# Patient Record
Sex: FEMALE | Race: OTHER | HISPANIC OR LATINO | URBAN - METROPOLITAN AREA
[De-identification: names, ages, dates, MRNs, and addresses within clinical notes are randomized per-mention and may not be internally consistent; named-entity substitution may affect disease eponyms.]

---

## 2018-10-01 ENCOUNTER — OUTPATIENT (OUTPATIENT)
Dept: OUTPATIENT SERVICES | Facility: HOSPITAL | Age: 83
LOS: 1 days | End: 2018-10-01
Payer: MEDICAID

## 2018-10-01 PROCEDURE — G9001: CPT

## 2018-10-18 ENCOUNTER — EMERGENCY (EMERGENCY)
Facility: HOSPITAL | Age: 83
LOS: 1 days | Discharge: OTHER INSTITUTION | End: 2018-10-18
Attending: STUDENT IN AN ORGANIZED HEALTH CARE EDUCATION/TRAINING PROGRAM
Payer: COMMERCIAL

## 2018-10-18 VITALS
HEART RATE: 83 BPM | OXYGEN SATURATION: 97 % | DIASTOLIC BLOOD PRESSURE: 121 MMHG | TEMPERATURE: 98 F | RESPIRATION RATE: 20 BRPM | SYSTOLIC BLOOD PRESSURE: 189 MMHG

## 2018-10-18 LAB
ALBUMIN SERPL ELPH-MCNC: 4 G/DL — SIGNIFICANT CHANGE UP (ref 3.3–5.2)
ALP SERPL-CCNC: 90 U/L — SIGNIFICANT CHANGE UP (ref 40–120)
ALT FLD-CCNC: 8 U/L — SIGNIFICANT CHANGE UP
ANION GAP SERPL CALC-SCNC: 13 MMOL/L — SIGNIFICANT CHANGE UP (ref 5–17)
APTT BLD: 23.9 SEC — LOW (ref 27.5–37.4)
AST SERPL-CCNC: 15 U/L — SIGNIFICANT CHANGE UP
BASOPHILS # BLD AUTO: 0 K/UL — SIGNIFICANT CHANGE UP (ref 0–0.2)
BASOPHILS NFR BLD AUTO: 0.4 % — SIGNIFICANT CHANGE UP (ref 0–2)
BILIRUB SERPL-MCNC: 0.3 MG/DL — LOW (ref 0.4–2)
BLD GP AB SCN SERPL QL: SIGNIFICANT CHANGE UP
BUN SERPL-MCNC: 24 MG/DL — HIGH (ref 8–20)
CALCIUM SERPL-MCNC: 9.3 MG/DL — SIGNIFICANT CHANGE UP (ref 8.6–10.2)
CHLORIDE SERPL-SCNC: 105 MMOL/L — SIGNIFICANT CHANGE UP (ref 98–107)
CK SERPL-CCNC: 49 U/L — SIGNIFICANT CHANGE UP (ref 25–170)
CO2 SERPL-SCNC: 23 MMOL/L — SIGNIFICANT CHANGE UP (ref 22–29)
CREAT SERPL-MCNC: 1.13 MG/DL — SIGNIFICANT CHANGE UP (ref 0.5–1.3)
EOSINOPHIL # BLD AUTO: 0.3 K/UL — SIGNIFICANT CHANGE UP (ref 0–0.5)
EOSINOPHIL NFR BLD AUTO: 2.4 % — SIGNIFICANT CHANGE UP (ref 0–6)
GLUCOSE SERPL-MCNC: 139 MG/DL — HIGH (ref 70–115)
HCT VFR BLD CALC: 37.2 % — SIGNIFICANT CHANGE UP (ref 37–47)
HGB BLD-MCNC: 11.9 G/DL — LOW (ref 12–16)
INR BLD: 1.02 RATIO — SIGNIFICANT CHANGE UP (ref 0.88–1.16)
LYMPHOCYTES # BLD AUTO: 37.3 % — SIGNIFICANT CHANGE UP (ref 20–55)
LYMPHOCYTES # BLD AUTO: 4.7 K/UL — SIGNIFICANT CHANGE UP (ref 1–4.8)
MCHC RBC-ENTMCNC: 29.8 PG — SIGNIFICANT CHANGE UP (ref 27–31)
MCHC RBC-ENTMCNC: 32 G/DL — SIGNIFICANT CHANGE UP (ref 32–36)
MCV RBC AUTO: 93.2 FL — SIGNIFICANT CHANGE UP (ref 81–99)
MONOCYTES # BLD AUTO: 0.7 K/UL — SIGNIFICANT CHANGE UP (ref 0–0.8)
MONOCYTES NFR BLD AUTO: 5.8 % — SIGNIFICANT CHANGE UP (ref 3–10)
NEUTROPHILS # BLD AUTO: 6.7 K/UL — SIGNIFICANT CHANGE UP (ref 1.8–8)
NEUTROPHILS NFR BLD AUTO: 53.9 % — SIGNIFICANT CHANGE UP (ref 37–73)
PLATELET # BLD AUTO: 315 K/UL — SIGNIFICANT CHANGE UP (ref 150–400)
POTASSIUM SERPL-MCNC: 4.5 MMOL/L — SIGNIFICANT CHANGE UP (ref 3.5–5.3)
POTASSIUM SERPL-SCNC: 4.5 MMOL/L — SIGNIFICANT CHANGE UP (ref 3.5–5.3)
PROT SERPL-MCNC: 7.4 G/DL — SIGNIFICANT CHANGE UP (ref 6.6–8.7)
PROTHROM AB SERPL-ACNC: 11.2 SEC — SIGNIFICANT CHANGE UP (ref 9.8–12.7)
RBC # BLD: 3.99 M/UL — LOW (ref 4.4–5.2)
RBC # FLD: 13.6 % — SIGNIFICANT CHANGE UP (ref 11–15.6)
SODIUM SERPL-SCNC: 141 MMOL/L — SIGNIFICANT CHANGE UP (ref 135–145)
TROPONIN T SERPL-MCNC: <0.01 NG/ML — SIGNIFICANT CHANGE UP (ref 0–0.06)
TYPE + AB SCN PNL BLD: SIGNIFICANT CHANGE UP
WBC # BLD: 12.5 K/UL — HIGH (ref 4.8–10.8)
WBC # FLD AUTO: 12.5 K/UL — HIGH (ref 4.8–10.8)

## 2018-10-18 PROCEDURE — 70496 CT ANGIOGRAPHY HEAD: CPT | Mod: 26

## 2018-10-18 PROCEDURE — 71045 X-RAY EXAM CHEST 1 VIEW: CPT | Mod: 26

## 2018-10-18 PROCEDURE — 70450 CT HEAD/BRAIN W/O DYE: CPT | Mod: 26,59

## 2018-10-18 PROCEDURE — 70498 CT ANGIOGRAPHY NECK: CPT | Mod: 26

## 2018-10-18 PROCEDURE — 99285 EMERGENCY DEPT VISIT HI MDM: CPT | Mod: 25

## 2018-10-18 PROCEDURE — 93010 ELECTROCARDIOGRAM REPORT: CPT

## 2018-10-18 RX ORDER — ALTEPLASE 100 MG
64 KIT INTRAVENOUS ONCE
Qty: 0 | Refills: 0 | Status: COMPLETED | OUTPATIENT
Start: 2018-10-18 | End: 2018-10-18

## 2018-10-18 RX ORDER — ONDANSETRON 8 MG/1
4 TABLET, FILM COATED ORAL ONCE
Qty: 0 | Refills: 0 | Status: COMPLETED | OUTPATIENT
Start: 2018-10-18 | End: 2018-10-18

## 2018-10-18 RX ORDER — ALTEPLASE 100 MG
7 KIT INTRAVENOUS ONCE
Qty: 0 | Refills: 0 | Status: COMPLETED | OUTPATIENT
Start: 2018-10-18 | End: 2018-10-18

## 2018-10-18 RX ADMIN — ONDANSETRON 4 MILLIGRAM(S): 8 TABLET, FILM COATED ORAL at 23:15

## 2018-10-18 RX ADMIN — ALTEPLASE 64 MILLIGRAM(S): KIT at 23:28

## 2018-10-18 RX ADMIN — ALTEPLASE 420 MILLIGRAM(S): KIT at 23:26

## 2018-10-18 NOTE — ED ADULT NURSE NOTE - NSIMPLEMENTINTERV_GEN_ALL_ED
Implemented All Fall with Harm Risk Interventions:  Foreman to call system. Call bell, personal items and telephone within reach. Instruct patient to call for assistance. Room bathroom lighting operational. Non-slip footwear when patient is off stretcher. Physically safe environment: no spills, clutter or unnecessary equipment. Stretcher in lowest position, wheels locked, appropriate side rails in place. Provide visual cue, wrist band, yellow gown, etc. Monitor gait and stability. Monitor for mental status changes and reorient to person, place, and time. Review medications for side effects contributing to fall risk. Reinforce activity limits and safety measures with patient and family. Provide visual clues: red socks.

## 2018-10-18 NOTE — ED PROVIDER NOTE - PROGRESS NOTE DETAILS
Case discussed via telestroke Dr. Yassine Evans pt getting CT scan Case discussed with Dr. De Santiago via telestroke Case discussed with Dr. De Santiago via telestroke. Pt is a candidate for tPA. Radiologist called regarding CT results. R HEATHER noted. Pt is a candidate for tPA as per Dr. De Santiago via telestroke. Call placed to pharmacy to prepare tPA. Pt is a candidate for tPA as per Dr. De Santiago via telestroke. Call placed to pharmacy to prepare tPA. Risks vs benefits of tPA discussed with family member. Family member understands risk and is agreeable to administration of the tPA. tPA administered. Dr. De Santiago requesting CTA for possible intervention. Medically urgent to do even though we do not have labs or blood work. Family understands all risks and benefits and they are agreeable to plan. 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 Dr. De Santiago aware. Transfer initiated Radiologist called regarding CT results. dense L MCA noted. extensive thrombosis from central internal carotid to L MCA. Dr. De Santiago aware. Transfer initiated

## 2018-10-18 NOTE — STROKE CODE NOTE - NIH STROKE SCALE: 4. FACIAL PALSY, QM
(1) Minor paralysis (flattened nasolabial fold, asymmetry on smiling) (3) Complete paralysis of one or both sides (absence of facial movement in the upper and lower face)

## 2018-10-18 NOTE — ED ADULT TRIAGE NOTE - ARRIVAL INFO ADDITIONAL COMMENTS
EMS pre-notified of Code Stroke. Right Sided droop. not following commands, non-verbal EMS pre-notified of possible Code Stroke @10:41. Right Sided droop. not following commands, non-verbal

## 2018-10-18 NOTE — ED PROVIDER NOTE - DIAGNOSIS COUNSELING, MDM
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 Patient was lost to follow-up with onc for management of his malignancies, they were subsequently consulted upon his admission and obtained a CTAP which revealed partial gastrectomy, previously noted soft tissue mass at the gastrectomy margin not well demonstrated on this study. No evidence of bowel obstruction. A new soft tissue mass in the right pelvis (2:100) measures 4.5 x 3.2 cm. This abuts the right aspect of the rectum and is concerning for peritoneal disease. GI consulted for further recommendations regarding pursuit of tissue biopsy.    Allergies:  No Known Allergies    Home Medications:    Hospital Medications:  amLODIPine   Tablet 5 milliGRAM(s) Oral daily  aspirin  chewable 81 milliGRAM(s) Oral daily  atorvastatin 80 milliGRAM(s) Oral at bedtime  clopidogrel Tablet 75 milliGRAM(s) Oral daily  fentaNYL     Lozenge 200 MICROGram(s) Transmucosal every 3 hours PRN  gabapentin 300 milliGRAM(s) Oral three times a day  heparin  Injectable 5000 Unit(s) SubCutaneous every 8 hours  levETIRAcetam  Solution 500 milliGRAM(s) Oral two times a day  pantoprazole   Suspension 40 milliGRAM(s) Oral daily  polyethylene glycol 3350 17 Gram(s) Oral daily  QUEtiapine 25 milliGRAM(s) Oral daily  senna 2 Tablet(s) Oral at bedtime  sodium chloride 0.45%. 1000 milliLiter(s) IV Continuous <Continuous>      PMHX/PSHX:  Prostate hypertrophy  HLD (hyperlipidemia)  Agitation  Depression  Stroke  Syncope  HTN (hypertension)  Gastric tumor  Gastric ulcer with hemorrhage, perforation, and obstruction, acute  No Past Surgical History    Family history:  No pertinent family history in first degree relatives    Social History:     ROS:     General:  No wt loss, fevers, chills, night sweats, fatigue,   Eyes:  Good vision, no reported pain  ENT:  No sore throat, pain, runny nose, dysphagia  CV:  No pain, palpitations, hypo/hypertension  Resp:  No dyspnea, cough, tachypnea, wheezing  GI:  See HPI  :  No pain, bleeding, incontinence, nocturia  Muscle:  No pain, weakness  Neuro:  No weakness, tingling, memory problems  Psych:  No fatigue, insomnia, mood problems, depression  Endocrine:  No polyuria, polydipsia, cold/heat intolerance  Heme:  No petechiae, ecchymosis, easy bruisability  Skin:  No rash, edema      PHYSICAL EXAM:   Vital Signs:  Vital Signs Last 24 Hrs  T(C): 36.6 (29 Nov 2017 11:23), Max: 36.7 (29 Nov 2017 04:50)  T(F): 97.8 (29 Nov 2017 11:23), Max: 98 (29 Nov 2017 04:50)  HR: 68 (29 Nov 2017 11:23) (60 - 88)  BP: 146/85 (29 Nov 2017 11:23) (138/80 - 162/95)  BP(mean): --  RR: 18 (29 Nov 2017 11:23) (18 - 18)  SpO2: 96% (29 Nov 2017 11:23) (95% - 97%)  Daily     Daily     GENERAL:  Appears stated age, well-groomed, well-nourished, no distress  HEENT:  NC/AT,  conjunctivae clear and pink,  no JVD  CHEST:  Full & symmetric excursion, no increased effort, breath sounds clear  HEART:  Regular rhythm, S1, S2, no murmur/rub/S3/S4, no abdominal bruit, no edema  ABDOMEN:  Soft, non-tender, non-distended, normoactive bowel sounds,  no masses ,  EXTREMITIES:  no cyanosis,clubbing or edema  SKIN:  No rash/erythema/ecchymoses/petechiae/wounds/abscess/warm/dry  NEURO:  Alert, oriented      LABS:                        12.5   3.58  )-----------( 168      ( 29 Nov 2017 07:14 )             36.0     11-29    139  |  98  |  18  ----------------------------<  112<H>  4.1   |  27  |  0.96    Ca    9.8      29 Nov 2017 07:12    Imaging:  < from: CT Abdomen and Pelvis w/ IV Cont (11.26.17 @ 21:15) >  IMPRESSION:     New large soft tissue mass in the right pelvis concerning for peritoneal   disease.    No acute pathology.    A 4.4 cm suprarenal abdominal aortic aneurysm without change.    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 Patient was lost to follow-up with onc for management of his malignancies, they were subsequently consulted upon his admission and obtained a CTAP which revealed partial gastrectomy, previously noted soft tissue mass at the gastrectomy margin not well demonstrated on this study. No evidence of bowel obstruction. A new soft tissue mass in the right pelvis (2:100) measures 4.5 x 3.2 cm. This abuts the right aspect of the rectum and is concerning for peritoneal disease. GI consulted for further recommendations regarding pursuit of tissue biopsy.    Allergies:  No Known Allergies    Home Medications:    Hospital Medications:  amLODIPine   Tablet 5 milliGRAM(s) Oral daily  aspirin  chewable 81 milliGRAM(s) Oral daily  atorvastatin 80 milliGRAM(s) Oral at bedtime  clopidogrel Tablet 75 milliGRAM(s) Oral daily  fentaNYL     Lozenge 200 MICROGram(s) Transmucosal every 3 hours PRN  gabapentin 300 milliGRAM(s) Oral three times a day  heparin  Injectable 5000 Unit(s) SubCutaneous every 8 hours  levETIRAcetam  Solution 500 milliGRAM(s) Oral two times a day  pantoprazole   Suspension 40 milliGRAM(s) Oral daily  polyethylene glycol 3350 17 Gram(s) Oral daily  QUEtiapine 25 milliGRAM(s) Oral daily  senna 2 Tablet(s) Oral at bedtime  sodium chloride 0.45%. 1000 milliLiter(s) IV Continuous <Continuous>      PMHX/PSHX:  Prostate hypertrophy  HLD (hyperlipidemia)  Agitation  Depression  Stroke  Syncope  HTN (hypertension)  Gastric tumor  Gastric ulcer with hemorrhage, perforation, and obstruction, acute  No Past Surgical History    Family history:  No pertinent family history in first degree relatives    Social History:     ROS:     General:  No wt loss, fevers, chills, night sweats, fatigue,   Eyes:  Good vision, no reported pain  ENT:  No sore throat, pain, runny nose, dysphagia  CV:  No pain, palpitations, hypo/hypertension  Resp:  No dyspnea, cough, tachypnea, wheezing  GI:  See HPI  :  No pain, bleeding, incontinence, nocturia  Muscle:  No pain, weakness  Neuro:  No weakness, tingling, memory problems  Psych:  No fatigue, insomnia, mood problems, depression  Endocrine:  No polyuria, polydipsia, cold/heat intolerance  Heme:  No petechiae, ecchymosis, easy bruisability  Skin:  No rash, edema      PHYSICAL EXAM:   Vital Signs:  Vital Signs Last 24 Hrs  T(C): 36.6 (29 Nov 2017 11:23), Max: 36.7 (29 Nov 2017 04:50)  T(F): 97.8 (29 Nov 2017 11:23), Max: 98 (29 Nov 2017 04:50)  HR: 68 (29 Nov 2017 11:23) (60 - 88)  BP: 146/85 (29 Nov 2017 11:23) (138/80 - 162/95)  BP(mean): --  RR: 18 (29 Nov 2017 11:23) (18 - 18)  SpO2: 96% (29 Nov 2017 11:23) (95% - 97%)  Daily     Daily     GENERAL:  Appears stated age, well-groomed, well-nourished, no distress  HEENT:  NC/AT,  conjunctivae clear and pink,  no JVD  CHEST:  Full & symmetric excursion, no increased effort, breath sounds clear  HEART:  Regular rhythm, S1, S2, no murmur/rub/S3/S4, no abdominal bruit, no edema  ABDOMEN:  Soft, non-tender, non-distended, normoactive bowel sounds,  no masses , +PEG  EXTREMITIES:  no cyanosis,clubbing or edema  SKIN:  No rash/erythema/ecchymoses/petechiae/wounds/abscess/warm/dry  NEURO:  Alert, AAOx0      LABS:                        12.5   3.58  )-----------( 168      ( 29 Nov 2017 07:14 )             36.0     11-29    139  |  98  |  18  ----------------------------<  112<H>  4.1   |  27  |  0.96    Ca    9.8      29 Nov 2017 07:12    Imaging:  < from: CT Abdomen and Pelvis w/ IV Cont (11.26.17 @ 21:15) >  IMPRESSION:     New large soft tissue mass in the right pelvis concerning for peritoneal   disease.    No acute pathology.    A 4.4 cm suprarenal abdominal aortic aneurysm without change.    < end of copied text > conducted a detailed discussion... I had a detailed discussion with the patient and/or guardian regarding the historical points, exam findings, and any diagnostic results supporting the discharge/admit diagnosis.

## 2018-10-18 NOTE — STROKE CODE NOTE - NIH STROKE SCALE: 1A. LEVEL OF CONSCIOUSNESS, QM
(0) Alert; keenly responsive (3) Responds only with reflex motor or autonomic effects or totally unresponsive, flaccid, and areflexic

## 2018-10-18 NOTE — ED PROVIDER NOTE - OBJECTIVE STATEMENT
88 y/o female with ah x of DM, osteoporosis, HTN, iron deficiency, neuropathy and HDL presents to the ED BIBA as a code stroke alert. CODE STROKE called upon arrival at 2254. As per family at bedside, pt was last seen normal at 2210 watching TV. Family found pt on the floor at 2220 and activated EMS. Upon arrival to the ED, grandson noted that pt was non-verbal and has a right sided facial droop, which are both new deficits. She is not on blood thinners anymore according to grandson, she was originally started on blood thinners for a blood clot. HPI and ROS limited secondary to pt's critical condition. 88 y/o female with ah x of DM, osteoporosis, HTN, iron deficiency, neuropathy and HDL presents to the ED BIBA as a code stroke alert. CODE STROKE called upon arrival at 2254. As per family at bedside, pt was last seen normal at 2210 watching TV. Family found pt on the floor at 2220 and activated EMS. Upon arrival to the ED, grandson noted that pt was non-verbal and has a right sided facial droop, which are both new deficits. She is not on blood thinners anymore according to grandson, she was originally started on blood thinners for a blood clot. Pt usually is AO*3 at baseline and has no hx of neurological deficits according to grandson.  HPI and ROS limited secondary to pt's critical condition.

## 2018-10-18 NOTE — STROKE CODE NOTE - NIH STROKE SCALE: 5A. MOTOR ARM, LEFT, QM
(3) No effort against gravity; limb falls (0) No drift; limb holds 90 (or 45) degrees for full 10 secs

## 2018-10-18 NOTE — STROKE CODE NOTE - NIH STROKE SCALE: 6B. MOTOR LEG, RIGHT, QM
(2) Some effort against gravity; leg falls to bed by 5 secs, but has some effort against gravity (3) No effort against gravity; leg falls to bed immediately

## 2018-10-18 NOTE — STROKE CODE NOTE - SUBJECTIVE
Patient seen and examined via telestroke. 87 functionally independent. Acute onset of L MCA syndrome. CT without acute L dense MCA. Risks tpa discussed with family including life threatening bleeding they agree to proceed. IVtpa per protocol. STAT CTA and potential transfer to NS for endovascular therapy.

## 2018-10-18 NOTE — ED ADULT TRIAGE NOTE - CHIEF COMPLAINT QUOTE
As per EMS found on floor, family states last known well 10:10 pm. Normally ambulatory, verbal, and A&Ox3. In ED pt. nonverbal, not following commands. droop noted. MD Akers called to bedside. Code Stroke initiated.

## 2018-10-18 NOTE — ED ADULT NURSE NOTE - OBJECTIVE STATEMENT
Pt brought In with new onset facial droop and unresponsiveness.  Patient usually A&Ox4 and independent at baseline as per granddebbie.  Code STROKE activated.  Code team and MD Akers at bedside. As per grandson last known well today at 2210.   Caridad at bedside during assessment and interventions.  TPA pushed by Dr. Akers.  Patient showed improvement with administration of TPA, now responding to voice, following basic commands (squeeze hands, smile, wiggle toes) but remains non verbal.  Patient's right pupil 3mm and sluggish, left 3mm and brisk.  Respirations even and unlabored.  2 episodes of vomiting, zofran administered as ordered.  Vitals remained stable throughout.  [facility] transportation service arrived and transported patient at 0035.

## 2018-10-19 ENCOUNTER — TRANSCRIPTION ENCOUNTER (OUTPATIENT)
Age: 83
End: 2018-10-19

## 2018-10-19 ENCOUNTER — INPATIENT (INPATIENT)
Facility: HOSPITAL | Age: 83
LOS: 12 days | Discharge: HOME | DRG: 23 | End: 2018-11-01
Attending: PSYCHIATRY & NEUROLOGY | Admitting: PSYCHIATRY & NEUROLOGY
Payer: MEDICAID

## 2018-10-19 ENCOUNTER — APPOINTMENT (OUTPATIENT)
Dept: NEUROLOGY | Facility: CLINIC | Age: 83
End: 2018-10-19

## 2018-10-19 VITALS
TEMPERATURE: 98 F | OXYGEN SATURATION: 97 % | SYSTOLIC BLOOD PRESSURE: 157 MMHG | RESPIRATION RATE: 18 BRPM | HEART RATE: 70 BPM | DIASTOLIC BLOOD PRESSURE: 125 MMHG

## 2018-10-19 VITALS
SYSTOLIC BLOOD PRESSURE: 129 MMHG | HEART RATE: 73 BPM | OXYGEN SATURATION: 97 % | RESPIRATION RATE: 17 BRPM | DIASTOLIC BLOOD PRESSURE: 69 MMHG

## 2018-10-19 DIAGNOSIS — I63.9 CEREBRAL INFARCTION, UNSPECIFIED: ICD-10-CM

## 2018-10-19 LAB
ANION GAP SERPL CALC-SCNC: 12 MMOL/L — SIGNIFICANT CHANGE UP (ref 5–17)
ANION GAP SERPL CALC-SCNC: 12 MMOL/L — SIGNIFICANT CHANGE UP (ref 5–17)
APTT BLD: 29.7 SEC — SIGNIFICANT CHANGE UP (ref 27.5–37.4)
BLD GP AB SCN SERPL QL: NEGATIVE — SIGNIFICANT CHANGE UP
BUN SERPL-MCNC: 19 MG/DL — SIGNIFICANT CHANGE UP (ref 7–23)
BUN SERPL-MCNC: 22 MG/DL — SIGNIFICANT CHANGE UP (ref 7–23)
CALCIUM SERPL-MCNC: 7.9 MG/DL — LOW (ref 8.4–10.5)
CALCIUM SERPL-MCNC: 8.2 MG/DL — LOW (ref 8.4–10.5)
CHLORIDE SERPL-SCNC: 107 MMOL/L — SIGNIFICANT CHANGE UP (ref 96–108)
CHLORIDE SERPL-SCNC: 112 MMOL/L — HIGH (ref 96–108)
CHOLEST SERPL-MCNC: 167 MG/DL — SIGNIFICANT CHANGE UP (ref 10–199)
CO2 SERPL-SCNC: 19 MMOL/L — LOW (ref 22–31)
CO2 SERPL-SCNC: 20 MMOL/L — LOW (ref 22–31)
CREAT SERPL-MCNC: 1.01 MG/DL — SIGNIFICANT CHANGE UP (ref 0.5–1.3)
CREAT SERPL-MCNC: 1.04 MG/DL — SIGNIFICANT CHANGE UP (ref 0.5–1.3)
FIBRINOGEN PPP-MCNC: 407 MG/DL — SIGNIFICANT CHANGE UP (ref 310–510)
FSP PPP-MCNC: >=20 — SIGNIFICANT CHANGE UP
GAS PNL BLDA: SIGNIFICANT CHANGE UP
GLUCOSE SERPL-MCNC: 121 MG/DL — HIGH (ref 70–99)
GLUCOSE SERPL-MCNC: 173 MG/DL — HIGH (ref 70–99)
HBA1C BLD-MCNC: 6 % — HIGH (ref 4–5.6)
HCT VFR BLD CALC: 32.9 % — LOW (ref 34.5–45)
HDLC SERPL-MCNC: 52 MG/DL — SIGNIFICANT CHANGE UP
HGB BLD-MCNC: 11.2 G/DL — LOW (ref 11.5–15.5)
INR BLD: 1.08 RATIO — SIGNIFICANT CHANGE UP (ref 0.88–1.16)
LIPID PNL WITH DIRECT LDL SERPL: 100 MG/DL — SIGNIFICANT CHANGE UP
MAGNESIUM SERPL-MCNC: 1.6 MG/DL — SIGNIFICANT CHANGE UP (ref 1.6–2.6)
MCHC RBC-ENTMCNC: 31.2 PG — SIGNIFICANT CHANGE UP (ref 27–34)
MCHC RBC-ENTMCNC: 33.9 GM/DL — SIGNIFICANT CHANGE UP (ref 32–36)
MCV RBC AUTO: 92.2 FL — SIGNIFICANT CHANGE UP (ref 80–100)
PHOSPHATE SERPL-MCNC: 3.1 MG/DL — SIGNIFICANT CHANGE UP (ref 2.5–4.5)
PLATELET # BLD AUTO: 273 K/UL — SIGNIFICANT CHANGE UP (ref 150–400)
POTASSIUM SERPL-MCNC: 4.3 MMOL/L — SIGNIFICANT CHANGE UP (ref 3.5–5.3)
POTASSIUM SERPL-MCNC: 4.5 MMOL/L — SIGNIFICANT CHANGE UP (ref 3.5–5.3)
POTASSIUM SERPL-SCNC: 4.3 MMOL/L — SIGNIFICANT CHANGE UP (ref 3.5–5.3)
POTASSIUM SERPL-SCNC: 4.5 MMOL/L — SIGNIFICANT CHANGE UP (ref 3.5–5.3)
PROTHROM AB SERPL-ACNC: 11.8 SEC — SIGNIFICANT CHANGE UP (ref 9.8–12.7)
RBC # BLD: 3.57 M/UL — LOW (ref 3.8–5.2)
RBC # FLD: 12.2 % — SIGNIFICANT CHANGE UP (ref 10.3–14.5)
RH IG SCN BLD-IMP: POSITIVE — SIGNIFICANT CHANGE UP
RH IG SCN BLD-IMP: POSITIVE — SIGNIFICANT CHANGE UP
SODIUM SERPL-SCNC: 139 MMOL/L — SIGNIFICANT CHANGE UP (ref 135–145)
SODIUM SERPL-SCNC: 143 MMOL/L — SIGNIFICANT CHANGE UP (ref 135–145)
T3 SERPL-MCNC: 96 NG/DL — SIGNIFICANT CHANGE UP (ref 80–200)
T4 AB SER-ACNC: 8.1 UG/DL — SIGNIFICANT CHANGE UP (ref 4.6–12)
TOTAL CHOLESTEROL/HDL RATIO MEASUREMENT: 3.2 RATIO — LOW (ref 3.3–7.1)
TRIGL SERPL-MCNC: 75 MG/DL — SIGNIFICANT CHANGE UP (ref 10–149)
TSH SERPL-MCNC: 3.25 UIU/ML — SIGNIFICANT CHANGE UP (ref 0.27–4.2)
WBC # BLD: 13 K/UL — HIGH (ref 3.8–10.5)
WBC # FLD AUTO: 13 K/UL — HIGH (ref 3.8–10.5)

## 2018-10-19 PROCEDURE — 71045 X-RAY EXAM CHEST 1 VIEW: CPT | Mod: 26

## 2018-10-19 PROCEDURE — 99285 EMERGENCY DEPT VISIT HI MDM: CPT | Mod: 25

## 2018-10-19 PROCEDURE — 70450 CT HEAD/BRAIN W/O DYE: CPT | Mod: 26

## 2018-10-19 PROCEDURE — 93005 ELECTROCARDIOGRAM TRACING: CPT

## 2018-10-19 PROCEDURE — 85730 THROMBOPLASTIN TIME PARTIAL: CPT

## 2018-10-19 PROCEDURE — 36415 COLL VENOUS BLD VENIPUNCTURE: CPT

## 2018-10-19 PROCEDURE — 86850 RBC ANTIBODY SCREEN: CPT

## 2018-10-19 PROCEDURE — 99292 CRITICAL CARE ADDL 30 MIN: CPT

## 2018-10-19 PROCEDURE — 70496 CT ANGIOGRAPHY HEAD: CPT

## 2018-10-19 PROCEDURE — T1013: CPT

## 2018-10-19 PROCEDURE — 84484 ASSAY OF TROPONIN QUANT: CPT

## 2018-10-19 PROCEDURE — 80053 COMPREHEN METABOLIC PANEL: CPT

## 2018-10-19 PROCEDURE — 99291 CRITICAL CARE FIRST HOUR: CPT

## 2018-10-19 PROCEDURE — 70450 CT HEAD/BRAIN W/O DYE: CPT | Mod: 26,76

## 2018-10-19 PROCEDURE — 61645 PERQ ART M-THROMBECT &/NFS: CPT | Mod: LT

## 2018-10-19 PROCEDURE — 86901 BLOOD TYPING SEROLOGIC RH(D): CPT

## 2018-10-19 PROCEDURE — 96375 TX/PRO/DX INJ NEW DRUG ADDON: CPT | Mod: XU

## 2018-10-19 PROCEDURE — 70450 CT HEAD/BRAIN W/O DYE: CPT

## 2018-10-19 PROCEDURE — 85027 COMPLETE CBC AUTOMATED: CPT

## 2018-10-19 PROCEDURE — 70498 CT ANGIOGRAPHY NECK: CPT

## 2018-10-19 PROCEDURE — 85610 PROTHROMBIN TIME: CPT

## 2018-10-19 PROCEDURE — 71045 X-RAY EXAM CHEST 1 VIEW: CPT

## 2018-10-19 PROCEDURE — 93010 ELECTROCARDIOGRAM REPORT: CPT | Mod: 59

## 2018-10-19 PROCEDURE — 96374 THER/PROPH/DIAG INJ IV PUSH: CPT | Mod: XU

## 2018-10-19 PROCEDURE — 82550 ASSAY OF CK (CPK): CPT

## 2018-10-19 PROCEDURE — 86900 BLOOD TYPING SEROLOGIC ABO: CPT

## 2018-10-19 PROCEDURE — 93306 TTE W/DOPPLER COMPLETE: CPT | Mod: 26

## 2018-10-19 PROCEDURE — 37195 THROMBOLYTIC THERAPY STROKE: CPT

## 2018-10-19 RX ORDER — ATORVASTATIN CALCIUM 80 MG/1
80 TABLET, FILM COATED ORAL AT BEDTIME
Qty: 0 | Refills: 0 | Status: DISCONTINUED | OUTPATIENT
Start: 2018-10-19 | End: 2018-11-01

## 2018-10-19 RX ORDER — PHENYLEPHRINE HYDROCHLORIDE 10 MG/ML
0.2 INJECTION INTRAVENOUS
Qty: 40 | Refills: 0 | Status: DISCONTINUED | OUTPATIENT
Start: 2018-10-19 | End: 2018-10-20

## 2018-10-19 RX ORDER — PANTOPRAZOLE SODIUM 20 MG/1
40 TABLET, DELAYED RELEASE ORAL DAILY
Qty: 0 | Refills: 0 | Status: DISCONTINUED | OUTPATIENT
Start: 2018-10-19 | End: 2018-10-20

## 2018-10-19 RX ORDER — CHLORHEXIDINE GLUCONATE 213 G/1000ML
15 SOLUTION TOPICAL
Qty: 0 | Refills: 0 | Status: DISCONTINUED | OUTPATIENT
Start: 2018-10-19 | End: 2018-10-20

## 2018-10-19 RX ORDER — CALCIUM GLUCONATE 100 MG/ML
2 VIAL (ML) INTRAVENOUS ONCE
Qty: 0 | Refills: 0 | Status: COMPLETED | OUTPATIENT
Start: 2018-10-19 | End: 2018-10-19

## 2018-10-19 RX ORDER — SODIUM CHLORIDE 9 MG/ML
1000 INJECTION INTRAMUSCULAR; INTRAVENOUS; SUBCUTANEOUS
Qty: 0 | Refills: 0 | Status: DISCONTINUED | OUTPATIENT
Start: 2018-10-19 | End: 2018-10-19

## 2018-10-19 RX ORDER — SODIUM CHLORIDE 5 G/100ML
1000 INJECTION, SOLUTION INTRAVENOUS
Qty: 0 | Refills: 0 | Status: DISCONTINUED | OUTPATIENT
Start: 2018-10-19 | End: 2018-10-21

## 2018-10-19 RX ORDER — INFLUENZA VIRUS VACCINE 15; 15; 15; 15 UG/.5ML; UG/.5ML; UG/.5ML; UG/.5ML
0.5 SUSPENSION INTRAMUSCULAR ONCE
Qty: 0 | Refills: 0 | Status: DISCONTINUED | OUTPATIENT
Start: 2018-10-19 | End: 2018-11-01

## 2018-10-19 RX ADMIN — PHENYLEPHRINE HYDROCHLORIDE 5.8 MICROGRAM(S)/KG/MIN: 10 INJECTION INTRAVENOUS at 17:46

## 2018-10-19 RX ADMIN — PHENYLEPHRINE HYDROCHLORIDE 5.8 MICROGRAM(S)/KG/MIN: 10 INJECTION INTRAVENOUS at 11:34

## 2018-10-19 RX ADMIN — SODIUM CHLORIDE 60 MILLILITER(S): 5 INJECTION, SOLUTION INTRAVENOUS at 20:00

## 2018-10-19 RX ADMIN — SODIUM CHLORIDE 60 MILLILITER(S): 9 INJECTION INTRAMUSCULAR; INTRAVENOUS; SUBCUTANEOUS at 06:16

## 2018-10-19 RX ADMIN — PANTOPRAZOLE SODIUM 40 MILLIGRAM(S): 20 TABLET, DELAYED RELEASE ORAL at 11:33

## 2018-10-19 RX ADMIN — PHENYLEPHRINE HYDROCHLORIDE 5.8 MICROGRAM(S)/KG/MIN: 10 INJECTION INTRAVENOUS at 06:16

## 2018-10-19 RX ADMIN — SODIUM CHLORIDE 60 MILLILITER(S): 5 INJECTION, SOLUTION INTRAVENOUS at 17:46

## 2018-10-19 RX ADMIN — Medication 200 GRAM(S): at 06:16

## 2018-10-19 RX ADMIN — SODIUM CHLORIDE 60 MILLILITER(S): 5 INJECTION, SOLUTION INTRAVENOUS at 11:34

## 2018-10-19 NOTE — ED PROVIDER NOTE - OBJECTIVE STATEMENT
87F presenting as code stroke - transferred from [facility]. Last normal was 2210, tPA was given at 2336 at [facility]. Aphasic in ED.

## 2018-10-19 NOTE — DISCHARGE NOTE ADULT - CARE PROVIDER_API CALL
Kerri Yun (NP; RN), NP in Family Health  [address] [facility]  [address]  [place] [address]  Phone: [phone]  Fax: [phone]    Thien Portillo (MD), Neurology; Vascular Neurology  [address] [place] [address] [address]  [place] [address]  Phone: [phone]  Fax: [phone]    Kenneth Chand (), Cardiology; Internal Medicine  [address] [place] [address]  [address]  [place] [address]  Phone: [phone]  Fax: [phone]

## 2018-10-19 NOTE — PROGRESS NOTE ADULT - SUBJECTIVE AND OBJECTIVE BOX
EXAMINATION:  General:  intubated, sedated.  Neuro: PERRL, gaze midline, o/b the vent, LUE loc, LLE wdrl, RLE TF, RUE 0/5.  Cards:  S1S2 present  Respiratory:  no respiratory distress, intubated  Abdomen:  soft  Extremities:  no edema  Skin:  warm/dry    Assessment and Plan:   · Assessment	  ASSESSMENT/PLAN:  86y/o female with vascular risk factors with left internal carotid artery T occlusion. S/p tPA, S/p Partial recanalization of the M1 segment of the left middle cerebral artery occlusion, TICI 2a.  Concern for hemorrhagic conversion.    NEURO:  sedation Fentanyl PRN,   stroke core measures  CTh now  Will also get fibrinogen , coags, CBC, BMP, LFt  Activity: [] mobilize as tolerated [x] Bedrest [] PT [] OT [] PMNR    PULM:  keep vented  Get ABG  CXR unremarkable - ETT acceptable position,     CV:  SBP goal 110-160  EKG, ECHO  Needs an a-line    RENAL:  Fluids: IVF maintenance    GI:  Diet: NPO  GI prophylaxis [] not indicated [x] PPI [] other:  Bowel regimen [x] colace [x] senna [] other:    ENDO:   Goal euglycemia (-180)    HEME/ONC:  VTE prophylaxis: [x] SCDs [] chemoprophylaxis [x] hold chemoprophylaxis due to: post tPA[] high risk of DVT/PE on admission due to:    ID: afebrile.    SOCIAL/FAMILY:  [x] awaiting [] updated at bedside [] family meeting    CODE STATUS:  [x] Full Code [] DNR [] DNI [] Palliative/Comfort Care    DISPOSITION:  [x] ICU [] Stroke Unit [] Floor [] EMU [] RCU [] PCU    [x] Patient is at high risk of neurologic deterioration/death due to: cerebral bleeding.    Time spent: 45 critical care minutes    Contact: [phone] Events: weaned neosyneprhine  got CT head, shows left MCA ischemic stroke with contrasted extravasation midline shift     T(C): 37.1 (10-19-18 @ 19:00), Max: 37.5 (10-19-18 @ 15:00)  HR: 71 (10-19-18 @ 23:37) (50 - 96)  BP: 110/49 (10-19-18 @ 22:00) (81/61 - 171/79)  RR: 12 (10-19-18 @ 22:00) (12 - 23)  SpO2: 98% (10-19-18 @ 23:37) (97% - 100%)  10-18-18 @ 07:01  -  10-19-18 @ 07:00  --------------------------------------------------------  IN: 255 mL / OUT: 300 mL / NET: -45 mL    10-19-18 @ 07:01  -  10-19-18 @ 23:47  --------------------------------------------------------  IN: 1194.5 mL / OUT: 695 mL / NET: 499.5 mL    atorvastatin 80 milliGRAM(s) Oral at bedtime  chlorhexidine 0.12% Liquid 15 milliLiter(s) Oral Mucosa two times a day  influenza   Vaccine 0.5 milliLiter(s) IntraMuscular once  pantoprazole  Injectable 40 milliGRAM(s) IV Push daily  phenylephrine    Infusion 0.2 MICROgram(s)/kG/Min IV Continuous <Continuous>  sodium chloride 2% . 1000 milliLiter(s) IV Continuous <Continuous>    Mode: AC/ CMV (Assist Control/ Continuous Mandatory Ventilation), RR (machine): 12, TV (machine): 500, FiO2: 40, PEEP: 5, ITime: 1, MAP: 10, PIP: 25  EXAMINATION:  General:  intubated, sedated.  Neuro: PERRL, gaze midline, o/b the vent, LUE loc, LLE wdrl, RLE TF, RUE 0/5.  Cards:  S1S2 present  Respiratory:  no respiratory distress, intubated  Abdomen:  soft  Extremities:  no edema  Skin:  warm/dry      LABS:  Na: 143 (10-19 @ 15:46), 139 (10-19 @ 05:21)  K: 4.5 (10-19 @ 15:46), 4.3 (10-19 @ 05:21)  Cl: 112 (10-19 @ 15:46), 107 (10-19 @ 05:21)  CO2: 19 (10-19 @ 15:46), 20 (10-19 @ 05:21)  BUN: 19 (10-19 @ 15:46), 22 (10-19 @ 05:21)  Cr: 1.04 (10-19 @ 15:46), 1.01 (10-19 @ 05:21)  Glu: 121(10-19 @ 15:46), 173(10-19 @ 05:21)    Hgb: 11.2 (10-19 @ 05:21)  Hct: 32.9 (10-19 @ 05:21)  WBC: 13.0 (10-19 @ 05:21)  Plt: 273 (10-19 @ 05:21)    INR: 1.08 10-19-18 @ 05:21  PTT: 29.7 10-19-18 @ 05:21        Assessment and Plan:   86y/o female with vascular risk factors with left internal carotid artery T occlusion. S/p tPA, S/p Partial recanalization of the M1 segment of the left middle cerebral artery occlusion, TICI 2a.  Concern for hemorrhagic conversion.    NEURO:  sedation Fentanyl PRN,   stroke core measures  neuro checks q 1 hr  CT head stable, midline shift, cytotoxic edema     PULM:  on PPRVC, will place her on PS   Get ABG   if tolerates it, can attempt extubation   CXR unremarkable - ETT acceptable position,     CV:  SBP goal 110-160  on phenylephrine, wean it off   Needs an a-line    RENAL:  Fluids: 2% 60 ml/hr, keep sodium 140-150     GI:  Diet: NPO, will attempt extubation   euglycemia (-180)    HEME/ONC:  VTE prophylaxis: [x] SCDs [] chemoprophylaxis [x] hold chemoprophylaxis due to: post tPA[] high risk of DVT/PE on admission due to:    SOCIAL/FAMILY:  [x] awaiting [] updated at bedside [] family meeting    CODE STATUS:  [x] Full Code [] DNR [] DNI [] Palliative/Comfort Care    DISPOSITION:  [x] ICU [] Stroke Unit [] Floor [] EMU [] RCU [] PCU    [x] Patient is at high risk of neurologic deterioration/death due to: cerebral bleeding, brain compression     Time spent: 35 critical care minutes    Contact: [phone] Events: weaned neosyneprhine  got CT head, shows left MCA ischemic stroke with contrasted extravasation midline shift     T(C): 37.1 (10-19-18 @ 19:00), Max: 37.5 (10-19-18 @ 15:00)  HR: 71 (10-19-18 @ 23:37) (50 - 96)  BP: 110/49 (10-19-18 @ 22:00) (81/61 - 171/79)  RR: 12 (10-19-18 @ 22:00) (12 - 23)  SpO2: 98% (10-19-18 @ 23:37) (97% - 100%)  10-18-18 @ 07:01  -  10-19-18 @ 07:00  --------------------------------------------------------  IN: 255 mL / OUT: 300 mL / NET: -45 mL    10-19-18 @ 07:01  -  10-19-18 @ 23:47  --------------------------------------------------------  IN: 1194.5 mL / OUT: 695 mL / NET: 499.5 mL    atorvastatin 80 milliGRAM(s) Oral at bedtime  chlorhexidine 0.12% Liquid 15 milliLiter(s) Oral Mucosa two times a day  influenza   Vaccine 0.5 milliLiter(s) IntraMuscular once  pantoprazole  Injectable 40 milliGRAM(s) IV Push daily  phenylephrine    Infusion 0.2 MICROgram(s)/kG/Min IV Continuous <Continuous>  sodium chloride 2% . 1000 milliLiter(s) IV Continuous <Continuous>    Mode: AC/ CMV (Assist Control/ Continuous Mandatory Ventilation), RR (machine): 12, TV (machine): 500, FiO2: 40, PEEP: 5, ITime: 1, MAP: 10, PIP: 25  EXAMINATION:  General:  intubated, sedated.   Neuro: PERRL, gaze Left , o/b the vent, LUE antigravity, LE withdraws bilaterally, right UE 0 /5, follows simple commands   Cards:  S1S2 present, irregular   Respiratory:  no respiratory distress, intubated  Abdomen:  soft  Extremities:  no edema  Skin:  warm/dry      LABS:  Na: 143 (10-19 @ 15:46), 139 (10-19 @ 05:21)  K: 4.5 (10-19 @ 15:46), 4.3 (10-19 @ 05:21)  Cl: 112 (10-19 @ 15:46), 107 (10-19 @ 05:21)  CO2: 19 (10-19 @ 15:46), 20 (10-19 @ 05:21)  BUN: 19 (10-19 @ 15:46), 22 (10-19 @ 05:21)  Cr: 1.04 (10-19 @ 15:46), 1.01 (10-19 @ 05:21)  Glu: 121(10-19 @ 15:46), 173(10-19 @ 05:21)    Hgb: 11.2 (10-19 @ 05:21)  Hct: 32.9 (10-19 @ 05:21)  WBC: 13.0 (10-19 @ 05:21)  Plt: 273 (10-19 @ 05:21)    INR: 1.08 10-19-18 @ 05:21  PTT: 29.7 10-19-18 @ 05:21        Assessment and Plan:   88y/o female with vascular risk factors with left internal carotid artery T occlusion. S/p tPA, S/p Partial recanalization of the M1 segment of the left middle cerebral artery occlusion, TICI 2a.  Concern for hemorrhagic conversion.    NEURO:  Patient is in A.fib, prob that is the cause of her stroke, she will eventually need to be on anticoagulation  consult stroke   start aspirin tomorrow   sedation Fentanyl PRN,   stroke core measures  neuro checks q 1 hr  CT head stable, midline shift, cytotoxic edema     PULM:  on PPRVC, will place her on PS   Get ABG   if tolerates it, can attempt extubation   CXR unremarkable - ETT acceptable position,     CV:  SBP goal 110-160  on phenylephrine, wean it off   has A.fib , rate controlled   normal left atrium on echo    RENAL:  Fluids: 2% 60 ml/hr, keep sodium 140-150     GI:  Diet: NPO, will attempt extubation   euglycemia (-180)    HEME/ONC:  VTE prophylaxis: [x] SCDs [] chemoprophylaxis lovenox 40 mg sc qhs  [] hold chemoprophylaxis due to: post tPA[] high risk of DVT/PE on admission due to:    SOCIAL/FAMILY:  [x] awaiting [] updated at bedside [] family meeting    CODE STATUS:  [x] Full Code [] DNR [] DNI [] Palliative/Comfort Care    DISPOSITION:  [x] ICU [] Stroke Unit [] Floor [] EMU [] RCU [] PCU    [x] Patient is at high risk of neurologic deterioration/death due to: cerebral bleeding, brain compression     Time spent: 35 critical care minutes    Contact: [phone] Events: weaned neosyneprhine  got CT head, shows left MCA ischemic stroke with contrasted extravasation midline shift     T(C): 37.1 (10-19-18 @ 19:00), Max: 37.5 (10-19-18 @ 15:00)  HR: 71 (10-19-18 @ 23:37) (50 - 96)  BP: 110/49 (10-19-18 @ 22:00) (81/61 - 171/79)  RR: 12 (10-19-18 @ 22:00) (12 - 23)  SpO2: 98% (10-19-18 @ 23:37) (97% - 100%)  10-18-18 @ 07:01  -  10-19-18 @ 07:00  --------------------------------------------------------  IN: 255 mL / OUT: 300 mL / NET: -45 mL    10-19-18 @ 07:01  -  10-19-18 @ 23:47  --------------------------------------------------------  IN: 1194.5 mL / OUT: 695 mL / NET: 499.5 mL    atorvastatin 80 milliGRAM(s) Oral at bedtime  chlorhexidine 0.12% Liquid 15 milliLiter(s) Oral Mucosa two times a day  influenza   Vaccine 0.5 milliLiter(s) IntraMuscular once  pantoprazole  Injectable 40 milliGRAM(s) IV Push daily  phenylephrine    Infusion 0.2 MICROgram(s)/kG/Min IV Continuous <Continuous>  sodium chloride 2% . 1000 milliLiter(s) IV Continuous <Continuous>    Mode: AC/ CMV (Assist Control/ Continuous Mandatory Ventilation), RR (machine): 12, TV (machine): 500, FiO2: 40, PEEP: 5, ITime: 1, MAP: 10, PIP: 25  EXAMINATION:  General:  intubated, sedated.   Neuro: PERRL, gaze Left , o/b the vent, LUE antigravity, LE withdraws bilaterally, right UE 0 /5, follows simple commands   Cards:  S1S2 present, irregular   Respiratory:  no respiratory distress, intubated  Abdomen:  soft  Extremities:  no edema  Skin:  warm/dry      LABS:  Na: 143 (10-19 @ 15:46), 139 (10-19 @ 05:21)  K: 4.5 (10-19 @ 15:46), 4.3 (10-19 @ 05:21)  Cl: 112 (10-19 @ 15:46), 107 (10-19 @ 05:21)  CO2: 19 (10-19 @ 15:46), 20 (10-19 @ 05:21)  BUN: 19 (10-19 @ 15:46), 22 (10-19 @ 05:21)  Cr: 1.04 (10-19 @ 15:46), 1.01 (10-19 @ 05:21)  Glu: 121(10-19 @ 15:46), 173(10-19 @ 05:21)    Hgb: 11.2 (10-19 @ 05:21)  Hct: 32.9 (10-19 @ 05:21)  WBC: 13.0 (10-19 @ 05:21)  Plt: 273 (10-19 @ 05:21)    INR: 1.08 10-19-18 @ 05:21  PTT: 29.7 10-19-18 @ 05:21        Assessment and Plan:   86y/o female with vascular risk factors with left internal carotid artery T occlusion. S/p tPA, S/p Partial recanalization of the M1 segment of the left middle cerebral artery occlusion, TICI 2a.  Concern for hemorrhagic conversion.    NEURO:  Patient is in A.fib, prob that is the cause of her stroke, she will eventually need to be on anticoagulation  consult stroke   start aspirin tomorrow   sedation Fentanyl PRN,   stroke core measures  neuro checks q 1 hr  CT head stable, midline shift, cytotoxic edema     PULM:  on PPRVC, will place her on PS   Get ABG in an hr, if cuff leak, extubate   if tolerates it, can attempt extubation   CXR unremarkable - ETT acceptable position,     CV:  SBP goal 110-160  on phenylephrine, wean it off   has A.fib , rate controlled   normal left atrium on echo    RENAL:  Fluids: 2% 60 ml/hr, keep sodium 140-150     GI:  Diet: NPO, will attempt extubation   euglycemia (-180)    HEME/ONC:  VTE prophylaxis: [x] SCDs [] chemoprophylaxis lovenox 40 mg sc qhs  [] hold chemoprophylaxis due to: post tPA[] high risk of DVT/PE on admission due to:    SOCIAL/FAMILY:  [x] awaiting [] updated at bedside [] family meeting    CODE STATUS:  [x] Full Code [] DNR [] DNI [] Palliative/Comfort Care    DISPOSITION:  [x] ICU [] Stroke Unit [] Floor [] EMU [] RCU [] PCU    [x] Patient is at high risk of neurologic deterioration/death due to: cerebral bleeding, brain compression     Time spent: 35 critical care minutes    Contact: [phone]

## 2018-10-19 NOTE — PROGRESS NOTE ADULT - ASSESSMENT
ASSESSMENT/PLAN:  88y/o female with vascular risk factors with left internal carotid artery T occlusion. S/p tPA, S/p Partial recanalization of the M1 segment of the left middle cerebral artery occlusion, TICI 2a.  Concern for hemorrhagic conversion.    NEURO:  sedation Fentanyl PRN,   stroke core measures  CTh now  Will also get fibrinogen , coags, CBC, BMP, LFt  Activity: [] mobilize as tolerated [x] Bedrest [] PT [] OT [] PMNR    PULM:  keep vented  Get ABG  CXR unremarkable - ETT acceptable position,     CV:  SBP goal 110-160  EKG, ECHO  Needs an a-line    RENAL:  Fluids: IVF maintenance    GI:  Diet: NPO  GI prophylaxis [] not indicated [x] PPI [] other:  Bowel regimen [x] colace [x] senna [] other:    ENDO:   Goal euglycemia (-180)    HEME/ONC:  VTE prophylaxis: [x] SCDs [] chemoprophylaxis [x] hold chemoprophylaxis due to: post tPA[] high risk of DVT/PE on admission due to:    ID: afebrile.    SOCIAL/FAMILY:  [x] awaiting [] updated at bedside [] family meeting    CODE STATUS:  [x] Full Code [] DNR [] DNI [] Palliative/Comfort Care    DISPOSITION:  [x] ICU [] Stroke Unit [] Floor [] EMU [] RCU [] PCU    [x] Patient is at high risk of neurologic deterioration/death due to: cerebral bleeding.    Time spent: 45 critical care minutes    Contact: [phone] ASSESSMENT/PLAN:  88y/o female with vascular risk factors with left internal carotid artery T occlusion. S/p tPA, S/p Partial recanalization of the M1 segment of the left middle cerebral artery occlusion, TICI 2a.  Concern for hemorrhagic conversion.    NEURO:  sedation Fentanyl PRN,   stroke core measures: A1c 6.0,   Start Atorvastatin 80 qHS  Activity: [] mobilize as tolerated [x] Bedrest [] PT [] OT [] PMNR    PULM:  keep vented  VAP ppx: Chlorhexidine 15 ml po BID    CV:  SBP goal 110-160  EKG, ECHO  Needs an a-line    RENAL:  Fluids: On 2%NS @ 60 ml/hr  Na goal: 145-155  BMP Q8H    GI:  Diet: NPO  GI prophylaxis [] not indicated [x] PPI [] other:  Bowel regimen [x] colace [x] senna [] other:    ENDO:   Goal euglycemia (-180)    HEME/ONC:  VTE prophylaxis: [x] SCDs [] chemoprophylaxis [x] hold chemoprophylaxis due to: post tPA[] high risk of DVT/PE on admission due to:    ID: afebrile.    SOCIAL/FAMILY:  [x] awaiting [] updated at bedside [] family meeting    CODE STATUS:  [x] Full Code [] DNR [] DNI [] Palliative/Comfort Care    DISPOSITION:  [x] ICU [] Stroke Unit [] Floor [] EMU [] RCU [] PCU    [x] Patient is at high risk of neurologic deterioration/death due to: cerebral bleeding.    Time spent: 45 critical care minutes    Contact: [phone] ASSESSMENT/PLAN:  88y/o female with vascular risk factors with left internal carotid artery T occlusion. S/p tPA, S/p Partial recanalization of the M1 segment of the left middle cerebral artery occlusion, TICI 2a.  Concern for hemorrhagic conversion.    NEURO:  sedation Fentanyl PRN,   stroke core measures: A1c 6.0,   Start Atorvastatin 80 qHS  Activity: [] mobilize as tolerated [x] Bedrest [] PT [] OT [] PMNR    PULM:  keep vented  VAP ppx: Chlorhexidine 15 ml po BID    CV:  SBP goal 110-160  Echo: Transpulmonic shunting  Needs an a-line    RENAL:  Fluids: On 2%NS @ 60 ml/hr  Na goal: 145-155  BMP Q8H    GI:  Diet: NPO  GI prophylaxis [] not indicated [x] PPI [] other:  Bowel regimen [x] colace [x] senna [] other:    ENDO:   Goal euglycemia (-180)    HEME/ONC:  VTE prophylaxis: [x] SCDs [] chemoprophylaxis [x] hold chemoprophylaxis due to: post tPA[] high risk of DVT/PE on admission due to:    ID: afebrile.    SOCIAL/FAMILY:  [x] awaiting [] updated at bedside [] family meeting    CODE STATUS:  [x] Full Code [] DNR [] DNI [] Palliative/Comfort Care    DISPOSITION:  [x] ICU [] Stroke Unit [] Floor [] EMU [] RCU [] PCU    [x] Patient is at high risk of neurologic deterioration/death due to: cerebral bleeding.    Time spent: 45 critical care minutes    Contact: [phone] ASSESSMENT/PLAN:  86y/o female with vascular risk factors with left internal carotid artery T occlusion. S/p tPA, S/p Partial recanalization of the M1 segment of the left middle cerebral artery occlusion, TICI 2a.  Concern for hemorrhagic conversion.    NEURO:  sedation Fentanyl PRN,   stroke core measures: A1c 6.0,   Start Atorvastatin 80 qHS  Activity: [] mobilize as tolerated [x] Bedrest [] PT [] OT [] PMNR    PULM:  keep vented  VAP ppx: Chlorhexidine 15 ml po BID    CV:  SBP goal 110-160  Requiring phenylephrine for maintaing SBP  TTE  Needs an a-line    RENAL:  Fluids: On 2%NS @ 60 ml/hr  Na goal: 145-155  BMP Q8H    GI:  Diet: NPO  GI prophylaxis [] not indicated [x] PPI [] other:  Bowel regimen [x] colace [x] senna [] other:    ENDO:   Goal euglycemia (-180)    HEME/ONC:  VTE prophylaxis: [x] SCDs [] chemoprophylaxis [x] hold chemoprophylaxis due to: post tPA[] high risk of DVT/PE on admission due to:    ID: afebrile.    SOCIAL/FAMILY:  [x] awaiting [] updated at bedside [] family meeting    CODE STATUS:  [x] Full Code [] DNR [] DNI [] Palliative/Comfort Care    DISPOSITION:  [x] ICU [] Stroke Unit [] Floor [] EMU [] RCU [] PCU    [x] Patient is at high risk of neurologic deterioration/death due to: cerebral bleeding.    Time spent: 45 critical care minutes    Contact: [phone]

## 2018-10-19 NOTE — DISCHARGE NOTE ADULT - HOSPITAL COURSE
88y/o female with vascular risk factors of HTN and hyperlipidemia, who was last seen normal at 22:10, on the day prior to admission. Then she was found by family members at the floor with inability to communicate or move her right side at 22:20. She was initially evaluated at [place] were NIHSS=29 deemed candidate for tPA. 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 Patient was transferred for further management to [facility]. She received IVtpa per protocol and transferred to [facility] after CTA revealed L ICA and L M1 occlusion. She underwent thrombectomy with resultant TICI 2B flow.     Impression:   Cerebral embolism with cerebral infarction. Left MCA distribution stroke - likely etiology being cryptogenic stroke, probably related to embolism from a proximal source, like cardiac/paradoxical source of embolism    CXR (10/23) shows bilateral pleural effusions - no overt sign of HF noted, lungs CTA B/L, protecting airway, saturating well    TTE shows EF: 55-60%, Agitated saline injection revealed few late bubbles in the left heart, consistent with transpulmonic shunting, LE doppler: no DVT. Initially thought to have afib- now deemed to be NSR with PAC's s/p ICM placement for prolonged cardiac monitoring to screen for occult cardiac arrhythmias like atrial fibrillation, but a cardiac source of embolism. Continue current cardiac meds, Dr Song (cardiology) consult appreciated. ILR placed and the patient to follow up with MELVA song for IRL evaluation.

## 2018-10-19 NOTE — H&P ADULT - HISTORY OF PRESENT ILLNESS
History obtained from chart.     Ms. Rucker is an 88y/o female with vascular risk factors of HTN and hyperlipidemia, who was last seen normal at 22:10. Then she was found by family members at the floor with inability to communicate or move her right side at 22:20. She was initially evaluated at [place] were NIHSS=29 deemed candidate for tPA. 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 Patient was transferred for further management to [facility].      Upon arrival, NIHSS=25, was taken directly for IR intervention.

## 2018-10-19 NOTE — ED ADULT NURSE NOTE - OBJECTIVE STATEMENT
Pt. is an 86 yo female c/o rescue stroke from [facility]. Pt is densely aphasic, L side spontaneous, flexing R upper, & triple flexing R lower. According to EMS, last seen normal was 2210. tPA was given over at [facility] starting at 2336 on 10/18/19.

## 2018-10-19 NOTE — CHART NOTE - NSCHARTNOTEFT_GEN_A_CORE
Interventional Neuro Radiology  Pre-Procedure Note     HPI:      Neuro Exam: Awake and alert, oriented x3, fluent, normal naming and repetition, follows 3 step commands. Extraocular movements intact, no nystagmus, visual fields full, face symmetric, tongue midline. No drift, 5/5 power x 4 extremities. Normal sensation to LT. Normal finger-to-nose and rapid alternating movements.    PAST MEDICAL & SURGICAL HISTORY:      Social History:     FAMILY HISTORY:      Allergies: No Known Allergies      Current Medications: sodium chloride 0.9%. 1000 milliLiter(s) IV Continuous <Continuous>      Labs:               HCG:     Blood Bank:     Assessment/Plan:   This is a 87y ____ hand dominant Female  presents with   Patient presents to neuro-IR for selective cerebral angiography.   Procedure/ risks/ benefits/ goals/ alternatives were explained. Risks include but are not limited to stroke/ vessel injury/ hemorrhage/ groin hematoma. All questions answered and concerns addressed. Informed content obtained from patient____ who verbaliezes /expresses full understanding. Consent placed in chart. Interventional Neuro Radiology  Pre-Procedure Note     HPI:  Ms. Rucker is an 86y/o female with vascular risk factors (as per ED provider note at [facility], no family members available at the moment of evaluation) of HTN and hyperlipidemia, who was last seen normal at 22:10. Then she was found by family members at the floor with inability to communicate or move her right side at 22:20. She was initially evaluated at [place] were NIHSS=29 deemed candidate for tPA. 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 Patient was referred for endovascular therapy for ischemic stroke and transfer to [facility]. Upon arrival, NIHSS=24 she presented to Neurointerventional Surgery suite for mechanical thrombectomy for ischemic stroke.      PAST MEDICAL & SURGICAL HISTORY:  HTN  Hyperlipidemia       Social History:   Unable to asses    FAMILY HISTORY:  Unable to asses    Allergies: No Known Allergies        Assessment/Plan:   This is an 86y/o female who was last seen normal at 22:10. Then she was found by family members at the floor with inability to communicate or move her right side at 22:20. She was initially evaluated at [place] were NIHSS=29 and deemed candidate for tPA. 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 Patient was referred for endovascular therapy for ischemic stroke and transfer to [facility]. Upon arrival, NIHSS=24 she presented to Neurointerventional Surgery suite for mechanical thrombectomy for ischemic stroke.  No family members or contact information available at moment of evaluation for which a two physician consent was obtained emergently.  Risks include but are not limited to stroke, vessel injury, hemorrhage and groin hematoma. Two physician consent documented in electronic medical record.

## 2018-10-19 NOTE — DISCHARGE NOTE ADULT - CARE PROVIDERS DIRECT ADDRESSES
,DirectAddress_Unknown,[email or web address],DirectAddress_Unknown

## 2018-10-19 NOTE — ED PROVIDER NOTE - ATTENDING CONTRIBUTION TO CARE
88 yo female stroke rescue xfer from [facility] s/p tPA @ ~2330 with last known normal @ 2210.  Dense aphasia here, RUE flexor posturing, RLE triple flex, spontaneous on L side.  Code stroke on arrival, neuro to bedside.  To IR urgently for clot retrieval.

## 2018-10-19 NOTE — CHART NOTE - NSCHARTNOTEFT_GEN_A_CORE
Neurointerventional Surgery   Procedure Note     Procedure:Selective Cerebral Angiography     Pre-Procedure Diagnosis: Left middle cerebral artery occlusion M1 segment occlusion  Post- Procedure Diagnosis: Partial recanalization of the M1 segment of the left middle cerebral artery occlusion     : Dr. Ame MD    First Assistant: Adrianne Brown MD    Nurse: Scarlett Stallings RN    Anesthesiologist: Dr. Avina                                       General Anesthesia    Sheath: 6 F NeuroMax      Preliminary Report:  Under a  6 NeuroMax sheath to the right groin under general anesthesia to the left common carotid artery a selective cerebral angiography was performed and reveals a left middle cerebral artery M1 segment occlusion. Using a triaxial system a 4X40mm Solitaire stent retriever was deployed through the clot and after the first pull we achieve recanalization of the M1 segment and the upper division. Then a 3D separator stent retriever was deployed through a distal M3 occlusion but with unsuccessful revascularization. The resultant recanalization score is TICI 2A (Official note to follow).  Patient tolerated procedure well. She remained hemodynamically stable, intubated.   Results were discussed with Neurosurgery.  Right groin sheath was discontinued.  Hemostasis was obtained with 21 minutes of manual compression.  No active bleeding, no hematoma, no ecchymosis. Quick clot and safeguard balloon dressing applied at 3:15hr.  STAT labs, CBC,BMP, PT/PTT/INR, Fibrinogen and Fibrinogen split products at 4:00hr. Patient transferred to recovery room in stable condition. Neurointerventional Surgery   Procedure Note     Procedure:Selective Cerebral Angiography     Pre-Procedure Diagnosis: Left ICA-T occlusion occlusion  Post- Procedure Diagnosis: Proximal left MCA M1 segment occlusion. TICI2A recanalization post EST, with persistent occlusion of large inferior division M3 trunk.    : Dr. Ame MD    First Assistant: Adrianne Brown MD    Nurse: Scarlett Stallings RN    Anesthesiologist: Dr. Avina                                       General Anesthesia    Sheath: 6 F NeuroMax      Preliminary Report:  Under a  6 NeuroMax sheath to the right groin under general anesthesia to the left internal carotid artery a selective cerebral angiography was performed and reveals a left middle cerebral artery M1 segment occlusion. Using a triaxial system a 4X40mm Solitaire stent retriever was deployed through the clot and after the first pull we achieve recanalization of the M1 segment and the upper division. Then the Solitaire, followed by a 3D separator stent retriever were sequentially deployed through a large inferior division M3 occlusion, but with failed revascularization. The resultant recanalization score is TICI 2A (Official note to follow).  Patient tolerated procedure well. She remained hemodynamically stable, intubated.   Results were discussed with Neurosurgery.  Right groin sheath was discontinued.  Hemostasis was obtained with 21 minutes of manual compression.  No active bleeding, no hematoma, no ecchymosis. Quick clot and safeguard balloon dressing applied at 3:15hr.  STAT labs, CBC,BMP, PT/PTT/INR, Fibrinogen and Fibrinogen split products at 4:00hr. Patient transferred to recovery room in stable condition.

## 2018-10-19 NOTE — ED PROVIDER NOTE - MEDICAL DECISION MAKING DETAILS
see attending attestation authored by me, Sharif Matson MD see attending attestation authored by me, MD David Rivero: Neuro/ IR.

## 2018-10-19 NOTE — DISCHARGE NOTE ADULT - NS AS DC STROKE ED MATERIALS
Stroke Education Booklet/Prescribed Medications/Need for Followup After Discharge/Stroke Warning Signs and Symptoms/Risk Factors for Stroke/Call 911 for Stroke

## 2018-10-19 NOTE — PROGRESS NOTE ADULT - SUBJECTIVE AND OBJECTIVE BOX
88y/o female with vascular risk factors (as per ED provider note at [facility]), no family members available at the moment of evaluation) of HTN and hyperlipidemia, who was last seen normal at 22:10. Then she was found by family members at the floor with inability to communicate or move her right side at 22:20. She was initially evaluated at [facility] were NIHSS=29 deemed candidate for tPA. 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 Patient was referred for endovascular therapy for ischemic stroke and transfer to [facility]. Upon arrival, NIHSS=24 she presented to Neurointerventional Surgery suite for mechanical thrombectomy for ischemic stroke.  S/p Partial recanalization of the M1 segment of the left middle cerebral artery occlusion, TICI 2a.    On admission, the patient was:  NIHSS: 24.    VITALS:  T(C): , Max: 36.5 (10-19-18 @ 01:30)  HR:  (70 - 96)  BP:  (149/88 - 157/125)  RR:  (13 - 18)  SpO2:  (97% - 100%)  Device: Avea, Mode: AC/ CMV (Assist Control/ Continuous Mandatory Ventilation), RR (machine): 12, TV (machine): 500, FiO2: 100, PEEP: 5, ITime: 1, MAP: 8, PIP: 21    Awaiting labs.    MEDICATIONS: reviewed.    EXAMINATION:  General:  intubated, sedated.  Neuro:    Cards:  S1S2 present  Respiratory:  no respiratory distress, intubated  Abdomen:  soft  Extremities:  no edema  Skin:  warm/dry 88y/o female with vascular risk factors (as per ED provider note at [facility]), no family members available at the moment of evaluation) of HTN and hyperlipidemia, who was last seen normal at 22:10. Then she was found by family members at the floor with inability to communicate or move her right side at 22:20. She was initially evaluated at [facility] were NIHSS=29 deemed candidate for tPA. 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 Patient was referred for endovascular therapy for ischemic stroke and transfer to [facility]. Upon arrival, NIHSS=24 she presented to Neurointerventional Surgery suite for mechanical thrombectomy for ischemic stroke.  S/p Partial recanalization of the M1 segment of the left middle cerebral artery occlusion, TICI 2a.    On admission, the patient was:  NIHSS: 24.    VITALS:  T(C): , Max: 36.5 (10-19-18 @ 01:30)  HR:  (70 - 96)  BP:  (149/88 - 157/125)  RR:  (13 - 18)  SpO2:  (97% - 100%)  Device: Avea, Mode: AC/ CMV (Assist Control/ Continuous Mandatory Ventilation), RR (machine): 12, TV (machine): 500, FiO2: 100, PEEP: 5, ITime: 1, MAP: 8, PIP: 21    Awaiting labs.    Imaging: DynaCT - contrast vs hemorrhagic conversion.    MEDICATIONS: reviewed.    EXAMINATION:  General:  intubated, sedated.  Neuro: PERRL, gaze midline, o/b the vent, LUE loc, LLE wdrl, RLE TF, RUE 0/5.  Cards:  S1S2 present  Respiratory:  no respiratory distress, intubated  Abdomen:  soft  Extremities:  no edema  Skin:  warm/dry

## 2018-10-19 NOTE — PROGRESS NOTE ADULT - SUBJECTIVE AND OBJECTIVE BOX
88y/o female with vascular risk factors (as per ED provider note at [facility]), no family members available at the moment of evaluation) of HTN and hyperlipidemia, who was last seen normal at 22:10. Then she was found by family members at the floor with inability to communicate or move her right side at 22:20. She was initially evaluated at [place] were NIHSS=29 deemed candidate for tPA. 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 Patient was referred for endovascular therapy for ischemic stroke and transfer to [facility]. Upon arrival, NIHSS=24 she presented to Neurointerventional Surgery suite for mechanical thrombectomy for ischemic stroke.  S/p Partial recanalization of the M1 segment of the left middle cerebral artery occlusion, TICI 2a.    On admission, the patient was:  NIHSS: 24.    Overnight Events:     ROS: negative [] unable to obtain as patient is comatose/intubated/aphasic []     VITALS:   T(C): 33.8 (10-19-18 @ 04:00), Max: 36.5 (10-19-18 @ 01:30)  HR: 63 (10-19-18 @ 06:30) (54 - 96)  BP: 104/63 (10-19-18 @ 06:30) (81/61 - 157/125)  RR: 13 (10-19-18 @ 06:30) (12 - 18)  SpO2: 100% (10-19-18 @ 06:30) (97% - 100%)    10-18-18 @ 07:01  -  10-19-18 @ 07:00  --------------------------------------------------------  IN: 208.8 mL / OUT: 250 mL / NET: -41.2 mL      LABS:  ABG - ( 19 Oct 2018 05:19 )  pH, Arterial: 7.41  pH, Blood: x     /  pCO2: 35    /  pO2: 394   / HCO3: 22    / Base Excess: -1.6  /  SaO2: 100                11.2   13.0  )-----------( 273      ( 19 Oct 2018 05:21 )             32.9     10-19    139  |  107  |  22  ----------------------------<  173<H>  4.3   |  20<L>  |  1.01    Ca    7.9<L>      19 Oct 2018 05:21  Phos  3.1     10-19  Mg     1.6     10-19      PT/INR - ( 19 Oct 2018 05:21 )   PT: 11.8 sec;   INR: 1.08 ratio         PTT - ( 19 Oct 2018 05:21 )  PTT:29.7 sec  MEDS:  MEDICATIONS  (STANDING):  pantoprazole  Injectable 40 milliGRAM(s) IV Push daily  phenylephrine    Infusion 0.2 MICROgram(s)/kG/Min (5.798 mL/Hr) IV Continuous <Continuous>  sodium chloride 2% . 1000 milliLiter(s) (60 mL/Hr) IV Continuous <Continuous>          EXAMINATION:  General:  intubated, sedated.  Neuro: PERRL, gaze midline, o/b the vent, LUE loc, LLE wdrl, RLE TF, RUE 0/5.  Cards:  S1S2 present  Respiratory:  no respiratory distress, intubated  Abdomen:  soft  Extremities:  no edema  Skin:  warm/dry 88y/o female with vascular risk factors (as per ED provider note at [facility]), no family members available at the moment of evaluation) of HTN and hyperlipidemia, who was last seen normal at 22:10. Then she was found by family members at the floor with inability to communicate or move her right side at 22:20. She was initially evaluated at [place] were NIHSS=29 deemed candidate for tPA. 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 Patient was referred for endovascular therapy for ischemic stroke and transfer to [facility]. Upon arrival, NIHSS=24 she presented to Neurointerventional Surgery suite for mechanical thrombectomy for ischemic stroke.  S/p Partial recanalization of the M1 segment of the left middle cerebral artery occlusion, TICI 2a.    On admission, the patient was:  NIHSS: 24.    Overnight Events: Admitted to NSICU    ROS: Unable to obtain since patient is intubated.    VITALS:   T(C): 33.8 (10-19-18 @ 04:00), Max: 36.5 (10-19-18 @ 01:30)  HR: 63 (10-19-18 @ 06:30) (54 - 96)  BP: 104/63 (10-19-18 @ 06:30) (81/61 - 157/125)  RR: 13 (10-19-18 @ 06:30) (12 - 18)  SpO2: 100% (10-19-18 @ 06:30) (97% - 100%)    10-18-18 @ 07:01  -  10-19-18 @ 07:00  --------------------------------------------------------  IN: 208.8 mL / OUT: 250 mL / NET: -41.2 mL      LABS:  ABG - ( 19 Oct 2018 05:19 )  pH, Arterial: 7.41  pH, Blood: x     /  pCO2: 35    /  pO2: 394   / HCO3: 22    / Base Excess: -1.6  /  SaO2: 100                11.2   13.0  )-----------( 273      ( 19 Oct 2018 05:21 )             32.9     10-19    139  |  107  |  22  ----------------------------<  173<H>  4.3   |  20<L>  |  1.01    Ca    7.9<L>      19 Oct 2018 05:21  Phos  3.1     10-19  Mg     1.6     10-19      PT/INR - ( 19 Oct 2018 05:21 )   PT: 11.8 sec;   INR: 1.08 ratio         PTT - ( 19 Oct 2018 05:21 )  PTT:29.7 sec  MEDS:  MEDICATIONS  (STANDING):  pantoprazole  Injectable 40 milliGRAM(s) IV Push daily  phenylephrine    Infusion 0.2 MICROgram(s)/kG/Min (5.798 mL/Hr) IV Continuous <Continuous>  sodium chloride 2% . 1000 milliLiter(s) (60 mL/Hr) IV Continuous <Continuous>          EXAMINATION:  General:  intubated, sedated.  Neuro: PERRL, gaze midline, o/b the vent, LUE FC (3/5), LLE purposeful, RLE TF, RUE 0/5.  Cards:  S1S2 present  Respiratory:  no respiratory distress, intubated  Abdomen:  soft  Inguinal ecchymoses noted  Extremities:  no edema  Skin:  warm/dry

## 2018-10-19 NOTE — H&P ADULT - ATTENDING COMMENTS
VASCULAR NEUROLOGY ATTENDING  The patient is seen and examined the history and imaging are reviewed. I agree with the resident note unless otherwise noted. Patient seen by me on arrival to [facility] via telestroke. In brief this is an 87 year old functionally independent woman presenting within the window for acute stroke therapy with L MCA syndrome. She received IVtpa per protocol and transferred to NS after CTA revealed L ICA and L M1 occlusion. Risks and benefits explained at length to patients grandson at te bedside. On exam this AM she is intubated arousable and attentive not following commands in [place]. Spontaneous movements on the L. R side with 1-2/5 in the uppers and lowers to noxious. Impression: L MCA infarct presumed cardio embolism. Continue post TPA protocol. Repeat CT with L hemispheric hemorrhage and contrast stating pending repeat to clarify degree of hemorrhagic conversion. Appreciate neurosurgical eval. Avoid hyponatremia. Serial CT heads and neuro checks. TTE potential ILR. PT/OT/SS evals.

## 2018-10-19 NOTE — CHART NOTE - NSCHARTNOTEFT_GEN_A_CORE
Patient seen earlier via telestroke. Endovascular therapy explained to patients grandson at bedside who agrees with procedure. Endovascular therapy potentially lifesaving and due to current absence of family physician consent given

## 2018-10-19 NOTE — DISCHARGE NOTE ADULT - PATIENT PORTAL LINK FT
You can access the [facility] Portal, offered by [facility], by registering with the following website: [email or web address]

## 2018-10-19 NOTE — DISCHARGE NOTE ADULT - CARE PLAN
Principal Discharge DX:	Acute ischemic stroke  Goal:	stroke prevention and rehabilitation  Assessment and plan of treatment:	- rehab  - c/w asa  - f/u with neurology

## 2018-10-19 NOTE — H&P ADULT - ASSESSMENT
Ms. Rucker is an 88y/o female with vascular risk factors of HTN and hyperlipidemia, who was last seen normal at 22:10. Then she was found by family members at the floor with inability to communicate or move her right side at 22:20. She was initially evaluated at [facility] were NIHSS=29 deemed candidate for tPA. 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 Patient was transferred for further management to [facility]. Neurological examination showed right hemiparesis, right hemianopia, right dense facial droop, right sensory loss, left gaze preference with gaze palsy.     Impression     Left hemispheric dysfunction from M1 occlusion     Plan     s/p IR and tpa   Permissive HTN x 24hrs goal 180/105  MRI brain w/o cont when stable   HgA1c  Lipid profile  no aspirin and DVT prophylaxis   Atorvastatin 80 gm when able to take   TTE when stable   Telemetry monitoring  PT/OT/SS

## 2018-10-19 NOTE — DISCHARGE NOTE ADULT - MEDICATION SUMMARY - MEDICATIONS TO TAKE
I will START or STAY ON the medications listed below when I get home from the hospital:    acetaminophen 160 mg/5 mL oral suspension  -- 20.31 milliliter(s) by mouth every 6 hours, As needed, Mild Pain (1 - 3), Moderate Pain (4 - 6)  -- Indication: For pain    aspirin 81 mg oral tablet, chewable  -- 1 tab(s) by mouth once a day  -- Indication: For prevention    Prinivil 5 mg oral tablet  -- 1 tab(s) by mouth   -- Indication: For HTN    Neurontin 250 mg/5 mL oral solution  -- 2 milliliter(s) by mouth 3 times a day  -- Indication: For pain    metFORMIN 500 mg oral tablet  -- 1 tab(s) by mouth 2 times a day  -- Indication: For DM    nystatin 100,000 units/mL oral suspension  -- 5 milliliter(s) by mouth 2 times a day  -- Indication: For rash    atorvastatin 80 mg oral tablet  -- 1 tab(s) by mouth once a day (at bedtime)  -- Indication: For HLD    Metoprolol Tartrate 1 mg/mL injectable solution  --  injectable   -- Indication: For HTN    alendronate 70 mg oral tablet  -- 1 tab(s) by mouth once a week  -- Indication: For Osteoprosis    Norvasc 5 mg oral tablet  -- 1 tab(s) by mouth once a day  -- Indication: For HTN    ferrous sulfate 325 mg (65 mg elemental iron) oral tablet  -- 1 tab(s) by mouth once a day  -- Indication: For Anemia    bisacodyl 10 mg rectal suppository  -- 1 suppository(ies) rectally once a day, As needed, Constipation  -- Indication: For Constipation    ocular lubricant ophthalmic solution  -- 1 drop(s) to each affected eye once a day  -- Indication: For Dry eye    brimonidine 0.2% ophthalmic solution  -- 1 drop(s) to each affected eye 2 times a day  -- Indication: For Dry eye    timolol-dorzolamide 0.5%-2% preservative-free ophthalmic solution  -- 1 drop(s) to each affected eye 2 times a day  -- Indication: For Dry eye

## 2018-10-19 NOTE — H&P ADULT - NSHPLABSRESULTS_GEN_ALL_CORE
11.2   13.0  )-----------( 273      ( 19 Oct 2018 05:21 )             32.9   10-19    139  |  107  |  22  ----------------------------<  173<H>  4.3   |  20<L>  |  1.01    Ca    7.9<L>      19 Oct 2018 05:21  Phos  3.1     10-19  Mg     1.6     10-19      CT head at [facility]     - unremarkable, unable to view images     CTA head and neck - unable to view images

## 2018-10-20 DIAGNOSIS — I63.9 CEREBRAL INFARCTION, UNSPECIFIED: ICD-10-CM

## 2018-10-20 LAB
ANION GAP SERPL CALC-SCNC: 7 MMOL/L — SIGNIFICANT CHANGE UP (ref 5–17)
ANION GAP SERPL CALC-SCNC: 8 MMOL/L — SIGNIFICANT CHANGE UP (ref 5–17)
APPEARANCE UR: CLEAR — SIGNIFICANT CHANGE UP
BILIRUB UR-MCNC: NEGATIVE — SIGNIFICANT CHANGE UP
BUN SERPL-MCNC: 17 MG/DL — SIGNIFICANT CHANGE UP (ref 7–23)
BUN SERPL-MCNC: 18 MG/DL — SIGNIFICANT CHANGE UP (ref 7–23)
CALCIUM SERPL-MCNC: 8.4 MG/DL — SIGNIFICANT CHANGE UP (ref 8.4–10.5)
CALCIUM SERPL-MCNC: 8.5 MG/DL — SIGNIFICANT CHANGE UP (ref 8.4–10.5)
CHLORIDE SERPL-SCNC: 116 MMOL/L — HIGH (ref 96–108)
CHLORIDE SERPL-SCNC: 119 MMOL/L — HIGH (ref 96–108)
CO2 SERPL-SCNC: 20 MMOL/L — LOW (ref 22–31)
CO2 SERPL-SCNC: 22 MMOL/L — SIGNIFICANT CHANGE UP (ref 22–31)
COLOR SPEC: YELLOW — SIGNIFICANT CHANGE UP
CREAT SERPL-MCNC: 1.01 MG/DL — SIGNIFICANT CHANGE UP (ref 0.5–1.3)
CREAT SERPL-MCNC: 1.03 MG/DL — SIGNIFICANT CHANGE UP (ref 0.5–1.3)
DIFF PNL FLD: NEGATIVE — SIGNIFICANT CHANGE UP
GAS PNL BLDA: SIGNIFICANT CHANGE UP
GLUCOSE SERPL-MCNC: 121 MG/DL — HIGH (ref 70–99)
GLUCOSE SERPL-MCNC: 138 MG/DL — HIGH (ref 70–99)
GLUCOSE UR QL: NEGATIVE MG/DL — SIGNIFICANT CHANGE UP
HCT VFR BLD CALC: 29.2 % — LOW (ref 34.5–45)
HGB BLD-MCNC: 9.4 G/DL — LOW (ref 11.5–15.5)
KETONES UR-MCNC: NEGATIVE — SIGNIFICANT CHANGE UP
LEUKOCYTE ESTERASE UR-ACNC: NEGATIVE — SIGNIFICANT CHANGE UP
MAGNESIUM SERPL-MCNC: 1.8 MG/DL — SIGNIFICANT CHANGE UP (ref 1.6–2.6)
MCHC RBC-ENTMCNC: 29.9 PG — SIGNIFICANT CHANGE UP (ref 27–34)
MCHC RBC-ENTMCNC: 32 GM/DL — SIGNIFICANT CHANGE UP (ref 32–36)
MCV RBC AUTO: 93.2 FL — SIGNIFICANT CHANGE UP (ref 80–100)
NITRITE UR-MCNC: NEGATIVE — SIGNIFICANT CHANGE UP
PH UR: 5.5 — SIGNIFICANT CHANGE UP (ref 5–8)
PHOSPHATE SERPL-MCNC: 3.8 MG/DL — SIGNIFICANT CHANGE UP (ref 2.5–4.5)
PLATELET # BLD AUTO: 231 K/UL — SIGNIFICANT CHANGE UP (ref 150–400)
POTASSIUM SERPL-MCNC: 4.2 MMOL/L — SIGNIFICANT CHANGE UP (ref 3.5–5.3)
POTASSIUM SERPL-MCNC: 4.2 MMOL/L — SIGNIFICANT CHANGE UP (ref 3.5–5.3)
POTASSIUM SERPL-SCNC: 4.2 MMOL/L — SIGNIFICANT CHANGE UP (ref 3.5–5.3)
POTASSIUM SERPL-SCNC: 4.2 MMOL/L — SIGNIFICANT CHANGE UP (ref 3.5–5.3)
PROT UR-MCNC: ABNORMAL MG/DL
RBC # BLD: 3.13 M/UL — LOW (ref 3.8–5.2)
RBC # FLD: 12.9 % — SIGNIFICANT CHANGE UP (ref 10.3–14.5)
SODIUM SERPL-SCNC: 143 MMOL/L — SIGNIFICANT CHANGE UP (ref 135–145)
SODIUM SERPL-SCNC: 149 MMOL/L — HIGH (ref 135–145)
SP GR SPEC: 1.02 — SIGNIFICANT CHANGE UP (ref 1.01–1.02)
TROPONIN T, HIGH SENSITIVITY RESULT: 15 NG/L — SIGNIFICANT CHANGE UP (ref 0–51)
UROBILINOGEN FLD QL: NEGATIVE MG/DL — SIGNIFICANT CHANGE UP
WBC # BLD: 11.9 K/UL — HIGH (ref 3.8–10.5)
WBC # FLD AUTO: 11.9 K/UL — HIGH (ref 3.8–10.5)

## 2018-10-20 PROCEDURE — 99233 SBSQ HOSP IP/OBS HIGH 50: CPT

## 2018-10-20 PROCEDURE — 71045 X-RAY EXAM CHEST 1 VIEW: CPT | Mod: 26

## 2018-10-20 PROCEDURE — 70551 MRI BRAIN STEM W/O DYE: CPT | Mod: 26

## 2018-10-20 PROCEDURE — 93010 ELECTROCARDIOGRAM REPORT: CPT

## 2018-10-20 RX ORDER — ASPIRIN/CALCIUM CARB/MAGNESIUM 324 MG
300 TABLET ORAL DAILY
Qty: 0 | Refills: 0 | Status: DISCONTINUED | OUTPATIENT
Start: 2018-10-20 | End: 2018-10-23

## 2018-10-20 RX ORDER — MAGNESIUM SULFATE 500 MG/ML
1 VIAL (ML) INJECTION ONCE
Qty: 0 | Refills: 0 | Status: COMPLETED | OUTPATIENT
Start: 2018-10-20 | End: 2018-10-20

## 2018-10-20 RX ORDER — ENOXAPARIN SODIUM 100 MG/ML
40 INJECTION SUBCUTANEOUS DAILY
Qty: 0 | Refills: 0 | Status: DISCONTINUED | OUTPATIENT
Start: 2018-10-20 | End: 2018-11-01

## 2018-10-20 RX ADMIN — Medication 100 GRAM(S): at 03:44

## 2018-10-20 RX ADMIN — Medication 300 MILLIGRAM(S): at 12:43

## 2018-10-20 RX ADMIN — ENOXAPARIN SODIUM 40 MILLIGRAM(S): 100 INJECTION SUBCUTANEOUS at 12:43

## 2018-10-20 NOTE — PHYSICAL THERAPY INITIAL EVALUATION ADULT - ADDITIONAL COMMENTS
pt lives in a house with her daughter without steps to enter. prior to admission she was amb Independently without AD. she was Independent with all ADLs and IADLs

## 2018-10-20 NOTE — PHYSICAL THERAPY INITIAL EVALUATION ADULT - PRECAUTIONS/LIMITATIONS, REHAB EVAL
CTH 10/19: Large acute L MCA territory infarct. Increased density seen throughout the left basal ganglia and left lateral frontal, parietal, and temporal cortices likely represents a combination of hemorrhagic transformation and cortical staining from recent digital subtraction angiography/fall precautions

## 2018-10-20 NOTE — CONSULT NOTE ADULT - ASSESSMENT
88 yo female admitted with Large left MCA territory infarct and found to have frequent ectopy on Tele

## 2018-10-20 NOTE — CONSULT NOTE ADULT - PROBLEM SELECTOR RECOMMENDATION 9
No evidence of Afib on tele. There is frequent PACS and Bigeminy but no Afib   Monitor on tele   Check thyroid panel   ASA and statin

## 2018-10-20 NOTE — PHYSICAL THERAPY INITIAL EVALUATION ADULT - PERTINENT HX OF CURRENT PROBLEM, REHAB EVAL
PMHx: HTN, hyperlipidemia, last seen normal 22:10, found by family members at the floor with inability to communicate or move R side at 22:20. NIHSS 29 at [facility], s/p IV tPA. Head/neck CTA: L M1 occlusion & L ICA T occlusion. transferred to [facility] (NIHSS 24) directly to Neurointerventional suite 10/19 for mechanical thrombectomy for ischemic stroke, +partial recanalization of M1 segment of L MCA occlusion. +hemorrhagic conversion. +new diagnosis of a-fib & PFO

## 2018-10-20 NOTE — PROGRESS NOTE ADULT - ASSESSMENT
ASSESSMENT/PLAN:  86y/o female with vascular risk factors with left internal carotid artery T occlusion. S/p tPA, S/p Partial recanalization of the M1 segment of the left middle cerebral artery occlusion, TICI 2a.  Concern for hemorrhagic conversion.    NEURO:  sedation Fentanyl PRN,   stroke core measures: A1c 6.0,   Start Atorvastatin 80 qHS  Activity: [] mobilize as tolerated [x] Bedrest [] PT [] OT [] PMNR    PULM:  keep vented  VAP ppx: Chlorhexidine 15 ml po BID    CV:  SBP goal 110-160  Requiring phenylephrine for maintaing SBP  TTE  Needs an a-line    RENAL:  Fluids: On 2%NS @ 60 ml/hr  Na goal: 145-155  BMP Q8H    GI:  Diet: NPO  GI prophylaxis [] not indicated [x] PPI [] other:  Bowel regimen [x] colace [x] senna [] other:    ENDO:   Goal euglycemia (-180)    HEME/ONC:  VTE prophylaxis: [x] SCDs [] chemoprophylaxis [x] hold chemoprophylaxis due to: post tPA[] high risk of DVT/PE on admission due to:    ID: afebrile.    SOCIAL/FAMILY:  [x] awaiting [] updated at bedside [] family meeting    CODE STATUS:  [x] Full Code [] DNR [] DNI [] Palliative/Comfort Care    DISPOSITION:  [x] ICU [] Stroke Unit [] Floor [] EMU [] RCU [] PCU    [x] Patient is at high risk of neurologic deterioration/death due to: cerebral bleeding.    Time spent: 45 critical care minutes    Contact: [phone] ASSESSMENT/PLAN:  L MCA stroke status post IV tPA and thrombectomy with hemorrhagic conversion in setting of newly diagnosed atrial fibrillation    NEURO:  CT head to evaluate for hemorrhage stability  MRI to assess stroke volume  Atorvastatin for secondary stroke prevention; ASA when hemorrhagic conversion stable; long-term anticoagulation after discussion of risks/benefits with family  Activity: [x] mobilize as tolerated [] Bedrest [] PT [] OT [] PMNR    PULM:  Extubated  Aspiration precautions    CV:  SBP goal 110-160mmHg  Weaned off pressor  Atrial fibrillation: rate control  Cardiology f/u    RENAL:  Na goal: 140-150 for cerebral edema    GI:  Diet: Speech and Swallow; currently on tube feeds  Bowel regimen [x] colace [x] senna [] other:    ENDO:   Goal euglycemia (-180)    HEME/ONC:  VTE prophylaxis: [x] SCDs [] chemoprophylaxis: when heme stable [] hold chemoprophylaxis due to: [] high risk of DVT/PE on admission due to:    ID:   Monitor for fever    SOCIAL/FAMILY:  [x] awaiting [] updated at bedside [] family meeting    CODE STATUS:  [x] Full Code [] DNR [] DNI [] Palliative/Comfort Care    DISPOSITION:  [] ICU [x] Stroke Unit [] Floor [] EMU [] RCU [] PCU

## 2018-10-20 NOTE — PHYSICAL THERAPY INITIAL EVALUATION ADULT - GENERAL OBSERVATIONS, REHAB EVAL
pt encountered supine in bed, + ICU monitoring, +venodyne boots, daughter at bedside, L sided gaze preference

## 2018-10-20 NOTE — PROGRESS NOTE ADULT - SUBJECTIVE AND OBJECTIVE BOX
THE PATIENT WAS SEEN AND EXAMINED BY ME WITH THE HOUSESTAFF AND STROKE TEAM DURING MORNING ROUNDS.   HPI:  Ms. Rucker is an 86y/o female with vascular risk factors of HTN and hyperlipidemia, who was last seen normal at 22:10, on the day prior to admission. Then she was found by family members at the floor with inability to communicate or move her right side at 22:20. She was initially evaluated at [place] were NIHSS=29 deemed candidate for tPA. 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 Patient was transferred for further management to [facility]. Neurological examination showed right hemiparesis, right hemianopia, right dense facial droop, right sensory loss, left gaze preference with gaze palsy. She received IVtpa per protocol and transferred to NS after CTA revealed L ICA and L M1 occlusion.     SUBJECTIVE: Extubated overnight.  No new neurologic complaints.      atorvastatin 80 milliGRAM(s) Oral at bedtime  influenza   Vaccine 0.5 milliLiter(s) IntraMuscular once  pantoprazole  Injectable 40 milliGRAM(s) IV Push daily  phenylephrine    Infusion 0.2 MICROgram(s)/kG/Min IV Continuous <Continuous>  sodium chloride 2% . 1000 milliLiter(s) IV Continuous <Continuous>    PHYSICAL EXAM:   Vital Signs Last 24 Hrs  T(C): 37.8 (20 Oct 2018 07:00), Max: 37.8 (20 Oct 2018 03:00)  T(F): 100 (20 Oct 2018 07:00), Max: 100 (20 Oct 2018 03:00)  HR: 68 (20 Oct 2018 07:00) (50 - 92)  BP: 121/48 (20 Oct 2018 07:00) (107/44 - 156/66)  BP(mean): 71 (20 Oct 2018 07:00) (0 - 100)  RR: 16 (20 Oct 2018 07:00) (12 - 23)  SpO2: 97% (20 Oct 2018 07:00) (97% - 100%)    General: No acute distress  HEENT: EOM intact, visual fields full  Abdomen: Soft, nontender, nondistended   Extremities: No edema    NEUROLOGICAL EXAM:  Mental status: Awake, alert, oriented x3, no aphasia, no neglect, normal memory   Cranial Nerves: No facial asymmetry, no nystagmus, no dysarthria,  tongue midline  Motor exam: Normal tone, no drift, 5/5 RUE, 5/5 RLE, 5/5 LUE, 5/5 LLE, normal fine finger movements.  Sensation: Intact to light touch   Coordination/ Gait: No dysmetria, ANDREW intact and symmetric bilaterally    LABS:                        9.4    11.9  )-----------( 231      ( 20 Oct 2018 01:00 )             29.2    10-20    143  |  116<H>  |  18  ----------------------------<  138<H>  4.2   |  20<L>  |  1.03    Ca    8.4      20 Oct 2018 01:00  Phos  3.8     10-20  Mg     1.8     10-20    PT/INR - ( 19 Oct 2018 05:21 )   PT: 11.8 sec;   INR: 1.08 ratio         PTT - ( 19 Oct 2018 05:21 )  PTT:29.7 sec  Hemoglobin A1C, Whole Blood: 6.0 % (10-19 @ 07:53)      IMAGING: Reviewed by me.     CT Head No Cont (10/19/18):  Large acute left MCA territory infarct. Increased density seen throughout the left basal ganglia and left lateral frontal, parietal, and temporal cortices likely represents a combination of hemorrhagic transformation and cortical staining from recent digital subtraction angiography. Additional density seen within the left cerebral sulci likely representing subarachnoid hemorrhage. Mass effect upon the left lateral ventricle and rightward midline shift of 4 mm. No large hydrocephalus. THE PATIENT WAS SEEN AND EXAMINED BY ME WITH THE HOUSESTAFF AND STROKE TEAM DURING MORNING ROUNDS.   HPI:  Ms. Rucker is an 86y/o female with vascular risk factors of HTN and hyperlipidemia, who was last seen normal at 22:10, on the day prior to admission. Then she was found by family members at the floor with inability to communicate or move her right side at 22:20. She was initially evaluated at [place] were NIHSS=29 deemed candidate for tPA. 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 Patient was transferred for further management to [facility]. Neurological examination showed right hemiparesis, right hemianopia, right dense facial droop, right sensory loss, left gaze preference with gaze palsy. She received IVtpa per protocol and transferred to NS after CTA revealed L ICA and L M1 occlusion.     SUBJECTIVE: Extubated overnight.  No new neurologic complaints.      atorvastatin 80 milliGRAM(s) Oral at bedtime  influenza   Vaccine 0.5 milliLiter(s) IntraMuscular once  pantoprazole  Injectable 40 milliGRAM(s) IV Push daily  phenylephrine    Infusion 0.2 MICROgram(s)/kG/Min IV Continuous <Continuous>  sodium chloride 2% . 1000 milliLiter(s) IV Continuous <Continuous>    PHYSICAL EXAM:   Vital Signs Last 24 Hrs  T(C): 37.8 (20 Oct 2018 07:00), Max: 37.8 (20 Oct 2018 03:00)  T(F): 100 (20 Oct 2018 07:00), Max: 100 (20 Oct 2018 03:00)  HR: 68 (20 Oct 2018 07:00) (50 - 92)  BP: 121/48 (20 Oct 2018 07:00) (107/44 - 156/66)  BP(mean): 71 (20 Oct 2018 07:00) (0 - 100)  RR: 16 (20 Oct 2018 07:00) (12 - 23)  SpO2: 97% (20 Oct 2018 07:00) (97% - 100%)    General: No acute distress  HEENT: Right HHH  Abdomen: Soft, nontender, nondistended   Extremities: No edema    NEUROLOGICAL EXAM:  Mental status: Awake, lethargic, not following commands, no verbal output  Cranial Nerves: Right facial droop  Motor exam:  RUE- spastic, increased tone, RLE - withdraws to noxious stimuli, LUE - minimal movement,  LLE - moving freely  Sensation: Not assessed.   Coordination/ Gait: Not assessed.     LABS:                        9.4    11.9  )-----------( 231      ( 20 Oct 2018 01:00 )             29.2    10-20    143  |  116<H>  |  18  ----------------------------<  138<H>  4.2   |  20<L>  |  1.03    Ca    8.4      20 Oct 2018 01:00  Phos  3.8     10-20  Mg     1.8     10-20    PT/INR - ( 19 Oct 2018 05:21 )   PT: 11.8 sec;   INR: 1.08 ratio         PTT - ( 19 Oct 2018 05:21 )  PTT:29.7 sec  Hemoglobin A1C, Whole Blood: 6.0 % (10-19 @ 07:53)      IMAGING: Reviewed by me.     CT Head No Cont (10/19/18) @ 23:21:   Redemonstration of large acute left MCA territory infarct with similar mass effect upon the left lateral ventricle and similar rightward midline shift of 4 mm. Ventricles stable in size. No hydrocephalus. Previously seen increased density throughout the region of infarction is less apparent on the current examination, consistent with resolving contrast staining after digital subtraction angiography. 2.1 x 1.6 cm region of persistent increased density in the left lateral frontotemporal region may represent hemorrhagic transformation of the infarct.    CT Head No Cont (10/19/18) @ 05:52:   Large acute left MCA territory infarct. Increased density seen throughout the left basal ganglia and left lateral frontal, parietal, and temporal cortices likely represents a combination of hemorrhagic transformation and cortical staining from recent digital subtraction angiography. Additional density seen within the left cerebral sulci likely representing subarachnoid hemorrhage. Mass effect upon the left lateral ventricle and rightward midline shift of 4 mm. No large hydrocephalus.

## 2018-10-20 NOTE — PROGRESS NOTE ADULT - SUBJECTIVE AND OBJECTIVE BOX
88y/o female with vascular risk factors (as per ED provider note at [facility]), no family members available at the moment of evaluation) of HTN and hyperlipidemia, who was last seen normal at 22:10. Then she was found by family members at the floor with inability to communicate or move her right side at 22:20. She was initially evaluated at [place] were NIHSS=29 deemed candidate for tPA. 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 Patient was referred for endovascular therapy for ischemic stroke and transfer to [facility]. Upon arrival, NIHSS=24 she presented to Neurointerventional Surgery suite for mechanical thrombectomy for ischemic stroke.  S/p Partial recanalization of the M1 segment of the left middle cerebral artery occlusion, TICI 2a.    On admission, the patient was:  NIHSS: 24.    Overnight Events:     ROS: negative [] unable to obtain as patient is comatose/intubated/aphasic []     VITALS:   T(C): 37 (10-20-18 @ 06:00), Max: 37.8 (10-20-18 @ 03:00)  HR: 67 (10-20-18 @ 06:00) (50 - 92)  BP: 122/51 (10-20-18 @ 06:00) (106/59 - 171/79)  RR: 17 (10-20-18 @ 06:00) (12 - 23)  SpO2: 99% (10-20-18 @ 06:00) (97% - 100%)    10-19-18 @ 07:01  -  10-20-18 @ 07:00  --------------------------------------------------------  IN: 1714.5 mL / OUT: 960 mL / NET: 754.5 mL      LABS:  ABG - ( 20 Oct 2018 00:57 )  pH, Arterial: 7.41  pH, Blood: x     /  pCO2: 36    /  pO2: 167   / HCO3: 22    / Base Excess: -1.2  /  SaO2: 100                9.4    11.9  )-----------( 231      ( 20 Oct 2018 01:00 )             29.2     10-20    143  |  116<H>  |  18  ----------------------------<  138<H>  4.2   |  20<L>  |  1.03    Ca    8.4      20 Oct 2018 01:00  Phos  3.8     10-20  Mg     1.8     10-20      PT/INR - ( 19 Oct 2018 05:21 )   PT: 11.8 sec;   INR: 1.08 ratio         PTT - ( 19 Oct 2018 05:21 )  PTT:29.7 sec  MEDS:  MEDICATIONS  (STANDING):  atorvastatin 80 milliGRAM(s) Oral at bedtime  influenza   Vaccine 0.5 milliLiter(s) IntraMuscular once  pantoprazole  Injectable 40 milliGRAM(s) IV Push daily  phenylephrine    Infusion 0.2 MICROgram(s)/kG/Min (5.798 mL/Hr) IV Continuous <Continuous>  sodium chloride 2% . 1000 milliLiter(s) (60 mL/Hr) IV Continuous <Continuous>    EXAMINATION:  General:  intubated, sedated.  Neuro: PERRL, gaze midline, o/b the vent, LUE FC (3/5), LLE purposeful, RLE TF, RUE 0/5.  Cards:  S1S2 present  Respiratory:  no respiratory distress, intubated  Abdomen:  soft  Inguinal ecchymoses noted  Extremities:  no edema  Skin:  warm/dry SUMMARY:   87 year-old woman with vascular risk factors of hypertension and hyperlipidemia presented to [facility] with right sided hemiparesis and global aphasia (admission NIHSS 24). She was given IV rtPA and transferred to [facility] for stroke rescue on 10/19/18. She underwent thrombectomy with resultant TICI 2B flow. Her hospital course was notable for new diagnosis of atrial fibrillation and patent foramen ovale. She also had some hemorrhagic conversion without change in examination.    24 HOUR EVENTS:  Extubated. Weaned off pressors.     ROS: [x] unable to obtain as patient is aphasic     VITALS/DATA/ORDERS: [x] Reviewed     EXAMINATION:  General: NAD, smiles  Neuro: Awake, alert, mumbles but no clear words, opens/closes eyes and sticks out tongue to command but follows no other commands, aphasic, PERRL, right facial, RUE flicker of movement to noxious stimulus, RLE TF, left side spontaneous and at least antigravity  Cards: S1S2 irregular  Respiratory: Decrease breath sounds at bases  Abdomen: Soft, +BS  Extremities: + pulses  Skin: Warm/dry

## 2018-10-20 NOTE — CONSULT NOTE ADULT - SUBJECTIVE AND OBJECTIVE BOX
CHIEF COMPLAINT: CVA    HISTORY OF PRESENT ILLNESS:  86y/o female with vascular risk factors of HTN and hyperlipidemia, who was last seen normal at 22:10, on the day prior to admission. Then she was found by family members at the floor with inability to communicate or move her right side at 22:20. She was initially evaluated at [place] were NIHSS=29 deemed candidate for tPA. 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 Patient was transferred for further management to [facility]. Neurological examination showed right hemiparesis, right hemianopia, right dense facial droop, right sensory loss, left gaze preference with gaze palsy. She received IVtpa per protocol and transferred to NS after CTA revealed L ICA and L M1 occlusion.   Now aphasic in Stroke unit. Family at bedside. No known cardiac history.     PAST MEDICAL & SURGICAL HISTORY:      reviewed     MEDICATIONS:  enoxaparin Injectable 40 milliGRAM(s) SubCutaneous daily        aspirin Suppository 300 milliGRAM(s) Rectal daily      atorvastatin 80 milliGRAM(s) Oral at bedtime    influenza   Vaccine 0.5 milliLiter(s) IntraMuscular once  sodium chloride 2% . 1000 milliLiter(s) IV Continuous <Continuous>      FAMILY HISTORY:      SOCIAL HISTORY:    [ ] Non-smoker  [ ] Smoker  [ ] Alcohol    Allergies    No Known Allergies    Intolerances    	    REVIEW OF SYSTEMS:  	    [ ] All others negative	  [X ] Unable to obtain    PHYSICAL EXAM:  T(C): 37.6 (10-20-18 @ 14:00), Max: 37.8 (10-20-18 @ 03:00)  HR: 56 (10-20-18 @ 20:00) (56 - 79)  BP: 132/56 (10-20-18 @ 20:00) (107/52 - 134/50)  RR: 17 (10-20-18 @ 20:00) (12 - 23)  SpO2: 100% (10-20-18 @ 20:00) (97% - 100%)  Wt(kg): --  I&O's Summary    19 Oct 2018 07:01  -  20 Oct 2018 07:00  --------------------------------------------------------  IN: 1774.5 mL / OUT: 960 mL / NET: 814.5 mL    20 Oct 2018 07:01  -  20 Oct 2018 20:57  --------------------------------------------------------  IN: 720 mL / OUT: 300 mL / NET: 420 mL        Appearance: NAD aphasic 	  HEENT:   Normal oral mucosa, PERRL, EOMI	  Lymphatic: No lymphadenopathy  Cardiovascular: Normal S1 S2, No JVD, No murmurs, No edema  Respiratory: Lungs clear to auscultation	  Psychiatry: A & O x 3,  Gastrointestinal:  Soft, Non-tender, + BS	  Skin: No rashes, No ecchymoses, No cyanosis	  Extremities: Decreased range of motion, No clubbing, cyanosis or edema  Vascular: Peripheral pulses palpable 2+ bilaterally  NEUROLOGICAL EXAM:  Mental status: Awake, lethargic, not following commands, no verbal output  Cranial Nerves: Right facial droop  Motor exam:  RUE- spastic, increased tone, RLE - withdraws to noxious stimuli, LUE - minimal movement,  LLE - moving freely  Sensation: Not assessed.   Coordination/ Gait: Not assessed.   TELEMETRY: 	SR, Bigeminy, PACs     ECG:  NSR, PACs, no acute ischemic stt changes 	  RADIOLOGY:  < from: CT Head No Cont (10.19.18 @ 05:52) >    EXAM:  CT BRAIN                            PROCEDURE DATE:  10/19/2018            INTERPRETATION:  Noncontrast CT of the brain.    CLINICAL INDICATION:  stroke s/p tpa, status post digital subtraction   angiography    TECHNIQUE : Axial CT scanning of the brain was obtained from the skull   base to the vertex without the administration of intravenous contrast.      COMPARISON: None available    FINDINGS:    There is low-density and sulcal effacement seen within the left frontal   and parietal lobes, consistent with an acute left MCA territory infarct.     Increased density seen throughout the left basal ganglia and left lateral   frontal, parietal, and temporal cortices likely represents a combination   of hemorrhagic transformation and cortical staining from recent digital   subtraction angiography. Additional density seen within the left cerebral   sulci likely representing subarachnoid hemorrhage.    Mass effect upon the left lateral ventricle and rightward midline shift   of 4 mm. Nolarge hydrocephalus.    IMPRESSION:    Large acute left MCA territory infarct.    Increased density seen throughout the left basal ganglia and left lateral   frontal, parietal, and temporal cortices likely represents a combination   of hemorrhagic transformation and cortical staining from recent digital   subtraction angiography    Additional density seen within the left cerebral sulci likely   representing subarachnoid hemorrhage.    Mass effect upon the left lateral ventricle and rightward midline shift   of 4 mm. No large hydrocephalus.    Dr. Kong discussed these findings with neurosurgical physician's   assistant Richard on 10/19/2018 9:02 AM with read back.                      MARIUSZ KONG M.D., ATTENDING RADIOLOGIST  This document hasbeen electronically signed. Oct 19 2018  9:02AM          < from: TTE with Doppler (w/Cont) (10.19.18 @ 08:26) >    Patient name: STEPAN RIOJAS  YOB: 1931   Age: 87 (F)   MR#: [id number]  Study Date: 10/19/2018  [facility] Delac[facility]LL[facility]  Study quality: Technically fair  Referring Physician: Adeline Jarqiun MD  Blood Pressure: 171/79 mmHg  Height: 160 cm  Weight: 68 kg  BSA: 1.7 m2  ------------------------------------------------------------------------  PROCEDURE: Transthoracic echocardiogram with 2-D, M-Mode  and complete spectral and color flow Doppler. Patientwas  injected with 10 cc's of aerosolized saline. Patient was  injected with 10 cc's of aerosolized saline.  INDICATION: Cerebral infarction, unspecified (I63.9)  ------------------------------------------------------------------------  Dimensions: Normal Values:  LA:     4.3    2.0 - 4.0 cm  Ao:     2.5    2.0 - 3.8 cm  SEPTUM: 0.9    0.6 - 1.2 cm  PWT:    0.8    0.6 - 1.1 cm  LVIDd:  3.8    3.0 - 5.6 cm  LVIDs:         1.8 - 4.0 cm  Derived variables:  LVMI: 55 g/m2  RWT: 0.42  EF (Visual Estimate): 55-60 %  ------------------------------------------------------------------------  Observations:  Mitral Valve: Normal mitral valve.  Aortic Valve/Aorta: Normal trileaflet aortic valve.  Aortic Root: 2.5 cm.  Left Atrium: Normal left atrium.LA volume index = 25  cc/m2.  Left Ventricle: Normal left ventricular systolic function.  No segmental wall motion abnormalities. Normal left  ventricular internal dimensions and wall thicknesses.  Right Heart: Normal right atrium. Normal right ventricular  size and function. Normal tricuspid valve. Minimal  tricuspid regurgitation. Normal pulmonic valve. Minimal  pulmonic regurgitation.  Pericardium/Pleura: Normal pericardium with no pericardial  effusion.  Hemodynamic: Estimated right atrial pressure is 8 mm Hg.  Estimated right ventricular systolic pressure equals 29 mm  Hg, assuming right atrial pressure equals 8 mm Hg,  consistent with normal pulmonary pressures. Agitated saline  injection revealed few late bubbles in the left heart,  consistent with transpulmonic shunting.  ------------------------------------------------------------------------  Conclusions:  1. Normal left ventricular internal dimensions and wall  thicknesses.  2. Normal left ventricular systolic function. No segmental  wall motion abnormalities.  3. Agitated saline injection revealed few late bubbles in  the left heart, consistent with transpulmonic shunting.  *** No previous Echo exam.  ------------------------------------------------------------------------  Confirmed on  10/19/2018 - 12:02:23 by Too Rose M.D.  ------------------------------------------------------------------------    < end of copied text >      OTHER: 	  	  LABS:	 	    CARDIAC MARKERS:                                  9.4    11.9  )-----------( 231      ( 20 Oct 2018 01:00 )             29.2     10-20    149<H>  |  119<H>  |  17  ----------------------------<  121<H>  4.2   |  22  |  1.01    Ca    8.5      20 Oct 2018 12:38  Phos  3.8     10-20  Mg     1.8     10-20      proBNP:   Lipid Profile:   HgA1c:   TSH:

## 2018-10-20 NOTE — PROVIDER CONTACT NOTE (OTHER) - ASSESSMENT
pt. aphasic  but face smiling , no pain s/s. daughter at bedside. HR-63 A-FIB  and SINUS ARRHYTHMIA going in cardiac monitor

## 2018-10-20 NOTE — AIRWAY REMOVAL NOTE  ADULT & PEDS - COUGH
Patient is calling to request a WORK excuse note for date(s): 2/20/18    Patient is requesting the letter be available for  today; patient stated her  will be in the [place] area. Please give a call when letter is ready for .    Preferred contact number#      mobile [phone]             no productive sputum

## 2018-10-21 LAB
ANION GAP SERPL CALC-SCNC: 11 MMOL/L — SIGNIFICANT CHANGE UP (ref 5–17)
ANION GAP SERPL CALC-SCNC: 12 MMOL/L — SIGNIFICANT CHANGE UP (ref 5–17)
BASOPHILS # BLD AUTO: 0 K/UL — SIGNIFICANT CHANGE UP (ref 0–0.2)
BASOPHILS NFR BLD AUTO: 0.1 % — SIGNIFICANT CHANGE UP (ref 0–2)
BUN SERPL-MCNC: 20 MG/DL — SIGNIFICANT CHANGE UP (ref 7–23)
BUN SERPL-MCNC: 25 MG/DL — HIGH (ref 7–23)
CALCIUM SERPL-MCNC: 8.6 MG/DL — SIGNIFICANT CHANGE UP (ref 8.4–10.5)
CALCIUM SERPL-MCNC: 8.9 MG/DL — SIGNIFICANT CHANGE UP (ref 8.4–10.5)
CHLORIDE SERPL-SCNC: 123 MMOL/L — HIGH (ref 96–108)
CHLORIDE SERPL-SCNC: 123 MMOL/L — HIGH (ref 96–108)
CO2 SERPL-SCNC: 19 MMOL/L — LOW (ref 22–31)
CO2 SERPL-SCNC: 20 MMOL/L — LOW (ref 22–31)
CREAT SERPL-MCNC: 0.89 MG/DL — SIGNIFICANT CHANGE UP (ref 0.5–1.3)
CREAT SERPL-MCNC: 0.95 MG/DL — SIGNIFICANT CHANGE UP (ref 0.5–1.3)
EOSINOPHIL # BLD AUTO: 0 K/UL — SIGNIFICANT CHANGE UP (ref 0–0.5)
EOSINOPHIL NFR BLD AUTO: 0 % — SIGNIFICANT CHANGE UP (ref 0–6)
GLUCOSE SERPL-MCNC: 110 MG/DL — HIGH (ref 70–99)
GLUCOSE SERPL-MCNC: 118 MG/DL — HIGH (ref 70–99)
HCT VFR BLD CALC: 27.5 % — LOW (ref 34.5–45)
HGB BLD-MCNC: 8.8 G/DL — LOW (ref 11.5–15.5)
LYMPHOCYTES # BLD AUTO: 1.3 K/UL — SIGNIFICANT CHANGE UP (ref 1–3.3)
LYMPHOCYTES # BLD AUTO: 11.8 % — LOW (ref 13–44)
MCHC RBC-ENTMCNC: 30.3 PG — SIGNIFICANT CHANGE UP (ref 27–34)
MCHC RBC-ENTMCNC: 32.1 GM/DL — SIGNIFICANT CHANGE UP (ref 32–36)
MCV RBC AUTO: 94.3 FL — SIGNIFICANT CHANGE UP (ref 80–100)
MONOCYTES # BLD AUTO: 0.7 K/UL — SIGNIFICANT CHANGE UP (ref 0–0.9)
MONOCYTES NFR BLD AUTO: 6.2 % — SIGNIFICANT CHANGE UP (ref 2–14)
NEUTROPHILS # BLD AUTO: 9.1 K/UL — HIGH (ref 1.8–7.4)
NEUTROPHILS NFR BLD AUTO: 82 % — HIGH (ref 43–77)
PLATELET # BLD AUTO: 198 K/UL — SIGNIFICANT CHANGE UP (ref 150–400)
POTASSIUM SERPL-MCNC: 3.8 MMOL/L — SIGNIFICANT CHANGE UP (ref 3.5–5.3)
POTASSIUM SERPL-MCNC: 4.2 MMOL/L — SIGNIFICANT CHANGE UP (ref 3.5–5.3)
POTASSIUM SERPL-SCNC: 3.8 MMOL/L — SIGNIFICANT CHANGE UP (ref 3.5–5.3)
POTASSIUM SERPL-SCNC: 4.2 MMOL/L — SIGNIFICANT CHANGE UP (ref 3.5–5.3)
RBC # BLD: 2.91 M/UL — LOW (ref 3.8–5.2)
RBC # FLD: 12.9 % — SIGNIFICANT CHANGE UP (ref 10.3–14.5)
SODIUM SERPL-SCNC: 154 MMOL/L — HIGH (ref 135–145)
SODIUM SERPL-SCNC: 154 MMOL/L — HIGH (ref 135–145)
T3 SERPL-MCNC: 67 NG/DL — LOW (ref 80–200)
T4 AB SER-ACNC: 8.4 UG/DL — SIGNIFICANT CHANGE UP (ref 4.6–12)
TSH SERPL-MCNC: 0.55 UIU/ML — SIGNIFICANT CHANGE UP (ref 0.27–4.2)
WBC # BLD: 11.1 K/UL — HIGH (ref 3.8–10.5)
WBC # FLD AUTO: 11.1 K/UL — HIGH (ref 3.8–10.5)

## 2018-10-21 PROCEDURE — 99233 SBSQ HOSP IP/OBS HIGH 50: CPT

## 2018-10-21 RX ORDER — SODIUM CHLORIDE 9 MG/ML
1000 INJECTION INTRAMUSCULAR; INTRAVENOUS; SUBCUTANEOUS
Qty: 0 | Refills: 0 | Status: DISCONTINUED | OUTPATIENT
Start: 2018-10-21 | End: 2018-10-22

## 2018-10-21 RX ORDER — ACETAMINOPHEN 500 MG
1000 TABLET ORAL ONCE
Qty: 0 | Refills: 0 | Status: COMPLETED | OUTPATIENT
Start: 2018-10-21 | End: 2018-10-21

## 2018-10-21 RX ORDER — METOPROLOL TARTRATE 50 MG
5 TABLET ORAL EVERY 6 HOURS
Qty: 0 | Refills: 0 | Status: DISCONTINUED | OUTPATIENT
Start: 2018-10-21 | End: 2018-10-23

## 2018-10-21 RX ORDER — SODIUM CHLORIDE 5 G/100ML
1000 INJECTION, SOLUTION INTRAVENOUS
Qty: 0 | Refills: 0 | Status: DISCONTINUED | OUTPATIENT
Start: 2018-10-21 | End: 2018-10-21

## 2018-10-21 RX ADMIN — Medication 300 MILLIGRAM(S): at 12:00

## 2018-10-21 RX ADMIN — Medication 5 MILLIGRAM(S): at 17:12

## 2018-10-21 RX ADMIN — ENOXAPARIN SODIUM 40 MILLIGRAM(S): 100 INJECTION SUBCUTANEOUS at 12:00

## 2018-10-21 RX ADMIN — SODIUM CHLORIDE 50 MILLILITER(S): 9 INJECTION INTRAMUSCULAR; INTRAVENOUS; SUBCUTANEOUS at 17:12

## 2018-10-21 RX ADMIN — SODIUM CHLORIDE 30 MILLILITER(S): 5 INJECTION, SOLUTION INTRAVENOUS at 08:40

## 2018-10-21 RX ADMIN — Medication 400 MILLIGRAM(S): at 04:16

## 2018-10-21 RX ADMIN — SODIUM CHLORIDE 60 MILLILITER(S): 5 INJECTION, SOLUTION INTRAVENOUS at 04:17

## 2018-10-21 RX ADMIN — Medication 1000 MILLIGRAM(S): at 04:50

## 2018-10-21 NOTE — CONSULT NOTE ADULT - ASSESSMENT
86 yo F, here with L MCA distribution hemorrhagic infarct- TPA and thrombectomy.  She remains aphasic, R hemiparesis.  Febrile past 24 hrs, Tmx 101.  Otherwise, no clues to emerging infection.  WBC minimally elevated.  No pyuria.  CXR 10/19 clear.  No other sepsis criteria at present.  By exclusion, fever likely reflects hemorrhagic component, CNS blood.    Plan:  Observe off antibiotics   Check PCT level  Follow temps and CBC/diff   Await Cx results  d/w Neuro team  d/w daughter in law

## 2018-10-21 NOTE — PROGRESS NOTE ADULT - ASSESSMENT
Ms. Rucker is an 86y/o female with vascular risk factors of HTN and hyperlipidemia, who was last seen normal at 22:10, on the day prior to admission. Then she was found by family members at the floor with inability to communicate or move her right side at 22:20. She was initially evaluated at [place] were NIHSS=29 deemed candidate for tPA. 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 Patient was transferred for further management to [facility]. Neurological examination showed right hemiparesis, right hemianopia, right dense facial droop, right sensory loss, left gaze preference with gaze palsy. She received IVtpa per protocol and transferred to NS after CTA revealed L ICA and L M1 occlusion.     Impression: L MCA infarct secondary to new onset of a-fib.     ASSESSMENT:     NEURO: Continue close monitoring for neurologic deterioration, SBP goal of 110-160, titrate statin to LDL goal less than 70, MRI Brain w/o pending. Physical therapy/OT/Speech eval/treatment- evals pending.     ANTITHROMBOTIC THERAPY: Aspirin only due to hemorraghic transformation     PULMONARY: CXR clear on 10/19, protecting airway, saturating well     CARDIOVASCULAR: TTE shows EF: 55-60%, Normal left ventricular internal dimensions and wall  thicknesses. Normal left ventricular systolic function. No segmental wall motion abnormalities. Agitated saline injection revealed few late bubbles in the left heart, consistent with transpulmonic shunting, cardiac monitoring found to be in a-fib.                              SBP goal: 110-160     GASTROINTESTINAL:  dysphagia screen failed, speech and swallow eval pending now that patient is extubated.     Diet: NPO    RENAL: BUN/Cr without acute change, good urine output      Na Goal: Greater than 135     Mendez: No    HEMATOLOGY: H/H without acute change, Platelets 198, no signs or symptoms of bleeding      DVT ppx: Heparin s.c [] LMWH [x]     ID: febrile overnight, slight leukocytosis , will continue to monitor, will recheck CBC w/ diff in the AM. Ordered 2 sets of blood cultures- results pending, UA - negative, and chest X-ray, results pending, to rule out infection.     OTHER: Plan discussed with pts daughter at bedside, all questions and concerns addressed.     DISPOSITION: Rehab or home depending on PT eval once stable and workup is complete.    CORE MEASURES:        Admission NIHSS: 29     TPA: [x] YES [] NO      LDL/HDL: 100/52      Depression Screen: p     Statin Therapy: yes     Dysphagia Screen: [] PASS [x] FAIL     Smoking [] YES [x] NO      Afib [x] YES [] NO     Stroke Education [] YES [] NO - p Ms. Rucker is an 86y/o female with vascular risk factors of HTN and hyperlipidemia, who was last seen normal at 22:10, on the day prior to admission. Then she was found by family members at the floor with inability to communicate or move her right side at 22:20. She was initially evaluated at [place] were NIHSS=29 deemed candidate for tPA. 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 Patient was transferred for further management to [facility]. Neurological examination showed right hemiparesis, right hemianopia, right dense facial droop, right sensory loss, left gaze preference with gaze palsy. She received IVtpa per protocol and transferred to NS after CTA revealed L ICA and L M1 occlusion.     Impression: L MCA infarct secondary to new onset of a-fib.     ASSESSMENT:     NEURO: Continue close monitoring for neurologic deterioration, SBP goal of 110-160, titrate statin to LDL goal less than 70, MRI Brain w/o - results as noted above.  Physical therapy/OT/Speech eval/treatment- evals pending.     ANTITHROMBOTIC THERAPY: Aspirin only due to hemorraghic transformation     PULMONARY: CXR clear on 10/19, protecting airway, saturating well     CARDIOVASCULAR: TTE shows EF: 55-60%, Normal left ventricular internal dimensions and wall  thicknesses. Normal left ventricular systolic function. No segmental wall motion abnormalities. Agitated saline injection revealed few late bubbles in the left heart, consistent with transpulmonic shunting, cardiac monitoring found to be in a-fib.                              SBP goal: 110-160     GASTROINTESTINAL:  dysphagia screen failed, speech and swallow eval pending now that patient is extubated.     Diet: NPO    RENAL: BUN/Cr without acute change, good urine output - being straight cathed     Na Goal: Greater than 135     Mendez: No    HEMATOLOGY: H/H without acute change, Platelets 198, no signs or symptoms of bleeding      DVT ppx: Heparin s.c [] LMWH [x]     ID: febrile overnight, slight leukocytosis , will continue to monitor, will recheck CBC w/ diff in the AM. Ordered 2 sets of blood cultures- results pending, UA - negative, and chest X-ray, results pending, to rule out infection. ID consulted.    OTHER: Plan discussed with pts daughter at bedside, all questions and concerns addressed.     DISPOSITION: Rehab or home depending on PT eval once stable and workup is complete.    CORE MEASURES:        Admission NIHSS: 29     TPA: [x] YES [] NO      LDL/HDL: 100/52      Depression Screen: p     Statin Therapy: yes     Dysphagia Screen: [] PASS [x] FAIL     Smoking [] YES [x] NO      Afib [x] YES [] NO     Stroke Education [] YES [] NO - p

## 2018-10-21 NOTE — PROGRESS NOTE ADULT - SUBJECTIVE AND OBJECTIVE BOX
Subjective: Patient seen and examined. No new events except as noted.   remains in Stroke unit     REVIEW OF SYSTEMS:    CONSTITUTIONAL: + weakness, fevers or chills  EYES/ENT: No visual changes;  No vertigo or throat pain   NECK: No pain or stiffness  RESPIRATORY: No cough, wheezing, hemoptysis; No shortness of breath  CARDIOVASCULAR: No chest pain or palpitations  GASTROINTESTINAL: No abdominal or epigastric pain. No nausea, vomiting, or hematemesis; No diarrhea or constipation. No melena or hematochezia.  GENITOURINARY: No dysuria, frequency or hematuria  NEUROLOGICAL: No numbness or weakness  SKIN: No itching, burning, rashes, or lesions   All other review of systems is negative unless indicated above.    MEDICATIONS:  MEDICATIONS  (STANDING):  aspirin Suppository 300 milliGRAM(s) Rectal daily  atorvastatin 80 milliGRAM(s) Oral at bedtime  enoxaparin Injectable 40 milliGRAM(s) SubCutaneous daily  influenza   Vaccine 0.5 milliLiter(s) IntraMuscular once  sodium chloride 2% . 1000 milliLiter(s) (30 mL/Hr) IV Continuous <Continuous>      PHYSICAL EXAM:  T(C): 37.5 (10-21-18 @ 07:30), Max: 38.3 (10-21-18 @ 04:00)  HR: 67 (10-21-18 @ 10:00) (56 - 83)  BP: 157/61 (10-21-18 @ 10:00) (108/60 - 157/61)  RR: 17 (10-21-18 @ 10:00) (15 - 22)  SpO2: 98% (10-21-18 @ 10:00) (97% - 100%)  Wt(kg): --  I&O's Summary    20 Oct 2018 07:01  -  21 Oct 2018 07:00  --------------------------------------------------------  IN: 1360 mL / OUT: 1070 mL / NET: 290 mL    21 Oct 2018 07:01  -  21 Oct 2018 11:45  --------------------------------------------------------  IN: 120 mL / OUT: 0 mL / NET: 120 mL          Appearance: NAD aphasic   HEENT:   Normal oral mucosa, PERRL, EOMI	  Lymphatic: No lymphadenopathy , no edema  Cardiovascular: Normal S1 S2, No JVD, No murmurs , Peripheral pulses palpable 2+ bilaterally  Respiratory: Lungs clear to auscultation, normal effort 	  Gastrointestinal:  Soft, Non-tender, + BS	  Skin: No rashes, No ecchymoses, No cyanosis, warm to touch  Musculoskeletal: Decreased range of motion and  strength  Psychiatry:  Mood & affect appropriate  Ext: No edema  NEUROLOGICAL EXAM:  Mental status: Awake, lethargic, not following commands, no verbal output  Cranial Nerves: Right facial droop  Motor exam:  RUE- plegic, increased tone, RLE - withdraws to noxious stimuli, LUE - minimal movement,  LLE - moving freely  Sensation: Not assessed.   Coordination/ Gait: Not assessed.     LABS:    CARDIAC MARKERS:                                8.8    11.1  )-----------( 198      ( 21 Oct 2018 01:22 )             27.5     10-21    154<H>  |  123<H>  |  20  ----------------------------<  118<H>  4.2   |  20<L>  |  0.89    Ca    8.6      21 Oct 2018 01:22  Phos  3.8     10-20  Mg     1.8     10-20      proBNP:   Lipid Profile:   HgA1c:   TSH:     2          TELEMETRY: SR/PACs 	    ECG:  	  RADIOLOGY:   DIAGNOSTIC TESTING:  [ ] Echocardiogram:  [ ]  Catheterization:  [ ] Stress Test:    OTHER:

## 2018-10-21 NOTE — PROGRESS NOTE ADULT - ASSESSMENT
86 yo female admitted with Large left MCA territory infarct and found to have frequent ectopy on Tele

## 2018-10-21 NOTE — PROGRESS NOTE ADULT - SUBJECTIVE AND OBJECTIVE BOX
THE PATIENT WAS SEEN AND EXAMINED BY ME WITH THE HOUSESTAFF AND STROKE TEAM DURING MORNING ROUNDS.   HPI:  Ms. Rucker is an 88y/o female with vascular risk factors of HTN and hyperlipidemia, who was last seen normal at 22:10, on the day prior to admission. Then she was found by family members at the floor with inability to communicate or move her right side at 22:20. She was initially evaluated at [place] were NIHSS=29 deemed candidate for tPA. 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 Patient was transferred for further management to [facility]. Neurological examination showed right hemiparesis, right hemianopia, right dense facial droop, right sensory loss, left gaze preference with gaze palsy. She received IVtpa per protocol and transferred to NS after CTA revealed L ICA and L M1 occlusion.     SUBJECTIVE: 4 seconds atrial tachycardia, temp of 101 F, and a right arm tremor overnight. 2 Sets of blood cultures drawn and tylenol given.  No new neurologic complaints.      aspirin Suppository 300 milliGRAM(s) Rectal daily  atorvastatin 80 milliGRAM(s) Oral at bedtime  enoxaparin Injectable 40 milliGRAM(s) SubCutaneous daily  influenza   Vaccine 0.5 milliLiter(s) IntraMuscular once  sodium chloride 2% . 1000 milliLiter(s) IV Continuous <Continuous>    PHYSICAL EXAM:   Vital Signs Last 24 Hrs  T(C): 38.3 (21 Oct 2018 04:00), Max: 38.3 (21 Oct 2018 04:00)  T(F): 101 (21 Oct 2018 04:00), Max: 101 (21 Oct 2018 04:00)  HR: 61 (21 Oct 2018 06:00) (56 - 83)  BP: 133/52 (21 Oct 2018 06:00) (108/60 - 152/82)  BP(mean): 104 (21 Oct 2018 04:00) (71 - 104)  RR: 16 (21 Oct 2018 06:00) (14 - 22)  SpO2: 99% (21 Oct 2018 06:00) (97% - 100%)    General: No acute distress  HEENT: Right H  Abdomen: Soft, nontender, nondistended   Extremities: No edema    NEUROLOGICAL EXAM:  Mental status: Awake, lethargic, not following commands, no verbal output  Cranial Nerves: Right facial droop  Motor exam:  RUE- spastic, increased tone, RLE - withdraws to noxious stimuli, LUE - minimal movement,  LLE - moving freely  Sensation: Not assessed.   Coordination/ Gait: Not assessed.     LABS:                        8.8    11.1  )-----------( 198      ( 21 Oct 2018 01:22 )             27.5    10-21    154<H>  |  123<H>  |  20  ----------------------------<  118<H>  4.2   |  20<L>  |  0.89    Ca    8.6      21 Oct 2018 01:22  Phos  3.8     10-20  Mg     1.8     10-20      Hemoglobin A1C, Whole Blood: 6.0 % (10-19 @ 07:53)      IMAGING: Reviewed by me.     CT Head No Cont (10/19/18) @ 23:21:   Redemonstration of large acute left MCA territory infarct with similar mass effect upon the left lateral ventricle and similar rightward midline shift of 4 mm. Ventricles stable in size. No hydrocephalus. Previously seen increased density throughout the region of infarction is less apparent on the current examination, consistent with resolving contrast staining after digital subtraction angiography. 2.1 x 1.6 cm region of persistent increased density in the left lateral frontotemporal region may represent hemorrhagic transformation of the infarct.    CT Head No Cont (10/19/18) @ 05:52:   Large acute left MCA territory infarct. Increased density seen throughout the left basal ganglia and left lateral frontal, parietal, and temporal cortices likely represents a combination of hemorrhagic transformation and cortical staining from recent digital subtraction angiography. Additional density seen within the left cerebral sulci likely representing subarachnoid hemorrhage. Mass effect upon the left lateral ventricle and rightward midline shift of 4 mm. No large hydrocephalus. THE PATIENT WAS SEEN AND EXAMINED BY ME WITH THE HOUSESTAFF AND STROKE TEAM DURING MORNING ROUNDS.   HPI:  Ms. Rucker is an 88y/o female with vascular risk factors of HTN and hyperlipidemia, who was last seen normal at 22:10, on the day prior to admission. Then she was found by family members at the floor with inability to communicate or move her right side at 22:20. She was initially evaluated at [place] were NIHSS=29 deemed candidate for tPA. 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 Patient was transferred for further management to [facility]. Neurological examination showed right hemiparesis, right hemianopia, right dense facial droop, right sensory loss, left gaze preference with gaze palsy. She received IVtpa per protocol and transferred to NS after CTA revealed L ICA and L M1 occlusion.     SUBJECTIVE: 4 seconds atrial tachycardia, temp of 101 F, and a right arm tremor overnight. 2 Sets of blood cultures drawn and tylenol given.  No new neurologic complaints.      aspirin Suppository 300 milliGRAM(s) Rectal daily  atorvastatin 80 milliGRAM(s) Oral at bedtime  enoxaparin Injectable 40 milliGRAM(s) SubCutaneous daily  influenza   Vaccine 0.5 milliLiter(s) IntraMuscular once  sodium chloride 2% . 1000 milliLiter(s) IV Continuous <Continuous>    PHYSICAL EXAM:   Vital Signs Last 24 Hrs  T(C): 38.3 (21 Oct 2018 04:00), Max: 38.3 (21 Oct 2018 04:00)  T(F): 101 (21 Oct 2018 04:00), Max: 101 (21 Oct 2018 04:00)  HR: 61 (21 Oct 2018 06:00) (56 - 83)  BP: 133/52 (21 Oct 2018 06:00) (108/60 - 152/82)  BP(mean): 104 (21 Oct 2018 04:00) (71 - 104)  RR: 16 (21 Oct 2018 06:00) (14 - 22)  SpO2: 99% (21 Oct 2018 06:00) (97% - 100%)    General: No acute distress  HEENT: Right H  Abdomen: Soft, nontender, nondistended   Extremities: No edema    NEUROLOGICAL EXAM:  Mental status: Awake, lethargic, not following commands, no verbal output  Cranial Nerves: Right facial droop  Motor exam:  RUE- plegic, increased tone, RLE - withdraws to noxious stimuli, LUE - minimal movement,  LLE - moving freely  Sensation: Not assessed.   Coordination/ Gait: Not assessed.     LABS:                        8.8    11.1  )-----------( 198      ( 21 Oct 2018 01:22 )             27.5    10-21    154<H>  |  123<H>  |  20  ----------------------------<  118<H>  4.2   |  20<L>  |  0.89    Ca    8.6      21 Oct 2018 01:22  Phos  3.8     10-20  Mg     1.8     10-20      Hemoglobin A1C, Whole Blood: 6.0 % (10-19 @ 07:53)      IMAGING: Reviewed by me.     MRI Head No Cont (10/20/18):  Acute hemorrhagic infarct involving the left MCA territory with mass effect on the left lateral ventricle and left to right shift.    CT Head No Cont (10/19/18) @ 23:21:   Redemonstration of large acute left MCA territory infarct with similar mass effect upon the left lateral ventricle and similar rightward midline shift of 4 mm. Ventricles stable in size. No hydrocephalus. Previously seen increased density throughout the region of infarction is less apparent on the current examination, consistent with resolving contrast staining after digital subtraction angiography. 2.1 x 1.6 cm region of persistent increased density in the left lateral frontotemporal region may represent hemorrhagic transformation of the infarct.    CT Head No Cont (10/19/18) @ 05:52:   Large acute left MCA territory infarct. Increased density seen throughout the left basal ganglia and left lateral frontal, parietal, and temporal cortices likely represents a combination of hemorrhagic transformation and cortical staining from recent digital subtraction angiography. Additional density seen within the left cerebral sulci likely representing subarachnoid hemorrhage. Mass effect upon the left lateral ventricle and rightward midline shift of 4 mm. No large hydrocephalus.

## 2018-10-21 NOTE — CONSULT NOTE ADULT - SUBJECTIVE AND OBJECTIVE BOX
HPI:   Patient is a 87y female with HTN, HLD, admitted 10/19 with acute stroke, L MCA syndrome- received TPA, underwent thrombectomy with hemorrhagic conversion.  She's now on Neuro service, aphasic, but alert, follows some commands.  NGT feeds in progress.  As of early yest AM, T 100, T 101 this AM.  No tachycardia, BP stable.  D/w daughter in law at bedside.    REVIEW OF SYSTEMS:   Not provided, aphasia    PAST MEDICAL & SURGICAL HISTORY:  As above    Allergies  No Known Allergies    Antimicrobials off    Other Medications:  aspirin Suppository 300 milliGRAM(s) Rectal daily  atorvastatin 80 milliGRAM(s) Oral at bedtime  enoxaparin Injectable 40 milliGRAM(s) SubCutaneous daily  influenza   Vaccine 0.5 milliLiter(s) IntraMuscular once  sodium chloride 2% . 1000 milliLiter(s) IV Continuous <Continuous>      FAMILY HISTORY:  NC    SOCIAL HISTORY:  Smoking: no    ETOH: no            T(F): 99.5 (10-21-18 @ 07:30), Max: 101 (10-21-18 @ 04:00)  HR: 67 (10-21-18 @ 10:00)  BP: 157/61 (10-21-18 @ 10:00)  RR: 17 (10-21-18 @ 10:00)  SpO2: 98% (10-21-18 @ 10:00)  Wt(kg): --    PHYSICAL EXAM:  General: alert, no acute distress  Eyes:  anicteric, no conjunctival injection, no discharge  Oropharynx: no lesions  	  Neck: without adenopathy  Lungs: clear to auscultation  Heart: irregular rhythm; no murmur, rubs or gallops  Abdomen: soft, nondistended, nontender, without mass or organomegaly  Skin: no lesions  Extremities: no edema  Neurologic: alert, aphasic, R hemiparesis    LAB RESULTS:                        8.8    11.1  )-----------( 198      ( 21 Oct 2018 01:22 )             27.5     10-21    154<H>  |  123<H>  |  20  ----------------------------<  118<H>  4.2   |  20<L>  |  0.89    Ca    8.6      21 Oct 2018 01:22  Phos  3.8     10-20  Mg     1.8     10-20      Urinalysis Basic - ( 20 Oct 2018 22:12 )    Color: Yellow / Appearance: Clear / S.023 / pH: x  Gluc: x / Ketone: Negative  / Bili: Negative / Urobili: Negative mg/dL   Blood: x / Protein: Trace mg/dL / Nitrite: Negative   Leuk Esterase: Negative / RBC: 2 /HPF / WBC 2 /HPF   Sq Epi: x / Non Sq Epi: 1 /HPF / Bacteria: Negative    MICROBIOLOGY:  RECENT CULTURES:  pending      RADIOLOGY REVIEWED:  MR Head No Cont (10.20.18 @ 18:38) >  Acute hemorrhagic infarct involving the left MCA territory   with mass effect on the left lateral ventricle and left to right shift.    Xray Chest 1 View- PORTABLE-Urgent (10.19.18 @ 05:48) >  The lungs are clear.

## 2018-10-21 NOTE — CHART NOTE - NSCHARTNOTEFT_GEN_A_CORE
Notified by RN of PAT 8 second with , per tele.      ICU Vital Signs Last 24 Hrs  T(C): 37.3 (21 Oct 2018 12:00), Max: 38.3 (21 Oct 2018 04:00)  T(F): 99.1 (21 Oct 2018 12:00), Max: 101 (21 Oct 2018 04:00)  HR: 71 (21 Oct 2018 16:00) (56 - 93)  BP: 159/53 (21 Oct 2018 16:00) (130/74 - 159/53)  BP(mean): 83 (21 Oct 2018 16:00) (78 - 119)  RR: 17 (21 Oct 2018 16:00) (15 - 22)  SpO2: 97% (21 Oct 2018 16:00) (95% - 100%)    General: No acute distress   HEENT: Right H  Abdomen: Soft, nontender, nondistended   Extremities: No edema    NEUROLOGICAL EXAM:  Mental status: Awake, lethargic, not following commands, no verbal output  Cranial Nerves: Right facial droop  Motor exam:  RUE- plegic, increased tone, RLE - withdraws to noxious stimuli, LUE - minimal movement,  LLE - moving freely  Sensation: Not assessed.   Coordination/ Gait: Not assessed.     Plan:   -Spoke with Dr. Chand, cardiologist who is following patient, started on lopressor 5mg IV q 6 hours, and will continue to monitor on tele.

## 2018-10-22 DIAGNOSIS — Z71.89 OTHER SPECIFIED COUNSELING: ICD-10-CM

## 2018-10-22 DIAGNOSIS — R00.0 TACHYCARDIA, UNSPECIFIED: ICD-10-CM

## 2018-10-22 LAB
ANION GAP SERPL CALC-SCNC: 12 MMOL/L — SIGNIFICANT CHANGE UP (ref 5–17)
ANION GAP SERPL CALC-SCNC: 13 MMOL/L — SIGNIFICANT CHANGE UP (ref 5–17)
ANION GAP SERPL CALC-SCNC: 14 MMOL/L — SIGNIFICANT CHANGE UP (ref 5–17)
BASOPHILS # BLD AUTO: 0.01 K/UL — SIGNIFICANT CHANGE UP (ref 0–0.2)
BASOPHILS NFR BLD AUTO: 0.1 % — SIGNIFICANT CHANGE UP (ref 0–2)
BUN SERPL-MCNC: 33 MG/DL — HIGH (ref 7–23)
BUN SERPL-MCNC: 36 MG/DL — HIGH (ref 7–23)
BUN SERPL-MCNC: 37 MG/DL — HIGH (ref 7–23)
CALCIUM SERPL-MCNC: 9.1 MG/DL — SIGNIFICANT CHANGE UP (ref 8.4–10.5)
CALCIUM SERPL-MCNC: 9.2 MG/DL — SIGNIFICANT CHANGE UP (ref 8.4–10.5)
CALCIUM SERPL-MCNC: 9.5 MG/DL — SIGNIFICANT CHANGE UP (ref 8.4–10.5)
CHLORIDE SERPL-SCNC: 127 MMOL/L — HIGH (ref 96–108)
CHLORIDE SERPL-SCNC: 127 MMOL/L — HIGH (ref 96–108)
CHLORIDE SERPL-SCNC: 128 MMOL/L — HIGH (ref 96–108)
CO2 SERPL-SCNC: 16 MMOL/L — LOW (ref 22–31)
CO2 SERPL-SCNC: 17 MMOL/L — LOW (ref 22–31)
CO2 SERPL-SCNC: 18 MMOL/L — LOW (ref 22–31)
CREAT SERPL-MCNC: 0.99 MG/DL — SIGNIFICANT CHANGE UP (ref 0.5–1.3)
CREAT SERPL-MCNC: 1.02 MG/DL — SIGNIFICANT CHANGE UP (ref 0.5–1.3)
CREAT SERPL-MCNC: 1.07 MG/DL — SIGNIFICANT CHANGE UP (ref 0.5–1.3)
EOSINOPHIL # BLD AUTO: 0 K/UL — SIGNIFICANT CHANGE UP (ref 0–0.5)
EOSINOPHIL NFR BLD AUTO: 0 % — SIGNIFICANT CHANGE UP (ref 0–6)
GLUCOSE SERPL-MCNC: 100 MG/DL — HIGH (ref 70–99)
GLUCOSE SERPL-MCNC: 115 MG/DL — HIGH (ref 70–99)
GLUCOSE SERPL-MCNC: 116 MG/DL — HIGH (ref 70–99)
HCT VFR BLD CALC: 29.5 % — LOW (ref 34.5–45)
HGB BLD-MCNC: 9.4 G/DL — LOW (ref 11.5–15.5)
IMM GRANULOCYTES NFR BLD AUTO: 0.4 % — SIGNIFICANT CHANGE UP (ref 0–1.5)
LYMPHOCYTES # BLD AUTO: 1.24 K/UL — SIGNIFICANT CHANGE UP (ref 1–3.3)
LYMPHOCYTES # BLD AUTO: 9.6 % — LOW (ref 13–44)
MCHC RBC-ENTMCNC: 30.5 PG — SIGNIFICANT CHANGE UP (ref 27–34)
MCHC RBC-ENTMCNC: 31.9 GM/DL — LOW (ref 32–36)
MCV RBC AUTO: 95.8 FL — SIGNIFICANT CHANGE UP (ref 80–100)
MONOCYTES # BLD AUTO: 0.78 K/UL — SIGNIFICANT CHANGE UP (ref 0–0.9)
MONOCYTES NFR BLD AUTO: 6 % — SIGNIFICANT CHANGE UP (ref 2–14)
NEUTROPHILS # BLD AUTO: 10.86 K/UL — HIGH (ref 1.8–7.4)
NEUTROPHILS NFR BLD AUTO: 83.9 % — HIGH (ref 43–77)
PLATELET # BLD AUTO: 228 K/UL — SIGNIFICANT CHANGE UP (ref 150–400)
POTASSIUM SERPL-MCNC: 3.5 MMOL/L — SIGNIFICANT CHANGE UP (ref 3.5–5.3)
POTASSIUM SERPL-MCNC: 3.7 MMOL/L — SIGNIFICANT CHANGE UP (ref 3.5–5.3)
POTASSIUM SERPL-MCNC: 3.7 MMOL/L — SIGNIFICANT CHANGE UP (ref 3.5–5.3)
POTASSIUM SERPL-SCNC: 3.5 MMOL/L — SIGNIFICANT CHANGE UP (ref 3.5–5.3)
POTASSIUM SERPL-SCNC: 3.7 MMOL/L — SIGNIFICANT CHANGE UP (ref 3.5–5.3)
POTASSIUM SERPL-SCNC: 3.7 MMOL/L — SIGNIFICANT CHANGE UP (ref 3.5–5.3)
PROCALCITONIN SERPL-MCNC: 0.05 NG/ML — SIGNIFICANT CHANGE UP (ref 0.02–0.1)
RBC # BLD: 3.08 M/UL — LOW (ref 3.8–5.2)
RBC # FLD: 15.3 % — HIGH (ref 10.3–14.5)
SODIUM SERPL-SCNC: 157 MMOL/L — HIGH (ref 135–145)
SODIUM SERPL-SCNC: 157 MMOL/L — HIGH (ref 135–145)
SODIUM SERPL-SCNC: 158 MMOL/L — HIGH (ref 135–145)
WBC # BLD: 12.94 K/UL — HIGH (ref 3.8–10.5)
WBC # FLD AUTO: 12.94 K/UL — HIGH (ref 3.8–10.5)

## 2018-10-22 PROCEDURE — 95819 EEG AWAKE AND ASLEEP: CPT | Mod: 26

## 2018-10-22 PROCEDURE — 99222 1ST HOSP IP/OBS MODERATE 55: CPT | Mod: GC

## 2018-10-22 PROCEDURE — 99233 SBSQ HOSP IP/OBS HIGH 50: CPT

## 2018-10-22 PROCEDURE — 93010 ELECTROCARDIOGRAM REPORT: CPT

## 2018-10-22 RX ORDER — HYDRALAZINE HCL 50 MG
5 TABLET ORAL ONCE
Qty: 0 | Refills: 0 | Status: COMPLETED | OUTPATIENT
Start: 2018-10-22 | End: 2018-10-22

## 2018-10-22 RX ORDER — SODIUM CHLORIDE 9 MG/ML
1000 INJECTION, SOLUTION INTRAVENOUS
Qty: 0 | Refills: 0 | Status: DISCONTINUED | OUTPATIENT
Start: 2018-10-22 | End: 2018-10-23

## 2018-10-22 RX ADMIN — Medication 5 MILLIGRAM(S): at 05:52

## 2018-10-22 RX ADMIN — ENOXAPARIN SODIUM 40 MILLIGRAM(S): 100 INJECTION SUBCUTANEOUS at 12:42

## 2018-10-22 RX ADMIN — Medication 5 MILLIGRAM(S): at 20:25

## 2018-10-22 RX ADMIN — Medication 5 MILLIGRAM(S): at 00:12

## 2018-10-22 RX ADMIN — Medication 300 MILLIGRAM(S): at 12:41

## 2018-10-22 RX ADMIN — Medication 5 MILLIGRAM(S): at 11:07

## 2018-10-22 RX ADMIN — SODIUM CHLORIDE 75 MILLILITER(S): 9 INJECTION, SOLUTION INTRAVENOUS at 08:05

## 2018-10-22 RX ADMIN — Medication 5 MILLIGRAM(S): at 17:27

## 2018-10-22 NOTE — DIETITIAN INITIAL EVALUATION ADULT. - ENERGY NEEDS
Ht: 65"   Wt: 170  BMI:28.4 kg/m2   IBW: 125 (+/-10%)     136% IBW  Edema: none        Skin: no pressure injuries documented

## 2018-10-22 NOTE — CONSULT NOTE ADULT - SUBJECTIVE AND OBJECTIVE BOX
Patient is a 87y old  Female who presents with a chief complaint of stroke (22 Oct 2018 10:48)      HPI:   STEPAN RIOJAS is a 88yo Female with hx of HTN and HLD who presented to [facility] 10/18 for aphasia and R hemiparesis, NIHSS=29, s/p tPA, CTA found L M1 occlusion, transferred to [facility] 10/19, NIHSS=24, underwent mechanical thrombectomy with Dr. Mccloud. Tele monitoring showing frequent ectopy, seen by cards, no evidence of A-fib, put on IV lopressor for tachycardia. TTE showed PFO. Also noted some hemorrhagic conversion without neurologic deterioration. Fever Tmax 101, seen by ID 10/21, not signs of infection noted. ID rec observe off abx and f/u Cx's. Pt on NGT feeds. Bedside swallow eval attempted 10/22, however no PO trials administered due to inadequate mental status. On aspirin, statin.      REVIEW OF SYSTEMS  Constitutional - Denies fevers, chills  HEENT - Denies changes in vision or hearing  Respiratory - Denies cough, dyspnea  Cardiovascular - Denies chest pain, palpitations  Gastrointestinal - Denies n/v, constipation, bowel incontinence  Genitourinary - Denies dysuria, urinary incontinence  Neurological - Denies weakness, numbness, headaches  Skin - Denies rashes  Musculoskeletal - Denies arthralgia, myalgias, back pain  Psychiatric - Denies depressed mood, anxiety    PAST MEDICAL & SURGICAL HISTORY  HTN  HLD  Prediabetes (A1c 6.0)    SOCIAL HISTORY  Smoking - Denied   EtOH - Denied   Drugs - Denied     FUNCTIONAL HISTORY  Lives with daughter in [place] 0[facility]  PTA Independent in ambulation and ADLs     CURRENT FUNCTIONAL STATUS  Bed mobility - mod A  Transfers - mod A  On 10/22 tolerated 30min PT well, followed <25% of commands.    FAMILY HISTORY:  noncontributory    ALLERGIES  No Known Allergies    MEDICATIONS   aspirin Suppository 300 milliGRAM(s) Rectal daily  atorvastatin 80 milliGRAM(s) Oral at bedtime  enoxaparin Injectable 40 milliGRAM(s) SubCutaneous daily  influenza   Vaccine 0.5 milliLiter(s) IntraMuscular once  metoprolol tartrate Injectable 5 milliGRAM(s) IV Push every 6 hours  sodium chloride 0.45%. 1000 milliLiter(s) IV Continuous <Continuous>    ----------------------------------------------------------------------------------------  PHYSICAL EXAM  VITALS  T(C): 37.4 (10-22-18 @ 08:33), Max: 37.4 (10-22-18 @ 08:33)  HR: 69 (10-22-18 @ 10:30) (65 - 97)  BP: 170/87 (10-22-18 @ 10:30) (143/63 - 170/87)  RR: 18 (10-22-18 @ 10:30) (17 - 31)  SpO2: 95% (10-22-18 @ 10:30) (95% - 100%)    Constitutional - NAD, Comfortable  HEENT - NCAT, EOMI  Neck - Supple  Chest - CTAB  Cardiovascular - RRR  Abdomen - BS+, Soft, NTND  Extremities - No C/C/E, No calf tenderness   Neurologic Exam -                    Cognitive - Awake, Alert, AAO to self, place, date, year, situation     Communication - Fluent, No dysarthria     Cranial Nerves - CN 2-12 intact     Motor -                     LEFT    UE - ShAB 5/5, EF 5/5, EE 5/5, WE 5/5,  5/5                    RIGHT UE - ShAB 5/5, EF 5/5, EE 5/5, WE 5/5,  5/5                    LEFT    LE - HF 5/5, KE 5/5, DF 5/5, PF 5/5                    RIGHT LE - HF 5/5, KE 5/5, DF 5/5, PF 5/5        Sensory - Intact to LT     Reflexes - DTR Intact, No primitive reflexive     Coordination - FTN intact  Psychiatric - Mood stable, Affect WNL    RECENT LABS/IMAGING                        9.4    12.94 )-----------( 228      ( 22 Oct 2018 07:26 )             29.5     10-    158<H>  |  128<H>  |  33<H>  ----------------------------<  116<H>  3.7   |  16<L>  |  0.99    Ca    9.1      22 Oct 2018 04:59      Urinalysis Basic - ( 20 Oct 2018 22:12 )  Color: Yellow / Appearance: Clear / S.023 / pH: x  Gluc: x / Ketone: Negative  / Bili: Negative / Urobili: Negative mg/dL   Blood: x / Protein: Trace mg/dL / Nitrite: Negative   Leuk Esterase: Negative / RBC: 2 /HPF / WBC 2 /HPF   Sq Epi: x / Non Sq Epi: 1 /HPF / Bacteria: Negative    MRI Head No Cont (10/20/18):  Acute hemorrhagic infarct involving the left MCA territory with mass effect on the left lateral ventricle and left to right shift.    CT Head No Cont (10/19/18) @ 23:21:   Redemonstration of large acute left MCA territory infarct with similar mass effect upon the left lateral ventricle and similar rightward midline shift of 4 mm. Ventricles stable in size. No hydrocephalus. Previously seen increased density throughout the region of infarction is less apparent on the current examination, consistent with resolving contrast staining after digital subtraction angiography. 2.1 x 1.6 cm region of persistent increased density in the left lateral frontotemporal region may represent hemorrhagic transformation of the infarct.    CT Head No Cont (10/19/18) @ 05:52:   Large acute left MCA territory infarct. Increased density seen throughout the left basal ganglia and left lateral frontal, parietal, and temporal cortices likely represents a combination of hemorrhagic transformation and cortical staining from recent digital subtraction angiography. Additional density seen within the left cerebral sulci likely representing subarachnoid hemorrhage. Mass effect upon the left lateral ventricle and rightward midline shift of 4 mm. No large hydrocephalus.    ----------------------------------------------------------------------------------------  ASSESSMENT/PLAN    STEPAN RIOJAS is a 88yo Female with hx of HTN, HLD, prediabetes, who is admitted with an acute L MCA infarct with hemorragic transformation, s/p tPA and thrombectomy, with functional, gait, ADL, swallow, and speech impairments.    ID - WBC uptrending today, afebrile.  Tachycardia - HR controlled on IV metoprolol. S/p 1L NS today.  Secondary CVA PPx - Aspirin, statin  Diet - NPO, meds given via NGT  DVT PPx - lovenox    *** Incomplete *** Patient is a 87y old  Female who presents with a chief complaint of stroke (22 Oct 2018 10:48)    HPI:   STEPAN RIOJAS is a 86yo Female with hx of HTN and HLD who presented to [facility] 10/18 for aphasia and R hemiparesis, NIHSS=29, s/p tPA, CTA found L M1 occlusion, transferred to [facility] 10/19, NIHSS=24, underwent mechanical thrombectomy with Dr. Mccloud. MRI brain 10/20 showed hemorrhagic transformation of L MCA infarct. Tele monitoring showing frequent ectopy, seen by cards, no evidence of A-fib, put on IV lopressor for tachycardia. TTE showed PFO. Also noted some hemorrhagic conversion without neurologic deterioration. NGT and barcenas removed 10/19, put NPO. Required straight cath. Fever Tmax 101, seen by ID 10/21, not signs of infection noted. ID rec observe off abx and f/u Cx's. Bedside swallow eval attempted 10/22, however no PO trials administered due to inadequate mental status. On aspirin, statin. Per daughter Emma at bedside, pt was agitated and disoriented overnight.      REVIEW OF SYSTEMS - impaired by aphasia, obtained from family  Constitutional - Denies fevers, chills  HEENT - L gaze preference  Respiratory - Denies cough, dyspnea  Cardiovascular - Denies chest pain, palpitations  Gastrointestinal - Denies n/v  Genitourinary - +Urinary retention  Neurological - +R hemiparesis +aphasia  Skin - +Bruising throughout  Musculoskeletal - Denies arthralgia, myalgias, back pain  Psychiatric - Denies depressed mood, anxiety    PAST MEDICAL & SURGICAL HISTORY  HTN  HLD  Prediabetes (A1c 6.0)    SOCIAL HISTORY  Smoking - Denied   EtOH - Denied   Drugs - Denied     FUNCTIONAL HISTORY  Lives with daughter in  3STE none inside  PTA Independent in ambulation and ADLs     CURRENT FUNCTIONAL STATUS  Bed mobility - mod A  Transfers - mod A  On 10/22 tolerated 30min PT well, followed <25% of commands.    FAMILY HISTORY:  noncontributory    ALLERGIES  No Known Allergies    MEDICATIONS   aspirin Suppository 300 milliGRAM(s) Rectal daily  atorvastatin 80 milliGRAM(s) Oral at bedtime  enoxaparin Injectable 40 milliGRAM(s) SubCutaneous daily  influenza   Vaccine 0.5 milliLiter(s) IntraMuscular once  metoprolol tartrate Injectable 5 milliGRAM(s) IV Push every 6 hours  sodium chloride 0.45%. 1000 milliLiter(s) IV Continuous <Continuous>    ----------------------------------------------------------------------------------------  PHYSICAL EXAM  VITALS  T(C): 37.4 (10-22-18 @ 08:33), Max: 37.4 (10-22-18 @ 08:33)  HR: 69 (10-22-18 @ 10:30) (65 - 97)  BP: 170/87 (10-22-18 @ 10:30) (143/63 - 170/87)  RR: 18 (10-22-18 @ 10:30) (17 - 31)  SpO2: 95% (10-22-18 @ 10:30) (95% - 100%)    Constitutional - NAD, Comfortable  HEENT - NCAT  Neck - Supple  Chest - poor inspiratory effort, diminished breath sounds  Cardiovascular - irregular  Abdomen - BS+, Soft, NTND  Extremities - No C/C/E, No calf tenderness  Neurologic Exam -                    Cognitive - Awake, Alert     Communication - Aphasic, occasionally mutters incomprehensible speech, poor command following     Cranial Nerves - Eyes do not track past midline to right VF     Motor -                     LEFT    UE - moves spontaneously, at least 2/5                    RIGHT UE - 0/5, rare trace movements                    LEFT    LE - moves spontaneously, at least 2/5                    RIGHT LE - 1/5     Sensory - Painful stimuli to RUE does not elicit withdrawl or wince. RLE withdraws to painful stimuli.     Reflexes - DTR Intact, babinski upgoing on R, downgoing on L  Psychiatric - Mood stable, Affect WNL    RECENT LABS/IMAGING                        9.4    12.94 )-----------( 228      ( 22 Oct 2018 07:26 )             29.5     10-    158<H>  |  128<H>  |  33<H>  ----------------------------<  116<H>  3.7   |  16<L>  |  0.99    Ca    9.1      22 Oct 2018 04:59      Urinalysis Basic - ( 20 Oct 2018 22:12 )  Color: Yellow / Appearance: Clear / S.023 / pH: x  Gluc: x / Ketone: Negative  / Bili: Negative / Urobili: Negative mg/dL   Blood: x / Protein: Trace mg/dL / Nitrite: Negative   Leuk Esterase: Negative / RBC: 2 /HPF / WBC 2 /HPF   Sq Epi: x / Non Sq Epi: 1 /HPF / Bacteria: Negative    MRI Head No Cont (10/20/18):  Acute hemorrhagic infarct involving the left MCA territory with mass effect on the left lateral ventricle and left to right shift.    CT Head No Cont (10/19/18) @ 23:21:   Redemonstration of large acute left MCA territory infarct with similar mass effect upon the left lateral ventricle and similar rightward midline shift of 4 mm. Ventricles stable in size. No hydrocephalus. Previously seen increased density throughout the region of infarction is less apparent on the current examination, consistent with resolving contrast staining after digital subtraction angiography. 2.1 x 1.6 cm region of persistent increased density in the left lateral frontotemporal region may represent hemorrhagic transformation of the infarct.    CT Head No Cont (10/19/18) @ 05:52:   Large acute left MCA territory infarct. Increased density seen throughout the left basal ganglia and left lateral frontal, parietal, and temporal cortices likely represents a combination of hemorrhagic transformation and cortical staining from recent digital subtraction angiography. Additional density seen within the left cerebral sulci likely representing subarachnoid hemorrhage. Mass effect upon the left lateral ventricle and rightward midline shift of 4 mm. No large hydrocephalus.    ----------------------------------------------------------------------------------------  ASSESSMENT/PLAN    STEPAN RIOJAS is a 86yo Female with hx of HTN, HLD, prediabetes, who is admitted with an acute L MCA infarct with hemorragic transformation, s/p tPA and thrombectomy, with functional, gait, ADL, swallow, and speech impairments.    ID - WBC uptrending today, afebrile.  Tachycardia - HR controlled on IV metoprolol. S/p 1L NS today.  Secondary CVA PPx - Aspirin, statin  Diet - NPO, pending repeat bedside swallow eval tomorrow  DVT PPx - lovenox    *** Incomplete *** Patient is a 87y old  Female who presents with a chief complaint of stroke (22 Oct 2018 10:48)    HPI:   STEPAN RIOJAS is a 88yo Female with hx of HTN and HLD who presented to [facility] 10/18 for aphasia and R hemiparesis, NIHSS=29, s/p tPA, CTA found L M1 occlusion, transferred to [facility] 10/19, NIHSS=24, underwent mechanical thrombectomy with Dr. Mccloud. MRI brain 10/20 showed hemorrhagic transformation of L MCA infarct. Tele monitoring showing frequent ectopy, seen by cards, no evidence of A-fib, put on IV lopressor for tachycardia. TTE showed PFO. Also noted some hemorrhagic conversion without neurologic deterioration. NGT and barcenas removed 10/19, put NPO. Required straight cath. Fever Tmax 101, seen by ID 10/21, not signs of infection noted. ID rec observe off abx and f/u Cx's. Bedside swallow eval attempted 10/22, however no PO trials administered due to inadequate mental status. On aspirin, statin. Per daughter Emma at bedside, pt was agitated and disoriented overnight.      REVIEW OF SYSTEMS - impaired by aphasia, obtained from family  Constitutional - Denies fevers, chills  HEENT - L gaze preference  Respiratory - Denies cough, dyspnea  Cardiovascular - Denies chest pain, palpitations  Gastrointestinal - Denies n/v  Genitourinary - +Urinary retention  Neurological - +R hemiparesis +aphasia  Skin - +Bruising throughout  Musculoskeletal - Denies arthralgia, myalgias, back pain  Psychiatric - Denies depressed mood, anxiety    PAST MEDICAL & SURGICAL HISTORY  HTN  HLD  Prediabetes (A1c 6.0)    SOCIAL HISTORY  Smoking - Denied   EtOH - Denied   Drugs - Denied     FUNCTIONAL HISTORY  Lives with daughter in  3STE none inside  PTA Independent in ambulation and ADLs     CURRENT FUNCTIONAL STATUS  Bed mobility - mod A  Transfers - mod A  ADLs - dependent for LE dressing  On 10/22 tolerated 30min PT well, followed <25% of commands.    FAMILY HISTORY:  noncontributory    ALLERGIES  No Known Allergies    MEDICATIONS   aspirin Suppository 300 milliGRAM(s) Rectal daily  atorvastatin 80 milliGRAM(s) Oral at bedtime  enoxaparin Injectable 40 milliGRAM(s) SubCutaneous daily  influenza   Vaccine 0.5 milliLiter(s) IntraMuscular once  metoprolol tartrate Injectable 5 milliGRAM(s) IV Push every 6 hours  sodium chloride 0.45%. 1000 milliLiter(s) IV Continuous <Continuous>    ----------------------------------------------------------------------------------------  PHYSICAL EXAM  VITALS  T(C): 37.4 (10-22-18 @ 08:33), Max: 37.4 (10-22-18 @ 08:33)  HR: 69 (10-22-18 @ 10:30) (65 - 97)  BP: 170/87 (10-22-18 @ 10:30) (143/63 - 170/87)  RR: 18 (10-22-18 @ 10:30) (17 - 31)  SpO2: 95% (10-22-18 @ 10:30) (95% - 100%)    Constitutional - NAD, Comfortable  HEENT - NCAT  Neck - Supple  Chest - poor inspiratory effort, diminished breath sounds  Cardiovascular - irregular  Abdomen - BS+, Soft, NTND  Extremities - No C/C/E, No calf tenderness  Neurologic Exam -                    Cognitive - Awake, Alert     Communication - Aphasic, occasionally mutters incomprehensible speech, poor command following     Cranial Nerves - Eyes do not track past midline to right VF     Motor -                     LEFT    UE - moves spontaneously, at least 2/5                    RIGHT UE - 0/5, rare trace movements                    LEFT    LE - moves spontaneously, at least 2/5                    RIGHT LE - 1/5     Sensory - Painful stimuli to RUE does not elicit withdrawl or wince. RLE withdraws to painful stimuli.     Reflexes - DTR Intact, babinski upgoing on R, downgoing on L  Psychiatric - Mood stable, Affect WNL    RECENT LABS/IMAGING                        9.4    12.94 )-----------( 228      ( 22 Oct 2018 07:26 )             29.5     10-    158<H>  |  128<H>  |  33<H>  ----------------------------<  116<H>  3.7   |  16<L>  |  0.99    Ca    9.1      22 Oct 2018 04:59      Urinalysis Basic - ( 20 Oct 2018 22:12 )  Color: Yellow / Appearance: Clear / S.023 / pH: x  Gluc: x / Ketone: Negative  / Bili: Negative / Urobili: Negative mg/dL   Blood: x / Protein: Trace mg/dL / Nitrite: Negative   Leuk Esterase: Negative / RBC: 2 /HPF / WBC 2 /HPF   Sq Epi: x / Non Sq Epi: 1 /HPF / Bacteria: Negative    MRI Head No Cont (10/20/18):  Acute hemorrhagic infarct involving the left MCA territory with mass effect on the left lateral ventricle and left to right shift.    CT Head No Cont (10/19/18) @ 23:21:   Redemonstration of large acute left MCA territory infarct with similar mass effect upon the left lateral ventricle and similar rightward midline shift of 4 mm. Ventricles stable in size. No hydrocephalus. Previously seen increased density throughout the region of infarction is less apparent on the current examination, consistent with resolving contrast staining after digital subtraction angiography. 2.1 x 1.6 cm region of persistent increased density in the left lateral frontotemporal region may represent hemorrhagic transformation of the infarct.    CT Head No Cont (10/19/18) @ 05:52:   Large acute left MCA territory infarct. Increased density seen throughout the left basal ganglia and left lateral frontal, parietal, and temporal cortices likely represents a combination of hemorrhagic transformation and cortical staining from recent digital subtraction angiography. Additional density seen within the left cerebral sulci likely representing subarachnoid hemorrhage. Mass effect upon the left lateral ventricle and rightward midline shift of 4 mm. No large hydrocephalus.    ----------------------------------------------------------------------------------------  ASSESSMENT/PLAN    STEPAN RIOJAS is a 88yo Female with hx of HTN, HLD, prediabetes, who is admitted with an acute L MCA infarct with hemorragic transformation, s/p tPA and thrombectomy, with functional, gait, ADL, swallow, and speech impairments.    ID - WBC uptrending today, afebrile.  Tachycardia - HR controlled on IV metoprolol. S/p 1L NS today.  Secondary CVA PPx - Aspirin, statin  Diet - NPO, pending repeat bedside swallow eval tomorrow  DVT PPx - lovenox    *** Incomplete *** Patient is a 87y old  Female who presents with a chief complaint of stroke (22 Oct 2018 10:48)    HPI:   STEPAN RIOJAS is a 88yo Female with hx of HTN and HLD who presented to [facility] 10/18 for aphasia and R hemiparesis, NIHSS=29, s/p tPA, CTA found L M1 occlusion, transferred to [facility] 10/19, NIHSS=24, underwent mechanical thrombectomy with Dr. Mccloud. MRI brain 10/20 showed hemorrhagic transformation of L MCA infarct. Tele monitoring showing frequent ectopy, seen by cards, no evidence of A-fib, put on IV lopressor for tachycardia. TTE showed PFO. Also noted some hemorrhagic conversion without neurologic deterioration. NGT and barcenas removed 10/19, put NPO. Required straight cath. Fever Tmax 101, seen by ID 10/21, not signs of infection noted. ID rec observe off abx and f/u Cx's. Bedside swallow eval attempted 10/22, however no PO trials administered due to inadequate mental status. On aspirin, statin. Per daughter Emma at bedside, pt was agitated and disoriented overnight.      REVIEW OF SYSTEMS - impaired by aphasia, obtained from family  Constitutional - Denies fevers, chills  HEENT - L gaze preference  Respiratory - Denies cough, dyspnea  Cardiovascular - Denies chest pain, palpitations  Gastrointestinal - Denies n/v  Genitourinary - +Urinary retention  Neurological - +R hemiparesis +aphasia  Skin - +Bruising throughout  Musculoskeletal - Denies arthralgia, myalgias, back pain  Psychiatric - Denies depressed mood, anxiety    PAST MEDICAL & SURGICAL HISTORY  HTN  HLD  Prediabetes (A1c 6.0)    SOCIAL HISTORY  Smoking - Denied   EtOH - Denied   Drugs - Denied     FUNCTIONAL HISTORY  Lives with daughter in  3STE none inside  PTA Independent in ambulation and ADLs     CURRENT FUNCTIONAL STATUS  Bed mobility - mod A  Transfers - mod A  ADLs - dependent for LE dressing  On 10/22 tolerated 30min PT well, followed <25% of commands.    FAMILY HISTORY:  noncontributory    ALLERGIES  No Known Allergies    MEDICATIONS   aspirin Suppository 300 milliGRAM(s) Rectal daily  atorvastatin 80 milliGRAM(s) Oral at bedtime  enoxaparin Injectable 40 milliGRAM(s) SubCutaneous daily  influenza   Vaccine 0.5 milliLiter(s) IntraMuscular once  metoprolol tartrate Injectable 5 milliGRAM(s) IV Push every 6 hours  sodium chloride 0.45%. 1000 milliLiter(s) IV Continuous <Continuous>    ----------------------------------------------------------------------------------------  PHYSICAL EXAM  VITALS  T(C): 37.4 (10-22-18 @ 08:33), Max: 37.4 (10-22-18 @ 08:33)  HR: 69 (10-22-18 @ 10:30) (65 - 97)  BP: 170/87 (10-22-18 @ 10:30) (143/63 - 170/87)  RR: 18 (10-22-18 @ 10:30) (17 - 31)  SpO2: 95% (10-22-18 @ 10:30) (95% - 100%)    Constitutional - NAD, Comfortable  HEENT - NCAT  Neck - Supple  Chest - poor inspiratory effort, diminished breath sounds  Cardiovascular - irregular  Abdomen - BS+, Soft, NTND  Extremities - No C/C/E, No calf tenderness  Neurologic Exam -                    Cognitive - Awake, Alert     Communication - Aphasic, occasionally mutters incomprehensible speech, poor command following     Cranial Nerves - Eyes do not track past midline to right VF     Motor -                     LEFT    UE - moves spontaneously, at least 2/5                    RIGHT UE - 0/5, rare trace movements                    LEFT    LE - moves spontaneously, at least 2/5                    RIGHT LE - 1/5     Sensory - Painful stimuli to RUE does not elicit withdrawl or wince. RLE withdraws to painful stimuli.     Reflexes - DTR Intact, babinski upgoing on R, downgoing on L  Psychiatric - Mood stable, Affect WNL    RECENT LABS/IMAGING                        9.4    12.94 )-----------( 228      ( 22 Oct 2018 07:26 )             29.5     10-    158<H>  |  128<H>  |  33<H>  ----------------------------<  116<H>  3.7   |  16<L>  |  0.99    Ca    9.1      22 Oct 2018 04:59      Urinalysis Basic - ( 20 Oct 2018 22:12 )  Color: Yellow / Appearance: Clear / S.023 / pH: x  Gluc: x / Ketone: Negative  / Bili: Negative / Urobili: Negative mg/dL   Blood: x / Protein: Trace mg/dL / Nitrite: Negative   Leuk Esterase: Negative / RBC: 2 /HPF / WBC 2 /HPF   Sq Epi: x / Non Sq Epi: 1 /HPF / Bacteria: Negative    MRI Head No Cont (10/20/18):  Acute hemorrhagic infarct involving the left MCA territory with mass effect on the left lateral ventricle and left to right shift.    CT Head No Cont (10/19/18) @ 23:21:   Redemonstration of large acute left MCA territory infarct with similar mass effect upon the left lateral ventricle and similar rightward midline shift of 4 mm. Ventricles stable in size. No hydrocephalus. Previously seen increased density throughout the region of infarction is less apparent on the current examination, consistent with resolving contrast staining after digital subtraction angiography. 2.1 x 1.6 cm region of persistent increased density in the left lateral frontotemporal region may represent hemorrhagic transformation of the infarct.    CT Head No Cont (10/19/18) @ 05:52:   Large acute left MCA territory infarct. Increased density seen throughout the left basal ganglia and left lateral frontal, parietal, and temporal cortices likely represents a combination of hemorrhagic transformation and cortical staining from recent digital subtraction angiography. Additional density seen within the left cerebral sulci likely representing subarachnoid hemorrhage. Mass effect upon the left lateral ventricle and rightward midline shift of 4 mm. No large hydrocephalus.    ----------------------------------------------------------------------------------------  ASSESSMENT/PLAN    STEPAN RIOJAS is a 88yo Female with hx of HTN, HLD, prediabetes, who is admitted with an acute L MCA infarct with hemorragic transformation, s/p tPA and thrombectomy, with functional, gait, ADL, swallow, and speech impairments.    ID - WBC uptrending today, afebrile.  Tachycardia - HR controlled on IV metoprolol.  Hypernatremia - S/p 1L NS today. 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 Mccloud. MRI brain 10/20 showed hemorrhagic transformation of L MCA infarct. Tele monitoring showing frequent ectopy, seen by cards, no evidence of A-fib, put on IV lopressor for tachycardia. TTE showed PFO. Also noted some hemorrhagic conversion without neurologic deterioration. NGT and barcenas removed 10/19, put NPO. Required straight cath. Fever Tmax 101, seen by ID 10/21, not signs of infection noted. ID rec observe off abx and f/u Cx's. Bedside swallow eval attempted 10/22, however no PO trials administered due to inadequate mental status. On aspirin, statin. Per daughter Emma at bedside, pt was agitated and disoriented overnight.      REVIEW OF SYSTEMS - impaired by aphasia, obtained from family  Constitutional - Denies fevers, chills  HEENT - L gaze preference  Respiratory - Denies cough, dyspnea  Cardiovascular - Denies chest pain, palpitations  Gastrointestinal - Denies n/v  Genitourinary - +Urinary retention  Neurological - +R hemiparesis +aphasia  Skin - +Bruising throughout  Musculoskeletal - Denies arthralgia, myalgias, back pain  Psychiatric - Denies depressed mood, anxiety    PAST MEDICAL & SURGICAL HISTORY  HTN  HLD  Prediabetes (A1c 6.0)    SOCIAL HISTORY  Smoking - Denied   EtOH - Denied   Drugs - Denied     FUNCTIONAL HISTORY  Lives with daughter in  3STE none inside  PTA Independent in ambulation and ADLs     CURRENT FUNCTIONAL STATUS  Bed mobility - mod A  Transfers - mod A  ADLs - dependent for LE dressing  On 10/22 tolerated 30min PT well, followed <25% of commands.    FAMILY HISTORY:  noncontributory    ALLERGIES  No Known Allergies    MEDICATIONS   aspirin Suppository 300 milliGRAM(s) Rectal daily  atorvastatin 80 milliGRAM(s) Oral at bedtime  enoxaparin Injectable 40 milliGRAM(s) SubCutaneous daily  influenza   Vaccine 0.5 milliLiter(s) IntraMuscular once  metoprolol tartrate Injectable 5 milliGRAM(s) IV Push every 6 hours  sodium chloride 0.45%. 1000 milliLiter(s) IV Continuous <Continuous>    ----------------------------------------------------------------------------------------  PHYSICAL EXAM  VITALS  T(C): 37.4 (10-22-18 @ 08:33), Max: 37.4 (10-22-18 @ 08:33)  HR: 69 (10-22-18 @ 10:30) (65 - 97)  BP: 170/87 (10-22-18 @ 10:30) (143/63 - 170/87)  RR: 18 (10-22-18 @ 10:30) (17 - 31)  SpO2: 95% (10-22-18 @ 10:30) (95% - 100%)    Constitutional - NAD, Comfortable  HEENT - NCAT  Neck - Supple  Chest - poor inspiratory effort, diminished breath sounds  Cardiovascular - irregular  Abdomen - BS+, Soft, NTND  Extremities - No C/C/E, No calf tenderness  Neurologic Exam -                    Cognitive - Awake, Alert     Communication - Aphasic, occasionally mutters incomprehensible speech, poor command following     Cranial Nerves - Eyes do not track past midline to right VF     Motor -                     LEFT    UE - moves spontaneously, at least 2/5                    RIGHT UE - 0/5, rare trace movements                    LEFT    LE - moves spontaneously, at least 2/5                    RIGHT LE - 1/5     Sensory - Painful stimuli to RUE does not elicit withdrawl or wince. RLE withdraws to painful stimuli.     Reflexes - DTR Intact, babinski upgoing on R, downgoing on L  Psychiatric - Mood stable, Affect WNL    RECENT LABS/IMAGING                        9.4    12.94 )-----------( 228      ( 22 Oct 2018 07:26 )             29.5     10-    158<H>  |  128<H>  |  33<H>  ----------------------------<  116<H>  3.7   |  16<L>  |  0.99    Ca    9.1      22 Oct 2018 04:59  Hemoglobin A1C, Whole Blood: 6.0% (10.19.18 @ 07:53)      Urinalysis Basic - ( 20 Oct 2018 22:12 )  Color: Yellow / Appearance: Clear / S.023 / pH: x  Gluc: x / Ketone: Negative  / Bili: Negative / Urobili: Negative mg/dL   Blood: x / Protein: Trace mg/dL / Nitrite: Negative   Leuk Esterase: Negative / RBC: 2 /HPF / WBC 2 /HPF   Sq Epi: x / Non Sq Epi: 1 /HPF / Bacteria: Negative    MRI Head No Cont (10/20/18):  Acute hemorrhagic infarct involving the left MCA territory with mass effect on the left lateral ventricle and left to right shift.    CT Head No Cont (10/19/18) @ 23:21:   Redemonstration of large acute left MCA territory infarct with similar mass effect upon the left lateral ventricle and similar rightward midline shift of 4 mm. Ventricles stable in size. No hydrocephalus. Previously seen increased density throughout the region of infarction is less apparent on the current examination, consistent with resolving contrast staining after digital subtraction angiography. 2.1 x 1.6 cm region of persistent increased density in the left lateral frontotemporal region may represent hemorrhagic transformation of the infarct.    CT Head No Cont (10/19/18) @ 05:52:   Large acute left MCA territory infarct. Increased density seen throughout the left basal ganglia and left lateral frontal, parietal, and temporal cortices likely represents a combination of hemorrhagic transformation and cortical staining from recent digital subtraction angiography. Additional density seen within the left cerebral sulci likely representing subarachnoid hemorrhage. Mass effect upon the left lateral ventricle and rightward midline shift of 4 mm. No large hydrocephalus.    < from: TTE with Doppler (w/Cont) (10.19.18 @ 08:26) >  Conclusions:  1. Normal left ventricular internal dimensions and wall thicknesses.  2. Normal left ventricular systolic function. No segmental wall motion abnormalities.  3. Agitated saline injection revealed few late bubbles in the left heart, consistent with transpulmonic shunting.    EEG IMPRESSION/CLINICAL CORRELATE  Abnormal EEG study.  1. Structural abnormality and cortical dysfunction in the left hemisphere.   2. Mild to moderate nonspecific diffuse or multifocal cerebral dysfunction.   3. No seizure seen.      ----------------------------------------------------------------------------------------  ASSESSMENT/PLAN    STEPAN RIOJAS is a 86yo Female with hx of HTN, HLD, prediabetes, who is admitted with an acute L MCA infarct with hemorragic transformation, s/p tPA and thrombectomy, with functional, gait, ADL, swallow, and speech impairments.    ID - WBC uptrending today, afebrile.  Tachycardia - HR controlled on IV metoprolol.  Hypernatremia - S/p 1L NS today. Remains NPO  Secondary CVA PPx - Aspirin, statin  Diet - NPO, pending repeat bedside swallow eval tomorrow  DVT PPx - lovenox    Rehab -   Once medically stable, off IV rate control agents, and feeding modality established, pt would benefit from ACUTE inpatient rehabilitation for the functional deficits consisting of 3 hours of therapy/day & 24 hour RN/daily PMR physician for comorbid medical management. Patient will be able to tolerate 3 hours a day.   Continue bedside therapy . Will continue to follow for ongoing rehab needs and recommendations.  Consider fluoxetine 20mg po daily for motor recovery after stroke once PO feeding modality established. Patient is a 87y old  Female who presents with a chief complaint of stroke (22 Oct 2018 10:48)    HPI:   STEPAN RIOJAS is a 86yo Female with hx of HTN and HLD who presented to [facility] 10/18 for aphasia and R hemiparesis, NIHSS=29, s/p tPA, CTA found L M1 occlusion, transferred to [facility] 10/19, NIHSS=24, underwent mechanical thrombectomy with Dr. Mccloud. MRI brain 10/20 showed hemorrhagic transformation of L MCA infarct. Tele monitoring showing frequent ectopy, seen by cards, no evidence of A-fib, put on IV lopressor for tachycardia. TTE showed PFO. Also noted some hemorrhagic conversion without neurologic deterioration. NGT and barcenas removed 10/19, put NPO. Required straight cath. Fever Tmax 101, seen by ID 10/21, not signs of infection noted. ID rec observe off abx and f/u Cx's. Bedside swallow eval attempted 10/22, however no PO trials administered due to inadequate mental status. On aspirin, statin. Per daughter Emma at bedside, pt was agitated and disoriented overnight.      REVIEW OF SYSTEMS - impaired by aphasia, obtained from family  Constitutional - Denies fevers, chills  HEENT - L gaze preference  Respiratory - Denies cough, dyspnea  Cardiovascular - Denies chest pain, palpitations  Gastrointestinal - Denies n/v  Genitourinary - +Urinary retention  Neurological - +R hemiparesis +aphasia  Skin - +Bruising throughout  Musculoskeletal - Denies arthralgia, myalgias, back pain  Psychiatric - Denies depressed mood, anxiety    PAST MEDICAL & SURGICAL HISTORY  HTN  HLD  Prediabetes (A1c 6.0)    SOCIAL HISTORY  Smoking - Denied   EtOH - Denied   Drugs - Denied     FUNCTIONAL HISTORY  Lives with daughter in  3STE none inside  PTA Independent in ambulation and ADLs     CURRENT FUNCTIONAL STATUS  Bed mobility - mod A  Transfers - mod A  ADLs - dependent for LE dressing  On 10/22 tolerated 30min PT well, followed <25% of commands.    FAMILY HISTORY:  noncontributory    ALLERGIES  No Known Allergies    MEDICATIONS   aspirin Suppository 300 milliGRAM(s) Rectal daily  atorvastatin 80 milliGRAM(s) Oral at bedtime  enoxaparin Injectable 40 milliGRAM(s) SubCutaneous daily  influenza   Vaccine 0.5 milliLiter(s) IntraMuscular once  metoprolol tartrate Injectable 5 milliGRAM(s) IV Push every 6 hours  sodium chloride 0.45%. 1000 milliLiter(s) IV Continuous <Continuous>    ----------------------------------------------------------------------------------------  PHYSICAL EXAM  VITALS  T(C): 37.4 (10-22-18 @ 08:33), Max: 37.4 (10-22-18 @ 08:33)  HR: 69 (10-22-18 @ 10:30) (65 - 97)  BP: 170/87 (10-22-18 @ 10:30) (143/63 - 170/87)  RR: 18 (10-22-18 @ 10:30) (17 - 31)  SpO2: 95% (10-22-18 @ 10:30) (95% - 100%)    Constitutional - NAD, Comfortable  HEENT - NCAT  Neck - Supple  Chest - poor inspiratory effort, diminished breath sounds  Cardiovascular - irregular  Abdomen - BS+, Soft, NTND  Extremities - No C/C/E, No calf tenderness  Neurologic Exam -                    Cognitive - Awake, Alert     Communication - Aphasic, occasionally mutters incomprehensible speech, poor command following     Cranial Nerves - Eyes do not track past midline to right VF     Motor -                     LEFT    UE - moves spontaneously                    RIGHT UE - 0/5, rare trace movements                    LEFT    LE - moves spontaneously                    RIGHT LE - 1/5     Sensory - Painful stimuli to RUE does not elicit withdrawl or wince. RLE withdraws to painful stimuli.     Reflexes - DTR Intact, babinski upgoing on R, downgoing on L  Psychiatric - Mood stable, Affect WNL    RECENT LABS/IMAGING                        9.4    12.94 )-----------( 228      ( 22 Oct 2018 07:26 )             29.5     10-22    158<H>  |  128<H>  |  33<H>  ----------------------------<  116<H>  3.7   |  16<L>  |  0.99    Ca    9.1      22 Oct 2018 04:59  Hemoglobin A1C, Whole Blood: 6.0% (10.19.18 @ 07:53)      Urinalysis Basic - ( 20 Oct 2018 22:12 )  Color: Yellow / Appearance: Clear / S.023 / pH: x  Gluc: x / Ketone: Negative  / Bili: Negative / Urobili: Negative mg/dL   Blood: x / Protein: Trace mg/dL / Nitrite: Negative   Leuk Esterase: Negative / RBC: 2 /HPF / WBC 2 /HPF   Sq Epi: x / Non Sq Epi: 1 /HPF / Bacteria: Negative    MRI Head No Cont (10/20/18):  Acute hemorrhagic infarct involving the left MCA territory with mass effect on the left lateral ventricle and left to right shift.    CT Head No Cont (10/19/18) @ 23:21:   Redemonstration of large acute left MCA territory infarct with similar mass effect upon the left lateral ventricle and similar rightward midline shift of 4 mm. Ventricles stable in size. No hydrocephalus. Previously seen increased density throughout the region of infarction is less apparent on the current examination, consistent with resolving contrast staining after digital subtraction angiography. 2.1 x 1.6 cm region of persistent increased density in the left lateral frontotemporal region may represent hemorrhagic transformation of the infarct.    CT Head No Cont (10/19/18) @ 05:52:   Large acute left MCA territory infarct. Increased density seen throughout the left basal ganglia and left lateral frontal, parietal, and temporal cortices likely represents a combination of hemorrhagic transformation and cortical staining from recent digital subtraction angiography. Additional density seen within the left cerebral sulci likely representing subarachnoid hemorrhage. Mass effect upon the left lateral ventricle and rightward midline shift of 4 mm. No large hydrocephalus.    < from: TTE with Doppler (w/Cont) (10.19.18 @ 08:26) >  Conclusions:  1. Normal left ventricular internal dimensions and wall thicknesses.  2. Normal left ventricular systolic function. No segmental wall motion abnormalities.  3. Agitated saline injection revealed few late bubbles in the left heart, consistent with transpulmonic shunting.    EEG IMPRESSION/CLINICAL CORRELATE  Abnormal EEG study.  1. Structural abnormality and cortical dysfunction in the left hemisphere.   2. Mild to moderate nonspecific diffuse or multifocal cerebral dysfunction.   3. No seizure seen.      ----------------------------------------------------------------------------------------  ASSESSMENT/PLAN    STEPAN RIOJAS is a 86yo Female with hx of HTN, HLD, prediabetes, who is admitted with an acute L MCA infarct with hemorragic transformation, s/p tPA and thrombectomy, with functional, gait, ADL, swallow, and speech impairments.    ID - WBC uptrending today, afebrile.  Tachycardia - HR controlled on IV metoprolol.  Hypernatremia - S/p 1L NS today. Remains NPO  Secondary CVA PPx - Aspirin, statin  Diet - NPO, pending repeat bedside swallow eval tomorrow  DVT PPx - lovenox    Rehab -   Once medically stable, off IV rate control agents, and feeding modality established, pt would benefit from ACUTE inpatient rehabilitation for the functional deficits consisting of 3 hours of therapy/day & 24 hour RN/daily PMR physician for comorbid medical management. Patient will be able to tolerate 3 hours a day.   Continue bedside therapy . Will continue to follow for ongoing rehab needs and recommendations.  Consider fluoxetine 20mg po daily for motor recovery after stroke once PO feeding modality established.

## 2018-10-22 NOTE — OCCUPATIONAL THERAPY INITIAL EVALUATION ADULT - RANGE OF MOTION EXAMINATION, LOWER EXTREMITY
Left LE Active ROM was WFL (within functional limits)/Right LE Passive ROM was WFL  (within functional limits)

## 2018-10-22 NOTE — OCCUPATIONAL THERAPY INITIAL EVALUATION ADULT - RANGE OF MOTION EXAMINATION, UPPER EXTREMITY
Right UE Passive ROM was WFL  (within functional limits)/Left UE Active ROM was WFL (within functional limits)

## 2018-10-22 NOTE — OCCUPATIONAL THERAPY INITIAL EVALUATION ADULT - LUE MMT, REHAB EVAL
grossly assessed due to/unable to accurately assess 2/2 decreased command follow and aphasia, however L UE at least 3/5 as movements noted t/o IE

## 2018-10-22 NOTE — DIETITIAN INITIAL EVALUATION ADULT. - OTHER INFO
Pt's daughter reports some wt gain, unsure of exact amount. Pt seen for: Length Of Stay   Adm dx: MCA stroke, S/P thhrombectomy   GI issues: no N/V/D   Last BM: none since adm    Food allergies: NKFA    Vit/supplement PTA: none noted

## 2018-10-22 NOTE — PROGRESS NOTE ADULT - SUBJECTIVE AND OBJECTIVE BOX
CC: f/u for low grade fever    Patient reports : she is sleepy, not verbal at this time.    REVIEW OF SYSTEMS:  All other review of systems negative (Comprehensive ROS)    Antimicrobials Day #  :off    Other Medications Reviewed    T(F): 99.4 (10-22-18 @ 08:33), Max: 99.4 (10-22-18 @ 08:33)  HR: 56 (10-22-18 @ 16:00)  BP: 168/73 (10-22-18 @ 16:00)  RR: 18 (10-22-18 @ 16:00)  SpO2: 97% (10-22-18 @ 16:00)  Wt(kg): --    PHYSICAL EXAM:  General: lethargic, no acute distress  Eyes:  anicteric, no conjunctival injection, no discharge  Oropharynx: no lesions or injection 	  Neck: supple, without adenopathy  Lungs: few ronchi  Heart: regular rate and rhythm; no murmur, rubs or gallops  Abdomen: soft, nondistended, nontender, without mass or organomegaly  Skin: no lesions  Extremities: no clubbing, cyanosis, or edema  Neurologic: sleepy, not very interactive at this point.    LAB RESULTS:                        9.4    12.94 )-----------( 228      ( 22 Oct 2018 07:26 )             29.5     10-    157<H>  |  127<H>  |  36<H>  ----------------------------<  115<H>  3.5   |  17<L>  |  1.07    Ca    9.5      22 Oct 2018 11:59        Urinalysis Basic - ( 20 Oct 2018 22:12 )    Color: Yellow / Appearance: Clear / S.023 / pH: x  Gluc: x / Ketone: Negative  / Bili: Negative / Urobili: Negative mg/dL   Blood: x / Protein: Trace mg/dL / Nitrite: Negative   Leuk Esterase: Negative / RBC: 2 /HPF / WBC 2 /HPF   Sq Epi: x / Non Sq Epi: 1 /HPF / Bacteria: Negative      MICROBIOLOGY:  RECENT CULTURES:  10-20 @ 14:48 .Blood Blood     No growth to date.          RADIOLOGY REVIEWED:  < from: MR Head No Cont (10.20.18 @ 18:38) >  Impression: Acute hemorrhagic infarct involving the left MCA territory   with mass effect on the left lateral ventricle and left to right shift.

## 2018-10-22 NOTE — OCCUPATIONAL THERAPY INITIAL EVALUATION ADULT - GENERAL OBSERVATIONS, REHAB EVAL
Pt received semi supine +tele, +BP cuff, +continuous pulse ox, +B/L venodynes, +restless,+echymosis on B/L UEs, +L gaze preference, +pt's daughter present t/o IE.

## 2018-10-22 NOTE — PROGRESS NOTE ADULT - ASSESSMENT
88y/o female with vascular risk factors of HTN and hyperlipidemia, who was last seen normal at 22:10, on the day prior to admission. Then she was found by family members at the floor with inability to communicate or move her right side at 22:20. She was initially evaluated at [place] were NIHSS=29 deemed candidate for tPA. 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 Patient was transferred for further management to [facility]. She received IVtpa per protocol and transferred to NS after CTA revealed L ICA and L M1 occlusion. She underwent thrombectomy with resultant TICI 2B flow.     Impression: Cerebral embolism with cerebral infarction -L MCA infarct- etiology cardioembolic secondary to new onset of a-fib.     NEURO: Neurological? Continue close monitoring for neurologic deterioration, BP goal of 140/90, Started on Atorvastatin 80mg as admission . MRI Brain w/o - results as noted above.  Physical therapy/OT/Speech eval/treatment- evals pending.     ANTITHROMBOTIC THERAPY: Aspirin for secondary stroke prevention; AC to be considered?    PULMONARY: CXR clear on 10/19, protecting airway, saturating well     CARDIOVASCULAR: TTE shows EF: 55-60%, Normal left ventricular internal dimensions and wall  thicknesses. Normal left ventricular systolic function. No segmental wall motion abnormalities. Agitated saline injection revealed few late bubbles in the left heart, consistent with transpulmonic shunting, cardiac monitoring found to be in a-fib now rate controlled.                              SBP goal: 140     GASTROINTESTINAL:  dysphagia screen failed, speech and swallow eval pending now that patient is extubated.     Diet: NPO    RENAL: BUN/Cr without acute change, good urine output - continue straight cath protocol; Hypernatremia- d/c IVF- repeat BMP q12hrs.      Na Goal: Greater than 135     Mendez: No    HEMATOLOGY: H/H ?, Platelets ?, no signs or symptoms of bleeding      DVT ppx: Heparin s.c [] LMWH [x]     ID: Remains afebrile-since 10/21 noted with fever of 101, slight leukocytosis, Ordered 2 sets of blood cultures- results pending, UA - negative, and chest X-ray, results? Procalcitonin  ID consulted and recommended to observe off Abx.    OTHER: Plan discussed with pts daughter at bedside, all questions and concerns addressed.     DISPOSITION: Rehab or home depending on PT eval once stable and workup is complete.    CORE MEASURES:        Admission NIHSS: 29     TPA: [x] YES [] NO      LDL/HDL: 100/52      Depression Screen: p     Statin Therapy: yes     Dysphagia Screen: [] PASS [x] FAIL     Smoking [] YES [x] NO      Afib [x] YES [] NO     Stroke Education [] YES [] NO - p 88y/o female with vascular risk factors of HTN and hyperlipidemia, who was last seen normal at 22:10, on the day prior to admission. Then she was found by family members at the floor with inability to communicate or move her right side at 22:20. She was initially evaluated at [place] were NIHSS=29 deemed candidate for tPA. 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 Patient was transferred for further management to [facility]. She received IVtpa per protocol and transferred to NS after CTA revealed L ICA and L M1 occlusion. She underwent thrombectomy with resultant TICI 2B flow.     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 MRI Brain w/o - results as noted above.  Physical therapy/OT/Speech eval/treatment- evals pending.     ANTITHROMBOTIC THERAPY: Aspirin for secondary stroke prevention    PULMONARY: CXR shows bilateral pleural effusions- may trial 1 dose of lasix if tachypneic, protecting airway, saturating well     CARDIOVASCULAR: TTE shows EF: 55-60%, Normal left ventricular internal dimensions and wall  thicknesses. Normal left ventricular systolic function. No segmental wall motion abnormalities. Agitated saline injection revealed few late bubbles in the left heart, consistent with transpulmonic shunting, Previously noted with afib- now deemed to be NSR with PAC's will likely need ILR. Cardiac monitoring now shows NSR with PACS- rate controlled. Continue with IV metoprolol as needed.                              SBP goal: 140     GASTROINTESTINAL:  dysphagia screen failed, speech and swallow eval recommended NPO for now, plan to reevaluate on 10/23. No further testing as of now. Discussed possibility of PEG tube with daughter at bedside, will consider options and discuss with family.      Diet: NPO    RENAL: BUN/Cr without acute change, good urine output - continue straight cath protocol; Hypernatremia- on 1/2 NS IVF- repeat BMP q12hrs.      Na Goal: Greater than 135     Mendez: No    HEMATOLOGY: H/H without acute change, Platelets 258, no signs or symptoms of bleeding      DVT ppx: LMWH [x]     ID: Remains afebrile-since 10/21 noted with fever of 101, slight leukocytosis, BCx x 2 NGTD, UA - negative, and chest X-ray shows no evidence of PNA, Procalcitonin within normal limits, ID consulted and recommended to observe off Abx. Will monitor closely.     OTHER: Plan discussed with pts daughter at bedside, all questions and concerns addressed.     DISPOSITION: Rehab or home depending on PT eval once stable and workup is complete.    CORE MEASURES:        Admission NIHSS: 29     TPA: [x] YES [] NO      LDL/HDL: 100/52      Depression Screen: p     Statin Therapy: yes     Dysphagia Screen: [] PASS [x] FAIL     Smoking [] YES [x] NO      Afib [x] YES [] NO     Stroke Education [] YES [] NO - p 88y/o female with vascular risk factors of HTN and hyperlipidemia, who was last seen normal at 22:10, on the day prior to admission. Then she was found by family members at the floor with inability to communicate or move her right side at 22:20. She was initially evaluated at [place] were NIHSS=29 deemed candidate for tPA. 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 Patient was transferred for further management to [facility]. She received IVtpa per protocol and transferred to NS after CTA revealed L ICA and L M1 occlusion. She underwent thrombectomy with resultant TICI 2B flow.     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 MRI Brain w/o - results as noted above.  Physical therapy/OT/Speech eval/treatment- evals pending.     ANTITHROMBOTIC THERAPY: Aspirin for secondary stroke prevention    PULMONARY: CXR shows bilateral pleural effusions- may trial 1 dose of lasix if tachypneic, protecting airway, saturating well     CARDIOVASCULAR: TTE shows EF: 55-60%, Normal left ventricular internal dimensions and wall  thicknesses. Normal left ventricular systolic function. No segmental wall motion abnormalities. Agitated saline injection revealed few late bubbles in the left heart, consistent with transpulmonic shunting, Previously noted with afib- now deemed to be NSR with PAC's will likely need ILR. Cardiac monitoring now shows NSR with PACS- rate controlled. Continue with IV metoprolol as needed.                              SBP goal: 140     GASTROINTESTINAL:  dysphagia screen failed, speech and swallow eval recommended NPO for now, plan to reevaluate on 10/23. No further testing as of now. Discussed possibility of PEG tube with daughter at bedside, will consider options and discuss with family.      Diet: NPO    RENAL: BUN/Cr without acute change, good urine output - continue straight cath protocol; Hypernatremia- on 1/2 NS IVF- repeat BMP q12hrs.      Na Goal: Greater than 135     Mendez: No    HEMATOLOGY: H/H without acute change, Platelets 258, no signs or symptoms of bleeding      DVT ppx: LMWH [x]     ID: Remains afebrile-since 10/21 noted with fever of 101, slight leukocytosis, BCx x 2 NGTD, UA - negative, and chest X-ray shows no evidence of PNA, Procalcitonin within normal limits, ID consulted and recommended to observe off Abx. Will monitor closely.     OTHER: Plan discussed with pts daughter at bedside, all questions and concerns addressed.     DISPOSITION: Rehab or home depending on PT eval once stable and workup is complete.    CORE MEASURES:        Admission NIHSS: 29     TPA: [x] YES [] NO      LDL/HDL: 100/52      Depression Screen: p     Statin Therapy: yes     Dysphagia Screen: [] PASS [x] FAIL     Smoking [] YES [x] NO      Afib [x] YES [] NO     Stroke Education [] YES [] NO - p

## 2018-10-22 NOTE — OCCUPATIONAL THERAPY INITIAL EVALUATION ADULT - IMPAIRED TRANSFERS: SIT/STAND, REHAB EVAL
decreased strength/impaired postural control/cognition/impaired balance/impaired motor control/abnormal muscle tone

## 2018-10-22 NOTE — PROGRESS NOTE ADULT - SUBJECTIVE AND OBJECTIVE BOX
THE PATIENT WAS SEEN AND EXAMINED BY ME WITH THE HOUSESTAFF AND STROKE TEAM DURING MORNING ROUNDS.   HPI:  86y/o female with vascular risk factors of HTN and hyperlipidemia, who was last seen normal at 22:10, on the day prior to admission. Then she was found by family members at the floor with inability to communicate or move her right side at 22:20. She was initially evaluated at [place] were NIHSS=29 deemed candidate for tPA. 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 Patient was transferred for further management to [facility]. She received IVtpa per protocol and transferred to NS after CTA revealed L ICA and L M1 occlusion. She underwent thrombectomy with resultant TICI 2B flow.     THE PATIENT WAS SEEN AND EXAMINED BY ME WITH THE HOUSESTAFF AND STROKE TEAM DURING MORNING ROUNDS.   HPI:  History obtained from chart.     Ms. Rucker is an 86y/o female with vascular risk factors of HTN and hyperlipidemia, who was last seen normal at 22:10. Then she was found by family members at the floor with inability to communicate or move her right side at 22:20. She was initially evaluated at [place] were NIHSS=29 deemed candidate for tPA. 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 Patient was transferred for further management to [facility].      Upon arrival, NIHSS=25, was taken directly for IR intervention. (19 Oct 2018 06:34)      SUBJECTIVE: No events overnight.  No new neurologic complaints.      aspirin Suppository 300 milliGRAM(s) Rectal daily  atorvastatin 80 milliGRAM(s) Oral at bedtime  enoxaparin Injectable 40 milliGRAM(s) SubCutaneous daily  influenza   Vaccine 0.5 milliLiter(s) IntraMuscular once  metoprolol tartrate Injectable 5 milliGRAM(s) IV Push every 6 hours  sodium chloride 0.9%. 1000 milliLiter(s) IV Continuous <Continuous>      PHYSICAL EXAM:   Vital Signs Last 24 Hrs  T(C): 37.2 (21 Oct 2018 20:00), Max: 37.3 (21 Oct 2018 12:00)  T(F): 99 (21 Oct 2018 20:00), Max: 99.1 (21 Oct 2018 12:00)  HR: 79 (22 Oct 2018 06:33) (65 - 93)  BP: 163/90 (22 Oct 2018 06:33) (143/63 - 169/69)  BP(mean): 112 (22 Oct 2018 06:33) (83 - 143)  RR: 31 (22 Oct 2018 06:33) (17 - 31)  SpO2: 96% (22 Oct 2018 06:33) (95% - 100%)    General: No acute distress  HEENT: Right HHA  Abdomen: Soft, nontender, nondistended   Extremities: No edema    NEUROLOGICAL EXAM:  Mental status: Awake, lethargic, not following commands, no verbal output  Cranial Nerves: Right facial droop, no dysarthia  Motor exam:  RUE- plegic, increased tone, RLE - withdraws to noxious stimuli, LUE - minimal movement,  LLE - moving freely  Sensation: Not assessed.   Coordination/ Gait: Not assessed.     LABS:                        8.8    11.1  )-----------( 198      ( 21 Oct 2018 01:22 )             27.5    10-22    158<H>  |  128<H>  |  33<H>  ----------------------------<  116<H>  3.7   |  16<L>  |  0.99    Ca    9.1      22 Oct 2018 04:59    Hemoglobin A1C, Whole Blood: 6.0 % (10-19 @ 07:53)    IMAGING: Reviewed by me.     MRI Head No Cont (10/20/18):  Acute hemorrhagic infarct involving the left MCA territory with mass effect on the left lateral ventricle and left to right shift.    CT Head No Cont (10/19/18) @ 23:21:   Redemonstration of large acute left MCA territory infarct with similar mass effect upon the left lateral ventricle and similar rightward midline shift of 4 mm. Ventricles stable in size. No hydrocephalus. Previously seen increased density throughout the region of infarction is less apparent on the current examination, consistent with resolving contrast staining after digital subtraction angiography. 2.1 x 1.6 cm region of persistent increased density in the left lateral frontotemporal region may represent hemorrhagic transformation of the infarct.    CT Head No Cont (10/19/18) @ 05:52:   Large acute left MCA territory infarct. Increased density seen throughout the left basal ganglia and left lateral frontal, parietal, and temporal cortices likely represents a combination of hemorrhagic transformation and cortical staining from recent digital subtraction angiography. Additional density seen within the left cerebral sulci likely representing subarachnoid hemorrhage. Mass effect upon the left lateral ventricle and rightward midline shift of 4 mm. No large hydrocephalus. THE PATIENT WAS SEEN AND EXAMINED BY ME WITH THE HOUSESTAFF AND STROKE TEAM DURING MORNING ROUNDS.   HPI:  88y/o female with vascular risk factors of HTN and hyperlipidemia, who was last seen normal at 22:10, on the day prior to admission. Then she was found by family members at the floor with inability to communicate or move her right side at 22:20. She was initially evaluated at [place] were NIHSS=29 deemed candidate for tPA. 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 Patient was transferred for further management to [facility]. She received IVtpa per protocol and transferred to NS after CTA revealed L ICA and L M1 occlusion. She underwent thrombectomy with resultant TICI 2B flow.     SUBJECTIVE: Noted with agitation overnight.  No new neurologic complaints.      aspirin Suppository 300 milliGRAM(s) Rectal daily  atorvastatin 80 milliGRAM(s) Oral at bedtime  enoxaparin Injectable 40 milliGRAM(s) SubCutaneous daily  influenza   Vaccine 0.5 milliLiter(s) IntraMuscular once  metoprolol tartrate Injectable 5 milliGRAM(s) IV Push every 6 hours  sodium chloride 0.9%. 1000 milliLiter(s) IV Continuous <Continuous>      PHYSICAL EXAM:   Vital Signs Last 24 Hrs  T(C): 37.2 (21 Oct 2018 20:00), Max: 37.3 (21 Oct 2018 12:00)  T(F): 99 (21 Oct 2018 20:00), Max: 99.1 (21 Oct 2018 12:00)  HR: 79 (22 Oct 2018 06:33) (65 - 93)  BP: 163/90 (22 Oct 2018 06:33) (143/63 - 169/69)  BP(mean): 112 (22 Oct 2018 06:33) (83 - 143)  RR: 31 (22 Oct 2018 06:33) (17 - 31)  SpO2: 96% (22 Oct 2018 06:33) (95% - 100%)    General: No acute distress  HEENT: unable to cooperate, unable to determine if BTT  Abdomen: Soft, nontender, nondistended   Extremities: No edema    NEUROLOGICAL EXAM:  Mental status: eyes closed- opens eyes to gentle verbal stimuli, generates some unintelligible sounds, not following commands  Cranial Nerves: no facial palsy at rest, no dysarthria  Motor exam: LUE: good effort against gravity, LLE : spontaneous movements minimal effort against gravity, RUE: no movement, RLE: minimal effort against gravity, minimally increased tone in RUE/RLE.     Sensation: unable to assess    Coordination/ Gait: deferred    LABS:                        8.8    11.1  )-----------( 198      ( 21 Oct 2018 01:22 )             27.5    10-22    158<H>  |  128<H>  |  33<H>  ----------------------------<  116<H>  3.7   |  16<L>  |  0.99    Ca    9.1      22 Oct 2018 04:59    Hemoglobin A1C, Whole Blood: 6.0 % (10-19 @ 07:53)    IMAGING: Reviewed by me.     MRI Head No Cont (10/20/18):  Acute hemorrhagic infarct involving the left MCA territory with mass effect on the left lateral ventricle and left to right shift.    CT Head No Cont (10/19/18) @ 23:21:   Redemonstration of large acute left MCA territory infarct with similar mass effect upon the left lateral ventricle and similar rightward midline shift of 4 mm. Ventricles stable in size. No hydrocephalus. Previously seen increased density throughout the region of infarction is less apparent on the current examination, consistent with resolving contrast staining after digital subtraction angiography. 2.1 x 1.6 cm region of persistent increased density in the left lateral frontotemporal region may represent hemorrhagic transformation of the infarct.    CT Head No Cont (10/19/18) @ 05:52:   Large acute left MCA territory infarct. Increased density seen throughout the left basal ganglia and left lateral frontal, parietal, and temporal cortices likely represents a combination of hemorrhagic transformation and cortical staining from recent digital subtraction angiography. Additional density seen within the left cerebral sulci likely representing subarachnoid hemorrhage. Mass effect upon the left lateral ventricle and rightward midline shift of 4 mm. No large hydrocephalus. THE PATIENT WAS SEEN AND EXAMINED BY ME WITH THE HOUSESTAFF AND STROKE TEAM DURING MORNING ROUNDS.   HPI:  88y/o female with vascular risk factors of HTN and hyperlipidemia, who was last seen normal at 22:10, on the day prior to admission. Then she was found by family members at the floor with inability to communicate or move her right side at 22:20. She was initially evaluated at [place] were NIHSS=29 deemed candidate for tPA. 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 Patient was transferred for further management to [facility]. She received IVtpa per protocol and transferred to NS after CTA revealed L ICA and L M1 occlusion. She underwent thrombectomy with resultant TICI 2B flow.     SUBJECTIVE: Noted with agitation overnight.  No new neurologic complaints.      aspirin Suppository 300 milliGRAM(s) Rectal daily  atorvastatin 80 milliGRAM(s) Oral at bedtime  enoxaparin Injectable 40 milliGRAM(s) SubCutaneous daily  influenza   Vaccine 0.5 milliLiter(s) IntraMuscular once  metoprolol tartrate Injectable 5 milliGRAM(s) IV Push every 6 hours  sodium chloride 0.9%. 1000 milliLiter(s) IV Continuous <Continuous>      PHYSICAL EXAM:   Vital Signs Last 24 Hrs  T(C): 37.2 (21 Oct 2018 20:00), Max: 37.3 (21 Oct 2018 12:00)  T(F): 99 (21 Oct 2018 20:00), Max: 99.1 (21 Oct 2018 12:00)  HR: 79 (22 Oct 2018 06:33) (65 - 93)  BP: 163/90 (22 Oct 2018 06:33) (143/63 - 169/69)  BP(mean): 112 (22 Oct 2018 06:33) (83 - 143)  RR: 31 (22 Oct 2018 06:33) (17 - 31)  SpO2: 96% (22 Oct 2018 06:33) (95% - 100%)    General: No acute distress  HEENT: unable to cooperate, unable to determine if BTT  Abdomen: Soft, nontender, nondistended   Extremities: No edema    NEUROLOGICAL EXAM:  Mental status: eyes closed- opens eyes to gentle verbal stimuli, generates some unintelligible sounds, not following commands  Cranial Nerves: no facial palsy at rest, no definite dysarthria but almost no verbal output  Motor exam: LUE: good effort against gravity, LLE : spontaneous movements minimal effort against gravity, RUE: no movement, RLE: minimal effort against gravity, minimally increased tone in RUE/RLE.     Sensation: unable to assess    Coordination/ Gait: deferred    LABS:                        8.8    11.1  )-----------( 198      ( 21 Oct 2018 01:22 )             27.5    10-22    158<H>  |  128<H>  |  33<H>  ----------------------------<  116<H>  3.7   |  16<L>  |  0.99    Ca    9.1      22 Oct 2018 04:59    Hemoglobin A1C, Whole Blood: 6.0 % (10-19 @ 07:53)    IMAGING: Reviewed by me.     MRI Head No Cont (10/20/18):  Acute hemorrhagic infarct involving the left MCA territory with mass effect on the left lateral ventricle and left to right shift.    CT Head No Cont (10/19/18) @ 23:21:   Redemonstration of large acute left MCA territory infarct with similar mass effect upon the left lateral ventricle and similar rightward midline shift of 4 mm. Ventricles stable in size. No hydrocephalus. Previously seen increased density throughout the region of infarction is less apparent on the current examination, consistent with resolving contrast staining after digital subtraction angiography. 2.1 x 1.6 cm region of persistent increased density in the left lateral frontotemporal region may represent hemorrhagic transformation of the infarct.    CT Head No Cont (10/19/18) @ 05:52:   Large acute left MCA territory infarct. Increased density seen throughout the left basal ganglia and left lateral frontal, parietal, and temporal cortices likely represents a combination of hemorrhagic transformation and cortical staining from recent digital subtraction angiography. Additional density seen within the left cerebral sulci likely representing subarachnoid hemorrhage. Mass effect upon the left lateral ventricle and rightward midline shift of 4 mm. No large hydrocephalus.

## 2018-10-22 NOTE — DIETITIAN INITIAL EVALUATION ADULT. - NS AS NUTRI INTERV ENTERAL NUTRITION
If pt requires EN recommend  Jevity 1.2 60cc/hr x 24 hrs to provide 1728 kcals, 80 gm protein, 1162cc free water  meets 22 Kcal/Kg, 1.0Gm/kg  dosing wt 77.3 kg

## 2018-10-22 NOTE — DIETITIAN INITIAL EVALUATION ADULT. - PERTINENT MEDS FT
MEDICATIONS  (STANDING):  aspirin Suppository 300 milliGRAM(s) Rectal daily  atorvastatin 80 milliGRAM(s) Oral at bedtime  enoxaparin Injectable 40 milliGRAM(s) SubCutaneous daily  influenza   Vaccine 0.5 milliLiter(s) IntraMuscular once  metoprolol tartrate Injectable 5 milliGRAM(s) IV Push every 6 hours  sodium chloride 0.45%. 1000 milliLiter(s) (75 mL/Hr) IV Continuous <Continuous>

## 2018-10-22 NOTE — OCCUPATIONAL THERAPY INITIAL EVALUATION ADULT - ADDITIONAL COMMENTS
MRI of head 10/20: Acute hemorrhagic infarct involving the left MCA territory with mass effect on the left lateral ventricle and left to right shift. Chest x-ray on 10/20: Small bilateral pleural effusions. CT of head on 10/19: IMPRESSION: Redemonstration of large acute left MCA territory infarct with similar mass effect upon the left lateral ventricle and similar rightward midline shift of 4 mm. Ventricles stable in size. No hydrocephalus. Previously seen increased density throughout the region of infarction is less apparent on the current examination, consistent with resolving contrast staining after digital subtraction angiography.  2.1 x 1.6 cm region of persistent increased density in the left lateral frontotemporal region may represent hemorrhagic transformation of the infarct.

## 2018-10-22 NOTE — OCCUPATIONAL THERAPY INITIAL EVALUATION ADULT - PLANNED THERAPY INTERVENTIONS, OT EVAL
ADL retraining/bed mobility training/cognitive, visual perceptual/transfer training/balance training

## 2018-10-22 NOTE — OCCUPATIONAL THERAPY INITIAL EVALUATION ADULT - PERTINENT HX OF CURRENT PROBLEM, REHAB EVAL
Ms. Rucker is an 88y/o female with vascular risk factors of HTN and hyperlipidemia, who was last seen normal at 22:10. Then she was found by family members at the floor with inability to communicate or move her right side at 22:20. She was initially evaluated at [place] were NIHSS=29 deemed candidate for tPA. 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 Patient was transferred for further management to [facility].

## 2018-10-22 NOTE — PROGRESS NOTE ADULT - SUBJECTIVE AND OBJECTIVE BOX
Subjective: Patient seen and examined. No new events except as noted.   remains in stroke unit   didnt sleep well   family at bedside   PAT yesterday       REVIEW OF SYSTEMS:    Unable to obtain           MEDICATIONS:  MEDICATIONS  (STANDING):  aspirin Suppository 300 milliGRAM(s) Rectal daily  atorvastatin 80 milliGRAM(s) Oral at bedtime  enoxaparin Injectable 40 milliGRAM(s) SubCutaneous daily  influenza   Vaccine 0.5 milliLiter(s) IntraMuscular once  metoprolol tartrate Injectable 5 milliGRAM(s) IV Push every 6 hours  sodium chloride 0.45%. 1000 milliLiter(s) (75 mL/Hr) IV Continuous <Continuous>      PHYSICAL EXAM:  T(C): 37.4 (10-22-18 @ 08:33), Max: 37.4 (10-22-18 @ 08:33)  HR: 97 (10-22-18 @ 10:00) (65 - 97)  BP: 170/66 (10-22-18 @ 10:00) (143/63 - 170/66)  RR: 22 (10-22-18 @ 10:00) (17 - 31)  SpO2: 96% (10-22-18 @ 10:00) (95% - 100%)  Wt(kg): --  I&O's Summary    21 Oct 2018 07:01  -  22 Oct 2018 07:00  --------------------------------------------------------  IN: 980 mL / OUT: 1005 mL / NET: -25 mL          Appearance: NAD aphasic   HEENT:   Normal oral mucosa, PERRL, EOMI	  Lymphatic: No lymphadenopathy , no edema  Cardiovascular: Normal S1 S2, No JVD, No murmurs , Peripheral pulses palpable 2+ bilaterally  Respiratory: Lungs clear to auscultation, normal effort 	  Gastrointestinal:  Soft, Non-tender, + BS	  Skin: No rashes, No ecchymoses, No cyanosis, warm to touch  Musculoskeletal: Decreased range of motion and  strength  Psychiatry:  Mood & affect appropriate  Ext: No edema  NEUROLOGICAL EXAM:  Mental status: Awake, lethargic, not following commands, no verbal output  Cranial Nerves: Right facial droop  Motor exam:  RUE- plegic, increased tone, RLE - withdraws to noxious stimuli, LUE - minimal movement,  LLE - moving freely  Sensation: Not assessed.   Coordination/ Gait: Not assessed.         LABS:    CARDIAC MARKERS:                                9.4    12.94 )-----------( 228      ( 22 Oct 2018 07:26 )             29.5     10-22    158<H>  |  128<H>  |  33<H>  ----------------------------<  116<H>  3.7   |  16<L>  |  0.99    Ca    9.1      22 Oct 2018 04:59      proBNP:   Lipid Profile:   HgA1c:   TSH: Thyroid Stimulating Hormone, Serum: 0.55 uIU/mL (10-21 @ 15:04)      2          TELEMETRY: 	/PAT/PACS    ECG:  	  RADIOLOGY:   DIAGNOSTIC TESTING:  [ ] Echocardiogram:  [ ]  Catheterization:  [ ] Stress Test:    OTHER:

## 2018-10-22 NOTE — CONSULT NOTE ADULT - ATTENDING COMMENTS
Seen and examined with resident. Agree with note.   Patient with right hemiparesis, gait dysfunction.  Patient will need acute rehabilitation when stable.

## 2018-10-22 NOTE — SWALLOW BEDSIDE ASSESSMENT ADULT - SWALLOW EVAL: DIAGNOSIS
Pt presents with an insufficient mental status to support PO feeding at this time. No PO trials administered.

## 2018-10-22 NOTE — OCCUPATIONAL THERAPY INITIAL EVALUATION ADULT - DIAGNOSIS, OT EVAL
Pt p/w Pt p/w decreased strength, balance, cognition, vision, and endurance impacting her ability to perform ADLs and functional mobility tasks.

## 2018-10-22 NOTE — SWALLOW BEDSIDE ASSESSMENT ADULT - COMMENTS
H&P continued/hospital course: Neurological examination showed right hemiparesis, right hemianopia, right dense facial droop, right sensory loss, left gaze preference with gaze palsy.   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 No evidence of Afib on tele. There is frequent PACS and Bigeminy but no Afib   10/21 Provider contact note: 4 seconds atrial tachycardia and temp 101  +Leukocytosis. ID consulted to r/o infection   10/21: ID Consult: no clues to emerging infection.  WBC minimally elevated.  No pyuria.  CXR 10/19 clear.  No other sepsis criteria at present.  By exclusion, fever likely reflects hemorrhagic component, CNS blood.    Patient on Lopressor and to possibly start PRN blood pressure medication H&P continued/hospital course: Neurological examination showed right hemiparesis, right hemianopia, right dense facial droop, right sensory loss, left gaze preference with gaze palsy.   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 No evidence of Afib on tele. There is frequent PACS and Bigeminy but no Afib   10/21 Provider contact note: 4 seconds atrial tachycardia and temp 101  +Leukocytosis. ID consulted to r/o infection   10/21: ID Consult: no clues to emerging infection.  WBC minimally elevated.  No pyuria.  CXR 10/19 clear.  No other sepsis criteria at present.  By exclusion, fever likely reflects hemorrhagic component, CNS blood.    Patient on Lopressor and to possibly start PRN blood pressure medication    See below addendum re: Imaging results

## 2018-10-22 NOTE — PROGRESS NOTE ADULT - ASSESSMENT
86 yo F, here with L MCA distribution hemorrhagic infarct- TPA and thrombectomy.  She remains aphasic, R hemiparesis.  Febrile 2days ago, Tmx 101.  Otherwise, no clues to emerging infection.  WBC minimally elevated.  No pyuria.  CXR 10/19 clear.  No other sepsis criteria at present.  By exclusion, fever likely reflects hemorrhagic component, CNS blood.Her temps appear to be moderating.Micro so far is negative.PC level is .05, also supportng a non infectious etiology to fever.    Plan:  Observe off antibiotics   Follow temps and CBC/diff   Follow Cx results  d/w daughter in law  No indication for antibiotics.

## 2018-10-22 NOTE — EEG REPORT - NS EEG TEXT BOX
[facility]   COMPREHENSIVE EPILEPSY CENTER   REPORT OF ROUTINE VIDEO EEG     [facility]: [address] [place] [address] , [address], [place] [address], Ph#: [phone]  [facility]: [address], [place] [address], Ph#: [phone]  Office: [address] [place] [address], [address], [place] [address] Ph#: [phone]    Patient Name: STEPAN RIOJAS  Age and : 87y ([birth year])  MRN #: [id number]  Location: [address]   Referring Physician: Adeline Jarquin    Study Date: 10-22-18    _____________________________________________________________  TECHNICAL INFORMATION    Placement and Labeling of Electrodes:  The EEG was performed utilizing 20 channels referential EEG connections (coronal over temporal over parasagittal montage) using all standard 10-20 electrode placements with EKG.  Recording was at a sampling rate of 256 samples per second per channel.  Time synchronized digital video recording was done simultaneously with EEG recording.  A low light infrared camera was used for low light recording.  Mario and seizure detection algorithms were utilized.    _____________________________________________________________  HISTORY    Patient is a 87y old  Female who presents with a chief complaint of stroke (22 Oct 2018 12:02)      PERTINENT MEDICATION:  No AEDs  _____________________________________________________________  STUDY INTERPRETATION    Findings: The background was continuous, spontaneously variable and reactive. During wakefulness, the posterior dominant rhythm consisted of symmetric, 7 Hz activity, with amplitude to 30 uV.     Background Slowing:  Background predominantly consisted of theta, delta and faster activities.    Focal Slowing:   None was present.    Sleep Background:  Drowsiness was characterized by fragmentation, attenuation, and slowing of the background activity.    Stage II sleep transients were not recorded.    Other Non-Epileptiform Findings:  Attenuation of fast and voltage over the left hemisphere max temporal region.     Interictal Epileptiform Activity:   None were present.    Events:  Clinical events: None recorded.  Seizures: None recorded.    Activation Procedures:   Hyperventilation was not performed.    Photic stimulation was not performed.     Artifacts:  Intermittent myogenic and movement artifacts were noted.    ECG:  The heart rate on single channel ECG was predominantly between 70-80 BPM ectopy.  _____________________________________________________________  EEG SUMMARY/CLASSIFICATION  Abnormal EEG in the awake, drowsy states.  - Attenuation of fast and voltage over the left hemisphere max temporal region.   - Mild to moderate generalized slowing.  _____________________________________________________________  EEG IMPRESSION/CLINICAL CORRELATE    Abnormal EEG study.  1. Cortical dysfunction in the left hemisphere.   2. Mild to moderate nonspecific diffuse or multifocal cerebral dysfunction.   3. No seizure seen.    Heart rhythm ectopy.     Preliminary Fellow Read:    Cathi Simeon MD  Adult EEG Fellow, [facility] Epilepsy [facility]   COMPREHENSIVE EPILEPSY CENTER   REPORT OF ROUTINE VIDEO EEG     [facility]: [address] [place] [address] , [address], [place] [address], Ph#: [phone]  [facility]: [address], [place] [address], Ph#: [phone]  Office: [address] [place] [address], [address], [place] [address] Ph#: [phone]    Patient Name: STEPAN RIOJAS  Age and : 87y ([birth year])  MRN #: [id number]  Location: [address]   Referring Physician: Adeline Jarquin    Study Date: 10-22-18    _____________________________________________________________  TECHNICAL INFORMATION    Placement and Labeling of Electrodes:  The EEG was performed utilizing 20 channels referential EEG connections (coronal over temporal over parasagittal montage) using all standard 10-20 electrode placements with EKG.  Recording was at a sampling rate of 256 samples per second per channel.  Time synchronized digital video recording was done simultaneously with EEG recording.  A low light infrared camera was used for low light recording.  Mario and seizure detection algorithms were utilized.    _____________________________________________________________  HISTORY    Patient is a 87y old  Female who presents with a chief complaint of stroke (22 Oct 2018 12:02)    PERTINENT MEDICATION:  No AEDs  _____________________________________________________________  STUDY INTERPRETATION    Findings: The background was continuous, spontaneously variable and reactive. During wakefulness, the posterior dominant rhythm consisted of symmetric, 7 Hz activity, with amplitude to 30 uV.     Background Slowing:  Background predominantly consisted of theta, delta and faster activities.    Focal Slowing:   Continuous polymorphic theta and delta activities over the left hemisphere.     Sleep Background:  Drowsiness was characterized by fragmentation, attenuation, and slowing of the background activity.    Stage II sleep transients were not recorded.    Other Non-Epileptiform Findings:  Attenuation of fast and voltage over the left hemisphere max temporal region.   Single sharp transients in the right Central region.    Interictal Epileptiform Activity:   None were present.    Events:  Clinical events: None recorded.  Seizures: None recorded.    Activation Procedures:   Hyperventilation was not performed.    Photic stimulation was not performed.     Artifacts:  Intermittent myogenic and movement artifacts were noted.    ECG:  The heart rate on single channel ECG was predominantly between 70-80 BPM ectopy.  _____________________________________________________________  EEG SUMMARY/CLASSIFICATION  Abnormal EEG in the awake, drowsy states.  - Continuous polymorphic theta and delta activities over the left hemisphere.   - Attenuation of fast and voltage over the left hemisphere max temporal region.   - Mild to moderate generalized slowing.  _____________________________________________________________  EEG IMPRESSION/CLINICAL CORRELATE    Abnormal EEG study.  1. Structural abnormality and cortical dysfunction in the left hemisphere.   2. Mild to moderate nonspecific diffuse or multifocal cerebral dysfunction.   3. No seizure seen.    Heart rhythm ectopy.     Preliminary Fellow Read:    Cathi Simeon MD  Adult EEG Fellow, [facility]psy [facility]   COMPREHENSIVE EPILEPSY CENTER   REPORT OF ROUTINE VIDEO EEG     [facility]: [address] [place] [address] , [address], [place] [address], Ph#: [phone]  [facility]: [address], [place] [address], Ph#: [phone]  Office: [address] [place] [address], [address], [place] [address] Ph#: [phone]    Patient Name: STEPAN RIOJAS  Age and : 87y ([birth year])  MRN #: [id number]  Location: [address]   Referring Physician: Adeline Jarquin    Study Date: 10-22-18    _____________________________________________________________  TECHNICAL INFORMATION    Placement and Labeling of Electrodes:  The EEG was performed utilizing 20 channels referential EEG connections (coronal over temporal over parasagittal montage) using all standard 10-20 electrode placements with EKG.  Recording was at a sampling rate of 256 samples per second per channel.  Time synchronized digital video recording was done simultaneously with EEG recording.  A low light infrared camera was used for low light recording.  Mario and seizure detection algorithms were utilized.    _____________________________________________________________  HISTORY    Patient is a 87y old  Female who presents with a chief complaint of stroke (22 Oct 2018 12:02)    PERTINENT MEDICATION:  No AEDs  _____________________________________________________________  STUDY INTERPRETATION    Findings: The background was continuous, spontaneously variable and reactive. During wakefulness, the posterior dominant rhythm consisted of symmetric, 7 Hz activity, with amplitude to 30 uV.     Background Slowing:  Background predominantly consisted of theta, delta and faster activities.    Focal Slowing:   Continuous polymorphic theta and delta activities over the left hemisphere.     Sleep Background:  Drowsiness was characterized by fragmentation, attenuation, and slowing of the background activity.    Stage II sleep transients were not recorded.    Other Non-Epileptiform Findings:  Attenuation of fast and voltage over the left hemisphere max temporal region.   Single sharp transients in the right Central region.    Interictal Epileptiform Activity:   None were present.    Events:  Clinical events: None recorded.  Seizures: None recorded.    Activation Procedures:   Hyperventilation was not performed.    Photic stimulation was not performed.     Artifacts:  Intermittent myogenic and movement artifacts were noted.    ECG:  The heart rate on single channel ECG was predominantly between 70-80 BPM ectopy.  _____________________________________________________________  EEG SUMMARY/CLASSIFICATION  Abnormal EEG in the awake, drowsy states.  - Continuous polymorphic theta and delta activities over the left hemisphere.   - Attenuation of fast and voltage over the left hemisphere max temporal region.   - Mild to moderate generalized slowing.  _____________________________________________________________  EEG IMPRESSION/CLINICAL CORRELATE    Abnormal EEG study.  1. Structural abnormality and cortical dysfunction in the left hemisphere.   2. Mild to moderate nonspecific diffuse or multifocal cerebral dysfunction.   3. No seizure seen.    Heart rhythm ectopy.    Cathi Simeon MD  Adult EEG Fellow, [facility]    Artem Trimble MD  EEG / Epilepsy Attending Physician

## 2018-10-22 NOTE — OCCUPATIONAL THERAPY INITIAL EVALUATION ADULT - BALANCE TRAINING, PT EVAL
GOAL: Pt will increase s/d s/s balance by half a grade to facilitate performance in ADLs and functional mobility tasks within 2 weeks.

## 2018-10-22 NOTE — OCCUPATIONAL THERAPY INITIAL EVALUATION ADULT - LLE MMT, REHAB EVAL
unable to accurately assess 2/2 decreased command follow and aphasia, however at least a 3/5 noted t/o IE./grossly assessed due to

## 2018-10-23 DIAGNOSIS — R13.10 DYSPHAGIA, UNSPECIFIED: ICD-10-CM

## 2018-10-23 LAB
ANION GAP SERPL CALC-SCNC: 13 MMOL/L — SIGNIFICANT CHANGE UP (ref 5–17)
BASOPHILS # BLD AUTO: 0.02 K/UL — SIGNIFICANT CHANGE UP (ref 0–0.2)
BASOPHILS NFR BLD AUTO: 0.2 % — SIGNIFICANT CHANGE UP (ref 0–2)
BUN SERPL-MCNC: 37 MG/DL — HIGH (ref 7–23)
CALCIUM SERPL-MCNC: 8.7 MG/DL — SIGNIFICANT CHANGE UP (ref 8.4–10.5)
CHLORIDE SERPL-SCNC: 126 MMOL/L — HIGH (ref 96–108)
CO2 SERPL-SCNC: 16 MMOL/L — LOW (ref 22–31)
CREAT SERPL-MCNC: 0.94 MG/DL — SIGNIFICANT CHANGE UP (ref 0.5–1.3)
EOSINOPHIL # BLD AUTO: 0 K/UL — SIGNIFICANT CHANGE UP (ref 0–0.5)
EOSINOPHIL NFR BLD AUTO: 0 % — SIGNIFICANT CHANGE UP (ref 0–6)
GLUCOSE SERPL-MCNC: 96 MG/DL — SIGNIFICANT CHANGE UP (ref 70–99)
HCT VFR BLD CALC: 28.2 % — LOW (ref 34.5–45)
HGB BLD-MCNC: 8.9 G/DL — LOW (ref 11.5–15.5)
IMM GRANULOCYTES NFR BLD AUTO: 0.5 % — SIGNIFICANT CHANGE UP (ref 0–1.5)
LYMPHOCYTES # BLD AUTO: 1.57 K/UL — SIGNIFICANT CHANGE UP (ref 1–3.3)
LYMPHOCYTES # BLD AUTO: 12.7 % — LOW (ref 13–44)
MCHC RBC-ENTMCNC: 30.3 PG — SIGNIFICANT CHANGE UP (ref 27–34)
MCHC RBC-ENTMCNC: 31.6 GM/DL — LOW (ref 32–36)
MCV RBC AUTO: 95.9 FL — SIGNIFICANT CHANGE UP (ref 80–100)
MONOCYTES # BLD AUTO: 0.9 K/UL — SIGNIFICANT CHANGE UP (ref 0–0.9)
MONOCYTES NFR BLD AUTO: 7.3 % — SIGNIFICANT CHANGE UP (ref 2–14)
NEUTROPHILS # BLD AUTO: 9.77 K/UL — HIGH (ref 1.8–7.4)
NEUTROPHILS NFR BLD AUTO: 79.3 % — HIGH (ref 43–77)
PLATELET # BLD AUTO: 229 K/UL — SIGNIFICANT CHANGE UP (ref 150–400)
POTASSIUM SERPL-MCNC: 3.6 MMOL/L — SIGNIFICANT CHANGE UP (ref 3.5–5.3)
POTASSIUM SERPL-SCNC: 3.6 MMOL/L — SIGNIFICANT CHANGE UP (ref 3.5–5.3)
RBC # BLD: 2.94 M/UL — LOW (ref 3.8–5.2)
RBC # FLD: 15.3 % — HIGH (ref 10.3–14.5)
SODIUM SERPL-SCNC: 155 MMOL/L — HIGH (ref 135–145)
WBC # BLD: 12.32 K/UL — HIGH (ref 3.8–10.5)
WBC # FLD AUTO: 12.32 K/UL — HIGH (ref 3.8–10.5)

## 2018-10-23 PROCEDURE — 71045 X-RAY EXAM CHEST 1 VIEW: CPT | Mod: 26

## 2018-10-23 PROCEDURE — 99233 SBSQ HOSP IP/OBS HIGH 50: CPT

## 2018-10-23 RX ORDER — ASPIRIN/CALCIUM CARB/MAGNESIUM 324 MG
81 TABLET ORAL DAILY
Qty: 0 | Refills: 0 | Status: DISCONTINUED | OUTPATIENT
Start: 2018-10-24 | End: 2018-11-01

## 2018-10-23 RX ORDER — METOPROLOL TARTRATE 50 MG
25 TABLET ORAL
Qty: 0 | Refills: 0 | Status: DISCONTINUED | OUTPATIENT
Start: 2018-10-23 | End: 2018-11-01

## 2018-10-23 RX ORDER — SODIUM CHLORIDE 9 MG/ML
1000 INJECTION, SOLUTION INTRAVENOUS
Qty: 0 | Refills: 0 | Status: DISCONTINUED | OUTPATIENT
Start: 2018-10-23 | End: 2018-10-25

## 2018-10-23 RX ORDER — AMLODIPINE BESYLATE 2.5 MG/1
5 TABLET ORAL DAILY
Qty: 0 | Refills: 0 | Status: DISCONTINUED | OUTPATIENT
Start: 2018-10-23 | End: 2018-11-01

## 2018-10-23 RX ORDER — GABAPENTIN 400 MG/1
100 CAPSULE ORAL THREE TIMES A DAY
Qty: 0 | Refills: 0 | Status: DISCONTINUED | OUTPATIENT
Start: 2018-10-23 | End: 2018-10-28

## 2018-10-23 RX ORDER — GABAPENTIN 400 MG/1
100 CAPSULE ORAL THREE TIMES A DAY
Qty: 0 | Refills: 0 | Status: DISCONTINUED | OUTPATIENT
Start: 2018-10-23 | End: 2018-10-23

## 2018-10-23 RX ORDER — FERROUS SULFATE 325(65) MG
300 TABLET ORAL DAILY
Qty: 0 | Refills: 0 | Status: DISCONTINUED | OUTPATIENT
Start: 2018-10-23 | End: 2018-11-01

## 2018-10-23 RX ORDER — LISINOPRIL 2.5 MG/1
5 TABLET ORAL
Qty: 0 | Refills: 0 | Status: DISCONTINUED | OUTPATIENT
Start: 2018-10-23 | End: 2018-11-01

## 2018-10-23 RX ADMIN — LISINOPRIL 5 MILLIGRAM(S): 2.5 TABLET ORAL at 19:31

## 2018-10-23 RX ADMIN — GABAPENTIN 100 MILLIGRAM(S): 400 CAPSULE ORAL at 22:33

## 2018-10-23 RX ADMIN — Medication 5 MILLIGRAM(S): at 06:40

## 2018-10-23 RX ADMIN — Medication 25 MILLIGRAM(S): at 19:20

## 2018-10-23 RX ADMIN — SODIUM CHLORIDE 100 MILLILITER(S): 9 INJECTION, SOLUTION INTRAVENOUS at 12:29

## 2018-10-23 RX ADMIN — ENOXAPARIN SODIUM 40 MILLIGRAM(S): 100 INJECTION SUBCUTANEOUS at 12:24

## 2018-10-23 RX ADMIN — ATORVASTATIN CALCIUM 80 MILLIGRAM(S): 80 TABLET, FILM COATED ORAL at 22:06

## 2018-10-23 RX ADMIN — Medication 300 MILLIGRAM(S): at 12:24

## 2018-10-23 RX ADMIN — SODIUM CHLORIDE 75 MILLILITER(S): 9 INJECTION, SOLUTION INTRAVENOUS at 00:30

## 2018-10-23 RX ADMIN — AMLODIPINE BESYLATE 5 MILLIGRAM(S): 2.5 TABLET ORAL at 19:20

## 2018-10-23 RX ADMIN — Medication 5 MILLIGRAM(S): at 00:31

## 2018-10-23 RX ADMIN — Medication 5 MILLIGRAM(S): at 12:24

## 2018-10-23 NOTE — SWALLOW BEDSIDE ASSESSMENT ADULT - COMMENTS
H&P continued/hospital course: Neurological examination showed right hemiparesis, right hemianopia, right dense facial droop, right sensory loss, left gaze preference with gaze palsy.   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 No evidence of Afib on tele. There is frequent PACS and Bigeminy but no Afib   10/21 Provider contact note: 4 seconds atrial tachycardia and temp 101  +Leukocytosis. ID consulted to r/o infection   10/21: ID Consult: no clues to emerging infection.  WBC minimally elevated.  No pyuria.  CXR 10/19 clear.  No other sepsis criteria at present.  By exclusion, fever likely reflects hemorrhagic component, CNS blood.    Patient on Lopressor and to possibly start PRN blood pressure medication    See below addendum re: Imaging results

## 2018-10-23 NOTE — PROGRESS NOTE ADULT - SUBJECTIVE AND OBJECTIVE BOX
CC: f/u for fever    Patient reports nothing    REVIEW OF SYSTEMS:  All other review of systems negative (Comprehensive ROS)    Antimicrobials Day #  :    Other Medications Reviewed    T(F): 98 (10-23-18 @ 16:19), Max: 98.8 (10-23-18 @ 08:28)  HR: 72 (10-23-18 @ 18:00)  BP: 129/74 (10-23-18 @ 18:00)  RR: 17 (10-23-18 @ 18:00)  SpO2: 98% (10-23-18 @ 18:00)  Wt(kg): --    PHYSICAL EXAM:  General: awake no acute distress  Eyes:  anicteric, no conjunctival injection, no discharge  Oropharynx: no lesions or injection 	  Neck: supple, without adenopathy  Lungs: clear to auscultation  Heart: regular rate and rhythm; no murmur, rubs or gallops  Abdomen: soft, nondistended, nontender, without mass or organomegaly  Skin: no lesions  Extremities: no clubbing, cyanosis, or edema  Neurologic: awake does not move right side    LAB RESULTS:                        8.9    12.32 )-----------( 229      ( 23 Oct 2018 08:11 )             28.2     10-23    155<H>  |  126<H>  |  37<H>  ----------------------------<  96  3.6   |  16<L>  |  0.94    Ca    8.7      23 Oct 2018 04:48          MICROBIOLOGY:  RECENT CULTURES:  10-21 @ 18:04 .Blood Blood     No growth to date.      10-20 @ 14:48 .Blood Blood     No growth to date.          RADIOLOGY REVIEWED:    < from: MR Head No Cont (10.20.18 @ 18:38) >  EXAM:  MR BRAIN                            PROCEDURE DATE:  10/20/2018            INTERPRETATION:  History: Left MCA infarcts seen on prior head CT.    MRI of the brain was performed using sagittal T1, axial T 1 fast and echo   T2-weighted sequencewith FLAIR diffusion and susceptibility weighted   sequence.    This exam is compared with prior noncontrast head CT performed on October 19, 2018.    Large area of abnormal T2 prolongation with restricted diffusion is seen   involving the left temporal frontal and parietal region. Involvement of   the left basal ganglia and left corona radiata region is identified as   well. This is compatible with an acute left MCA infarct. There is   evidence of associated areas of susceptibility identified which are   compatible with areas of hemorrhagic transformation. Mass effect on the   left lateral ventricle is identified. Left-to-right shift (5.6 mm) is   seen.    The large vessels demonstrate normal flow voids.    The visualized paranasal sinusesmastoid and middle ear regions appear   clear.    Inflammatory change is seen involving both mastoids.    The patient is status post bilateral cataract surgery    Impression: Acute hemorrhagic infarct involving the left MCA territory   with mass effect on the left lateral ventricle and left to right shift.            < end of copied text >    Assessment:  S/p CVA, TPA, Thrombectomy with hemorrhagic transformation and low grade fever and mild wbc elevation likely reactive to blood in the brain  Plan:  monitor off antibiotics

## 2018-10-23 NOTE — SWALLOW BEDSIDE ASSESSMENT ADULT - NS SPL SWALLOW CLINIC TRIAL FT
significant oral residue noted post swallow, most notable on R side; pt's oral cavity suctioned via [place] to ensure complete clearance.

## 2018-10-23 NOTE — PROGRESS NOTE ADULT - SUBJECTIVE AND OBJECTIVE BOX
Subjective: Patient seen and examined. No new events except as noted.   remains in stroke unit     REVIEW OF SYSTEMS:    CONSTITUTIONAL: + weakness, fevers or chills  EYES/ENT: No visual changes;  No vertigo or throat pain   NECK: No pain or stiffness  RESPIRATORY: No cough, wheezing, hemoptysis; No shortness of breath  CARDIOVASCULAR: No chest pain or palpitations  GASTROINTESTINAL: No abdominal or epigastric pain. No nausea, vomiting, or hematemesis; No diarrhea or constipation. No melena or hematochezia.  GENITOURINARY: No dysuria, frequency or hematuria  NEUROLOGICAL: = numbness or weakness  SKIN: No itching, burning, rashes, or lesions   All other review of systems is negative unless indicated above.    MEDICATIONS:  MEDICATIONS  (STANDING):  amLODIPine   Tablet 5 milliGRAM(s) Oral daily  aspirin  chewable 81 milliGRAM(s) Oral daily  atorvastatin 80 milliGRAM(s) Oral at bedtime  enoxaparin Injectable 40 milliGRAM(s) SubCutaneous daily  ferrous    sulfate Liquid 300 milliGRAM(s) Oral daily  gabapentin   Solution 100 milliGRAM(s) Oral three times a day  influenza   Vaccine 0.5 milliLiter(s) IntraMuscular once  lisinopril 5 milliGRAM(s) Oral <User Schedule>  metoprolol tartrate 25 milliGRAM(s) Oral two times a day  sodium chloride 0.45%. 1000 milliLiter(s) (100 mL/Hr) IV Continuous <Continuous>      PHYSICAL EXAM:   Vital Signs Last 24 Hrs  T(C): 36.9 (22 Oct 2018 20:00), Max: 37.4 (22 Oct 2018 08:33)  T(F): 98.5 (22 Oct 2018 20:00), Max: 99.4 (22 Oct 2018 08:33)  HR: 74 (23 Oct 2018 06:00) (56 - 97)  BP: 151/91 (23 Oct 2018 06:00) (140/80 - 170/87)  BP(mean): 107 (23 Oct 2018 06:00) (88 - 122)  RR: 15 (23 Oct 2018 06:00) (15 - 22)  SpO2: 97% (23 Oct 2018 06:00) (94% - 99%)          Appearance: NAD aphasic   HEENT:   Dry oral mucosa, +NGT 	  Lymphatic: No lymphadenopathy , no edema  Cardiovascular: Normal S1 S2, No JVD, No murmurs , Peripheral pulses palpable 2+ bilaterally  Respiratory: Lungs clear to auscultation, normal effort 	  Gastrointestinal:  Soft, Non-tender, + BS	  Skin: No rashes, No ecchymoses, No cyanosis, warm to touch  Musculoskeletal: Decreased range of motion and  strength  Psychiatry:  lethargic   Ext: No edema  NEUROLOGICAL EXAM:  Mental status: Awake, lethargic, not following commands, no verbal output  Cranial Nerves: Right facial droop  Motor exam:  RUE- plegic, increased tone, RLE - withdraws to noxious stimuli, LUE - minimal movement,  LLE - moving freely  Sensation: Not assessed.   Coordination/ Gait: Not assessed.           LABS:    CARDIAC MARKERS:    LABS:                        9.4    12.94 )-----------( 228      ( 22 Oct 2018 07:26 )             29.5    10-23    155<H>  |  126<H>  |  37<H>  ----------------------------<  96  3.6   |  16<L>  |  0.94    Ca    8.7      23 Oct 2018 04:48    Hemoglobin A1C, Whole Blood: 6.0 % (10-19 @ 07:53)    IMAGING: Reviewed by me.     MRI Head No Cont (10/20/18):  Acute hemorrhagic infarct involving the left MCA territory with mass effect on the left lateral ventricle and left to right shift.    CT Head No Cont (10/19/18) @ 23:21:   Redemonstration of large acute left MCA territory infarct with similar mass effect upon the left lateral ventricle and similar rightward midline shift of 4 mm. Ventricles stable in size. No hydrocephalus. Previously seen increased density throughout the region of infarction is less apparent on the current examination, consistent with resolving contrast staining after digital subtraction angiography. 2.1 x 1.6 cm region of persistent increased density in the left lateral frontotemporal region may represent hemorrhagic transformation of the infarct.    CT Head No Cont (10/19/18) @ 05:52:   Large acute left MCA territory infarct. Increased density seen throughout the left basal ganglia and left lateral frontal, parietal, and temporal cortices likely represents a combination of hemorrhagic transformation and cortical staining from recent digital subtraction angiography. Additional density seen within the left cerebral sulci likely representing subarachnoid hemorrhage. Mass effect upon the left lateral ventricle and rightward midline shift of 4 mm. No large hydrocephalus.          TELEMETRY: SR/PACs	    ECG:  	  RADIOLOGY:   DIAGNOSTIC TESTING:  [ ] Echocardiogram:  [ ]  Catheterization:  [ ] Stress Test:    OTHER:

## 2018-10-23 NOTE — SWALLOW BEDSIDE ASSESSMENT ADULT - SWALLOW EVAL: DIAGNOSIS
Patient presents with oral and suspected pharyngeal dysphagia characterized by impaired oral management resulting in significant oral residue most notable on R side requiring suctioning via [place], delayed trigger of the swallow, and reduced hyolaryngeal elevation/excursion upon palpation.

## 2018-10-23 NOTE — PROGRESS NOTE ADULT - ASSESSMENT
88y/o female with vascular risk factors of HTN and hyperlipidemia, who was last seen normal at 22:10, on the day prior to admission. Then she was found by family members at the floor with inability to communicate or move her right side at 22:20. She was initially evaluated at [place] were NIHSS=29 deemed candidate for tPA. 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 Patient was transferred for further management to [facility]. She received IVtpa per protocol and transferred to NS after CTA revealed L ICA and L M1 occlusion. She underwent thrombectomy with resultant TICI 2B flow.     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 MRI Brain w/o - results as noted above.  Physical therapy/OT recommends acute rehab.     ANTITHROMBOTIC THERAPY: Aspirin for secondary stroke prevention    PULMONARY: CXR shows bilateral pleural effusions- no overt sign of HF noted, lungs CTA B/L, protecting airway, saturating well     CARDIOVASCULAR: TTE shows EF: 55-60%, Normal left ventricular internal dimensions and wall  thicknesses. Normal left ventricular systolic function. No segmental wall motion abnormalities. Agitated saline injection revealed few late bubbles in the left heart, consistent with transpulmonic shunting, Previously noted with afib- now deemed to be NSR with PAC's will likely need ILR. Cardiac monitoring now shows NSR with PACS- rate controlled. Continue with IV metoprolol as needed.                              SBP goal: 140     GASTROINTESTINAL:  dysphagia screen failed, speech and swallow eval recommended NPO for now, plan to reevaluate on 10/23. No further testing as of now. Discussed possibility of PEG tube with daughter at bedside, will consider options and discuss with family.      Diet: NPO    RENAL: BUN/Cr without acute change, good urine output - continue straight cath protocol; Hypernatremia- trending down gradually on 1/2 NS IVF- repeat BMP q12hrs.      Na Goal: Greater than 135     Mendez: No    HEMATOLOGY: H/H ?, Platelets ?, no signs or symptoms of bleeding      DVT ppx: LMWH [x]     ID: Remains afebrile-since 10/21 noted with fever of 101, slight leukocytosis???, BCx x 2 NGTD, UA - negative, and chest X-ray shows no evidence of PNA, Procalcitonin within normal limits, ID consulted and recommended to observe off Abx. Will monitor closely.     OTHER: Plan discussed with pts daughter at bedside, all questions and concerns addressed.     DISPOSITION: Rehab once stable and workup is complete.    CORE MEASURES:        Admission NIHSS: 29     TPA: [x] YES [] NO      LDL/HDL: 100/52      Depression Screen: unable to obtain due to aphasia     Statin Therapy: yes     Dysphagia Screen: [] PASS [x] FAIL     Smoking [] YES [x] NO      Afib [x] YES [] NO     Stroke Education [] YES [] NO - p 88y/o female with vascular risk factors of HTN and hyperlipidemia, who was last seen normal at 22:10, on the day prior to admission. Then she was found by family members at the floor with inability to communicate or move her right side at 22:20. She was initially evaluated at [place] were NIHSS=29 deemed candidate for tPA. 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 Patient was transferred for further management to [facility]. She received IVtpa per protocol and transferred to NS after CTA revealed L ICA and L M1 occlusion. She underwent thrombectomy with resultant TICI 2B flow.     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 MRI Brain w/o - results as noted above.  Physical therapy/OT recommends acute rehab.     ANTITHROMBOTIC THERAPY: Aspirin for secondary stroke prevention    PULMONARY: CXR shows bilateral pleural effusions- no overt sign of HF noted, lungs CTA B/L, protecting airway, saturating well     CARDIOVASCULAR: TTE shows EF: 55-60%, Normal left ventricular internal dimensions and wall  thicknesses. Normal left ventricular systolic function. No segmental wall motion abnormalities. Agitated saline injection revealed few late bubbles in the left heart, consistent with transpulmonic shunting, Previously noted with afib- now deemed to be NSR with PAC's will likely need ILR. Cardiac monitoring now shows NSR with PACS- rate controlled. Continue with IV metoprolol as needed.                              SBP goal: 140     GASTROINTESTINAL:  dysphagia screen failed, speech and swallow eval recommended NPO for now, plan to reevaluate on 10/235. No further testing as of now. Discussed possibility of PEG tube with patients nephew who is the HCP and he would like to proceed with PEG if unable to pass dysphagia on 10/25. GI consulted. Will place NGT interim.      Diet: NPO    RENAL: BUN/Cr without acute change, good urine output - continue straight cath protocol; Hypernatremia- trending down gradually- will increase 1/2 NS IVF- repeat BMP q12hrs.      Na Goal: Greater than 135     Menedz: No    HEMATOLOGY: H/H without acute change noted with anemia, Platelets 229, no signs or symptoms of bleeding      DVT ppx: LMWH [x]     ID: Remains afebrile-since 10/21 noted with fever of 101, slight leukocytosis, BCx x 2 NGTD, UA - negative, and chest X-ray shows no evidence of PNA, Procalcitonin within normal limits, ID consulted and recommended to observe off Abx. Will monitor closely.     OTHER: Plan discussed with pts nephew (hcp) at bedside, all questions and concerns addressed.     DISPOSITION: Rehab once stable and workup is complete.    CORE MEASURES:        Admission NIHSS: 29     TPA: [x] YES [] NO      LDL/HDL: 100/52      Depression Screen: unable to obtain due to aphasia     Statin Therapy: yes     Dysphagia Screen: [] PASS [x] FAIL     Smoking [] YES [x] NO      Afib [x] YES [] NO     Stroke Education [] YES [] NO - p 86y/o female with vascular risk factors of HTN and hyperlipidemia, who was last seen normal at 22:10, on the day prior to admission. Then she was found by family members at the floor with inability to communicate or move her right side at 22:20. She was initially evaluated at [place] were NIHSS=29 deemed candidate for tPA. 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 Patient was transferred for further management to [facility]. She received IVtpa per protocol and transferred to NS after CTA revealed L ICA and L M1 occlusion. She underwent thrombectomy with resultant TICI 2B flow.     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 MRI Brain w/o - results as noted above.  Physical therapy/OT recommends acute rehab.     ANTITHROMBOTIC THERAPY: Aspirin for secondary stroke prevention    PULMONARY: CXR shows bilateral pleural effusions- no overt sign of HF noted, lungs CTA B/L, protecting airway, saturating well     CARDIOVASCULAR: TTE shows EF: 55-60%, Normal left ventricular internal dimensions and wall  thicknesses. Normal left ventricular systolic function. No segmental wall motion abnormalities. Agitated saline injection revealed few late bubbles in the left heart, consistent with transpulmonic shunting, Previously noted with afib- now deemed to be NSR with PAC's will likely need ILR. Cardiac monitoring now shows NSR with PACS- rate controlled. Continue with IV metoprolol as needed.                              SBP goal: 140     GASTROINTESTINAL:  dysphagia screen failed, speech and swallow eval recommended NPO for now, reevaluatd on 10/23-still recommended NPO. No further testing as of now. Discussed possibility of PEG tube with patients nephew who is the HCP and he would like to proceed with PEG if unable to pass dysphagia on 10/25. GI consulted. Will place NGT interim.      Diet: NPO    RENAL: BUN/Cr without acute change, good urine output - continue straight cath protocol; Hypernatremia- trending down gradually- will increase 1/2 NS IVF- repeat BMP q12hrs.      Na Goal: Greater than 135     Mendez: No    HEMATOLOGY: H/H without acute change noted with anemia, Platelets 229, no signs or symptoms of bleeding      DVT ppx: LMWH [x]     ID: Remains afebrile-since 10/21 noted with fever of 101, slight leukocytosis, BCx x 2 NGTD, UA - negative, and chest X-ray shows no evidence of PNA, Procalcitonin within normal limits, ID consulted and recommended to observe off Abx. Will monitor closely.     OTHER: Plan discussed with pts nephew (hcp) at bedside, all questions and concerns addressed.     DISPOSITION: Rehab once stable and workup is complete.    CORE MEASURES:        Admission NIHSS: 29     TPA: [x] YES [] NO      LDL/HDL: 100/52      Depression Screen: unable to obtain due to aphasia     Statin Therapy: yes     Dysphagia Screen: [] PASS [x] FAIL     Smoking [] YES [x] NO      Afib [x] YES [] NO     Stroke Education [] YES [] NO - p

## 2018-10-23 NOTE — PROGRESS NOTE ADULT - SUBJECTIVE AND OBJECTIVE BOX
THE PATIENT WAS SEEN AND EXAMINED BY ME WITH THE HOUSESTAFF AND STROKE TEAM DURING MORNING ROUNDS.   HPI:  88y/o female with vascular risk factors of HTN and hyperlipidemia, who was last seen normal at 22:10, on the day prior to admission. Then she was found by family members at the floor with inability to communicate or move her right side at 22:20. She was initially evaluated at [place] were NIHSS=29 deemed candidate for tPA. 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 Patient was transferred for further management to [facility]. She received IVtpa per protocol and transferred to NS after CTA revealed L ICA and L M1 occlusion. She underwent thrombectomy with resultant TICI 2B flow.     SUBJECTIVE: No events overnight.  No new neurologic complaints.      aspirin Suppository 300 milliGRAM(s) Rectal daily  atorvastatin 80 milliGRAM(s) Oral at bedtime  enoxaparin Injectable 40 milliGRAM(s) SubCutaneous daily  influenza   Vaccine 0.5 milliLiter(s) IntraMuscular once  metoprolol tartrate Injectable 5 milliGRAM(s) IV Push every 6 hours  sodium chloride 0.45%. 1000 milliLiter(s) IV Continuous <Continuous>    PHYSICAL EXAM:   Vital Signs Last 24 Hrs  T(C): 36.9 (22 Oct 2018 20:00), Max: 37.4 (22 Oct 2018 08:33)  T(F): 98.5 (22 Oct 2018 20:00), Max: 99.4 (22 Oct 2018 08:33)  HR: 74 (23 Oct 2018 06:00) (56 - 97)  BP: 151/91 (23 Oct 2018 06:00) (140/80 - 170/87)  BP(mean): 107 (23 Oct 2018 06:00) (88 - 122)  RR: 15 (23 Oct 2018 06:00) (15 - 22)  SpO2: 97% (23 Oct 2018 06:00) (94% - 99%)    General: No acute distress  HEENT: unable to cooperate, unable to determine if BTT  Abdomen: Soft, nontender, nondistended   Extremities: No edema    NEUROLOGICAL EXAM:  Mental status: eyes closed- opens eyes to gentle verbal stimuli, generates some unintelligible sounds, not following commands  Cranial Nerves: no facial palsy at rest, no definite dysarthria but almost no verbal output  Motor exam: LUE: good effort against gravity, LLE : spontaneous movements minimal effort against gravity, RUE: no movement, RLE: minimal effort against gravity, minimally increased tone in RUE/RLE.     Sensation: unable to assess    Coordination/ Gait: deferred    LABS:                        9.4    12.94 )-----------( 228      ( 22 Oct 2018 07:26 )             29.5    10-23    155<H>  |  126<H>  |  37<H>  ----------------------------<  96  3.6   |  16<L>  |  0.94    Ca    8.7      23 Oct 2018 04:48    Hemoglobin A1C, Whole Blood: 6.0 % (10-19 @ 07:53)    IMAGING: Reviewed by me.     MRI Head No Cont (10/20/18):  Acute hemorrhagic infarct involving the left MCA territory with mass effect on the left lateral ventricle and left to right shift.    CT Head No Cont (10/19/18) @ 23:21:   Redemonstration of large acute left MCA territory infarct with similar mass effect upon the left lateral ventricle and similar rightward midline shift of 4 mm. Ventricles stable in size. No hydrocephalus. Previously seen increased density throughout the region of infarction is less apparent on the current examination, consistent with resolving contrast staining after digital subtraction angiography. 2.1 x 1.6 cm region of persistent increased density in the left lateral frontotemporal region may represent hemorrhagic transformation of the infarct.    CT Head No Cont (10/19/18) @ 05:52:   Large acute left MCA territory infarct. Increased density seen throughout the left basal ganglia and left lateral frontal, parietal, and temporal cortices likely represents a combination of hemorrhagic transformation and cortical staining from recent digital subtraction angiography. Additional density seen within the left cerebral sulci likely representing subarachnoid hemorrhage. Mass effect upon the left lateral ventricle and rightward midline shift of 4 mm. No large hydrocephalus. THE PATIENT WAS SEEN AND EXAMINED BY ME WITH THE HOUSESTAFF AND STROKE TEAM DURING MORNING ROUNDS.   HPI:  88y/o female with vascular risk factors of HTN and hyperlipidemia, who was last seen normal at 22:10, on the day prior to admission. Then she was found by family members at the floor with inability to communicate or move her right side at 22:20. She was initially evaluated at [place] were NIHSS=29 deemed candidate for tPA. 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 Patient was transferred for further management to [facility]. She received IVtpa per protocol and transferred to NS after CTA revealed L ICA and L M1 occlusion. She underwent thrombectomy with resultant TICI 2B flow.     SUBJECTIVE: No events overnight.  No new neurologic complaints.      aspirin Suppository 300 milliGRAM(s) Rectal daily  atorvastatin 80 milliGRAM(s) Oral at bedtime  enoxaparin Injectable 40 milliGRAM(s) SubCutaneous daily  influenza   Vaccine 0.5 milliLiter(s) IntraMuscular once  metoprolol tartrate Injectable 5 milliGRAM(s) IV Push every 6 hours  sodium chloride 0.45%. 1000 milliLiter(s) IV Continuous <Continuous>    PHYSICAL EXAM:   Vital Signs Last 24 Hrs  T(C): 36.9 (22 Oct 2018 20:00), Max: 37.4 (22 Oct 2018 08:33)  T(F): 98.5 (22 Oct 2018 20:00), Max: 99.4 (22 Oct 2018 08:33)  HR: 74 (23 Oct 2018 06:00) (56 - 97)  BP: 151/91 (23 Oct 2018 06:00) (140/80 - 170/87)  BP(mean): 107 (23 Oct 2018 06:00) (88 - 122)  RR: 15 (23 Oct 2018 06:00) (15 - 22)  SpO2: 97% (23 Oct 2018 06:00) (94% - 99%)    General: No acute distress  HEENT: unable to cooperate, no BTT on right, slight gaze deviation to the left  Abdomen: Soft, nontender, nondistended   Extremities: No edema    NEUROLOGICAL EXAM:  Mental status: eyes closed- opens eyes to gentle verbal stimuli, generates some unintelligible sounds, not following commands  Cranial Nerves: right nasolabial flattening, no BTT on right, slight gaze deviation to the left, no definite dysarthria but almost no verbal output  Motor exam: LUE: good effort against gravity, LLE : spontaneous movements minimal effort against gravity, RUE: no movement, RLE: minimal effort against gravity, minimally increased tone in RUE/RLE.     Sensation: unable to assess    Coordination/ Gait: deferred    LABS:                        9.4    12.94 )-----------( 228      ( 22 Oct 2018 07:26 )             29.5    10-23    155<H>  |  126<H>  |  37<H>  ----------------------------<  96  3.6   |  16<L>  |  0.94    Ca    8.7      23 Oct 2018 04:48    Hemoglobin A1C, Whole Blood: 6.0 % (10-19 @ 07:53)    IMAGING: Reviewed by me.     MRI Head No Cont (10/20/18):  Acute hemorrhagic infarct involving the left MCA territory with mass effect on the left lateral ventricle and left to right shift.    CT Head No Cont (10/19/18) @ 23:21:   Redemonstration of large acute left MCA territory infarct with similar mass effect upon the left lateral ventricle and similar rightward midline shift of 4 mm. Ventricles stable in size. No hydrocephalus. Previously seen increased density throughout the region of infarction is less apparent on the current examination, consistent with resolving contrast staining after digital subtraction angiography. 2.1 x 1.6 cm region of persistent increased density in the left lateral frontotemporal region may represent hemorrhagic transformation of the infarct.    CT Head No Cont (10/19/18) @ 05:52:   Large acute left MCA territory infarct. Increased density seen throughout the left basal ganglia and left lateral frontal, parietal, and temporal cortices likely represents a combination of hemorrhagic transformation and cortical staining from recent digital subtraction angiography. Additional density seen within the left cerebral sulci likely representing subarachnoid hemorrhage. Mass effect upon the left lateral ventricle and rightward midline shift of 4 mm. No large hydrocephalus.

## 2018-10-23 NOTE — CONSULT NOTE ADULT - PROBLEM SELECTOR RECOMMENDATION 9
- Pt failed bedside speech and swallow evaluation x 2  - pt with oral and suspected pharyngeal dysphagia  - keep NPO with NGT feeds, await X-ray to confirm NGT placement  - pt is at risk for aspiration  - repeat s/s eval planned for Thursday. If she fails will plan for upper gastrointestinal endoscopy with PEG placement Friday.  - will follow

## 2018-10-23 NOTE — CONSULT NOTE ADULT - SUBJECTIVE AND OBJECTIVE BOX
Chief Complaint:  Patient is a 87y old  Female who presents with a chief complaint of stroke (23 Oct 2018 07:04)      HPI: Ms. Rucker is an 88y/o female with vascular risk factors of HTN and hyperlipidemia, who was last seen normal at 22:10. Then she was found by family members at the floor with inability to communicate or move her right side at 22:20. She was initially evaluated at [place] were NIHSS=29 deemed candidate for tPA. 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 Patient was transferred for further management to [facility].   Upon arrival, NIHSS=25, was taken directly for IR intervention.   Pt failed bedside speech and swallow evaluation x 2. As per speech and swallow documentation patient with with oral and suspected pharyngeal dysphagia. GI consulted.     Allergies:  No Known Allergies      Medications:  aspirin Suppository 300 milliGRAM(s) Rectal daily  atorvastatin 80 milliGRAM(s) Oral at bedtime  enoxaparin Injectable 40 milliGRAM(s) SubCutaneous daily  influenza   Vaccine 0.5 milliLiter(s) IntraMuscular once  metoprolol tartrate Injectable 5 milliGRAM(s) IV Push every 6 hours  sodium chloride 0.45%. 1000 milliLiter(s) IV Continuous <Continuous>      PMHX/PSHX:      Family history:      Social History:     ROS: unable to attain.       PHYSICAL EXAM:   Vital Signs:  Vital Signs Last 24 Hrs  T(C): 37.1 (23 Oct 2018 08:28), Max: 37.1 (23 Oct 2018 08:28)  T(F): 98.8 (23 Oct 2018 08:28), Max: 98.8 (23 Oct 2018 08:28)  HR: 52 (23 Oct 2018 14:00) (52 - 155)  BP: 151/69 (23 Oct 2018 14:00) (140/80 - 168/106)  BP(mean): 88 (23 Oct 2018 14:00) (88 - 122)  RR: 18 (23 Oct 2018 14:00) (14 - 22)  SpO2: 97% (23 Oct 2018 14:00) (94% - 99%)  Daily     Daily     GENERAL:  Appears stated age, well-groomed, well-nourished, no distress  HEENT:  NC/AT,  conjunctivae clear and pink, no thyromegaly, nodules, adenopathy, no JVD, sclera -anicteric, + NGT  CHEST:  Full & symmetric excursion, no increased effort, breath sounds clear  HEART:  Regular rhythm, S1, S2, no murmur/rub/S3/S4, no abdominal bruit, no edema  ABDOMEN:  Soft, non-tender, non-distended, normoactive bowel sounds,  no masses ,no hepato-splenomegaly, no signs of chronic liver disease  EXTEREMITIES:  no cyanosis,clubbing or edema  SKIN:  No rash/erythema/ecchymoses/petechiae/wounds/abscess/warm/dry  NEURO:  awake, not following commands, minimal verbal output    LABS:                        8.9    12.32 )-----------( 229      ( 23 Oct 2018 08:11 )             28.2     10-23    155<H>  |  126<H>  |  37<H>  ----------------------------<  96  3.6   |  16<L>  |  0.94    Ca    8.7      23 Oct 2018 04:48                Imaging:

## 2018-10-24 DIAGNOSIS — Z71.89 OTHER SPECIFIED COUNSELING: ICD-10-CM

## 2018-10-24 LAB
ANION GAP SERPL CALC-SCNC: 13 MMOL/L — SIGNIFICANT CHANGE UP (ref 5–17)
ANION GAP SERPL CALC-SCNC: 13 MMOL/L — SIGNIFICANT CHANGE UP (ref 5–17)
BUN SERPL-MCNC: 40 MG/DL — HIGH (ref 7–23)
BUN SERPL-MCNC: 40 MG/DL — HIGH (ref 7–23)
CALCIUM SERPL-MCNC: 8.7 MG/DL — SIGNIFICANT CHANGE UP (ref 8.4–10.5)
CALCIUM SERPL-MCNC: 8.7 MG/DL — SIGNIFICANT CHANGE UP (ref 8.4–10.5)
CHLORIDE SERPL-SCNC: 121 MMOL/L — HIGH (ref 96–108)
CHLORIDE SERPL-SCNC: 123 MMOL/L — HIGH (ref 96–108)
CO2 SERPL-SCNC: 16 MMOL/L — LOW (ref 22–31)
CO2 SERPL-SCNC: 16 MMOL/L — LOW (ref 22–31)
CREAT SERPL-MCNC: 0.91 MG/DL — SIGNIFICANT CHANGE UP (ref 0.5–1.3)
CREAT SERPL-MCNC: 0.92 MG/DL — SIGNIFICANT CHANGE UP (ref 0.5–1.3)
GLUCOSE SERPL-MCNC: 122 MG/DL — HIGH (ref 70–99)
GLUCOSE SERPL-MCNC: 123 MG/DL — HIGH (ref 70–99)
HCT VFR BLD CALC: 30.9 % — LOW (ref 34.5–45)
HGB BLD-MCNC: 9.8 G/DL — LOW (ref 11.5–15.5)
MAGNESIUM SERPL-MCNC: 2.1 MG/DL — SIGNIFICANT CHANGE UP (ref 1.6–2.6)
MCHC RBC-ENTMCNC: 30.2 PG — SIGNIFICANT CHANGE UP (ref 27–34)
MCHC RBC-ENTMCNC: 31.7 GM/DL — LOW (ref 32–36)
MCV RBC AUTO: 95.1 FL — SIGNIFICANT CHANGE UP (ref 80–100)
PLATELET # BLD AUTO: 270 K/UL — SIGNIFICANT CHANGE UP (ref 150–400)
POTASSIUM SERPL-MCNC: 3.6 MMOL/L — SIGNIFICANT CHANGE UP (ref 3.5–5.3)
POTASSIUM SERPL-MCNC: 3.6 MMOL/L — SIGNIFICANT CHANGE UP (ref 3.5–5.3)
POTASSIUM SERPL-SCNC: 3.6 MMOL/L — SIGNIFICANT CHANGE UP (ref 3.5–5.3)
POTASSIUM SERPL-SCNC: 3.6 MMOL/L — SIGNIFICANT CHANGE UP (ref 3.5–5.3)
RBC # BLD: 3.25 M/UL — LOW (ref 3.8–5.2)
RBC # FLD: 15 % — HIGH (ref 10.3–14.5)
SODIUM SERPL-SCNC: 150 MMOL/L — HIGH (ref 135–145)
SODIUM SERPL-SCNC: 152 MMOL/L — HIGH (ref 135–145)
WBC # BLD: 12.02 K/UL — HIGH (ref 3.8–10.5)
WBC # FLD AUTO: 12.02 K/UL — HIGH (ref 3.8–10.5)

## 2018-10-24 PROCEDURE — 93010 ELECTROCARDIOGRAM REPORT: CPT

## 2018-10-24 PROCEDURE — 93970 EXTREMITY STUDY: CPT | Mod: 26

## 2018-10-24 PROCEDURE — 99233 SBSQ HOSP IP/OBS HIGH 50: CPT

## 2018-10-24 RX ADMIN — GABAPENTIN 100 MILLIGRAM(S): 400 CAPSULE ORAL at 22:26

## 2018-10-24 RX ADMIN — Medication 300 MILLIGRAM(S): at 12:51

## 2018-10-24 RX ADMIN — ATORVASTATIN CALCIUM 80 MILLIGRAM(S): 80 TABLET, FILM COATED ORAL at 22:27

## 2018-10-24 RX ADMIN — Medication 81 MILLIGRAM(S): at 12:51

## 2018-10-24 RX ADMIN — Medication 25 MILLIGRAM(S): at 12:51

## 2018-10-24 RX ADMIN — GABAPENTIN 100 MILLIGRAM(S): 400 CAPSULE ORAL at 06:15

## 2018-10-24 RX ADMIN — LISINOPRIL 5 MILLIGRAM(S): 2.5 TABLET ORAL at 17:49

## 2018-10-24 RX ADMIN — ENOXAPARIN SODIUM 40 MILLIGRAM(S): 100 INJECTION SUBCUTANEOUS at 12:51

## 2018-10-24 RX ADMIN — GABAPENTIN 100 MILLIGRAM(S): 400 CAPSULE ORAL at 16:20

## 2018-10-24 RX ADMIN — AMLODIPINE BESYLATE 5 MILLIGRAM(S): 2.5 TABLET ORAL at 06:15

## 2018-10-24 NOTE — PROGRESS NOTE ADULT - SUBJECTIVE AND OBJECTIVE BOX
THE PATIENT WAS SEEN AND EXAMINED BY ME WITH THE HOUSESTAFF AND STROKE TEAM DURING MORNING ROUNDS.   HPI:    88y/o female with vascular risk factors of HTN and hyperlipidemia, who was last seen normal at 22:10, on the day prior to admission. Then she was found by family members at the floor with inability to communicate or move her right side at 22:20. She was initially evaluated at [place] were NIHSS=29 deemed candidate for tPA. 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 Patient was transferred for further management to [facility]. She received IVtpa per protocol and transferred to [facility] after CTA revealed L ICA and L M1 occlusion. She underwent thrombectomy with resultant TICI 2B flow.     SUBJECTIVE: No events overnight.  No new neurologic complaints.      amLODIPine   Tablet 5 milliGRAM(s) Oral daily  aspirin  chewable 81 milliGRAM(s) Oral daily  atorvastatin 80 milliGRAM(s) Oral at bedtime  enoxaparin Injectable 40 milliGRAM(s) SubCutaneous daily  ferrous    sulfate Liquid 300 milliGRAM(s) Oral daily  gabapentin   Solution 100 milliGRAM(s) Oral three times a day  influenza   Vaccine 0.5 milliLiter(s) IntraMuscular once  lisinopril 5 milliGRAM(s) Oral <User Schedule>  metoprolol tartrate 25 milliGRAM(s) Oral two times a day  sodium chloride 0.45%. 1000 milliLiter(s) IV Continuous <Continuous>      PHYSICAL EXAM:   Vital Signs Last 24 Hrs  T(C): 36.7 (23 Oct 2018 20:00), Max: 36.7 (23 Oct 2018 16:19)  T(F): 98.1 (23 Oct 2018 20:00), Max: 98.1 (23 Oct 2018 20:00)  HR: 88 (24 Oct 2018 08:00) (52 - 95)  BP: 105/63 (24 Oct 2018 08:00) (105/63 - 162/93)  BP(mean): 76 (24 Oct 2018 08:00) (69 - 114)  RR: 27 (24 Oct 2018 08:00) (13 - 27)  SpO2: 97% (24 Oct 2018 08:00) (96% - 98%)    General: No acute distress  HEENT: unable to cooperate, no BTT on right, slight gaze deviation to the left  Abdomen: Soft, nontender, nondistended   Extremities: No edema    NEUROLOGICAL EXAM:  Mental status: eyes closed- opens eyes to gentle verbal stimuli, generates some unintelligible sounds, not following commands  Cranial Nerves: right nasolabial flattening, no BTT on right, slight gaze deviation to the left, no definite dysarthria but almost no verbal output  Motor exam: LUE: good effort against gravity, LLE : spontaneous movements minimal effort against gravity, RUE: no movement, RLE: minimal effort against gravity, minimally increased tone in RUE/RLE.     Sensation: unable to assess    Coordination/ Gait: deferred    LABS:                        9.8    12.02 )-----------( 270      ( 24 Oct 2018 07:35 )             30.9    10-24    150<H>  |  121<H>  |  40<H>  ----------------------------<  122<H>  3.6   |  16<L>  |  0.92    Ca    8.7      24 Oct 2018 05:18  Mg     2.1     10-24      Hemoglobin A1C, Whole Blood: 6.0 % (10-19 @ 07:53)      IMAGING: Reviewed by me.     MRI Head No Cont (10/20/18):  Acute hemorrhagic infarct involving the left MCA territory with mass effect on the left lateral ventricle and left to right shift.    CT Head No Cont (10/19/18) @ 23:21:   Redemonstration of large acute left MCA territory infarct with similar mass effect upon the left lateral ventricle and similar rightward midline shift of 4 mm. Ventricles stable in size. No hydrocephalus. Previously seen increased density throughout the region of infarction is less apparent on the current examination, consistent with resolving contrast staining after digital subtraction angiography. 2.1 x 1.6 cm region of persistent increased density in the left lateral frontotemporal region may represent hemorrhagic transformation of the infarct.    CT Head No Cont (10/19/18) @ 05:52:   Large acute left MCA territory infarct. Increased density seen throughout the left basal ganglia and left lateral frontal, parietal, and temporal cortices likely represents a combination of hemorrhagic transformation and cortical staining from recent digital subtraction angiography. Additional density seen within the left cerebral sulci likely representing subarachnoid hemorrhage. Mass effect upon the left lateral ventricle and rightward midline shift of 4 mm. No large hydrocephalus. THE PATIENT WAS SEEN AND EXAMINED BY ME WITH THE HOUSESTAFF AND STROKE TEAM DURING MORNING ROUNDS.   HPI:    88y/o female with vascular risk factors of HTN and hyperlipidemia, who was last seen normal at 22:10, on the day prior to admission. Then she was found by family members at the floor with inability to communicate or move her right side at 22:20. She was initially evaluated at [place] were NIHSS=29 deemed candidate for tPA. 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 Patient was transferred for further management to [facility]. She received IVtpa per protocol and transferred to [facility] after CTA revealed L ICA and L M1 occlusion. She underwent thrombectomy with resultant TICI 2B flow.     SUBJECTIVE: No events overnight.  No new neurologic complaints.      amLODIPine   Tablet 5 milliGRAM(s) Oral daily  aspirin  chewable 81 milliGRAM(s) Oral daily  atorvastatin 80 milliGRAM(s) Oral at bedtime  enoxaparin Injectable 40 milliGRAM(s) SubCutaneous daily  ferrous    sulfate Liquid 300 milliGRAM(s) Oral daily  gabapentin   Solution 100 milliGRAM(s) Oral three times a day  influenza   Vaccine 0.5 milliLiter(s) IntraMuscular once  lisinopril 5 milliGRAM(s) Oral <User Schedule>  metoprolol tartrate 25 milliGRAM(s) Oral two times a day  sodium chloride 0.45%. 1000 milliLiter(s) IV Continuous <Continuous>      PHYSICAL EXAM:   Vital Signs Last 24 Hrs  T(C): 36.7 (23 Oct 2018 20:00), Max: 36.7 (23 Oct 2018 16:19)  T(F): 98.1 (23 Oct 2018 20:00), Max: 98.1 (23 Oct 2018 20:00)  HR: 88 (24 Oct 2018 08:00) (52 - 95)  BP: 105/63 (24 Oct 2018 08:00) (105/63 - 162/93)  BP(mean): 76 (24 Oct 2018 08:00) (69 - 114)  RR: 27 (24 Oct 2018 08:00) (13 - 27)  SpO2: 97% (24 Oct 2018 08:00) (96% - 98%)    General: No acute distress  HEENT: unable to cooperate, no BTT on right, slight gaze deviation to the left  Abdomen: Soft, nontender, nondistended   Extremities: No edema    NEUROLOGICAL EXAM:  Mental status: eyes open, generates some unintelligible sounds, not following commands, no mimic  Cranial Nerves: Moderate-Severe right nasolabial flattening, no Blink to threat on right visual field, right gaze palsy, unable to cross pass midline, no definite dysarthria but almost no verbal output  Motor exam: LUE: good effort against gravity, LLE : spontaneous movements, minimal effort against gravity, RUE: no movement, RLE: externally rotated, triple flexion, minimally increased tone in RUE/RLE.     Sensation: unable to assess    Coordination/ Gait: deferred    LABS:                        9.8    12.02 )-----------( 270      ( 24 Oct 2018 07:35 )             30.9    10-24    150<H>  |  121<H>  |  40<H>  ----------------------------<  122<H>  3.6   |  16<L>  |  0.92    Ca    8.7      24 Oct 2018 05:18  Mg     2.1     10-24      Hemoglobin A1C, Whole Blood: 6.0 % (10-19 @ 07:53)      IMAGING: Reviewed by me.     MRI Head No Cont (10/20/18):  Acute hemorrhagic infarct involving the left MCA territory with mass effect on the left lateral ventricle and left to right shift.    CT Head No Cont (10/19/18) @ 23:21:   Redemonstration of large acute left MCA territory infarct with similar mass effect upon the left lateral ventricle and similar rightward midline shift of 4 mm. Ventricles stable in size. No hydrocephalus. Previously seen increased density throughout the region of infarction is less apparent on the current examination, consistent with resolving contrast staining after digital subtraction angiography. 2.1 x 1.6 cm region of persistent increased density in the left lateral frontotemporal region may represent hemorrhagic transformation of the infarct.    CT Head No Cont (10/19/18) @ 05:52:   Large acute left MCA territory infarct. Increased density seen throughout the left basal ganglia and left lateral frontal, parietal, and temporal cortices likely represents a combination of hemorrhagic transformation and cortical staining from recent digital subtraction angiography. Additional density seen within the left cerebral sulci likely representing subarachnoid hemorrhage. Mass effect upon the left lateral ventricle and rightward midline shift of 4 mm. No large hydrocephalus. THE PATIENT WAS SEEN AND EXAMINED BY ME WITH THE HOUSESTAFF AND STROKE TEAM DURING MORNING ROUNDS.   HPI:    88y/o female with vascular risk factors of HTN and hyperlipidemia, who was last seen normal at 22:10, on the day prior to admission. Then she was found by family members at the floor with inability to communicate or move her right side at 22:20. She was initially evaluated at [place] were NIHSS=29 deemed candidate for tPA. 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 Patient was transferred for further management to [facility]. She received IVtpa per protocol and transferred to [facility] after CTA revealed L ICA and L M1 occlusion. She underwent thrombectomy with resultant TICI 2B flow.     SUBJECTIVE: No events overnight.  No new neurologic complaints.      amLODIPine   Tablet 5 milliGRAM(s) Oral daily  aspirin  chewable 81 milliGRAM(s) Oral daily  atorvastatin 80 milliGRAM(s) Oral at bedtime  enoxaparin Injectable 40 milliGRAM(s) SubCutaneous daily  ferrous    sulfate Liquid 300 milliGRAM(s) Oral daily  gabapentin   Solution 100 milliGRAM(s) Oral three times a day  influenza   Vaccine 0.5 milliLiter(s) IntraMuscular once  lisinopril 5 milliGRAM(s) Oral <User Schedule>  metoprolol tartrate 25 milliGRAM(s) Oral two times a day  sodium chloride 0.45%. 1000 milliLiter(s) IV Continuous <Continuous>      PHYSICAL EXAM:   Vital Signs Last 24 Hrs  T(C): 36.7 (23 Oct 2018 20:00), Max: 36.7 (23 Oct 2018 16:19)  T(F): 98.1 (23 Oct 2018 20:00), Max: 98.1 (23 Oct 2018 20:00)  HR: 88 (24 Oct 2018 08:00) (52 - 95)  BP: 105/63 (24 Oct 2018 08:00) (105/63 - 162/93)  BP(mean): 76 (24 Oct 2018 08:00) (69 - 114)  RR: 27 (24 Oct 2018 08:00) (13 - 27)  SpO2: 97% (24 Oct 2018 08:00) (96% - 98%)    General: No acute distress  HEENT: unable to cooperate, no BTT on right, slight gaze deviation to the left  Abdomen: Soft, nontender, nondistended   Extremities: No edema    NEUROLOGICAL EXAM:  Mental status: eyes open, generates some unintelligible sounds, not following commands, no mimic  Cranial Nerves: Moderate-Severe right nasolabial flattening, no Blink to threat on right visual field, right gaze palsy, unable to cross pass midline, no definite dysarthria but almost no verbal output  Motor exam: LUE: good effort against gravity, LLE : spontaneous movements, minimal effort against gravity, RUE: no movement, RLE: externally rotated, no volitional movement-triple flexion only, minimally increased tone in RUE/RLE.     Sensation: unable to assess    Coordination/ Gait: deferred    LABS:                        9.8    12.02 )-----------( 270      ( 24 Oct 2018 07:35 )             30.9    10-24    150<H>  |  121<H>  |  40<H>  ----------------------------<  122<H>  3.6   |  16<L>  |  0.92    Ca    8.7      24 Oct 2018 05:18  Mg     2.1     10-24      Hemoglobin A1C, Whole Blood: 6.0 % (10-19 @ 07:53)      IMAGING: Reviewed by me.     MRI Head No Cont (10/20/18):  Acute hemorrhagic infarct involving the left MCA territory with mass effect on the left lateral ventricle and left to right shift.    CT Head No Cont (10/19/18) @ 23:21:   Redemonstration of large acute left MCA territory infarct with similar mass effect upon the left lateral ventricle and similar rightward midline shift of 4 mm. Ventricles stable in size. No hydrocephalus. Previously seen increased density throughout the region of infarction is less apparent on the current examination, consistent with resolving contrast staining after digital subtraction angiography. 2.1 x 1.6 cm region of persistent increased density in the left lateral frontotemporal region may represent hemorrhagic transformation of the infarct.    CT Head No Cont (10/19/18) @ 05:52:   Large acute left MCA territory infarct. Increased density seen throughout the left basal ganglia and left lateral frontal, parietal, and temporal cortices likely represents a combination of hemorrhagic transformation and cortical staining from recent digital subtraction angiography. Additional density seen within the left cerebral sulci likely representing subarachnoid hemorrhage. Mass effect upon the left lateral ventricle and rightward midline shift of 4 mm. No large hydrocephalus.

## 2018-10-24 NOTE — PROGRESS NOTE ADULT - ASSESSMENT
86y/o female with vascular risk factors of HTN and hyperlipidemia, who was last seen normal at 22:10, on the day prior to admission. Then she was found by family members at the floor with inability to communicate or move her right side at 22:20. She was initially evaluated at [place] were NIHSS=29 deemed candidate for tPA. 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 Patient was transferred for further management to [facility]. She received IVtpa per protocol and transferred to NS after CTA revealed L ICA and L M1 occlusion. She underwent thrombectomy with resultant TICI 2B flow.     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 MRI Brain w/o - results as noted above.  Physical therapy/OT recommends acute rehab.     ANTITHROMBOTIC THERAPY: Aspirin for secondary stroke prevention    PULMONARY: CXR shows bilateral pleural effusions- no overt sign of HF noted, lungs CTA B/L, protecting airway, saturating well     CARDIOVASCULAR: TTE shows EF: 55-60%, Normal left ventricular internal dimensions and wall  thicknesses. Normal left ventricular systolic function. No segmental wall motion abnormalities. Agitated saline injection revealed few late bubbles in the left heart, consistent with transpulmonic shunting, Previously noted with afib- now deemed to be NSR with PAC's will likely need ILR. Cardiac monitoring now shows NSR with PACS- rate controlled. Continue with IV metoprolol as needed.                              SBP goal: 140     GASTROINTESTINAL:  dysphagia screen failed, speech and swallow eval recommended NPO for now, reevaluatd on 10/23-still recommended NPO. No further testing as of now. Discussed possibility of PEG tube with patients nephew who is the HCP and he would like to proceed with PEG if unable to pass dysphagia on 10/25. GI consulted. Will place NGT interim.      Diet: NPO    RENAL: BUN/Cr without acute change, good urine output - continue straight cath protocol; Hypernatremia- trending down gradually- will increase 1/2 NS IVF- repeat BMP q12hrs.      Na Goal: Greater than 135     Mendez: No    HEMATOLOGY: H/H without acute change noted with anemia, Platelets 229, no signs or symptoms of bleeding      DVT ppx: LMWH [x]     ID: Remains afebrile-since 10/21 noted with fever of 101, slight leukocytosis, BCx x 2 NGTD, UA - negative, and chest X-ray shows no evidence of PNA, Procalcitonin within normal limits, ID consulted and recommended to observe off Abx. Will monitor closely.     OTHER: Plan discussed with pts nephew (hcp) at bedside, all questions and concerns addressed.     DISPOSITION: Rehab once stable and workup is complete.    CORE MEASURES:        Admission NIHSS: 29     TPA: [x] YES [] NO      LDL/HDL: 100/52      Depression Screen: unable to obtain due to aphasia     Statin Therapy: yes     Dysphagia Screen: [] PASS [x] FAIL     Smoking [] YES [x] NO      Afib [x] YES [] NO     Stroke Education [] YES [] NO - p 88y/o female with vascular risk factors of HTN and hyperlipidemia, who was last seen normal at 22:10, on the day prior to admission. Then she was found by family members at the floor with inability to communicate or move her right side at 22:20. She was initially evaluated at [place] were NIHSS=29 deemed candidate for tPA. 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 Patient was transferred for further management to [facility]. She received IVtpa per protocol and transferred to NS after CTA revealed L ICA and L M1 occlusion. She underwent thrombectomy with resultant TICI 2B flow.     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 MRI Brain w/o - results as noted above.  Physical therapy/OT recommends acute rehab.     ANTITHROMBOTIC THERAPY: Aspirin for secondary stroke prevention    PULMONARY: CXR shows bilateral pleural effusions- no overt sign of HF noted, lungs CTA B/L, protecting airway, saturating well     CARDIOVASCULAR: TTE shows EF: 55-60%, Normal left ventricular internal dimensions and wall  thicknesses. Normal left ventricular systolic function. No segmental wall motion abnormalities. Agitated saline injection revealed few late bubbles in the left heart, consistent with transpulmonic shunting, will obtain LE doppler to r/o DVT.  Previously noted with afib- now deemed to be NSR with PAC's will likely need ILR. Cardiac monitoring now shows NSR with PACS- rate controlled. Continue current cardiac meds, Dr Chand (cardiology) consult appreciated                          SBP goal: 140     GASTROINTESTINAL:  dysphagia screen failed, speech and swallow eval recommended NPO for now, re-evaluatd on 10/23-still recommended NPO. Discussed possibility of PEG tube with patients nephew who is the HCP and he would like to proceed with PEG if unable to pass dysphagia on 10/25. GI consulted. NGT in place. Tolerating feedings     Diet: NGT    RENAL: BUN/Cr without acute change, good urine output - continue straight cath protocol; Hypernatremia- continue free water bolus.      Na Goal: Greater than 135     Mendez: No    HEMATOLOGY: H/H without acute change noted with anemia, Platelets 270, no signs or symptoms of bleeding      DVT ppx: LMWH     ID: Remains afebrile, fever on 10/21 of 38.3, leukocytosis: trending down, BCx x 2 NGTD, UA - negative, and chest X-ray shows no evidence of PNA, Procalcitonin within normal limits, ID consulted and recommended to observe off Abx. Will continue to monitor closely.     OTHER: Plan discussed with pts nephew (HCP) and pt's son at bedside, all questions and concerns addressed.     DISPOSITION: Rehab once stable and workup is complete.    CORE MEASURES:        Admission NIHSS: 29     TPA:  YES      LDL/HDL: 100/52      Depression Screen: unable to obtain due to aphasia     Statin Therapy: yes     Dysphagia Screen: FAIL     Smoking NO      Afib YES      Stroke Education  YES, to family 88y/o female with vascular risk factors of HTN and hyperlipidemia, who was last seen normal at 22:10, on the day prior to admission. Then she was found by family members at the floor with inability to communicate or move her right side at 22:20. She was initially evaluated at [place] were NIHSS=29 deemed candidate for tPA. 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 Patient was transferred for further management to [facility]. She received IVtpa per protocol and transferred to NS after CTA revealed L ICA and L M1 occlusion. She underwent thrombectomy with resultant TICI 2B flow.     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 MRI Brain w/o - results as noted above.  Physical therapy/OT recommends acute rehab.     ANTITHROMBOTIC THERAPY: Aspirin for secondary stroke prevention    PULMONARY: CXR (10/23)shows bilateral pleural effusions- no overt sign of HF noted, lungs CTA B/L, protecting airway, saturating well     CARDIOVASCULAR: TTE shows EF: 55-60%, Agitated saline injection revealed few late bubbles in the left heart, consistent with transpulmonic shunting, will obtain LE doppler to r/o DVT.  Previously noted with afib- now deemed to be NSR with PAC's will likely need ILR. Cardiac monitoring showed (10/24/18) 9 sec paused. Continue current cardiac meds, Dr Chand (cardiology) consult appreciated                          SBP goal: 140     GASTROINTESTINAL:  dysphagia screen failed, speech and swallow eval recommended NPO for now, re-evaluatd on 10/23-still recommended NPO. Discussed possibility of PEG tube with patients nephew who is the HCP and he would like to proceed with PEG if unable to pass dysphagia on 10/25. GI consulted. NGT in place. Tolerating feedings     Diet: NGT    RENAL: BUN/Cr without acute change, good urine output - continue straight cath protocol; Hypernatremia- continue free water bolus.      Na Goal: Greater than 135     Mendez: No    HEMATOLOGY: H/H without acute change noted with anemia, Platelets 270, no signs or symptoms of bleeding      DVT ppx: LMWH     ID: Remains afebrile, fever on 10/21 of 38.3, leukocytosis: trending down, BCx x 2 NGTD, UA - negative, and chest X-ray shows no evidence of PNA, Procalcitonin within normal limits, ID consulted and recommended to observe off Abx. Will continue to monitor closely.     OTHER: Plan discussed with pts nephew (HCP) and pt's son at bedside, all questions and concerns addressed.     DISPOSITION: Rehab once stable and workup is complete.    CORE MEASURES:        Admission NIHSS: 29     TPA:  YES      LDL/HDL: 100/52      Depression Screen: unable to obtain due to aphasia     Statin Therapy: yes     Dysphagia Screen: FAIL     Smoking NO      Afib YES      Stroke Education  YES, to family

## 2018-10-24 NOTE — PROGRESS NOTE ADULT - SUBJECTIVE AND OBJECTIVE BOX
Interval Events: Pt seen and examined  Tolerating NGT feeds  for repeat s/s evaluation tomorrow     MEDICATIONS  (STANDING):  amLODIPine   Tablet 5 milliGRAM(s) Oral daily  aspirin  chewable 81 milliGRAM(s) Oral daily  atorvastatin 80 milliGRAM(s) Oral at bedtime  enoxaparin Injectable 40 milliGRAM(s) SubCutaneous daily  ferrous    sulfate Liquid 300 milliGRAM(s) Oral daily  gabapentin   Solution 100 milliGRAM(s) Oral three times a day  influenza   Vaccine 0.5 milliLiter(s) IntraMuscular once  lisinopril 5 milliGRAM(s) Oral <User Schedule>  metoprolol tartrate 25 milliGRAM(s) Oral two times a day  sodium chloride 0.45%. 1000 milliLiter(s) (100 mL/Hr) IV Continuous <Continuous>    MEDICATIONS  (PRN):      Allergies    No Known Allergies    Intolerances    ROS: unable to attain.     Vital Signs Last 24 Hrs  T(C): 36.7 (24 Oct 2018 09:00), Max: 36.7 (23 Oct 2018 16:19)  T(F): 98.1 (24 Oct 2018 09:00), Max: 98.1 (23 Oct 2018 20:00)  HR: 88 (24 Oct 2018 08:00) (52 - 95)  BP: 105/63 (24 Oct 2018 08:00) (105/63 - 159/77)  BP(mean): 76 (24 Oct 2018 08:00) (69 - 100)  RR: 27 (24 Oct 2018 08:00) (13 - 27)  SpO2: 97% (24 Oct 2018 08:00) (96% - 98%)    PHYSICAL EXAM:    GENERAL:  Appears stated age, well-groomed, well-nourished, no distress  HEENT:  NC/AT,  conjunctivae clear and pink, no thyromegaly, nodules, adenopathy, no JVD, sclera -anicteric, + NGT  CHEST:  Full & symmetric excursion, no increased effort, breath sounds clear  HEART:  Regular rhythm, S1, S2, no murmur/rub/S3/S4, no abdominal bruit, no edema  ABDOMEN:  Soft, non-tender, non-distended, normoactive bowel sounds,  no masses ,no hepato-splenomegaly, no signs of chronic liver disease  EXTEREMITIES:  no cyanosis,clubbing or edema  SKIN:  No rash/erythema/ecchymoses/petechiae/wounds/abscess/warm/dry  NEURO:  awake, not following commands, minimal verbal output    LABS:                        9.8    12.02 )-----------( 270      ( 24 Oct 2018 07:35 )             30.9     10-24    150<H>  |  121<H>  |  40<H>  ----------------------------<  122<H>  3.6   |  16<L>  |  0.92    Ca    8.7      24 Oct 2018 05:18  Mg     2.1     10-24            RADIOLOGY & ADDITIONAL TESTS:

## 2018-10-24 NOTE — PROGRESS NOTE ADULT - SUBJECTIVE AND OBJECTIVE BOX
Subjective: Patient seen and examined. No new events except as noted.   remains in Stroke unit     REVIEW OF SYSTEMS:    CONSTITUTIONAL: + weakness, fevers or chills  EYES/ENT: No visual changes;  No vertigo or throat pain   NECK: No pain or stiffness  RESPIRATORY: No cough, wheezing, hemoptysis; No shortness of breath  CARDIOVASCULAR: No chest pain or palpitations  GASTROINTESTINAL: No abdominal or epigastric pain. No nausea, vomiting, or hematemesis; No diarrhea or constipation. No melena or hematochezia.  GENITOURINARY: No dysuria, frequency or hematuria  NEUROLOGICAL: No numbness or weakness  SKIN: No itching, burning, rashes, or lesions   All other review of systems is negative unless indicated above.    MEDICATIONS:  MEDICATIONS  (STANDING):  amLODIPine   Tablet 5 milliGRAM(s) Oral daily  aspirin  chewable 81 milliGRAM(s) Oral daily  atorvastatin 80 milliGRAM(s) Oral at bedtime  enoxaparin Injectable 40 milliGRAM(s) SubCutaneous daily  ferrous    sulfate Liquid 300 milliGRAM(s) Oral daily  gabapentin   Solution 100 milliGRAM(s) Oral three times a day  influenza   Vaccine 0.5 milliLiter(s) IntraMuscular once  lisinopril 5 milliGRAM(s) Oral <User Schedule>  metoprolol tartrate 25 milliGRAM(s) Oral two times a day  sodium chloride 0.45%. 1000 milliLiter(s) (100 mL/Hr) IV Continuous <Continuous>      PHYSICAL EXAM:  T(C): 36.7 (10-24-18 @ 09:00), Max: 36.7 (10-23-18 @ 16:19)  HR: 88 (10-24-18 @ 08:00) (52 - 95)  BP: 105/63 (10-24-18 @ 08:00) (105/63 - 159/77)  RR: 27 (10-24-18 @ 08:00) (13 - 27)  SpO2: 97% (10-24-18 @ 08:00) (96% - 98%)  Wt(kg): --  I&O's Summary    23 Oct 2018 07:01  -  24 Oct 2018 07:00  --------------------------------------------------------  IN: 3180 mL / OUT: 1025 mL / NET: 2155 mL          Appearance: NAD aphasic   HEENT:   Dry oral mucosa, +NGT 	  Lymphatic: No lymphadenopathy , no edema  Cardiovascular: Normal S1 S2, No JVD, No murmurs , Peripheral pulses palpable 2+ bilaterally  Respiratory: Lungs clear to auscultation, normal effort 	  Gastrointestinal:  Soft, Non-tender, + BS	  Skin: No rashes, No ecchymoses, No cyanosis, warm to touch  Musculoskeletal: Decreased range of motion and  strength  Psychiatry:  lethargic   Ext: No edema  NEUROLOGICAL EXAM:  Mental status: Awake, lethargic, not following commands, no verbal output  Cranial Nerves: Right facial droop  Motor exam:  RUE- plegic, increased tone, RLE - withdraws to noxious stimuli, LUE - minimal movement,  LLE - moving freely  Sensation: Not assessed.   Coordination/ Gait: Not assessed.       LABS:    CARDIAC MARKERS:                                9.8    12.02 )-----------( 270      ( 24 Oct 2018 07:35 )             30.9     10-24    150<H>  |  121<H>  |  40<H>  ----------------------------<  122<H>  3.6   |  16<L>  |  0.92    Ca    8.7      24 Oct 2018 05:18  Mg     2.1     10-24      proBNP:   Lipid Profile:   HgA1c:   TSH:             TELEMETRY: 	    ECG:  	  RADIOLOGY:   DIAGNOSTIC TESTING:  [ ] Echocardiogram:  [ ]  Catheterization:  [ ] Stress Test:    OTHER:

## 2018-10-25 LAB
ANION GAP SERPL CALC-SCNC: 11 MMOL/L — SIGNIFICANT CHANGE UP (ref 5–17)
APPEARANCE UR: CLEAR — SIGNIFICANT CHANGE UP
BILIRUB UR-MCNC: NEGATIVE — SIGNIFICANT CHANGE UP
BUN SERPL-MCNC: 35 MG/DL — HIGH (ref 7–23)
CALCIUM SERPL-MCNC: 8.1 MG/DL — LOW (ref 8.4–10.5)
CHLORIDE SERPL-SCNC: 119 MMOL/L — HIGH (ref 96–108)
CO2 SERPL-SCNC: 18 MMOL/L — LOW (ref 22–31)
COLOR SPEC: YELLOW — SIGNIFICANT CHANGE UP
CREAT SERPL-MCNC: 0.82 MG/DL — SIGNIFICANT CHANGE UP (ref 0.5–1.3)
CULTURE RESULTS: SIGNIFICANT CHANGE UP
CULTURE RESULTS: SIGNIFICANT CHANGE UP
DIFF PNL FLD: ABNORMAL
GLUCOSE BLDC GLUCOMTR-MCNC: 152 MG/DL — HIGH (ref 70–99)
GLUCOSE SERPL-MCNC: 134 MG/DL — HIGH (ref 70–99)
GLUCOSE UR QL: NEGATIVE — SIGNIFICANT CHANGE UP
HCT VFR BLD CALC: 31.4 % — LOW (ref 34.5–45)
HGB BLD-MCNC: 9.8 G/DL — LOW (ref 11.5–15.5)
KETONES UR-MCNC: NEGATIVE — SIGNIFICANT CHANGE UP
LEUKOCYTE ESTERASE UR-ACNC: ABNORMAL
MCHC RBC-ENTMCNC: 29.8 PG — SIGNIFICANT CHANGE UP (ref 27–34)
MCHC RBC-ENTMCNC: 31.2 GM/DL — LOW (ref 32–36)
MCV RBC AUTO: 95.4 FL — SIGNIFICANT CHANGE UP (ref 80–100)
NITRITE UR-MCNC: POSITIVE
OB PNL STL: NEGATIVE — SIGNIFICANT CHANGE UP
PH UR: 6 — SIGNIFICANT CHANGE UP (ref 5–8)
PLATELET # BLD AUTO: 244 K/UL — SIGNIFICANT CHANGE UP (ref 150–400)
POTASSIUM SERPL-MCNC: 3.7 MMOL/L — SIGNIFICANT CHANGE UP (ref 3.5–5.3)
POTASSIUM SERPL-SCNC: 3.7 MMOL/L — SIGNIFICANT CHANGE UP (ref 3.5–5.3)
PROT UR-MCNC: ABNORMAL
RBC # BLD: 3.29 M/UL — LOW (ref 3.8–5.2)
RBC # FLD: 13.5 % — SIGNIFICANT CHANGE UP (ref 10.3–14.5)
SODIUM SERPL-SCNC: 148 MMOL/L — HIGH (ref 135–145)
SP GR SPEC: 1.02 — SIGNIFICANT CHANGE UP (ref 1.01–1.02)
SPECIMEN SOURCE: SIGNIFICANT CHANGE UP
SPECIMEN SOURCE: SIGNIFICANT CHANGE UP
TROPONIN T, HIGH SENSITIVITY RESULT: 35 NG/L — SIGNIFICANT CHANGE UP (ref 0–51)
UROBILINOGEN FLD QL: NEGATIVE — SIGNIFICANT CHANGE UP
WBC # BLD: 10.4 K/UL — SIGNIFICANT CHANGE UP (ref 3.8–10.5)
WBC # FLD AUTO: 10.4 K/UL — SIGNIFICANT CHANGE UP (ref 3.8–10.5)

## 2018-10-25 PROCEDURE — 33282: CPT

## 2018-10-25 PROCEDURE — 99233 SBSQ HOSP IP/OBS HIGH 50: CPT

## 2018-10-25 PROCEDURE — 99232 SBSQ HOSP IP/OBS MODERATE 35: CPT

## 2018-10-25 RX ADMIN — ENOXAPARIN SODIUM 40 MILLIGRAM(S): 100 INJECTION SUBCUTANEOUS at 11:23

## 2018-10-25 RX ADMIN — AMLODIPINE BESYLATE 5 MILLIGRAM(S): 2.5 TABLET ORAL at 06:03

## 2018-10-25 RX ADMIN — Medication 300 MILLIGRAM(S): at 11:23

## 2018-10-25 RX ADMIN — GABAPENTIN 100 MILLIGRAM(S): 400 CAPSULE ORAL at 23:15

## 2018-10-25 RX ADMIN — GABAPENTIN 100 MILLIGRAM(S): 400 CAPSULE ORAL at 14:59

## 2018-10-25 RX ADMIN — LISINOPRIL 5 MILLIGRAM(S): 2.5 TABLET ORAL at 17:50

## 2018-10-25 RX ADMIN — GABAPENTIN 100 MILLIGRAM(S): 400 CAPSULE ORAL at 06:04

## 2018-10-25 RX ADMIN — Medication 81 MILLIGRAM(S): at 11:23

## 2018-10-25 RX ADMIN — ATORVASTATIN CALCIUM 80 MILLIGRAM(S): 80 TABLET, FILM COATED ORAL at 23:15

## 2018-10-25 RX ADMIN — Medication 25 MILLIGRAM(S): at 01:25

## 2018-10-25 RX ADMIN — Medication 25 MILLIGRAM(S): at 11:23

## 2018-10-25 RX ADMIN — Medication 25 MILLIGRAM(S): at 23:15

## 2018-10-25 NOTE — SWALLOW BEDSIDE ASSESSMENT ADULT - MUCOSAL QUALITY
noted with minimal amount of thick, white secretions on tongue and hard palate; oral care provided prior to initiation of PO trials
pink, moist

## 2018-10-25 NOTE — PROVIDER CONTACT NOTE (OTHER) - ACTION/TREATMENT ORDERED:
Dr Portillo aware. No further orders at this time
Dr Portillo aware. Tylenol to be given. Cultures drawn 10/20
12 lead EKG done. Blood sent for cardiac enzyme
No further action at this time. Will continue to monitor. Will give metoprolol at 2300.
No new orders. will monitor
awared and will continue to  monitor
provider came and assessed pt. EKG, cardiac enzyme sent, cardiology consult

## 2018-10-25 NOTE — SWALLOW BEDSIDE ASSESSMENT ADULT - SWALLOW EVAL: ORAL MUSCULATURE
+ facial symmetry/unable to assess due to poor participation/comprehension
unable to assess due to poor participation/comprehension/Reduced ROM on R side noted when pt smiling
limited 2/2 reduced command following

## 2018-10-25 NOTE — PROGRESS NOTE ADULT - SUBJECTIVE AND OBJECTIVE BOX
Interval Events: Pt failed repeat s/s eval.  tolerating NGT feeds.     MEDICATIONS  (STANDING):  amLODIPine   Tablet 5 milliGRAM(s) Oral daily  aspirin  chewable 81 milliGRAM(s) Oral daily  atorvastatin 80 milliGRAM(s) Oral at bedtime  enoxaparin Injectable 40 milliGRAM(s) SubCutaneous daily  ferrous    sulfate Liquid 300 milliGRAM(s) Oral daily  gabapentin   Solution 100 milliGRAM(s) Oral three times a day  influenza   Vaccine 0.5 milliLiter(s) IntraMuscular once  lisinopril 5 milliGRAM(s) Oral <User Schedule>  metoprolol tartrate 25 milliGRAM(s) Oral two times a day    MEDICATIONS  (PRN):      Allergies    No Known Allergies    Intolerances        Review of Systems: unable to attain.       Vital Signs Last 24 Hrs  T(C): 36.7 (25 Oct 2018 12:00), Max: 36.8 (25 Oct 2018 08:18)  T(F): 98 (25 Oct 2018 12:00), Max: 98.2 (25 Oct 2018 08:18)  HR: 65 (25 Oct 2018 12:00) (65 - 88)  BP: 124/85 (25 Oct 2018 12:00) (101/62 - 124/85)  BP(mean): 68 (25 Oct 2018 10:00) (68 - 103)  RR: 18 (25 Oct 2018 12:00) (17 - 24)  SpO2: 99% (25 Oct 2018 12:00) (97% - 100%)    PHYSICAL EXAM:    GENERAL:  Appears stated age, well-groomed, well-nourished, no distress  HEENT:  NC/AT,  conjunctivae clear and pink, no thyromegaly, nodules, adenopathy, no JVD, sclera -anicteric, + NGT  CHEST:  Full & symmetric excursion, no increased effort, breath sounds clear  HEART:  Regular rhythm, S1, S2, no murmur/rub/S3/S4, no abdominal bruit, no edema  ABDOMEN:  Soft, non-tender, non-distended, normoactive bowel sounds,  no masses ,no hepato-splenomegaly, no signs of chronic liver disease  EXTEREMITIES:  no cyanosis,clubbing or edema  SKIN:  No rash/erythema/ecchymoses/petechiae/wounds/abscess/warm/dry  NEURO:  awake, not following commands, minimal verbal output      LABS:                        9.8    10.4  )-----------( 244      ( 25 Oct 2018 00:11 )             31.4     10-25    148<H>  |  119<H>  |  35<H>  ----------------------------<  134<H>  3.7   |  18<L>  |  0.82    Ca    8.1<L>      25 Oct 2018 00:11  Mg     2.1     10-24            RADIOLOGY & ADDITIONAL TESTS:

## 2018-10-25 NOTE — SWALLOW BEDSIDE ASSESSMENT ADULT - PHARYNGEAL PHASE
Delayed pharyngeal swallow/Decreased laryngeal elevation
Decreased laryngeal elevation/Absent trigger of swallow/requires tactile stimulation to oral cavity (x3) to initiate pharyngeal swallow

## 2018-10-25 NOTE — PROGRESS NOTE ADULT - SUBJECTIVE AND OBJECTIVE BOX
Subjective: Patient seen and examined. No new events except as noted.   remains in stroke unit   no cp or sob     REVIEW OF SYSTEMS:    CONSTITUTIONAL: + weakness, fevers or chills  EYES/ENT: No visual changes;  No vertigo or throat pain   NECK: No pain or stiffness  RESPIRATORY: No cough, wheezing, hemoptysis; No shortness of breath  CARDIOVASCULAR: No chest pain or palpitations  GASTROINTESTINAL: No abdominal or epigastric pain. No nausea, vomiting, or hematemesis; No diarrhea or constipation. No melena or hematochezia.  GENITOURINARY: No dysuria, frequency or hematuria  NEUROLOGICAL: + numbness or weakness  SKIN: No itching, burning, rashes, or lesions   All other review of systems is negative unless indicated above.    MEDICATIONS:  MEDICATIONS  (STANDING):  amLODIPine   Tablet 5 milliGRAM(s) Oral daily  aspirin  chewable 81 milliGRAM(s) Oral daily  atorvastatin 80 milliGRAM(s) Oral at bedtime  enoxaparin Injectable 40 milliGRAM(s) SubCutaneous daily  ferrous    sulfate Liquid 300 milliGRAM(s) Oral daily  gabapentin   Solution 100 milliGRAM(s) Oral three times a day  influenza   Vaccine 0.5 milliLiter(s) IntraMuscular once  lisinopril 5 milliGRAM(s) Oral <User Schedule>  metoprolol tartrate 25 milliGRAM(s) Oral two times a day      PHYSICAL EXAM:  T(C): 36.8 (10-25-18 @ 08:18), Max: 36.8 (10-25-18 @ 08:18)  HR: 72 (10-25-18 @ 06:00) (68 - 90)  BP: 115/56 (10-25-18 @ 06:00) (101/62 - 123/90)  RR: 19 (10-25-18 @ 06:00) (17 - 26)  SpO2: 98% (10-25-18 @ 06:00) (97% - 100%)  Wt(kg): --  I&O's Summary    24 Oct 2018 07:01  -  25 Oct 2018 07:00  --------------------------------------------------------  IN: 1200 mL / OUT: 1000 mL / NET: 200 mL          Appearance: NAD aphasic   HEENT:   Dry oral mucosa, +NGT 	  Lymphatic: No lymphadenopathy , no edema  Cardiovascular: Normal S1 S2, No JVD, No murmurs , Peripheral pulses palpable 2+ bilaterally  Respiratory: Lungs clear to auscultation, normal effort 	  Gastrointestinal:  Soft, Non-tender, + BS	  Skin: No rashes, No ecchymoses, No cyanosis, warm to touch  Musculoskeletal: Decreased range of motion and  strength  Psychiatry:  lethargic   Ext: No edema  NEUROLOGICAL EXAM:  Mental status: Awake, lethargic, not following commands, no verbal output  Cranial Nerves: Right facial droop  Motor exam:  RUE- plegic, increased tone, RLE - withdraws to noxious stimuli, LUE - minimal movement,  LLE - moving freely  Sensation: Not assessed.   Coordination/ Gait: Not assessed.       LABS:    CARDIAC MARKERS:                                9.8    10.4  )-----------( 244      ( 25 Oct 2018 00:11 )             31.4     10-25    148<H>  |  119<H>  |  35<H>  ----------------------------<  134<H>  3.7   |  18<L>  |  0.82    Ca    8.1<L>      25 Oct 2018 00:11  Mg     2.1     10-24      proBNP:   Lipid Profile:   HgA1c:   TSH:             TELEMETRY: SR	    ECG:  	  RADIOLOGY:   DIAGNOSTIC TESTING:  [ ] Echocardiogram:  [ ]  Catheterization:  [ ] Stress Test:    OTHER:

## 2018-10-25 NOTE — SWALLOW BEDSIDE ASSESSMENT ADULT - SLP GENERAL OBSERVATIONS
Patient found in bed. Sleeping upon contact. On room air. Daughter at bedside remained in room for exam. Daughter provided interpretation when needed as patient is English speaking and 2/2 reduced mentation, is unable to utilize . Daughter reports she will be leaving "to go home tomorrow" and will have other family members present at bedside to support/advocate for her mother. Patient does not follow commands, eyes remained closed throughout exam except 1 instance given noxious stimuli. Patient however, is unable to maintain eye contact or sustain adequate alertness level for PO trials. Occasional mumbling when given repeated verbal stimuli. Right sided neglect suspected. RR up to 39 given tactile stimulation. LOLLY Donohue alerted. Patient's ID band states her birthday is 1/01/1931 however as per daughter, patient's birthday is 1/10/1931. LOLLY Donohue and RAQUEL Romero alerted.
Patient encountered awake and alert in bed, on RA, positioned upright for purposes of evaluation.  Daughter-in-law at bedside and present for evaluation.  Patient is Serbian speaking and daughter-in-law is bilingual.  Assessment performed in Serbian as this clinician is a conversational speaker.  Patient grossly nonverbal, but noted smiling, maintaining eye contact and mumbling when asked questions.  Inconsistently followed commands when provided with persistent verbal cues and direct model - ?task comprehension vs oral apraxia/weakness.  Patient noted nodding head in response to y/n questions, however reliability judged to be poor given pt inability to shake head to gesture 'no.'  Pt's son contacted pt's daughter-in-law at the end of the assessment and this clinician informed him of clinical findings via telephone.   on patient's ID band is 31, but pt's daughter-in-law endorsed pt's  is [birth year].  Daughter-in-law also reported that pt's last name is Sutton, not Alka.  MD Jennings notified of both discrepancies.
Pt found in bed. + KFT. On room air. Sleeping upon encounter. Son, Dominic present. Initially patient does not rouse to verbal or tactile stimuli. Patient requires noxious stimuli to open eyes. Poor eye contact. Alertness, attention improves with verbal stimulation. Pt smiling, occasional phonation.+ Right sided facial drop. +Right sides hemiparesis. Left gaze preference however, able to cross midline. Follows simple 1-step commands ( in German) with models ~25%. Son remained in room for exam and interpreted during evaluation. Formal speech-language evaluation completed. Please see report.

## 2018-10-25 NOTE — SWALLOW BEDSIDE ASSESSMENT ADULT - COMMENTS
H&P continued/hospital course: Neurological examination showed right hemiparesis, right hemianopia, right dense facial droop, right sensory loss, left gaze preference with gaze palsy.   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 No evidence of Afib on tele. There is frequent PACS and Bigeminy but no Afib   10/21 Provider contact note: 4 seconds atrial tachycardia and temp 101  +Leukocytosis. ID consulted to r/o infection   10/21: ID Consult: no clues to emerging infection.  WBC minimally elevated.  No pyuria.  CXR 10/19 clear.  No other sepsis criteria at present.  By exclusion, fever likely reflects hemorrhagic component, CNS blood.    Patient on Lopressor and to possibly start PRN blood pressure medication

## 2018-10-25 NOTE — SWALLOW BEDSIDE ASSESSMENT ADULT - SWALLOW EVAL: PATIENT/FAMILY GOALS STATEMENT
Per daughter, patient independent prior to admit. Daughter reports patient "did not sleep well" last night. Daughter has questions re: "feeding tubes" and "other choices" Deferred questions to MD.
Per son, "to eat".

## 2018-10-25 NOTE — SWALLOW BEDSIDE ASSESSMENT ADULT - SLP PERTINENT HISTORY OF CURRENT PROBLEM
H&P: Ms. Rucker is an 88y/o female with vascular risk factors of HTN and hyperlipidemia, who was last seen normal at 22:10. Then she was found by family members at the floor with inability to communicate or move her right side at 22:20. She was initially evaluated at [place] were NIHSS=29 deemed candidate for tPA. 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 Patient was transferred for further management to [facility]. Upon arrival, NIHSS=25, was taken directly for IR intervention (s/p thrombectomy on 10/19) and extubated 10/20.
***NAME HAS BEEN CORRECTED IN [place]: STEPAN NAJERA*** H&P: Ms. Rucker is an 86y/o female with vascular risk factors of HTN and hyperlipidemia, who was last seen normal at 22:10. Then she was found by family members at the floor with inability to communicate or move her right side at 22:20. She was initially evaluated at [place] were NIHSS=29 deemed candidate for tPA. 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 Patient was transferred for further management to [facility]. Upon arrival, NIHSS=25, was taken directly for IR intervention (s/p thrombectomy on 10/19) and extubated 10/20.
H&P: Ms. Rucker is an 86y/o female with vascular risk factors of HTN and hyperlipidemia, who was last seen normal at 22:10. Then she was found by family members at the floor with inability to communicate or move her right side at 22:20. She was initially evaluated at [place] were NIHSS=29 deemed candidate for tPA. 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 Patient was transferred for further management to [facility]. Upon arrival, NIHSS=25, was taken directly for IR intervention (s/p thrombectomy on 10/19) and extubated 10/20.

## 2018-10-25 NOTE — SWALLOW BEDSIDE ASSESSMENT ADULT - SWALLOW EVAL: RECOMMENDED DIET
NPO, with non-oral nutrition/hydration/medications.
NPO, with non-oral nutrition/hydration/medications.
NPO, with non-oral nutrition/hydration/medications. This service will f/u on 10/23 for reassessment of swallow.

## 2018-10-25 NOTE — SWALLOW BEDSIDE ASSESSMENT ADULT - ADDITIONAL RECOMMENDATIONS
Maintain good oral hygiene.  As per discussion with MD Jennings, tentatively scheduled for re-evaluation on Thurs 10/25 pending pt's progress.
maintain good oral hygiene
maintain good oral hygiene

## 2018-10-25 NOTE — SWALLOW BEDSIDE ASSESSMENT ADULT - ASPIRATION PRECAUTIONS
for saliva and if enteral feeds initiated/yes
for secretions and enteral feeding/yes
yes/for secretions and if enteral feeding initiated

## 2018-10-25 NOTE — SWALLOW BEDSIDE ASSESSMENT ADULT - ASR SWALLOW DENTITION
daughter states patient has upper/lower dentures (not present at time of exam)
edentulous; daughter-in-law reports pt does have dentures
edentulous, does not have dentures

## 2018-10-25 NOTE — PROGRESS NOTE ADULT - ASSESSMENT
86y/o female with vascular risk factors of HTN and hyperlipidemia, who was last seen normal at 22:10, on the day prior to admission. Then she was found by family members at the floor with inability to communicate or move her right side at 22:20. She was initially evaluated at [place] were NIHSS=29 deemed candidate for tPA. 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 Patient was transferred for further management to [facility]. She received IVtpa per protocol and transferred to NS after CTA revealed L ICA and L M1 occlusion. She underwent thrombectomy with resultant TICI 2B flow.     Impression: neurologically improving in language and cognitive function. 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 MRI Brain w/o - results as noted above.  Physical therapy/OT recommends acute rehab.     ANTITHROMBOTIC THERAPY: Aspirin for secondary stroke prevention    PULMONARY: CXR (10/23)shows bilateral pleural effusions- no overt sign of HF noted, lungs CTA B/L, protecting airway, saturating well     CARDIOVASCULAR: TTE shows EF: 55-60%, Agitated saline injection revealed few late bubbles in the left heart, consistent with transpulmonic shunting, LE doppler: no DVT. Previously noted with afib- now deemed to be NSR with PAC's will need ILR placement. Cardiac monitoring showed (10/24/18) 9 sec paused. Continue current cardiac meds, Dr Chand (cardiology) consult appreciated                          SBP goal: 140     GASTROINTESTINAL:  dysphagia screen failed, speech and swallow eval recommended NPO for now, re-evaluatd on 10/23-still recommended NPO. Discussed possibility of PEG tube with patients nephew who is the HCP and he would like to proceed with PEG if unable to pass dysphagia on 10/25. GI consulted. NGT in place. Tolerating feedings     Diet: NGT    RENAL: BUN/Cr without acute change, good urine output - continue straight cath protocol; Hypernatremia- continue free water bolus.      Na Goal: Greater than 135     Mendez: No    HEMATOLOGY: H/H without acute change noted with anemia, Platelets 244, no signs or symptoms of bleeding      DVT ppx: LMWH     ID: Remains afebrile, fever on 10/21 of 38.3, no leukocytosis, BCx x 2 NGTD, UA - negative, and chest X-ray shows no evidence of PNA, Procalcitonin within normal limits, ID consulted and recommended to observe off Abx. Will continue to monitor closely.     OTHER: Plan discussed with pts nephew (HCP) and pt's son at bedside, all questions and concerns addressed.     DISPOSITION: Rehab once stable and workup is complete.    CORE MEASURES:        Admission NIHSS: 29     TPA:  YES      LDL/HDL: 100/52      Depression Screen: unable to obtain due to aphasia     Statin Therapy: yes     Dysphagia Screen: FAIL     Smoking NO      Afib YES      Stroke Education  YES, to family 88y/o female with vascular risk factors of HTN and hyperlipidemia, who was last seen normal at 22:10, on the day prior to admission. Then she was found by family members at the floor with inability to communicate or move her right side at 22:20. She was initially evaluated at [place] were NIHSS=29 deemed candidate for tPA. 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 Patient was transferred for further management to [facility]. She received IVtpa per protocol and transferred to NS after CTA revealed L ICA and L M1 occlusion. She underwent thrombectomy with resultant TICI 2B flow.     Impression: neurologically slightly  improving in language and cognitive function. 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 MRI Brain w/o - results as noted above.  Physical therapy/OT recommends acute rehab.     ANTITHROMBOTIC THERAPY: Aspirin for secondary stroke prevention    PULMONARY: CXR (10/23)shows bilateral pleural effusions- no overt sign of HF noted, lungs CTA B/L, protecting airway, saturating well     CARDIOVASCULAR: TTE shows EF: 55-60%, Agitated saline injection revealed few late bubbles in the left heart, consistent with transpulmonic shunting, LE doppler: no DVT. Previously noted with afib- now deemed to be NSR with PAC's will need ILR placement. Cardiac monitoring showed (10/24/18) 9 sec paused. Continue current cardiac meds, Dr Chand (cardiology) consult appreciated                          SBP goal: 140     GASTROINTESTINAL:  dysphagia screen failed, speech and swallow eval recommended NPO for now, re-evaluatd on 10/23-still recommended NPO. Discussed possibility of PEG tube with patients nephew who is the HCP and he would like to proceed with PEG if unable to pass dysphagia on 10/25. GI consulted. NGT in place. Tolerating feedings     Diet: NGT    RENAL: BUN/Cr without acute change, good urine output - continue straight cath protocol; Hypernatremia- continue free water bolus.      Na Goal: Greater than 135     Mendez: No    HEMATOLOGY: H/H without acute change noted with anemia, Platelets 244, no signs or symptoms of bleeding      DVT ppx: LMWH     ID: Remains afebrile, fever on 10/21 of 38.3, no leukocytosis, BCx x 2 NGTD, UA - negative, and chest X-ray shows no evidence of PNA, Procalcitonin within normal limits, ID consulted and recommended to observe off Abx. Will continue to monitor closely.     OTHER: Plan discussed with pts nephew (HCP) and pt's son at bedside, all questions and concerns addressed.     DISPOSITION: Rehab once stable and workup is complete.    CORE MEASURES:        Admission NIHSS: 29     TPA:  YES      LDL/HDL: 100/52      Depression Screen: unable to obtain due to aphasia     Statin Therapy: yes     Dysphagia Screen: FAIL     Smoking NO      Afib YES      Stroke Education  YES, to family

## 2018-10-25 NOTE — CONSULT NOTE ADULT - SUBJECTIVE AND OBJECTIVE BOX
Patient is a 87y old  Female who presents with a chief complaint of stroke (25 Oct 2018 14:31)      HISTORY OF PRESENT ILLNESS:   86 yo F with HTN, HLD who was brought in by family for difficulty with speech and R sided weakness found to have L MCA stroke s/p tPA on 10/19. Echo w/o PFo. No events on tele. Consult for ILR placement.    Patient is aphasic and unable to provide history. Opens eyes to name but does not follow any other commands or use other gestures to communicate.    Allergies    No Known Allergies    Intolerances    	    MEDICATIONS:  amLODIPine   Tablet 5 milliGRAM(s) Oral daily  aspirin  chewable 81 milliGRAM(s) Oral daily  enoxaparin Injectable 40 milliGRAM(s) SubCutaneous daily  lisinopril 5 milliGRAM(s) Oral <User Schedule>  metoprolol tartrate 25 milliGRAM(s) Oral two times a day        gabapentin   Solution 100 milliGRAM(s) Oral three times a day      atorvastatin 80 milliGRAM(s) Oral at bedtime    ferrous    sulfate Liquid 300 milliGRAM(s) Oral daily  influenza   Vaccine 0.5 milliLiter(s) IntraMuscular once      PAST MEDICAL & SURGICAL HISTORY:  HTN, HLD    FAMILY HISTORY:  unable to obtain    SOCIAL HISTORY:  unable to obtain      REVIEW OF SYSTEMS:   unable to obtain      PHYSICAL EXAM:  T(C): 36.7 (10-25-18 @ 12:00), Max: 36.8 (10-25-18 @ 08:18)  HR: 65 (10-25-18 @ 12:00) (65 - 88)  BP: 124/85 (10-25-18 @ 12:00) (101/62 - 124/85)  RR: 18 (10-25-18 @ 12:00) (17 - 24)  SpO2: 99% (10-25-18 @ 12:00) (97% - 100%)  Wt(kg): --  I&O's Summary    24 Oct 2018 07:01  -  25 Oct 2018 07:00  --------------------------------------------------------  IN: 1200 mL / OUT: 1480 mL / NET: -280 mL    25 Oct 2018 07:01  -  25 Oct 2018 15:52  --------------------------------------------------------  IN: 300 mL / OUT: 0 mL / NET: 300 mL        Appearance: Normal	  HEENT:   Normal oral mucosa  Cardiovascular: Normal S1 S2, No JVD, No murmurs, No edema  Respiratory: Lungs clear to auscultation in anterior field  Gastrointestinal:  Soft, Non-tender, + BS	  Neurologic: aphasic, does not follow commands, opens eyes to voice  Extremities: Normal range of motion, No clubbing, cyanosis or edema        LABS:	 	    CBC Full  -  ( 25 Oct 2018 00:11 )  WBC Count : 10.4 K/uL  Hemoglobin : 9.8 g/dL  Hematocrit : 31.4 %  Platelet Count - Automated : 244 K/uL  Mean Cell Volume : 95.4 fl  Mean Cell Hemoglobin : 29.8 pg  Mean Cell Hemoglobin Concentration : 31.2 gm/dL  Auto Neutrophil # : x  Auto Lymphocyte # : x  Auto Monocyte # : x  Auto Eosinophil # : x  Auto Basophil # : x  Auto Neutrophil % : x  Auto Lymphocyte % : x  Auto Monocyte % : x  Auto Eosinophil % : x  Auto Basophil % : x    10-25    148<H>  |  119<H>  |  35<H>  ----------------------------<  134<H>  3.7   |  18<L>  |  0.82  10-24    150<H>  |  121<H>  |  40<H>  ----------------------------<  122<H>  3.6   |  16<L>  |  0.92    Ca    8.1<L>      25 Oct 2018 00:11  Ca    8.7      24 Oct 2018 05:18  Mg     2.1     10-24    TELEMETRY: no afib  ECG:  	NSR, PACs, short CO (90)    PREVIOUS DIAGNOSTIC TESTING:    Echo 10/19/18  1. Normal left ventricular internal dimensions and wall  thicknesses.  2. Normal left ventricular systolic function. No segmental  wall motion abnormalities.  3. Agitated saline injection revealed few late bubbles in  the left heart, consistent with transpulmonic shunting.  	  ASSESSMENT/PLAN: 	  86 yo F with HTN, HLD adm with L MCA stroke s/p tPA consulted for ILR placement.    - echo w/o PFO, tele w/o afib  - pt aphasic, unable to communicate; spoke to pt's son Nick Rucker regarding utility of loop recorder and possible complications; son agreeable to proceeding with ILR placement      Discussed with Dr. Colin.    Maribel Salcido MD  Cardiology Fellow

## 2018-10-25 NOTE — SWALLOW BEDSIDE ASSESSMENT ADULT - SWALLOW EVAL: DIAGNOSIS
Pt presents with 1) an oral and suspected pharyngeal dysphagia characterized by oral holding of food/liquid material requiring verbal cues/tactile stimulation to trigger swallow sequence, reduced AP transit, oral cavity residue (suctioned by clinician), and an absent pharyngeal swallow. 2) Global aphasia Pt presents with 1) an oral and suspected pharyngeal dysphagia characterized by oral holding of food/liquid material requiring verbal cues/tactile stimulation to initiate swallow sequence, reduced AP transit, oral cavity residue (suctioned by clinician), and an absent pharyngeal swallow. 2) Global aphasia

## 2018-10-25 NOTE — SWALLOW BEDSIDE ASSESSMENT ADULT - NS ASR SWALLOW FINDINGS DISCUS
Patient/Family/RN MD Michaela Donohue ([id number])
LOLLY Vernon, Dr. Libman, RAVEN Petit
LOLLY Donohue, daughter, RAQUEL Romero

## 2018-10-25 NOTE — PROGRESS NOTE ADULT - SUBJECTIVE AND OBJECTIVE BOX
THE PATIENT WAS SEEN AND EXAMINED BY ME WITH THE HOUSESTAFF AND STROKE TEAM DURING MORNING ROUNDS.   HPI:    86y/o female with vascular risk factors of HTN and hyperlipidemia, who was last seen normal at 22:10, on the day prior to admission. Then she was found by family members at the floor with inability to communicate or move her right side at 22:20. She was initially evaluated at [place] were NIHSS=29 deemed candidate for tPA. 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 Patient was transferred for further management to [facility]. She received IVtpa per protocol and transferred to NS after CTA revealed L ICA and L M1 occlusion. She underwent thrombectomy with resultant TICI 2B flow.     SUBJECTIVE: No events overnight.  No new neurologic complaints.      amLODIPine   Tablet 5 milliGRAM(s) Oral daily  aspirin  chewable 81 milliGRAM(s) Oral daily  atorvastatin 80 milliGRAM(s) Oral at bedtime  enoxaparin Injectable 40 milliGRAM(s) SubCutaneous daily  ferrous    sulfate Liquid 300 milliGRAM(s) Oral daily  gabapentin   Solution 100 milliGRAM(s) Oral three times a day  influenza   Vaccine 0.5 milliLiter(s) IntraMuscular once  lisinopril 5 milliGRAM(s) Oral <User Schedule>  metoprolol tartrate 25 milliGRAM(s) Oral two times a day      PHYSICAL EXAM:   Vital Signs Last 24 Hrs  T(C): 36.7 (25 Oct 2018 12:00), Max: 36.8 (25 Oct 2018 08:18)  T(F): 98 (25 Oct 2018 12:00), Max: 98.2 (25 Oct 2018 08:18)  HR: 65 (25 Oct 2018 12:00) (65 - 88)  BP: 124/85 (25 Oct 2018 12:00) (101/62 - 124/85)  BP(mean): 68 (25 Oct 2018 10:00) (68 - 103)  RR: 18 (25 Oct 2018 12:00) (17 - 24)  SpO2: 99% (25 Oct 2018 12:00) (97% - 100%)    General: No acute distress  HEENT: unable to cooperate, no BTT on right, slight gaze deviation to the left  Abdomen: Soft, nontender, nondistended   Extremities: No edema    NEUROLOGICAL EXAM:  Mental status: eyes open, generates some unintelligible sounds, improving language and cognitive functions and more attentive today  Cranial Nerves: Moderate-Severe right nasolabial flattening, no Blink to threat on right visual field, right gaze palsy, unable to cross pass midline, no definite dysarthria but almost no verbal output  Motor exam: LUE: good effort against gravity, LLE : spontaneous movements, minimal effort against gravity, RUE: no movement, RLE: externally rotated, no volitional movement-triple flexion only, minimally increased tone in RUE/RLE.     Sensation: unable to assess    Coordination/ Gait: deferred    LABS:                        9.8    10.4  )-----------( 244      ( 25 Oct 2018 00:11 )             31.4    10-25    148<H>  |  119<H>  |  35<H>  ----------------------------<  134<H>  3.7   |  18<L>  |  0.82    Ca    8.1<L>      25 Oct 2018 00:11  Mg     2.1     10-24      Hemoglobin A1C, Whole Blood: 6.0 % (10-19 @ 07:53)      IMAGING: Reviewed by me.     MRI Head No Cont (10/20/18):  Acute hemorrhagic infarct involving the left MCA territory with mass effect on the left lateral ventricle and left to right shift.    CT Head No Cont (10/19/18) @ 23:21:   Redemonstration of large acute left MCA territory infarct with similar mass effect upon the left lateral ventricle and similar rightward midline shift of 4 mm. Ventricles stable in size. No hydrocephalus. Previously seen increased density throughout the region of infarction is less apparent on the current examination, consistent with resolving contrast staining after digital subtraction angiography. 2.1 x 1.6 cm region of persistent increased density in the left lateral frontotemporal region may represent hemorrhagic transformation of the infarct.    CT Head No Cont (10/19/18) @ 05:52:   Large acute left MCA territory infarct. Increased density seen throughout the left basal ganglia and left lateral frontal, parietal, and temporal cortices likely represents a combination of hemorrhagic transformation and cortical staining from recent digital subtraction angiography. Additional density seen within the left cerebral sulci likely representing subarachnoid hemorrhage. Mass effect upon the left lateral ventricle and rightward midline shift of 4 mm. No large hydrocephalus.

## 2018-10-25 NOTE — SPEECH LANGUAGE PATHOLOGY EVALUATION - SLP GENERAL OBSERVATIONS
Pt found in bed. + KFT. On room air. Sleeping upon encounter. Son, Dominic present. Initially patient does not rouse to verbal or tactile stimuli. Patient requires noxious stimuli to open eyes. Poor eye contact. Alertness, attention improves with verbal stimulation. Pt smiling, occasional phonation.+ Right sided facial drop. +Right sides hemiparesis. Left gaze preference however, able to cross midline. Son remained in room for exam and interpreted during evaluation.

## 2018-10-25 NOTE — SWALLOW BEDSIDE ASSESSMENT ADULT - ORAL PHASE
initially there is absent manipulation of material. given tj-oral stimulation, patient attempts to form cohesive bolus. suspect premature spillover, mild stasis/Decreased anterior-posterior movement of the bolus/Delayed oral transit time/Stasis in lateral sulci Decreased anterior-posterior movement of the bolus/Delayed oral transit time/Stasis in lateral sulci/initially there is absent manipulation of material. given tj-oral stimulation, patient attempts to form cohesive bolus. suspect premature spillover, mild stasis (clinician suctioned from oral cavity)

## 2018-10-25 NOTE — CHART NOTE - NSCHARTNOTEFT_GEN_A_CORE
Consult note for tube feeding.    Chart reviewed, events noted. Plan for swallow reeval today. NGT feeds Jevity 1.2 50cc/hr x 24 hrs. Recommend  Jevity 1.2 60cc/hr x 24 hrs to provide 1728 kcals, 80 gm protein, 1162cc free water  meets 22 Kcal/Kg, 1.0Gm/kg  dosing wt 77.3 kg. Consult note for tube feeding.    Chart reviewed, events noted. Plan for swallow reeval today. NGT feeds Jevity 1.2 50cc/hr x 24 hrs, 250cc free water q 6 hrs. Recommend  Jevity 1.2 60cc/hr x 24 hrs to provide 1728 kcals, 80 gm protein, 1162cc free water  meets 22 Kcal/Kg, 1.0Gm/kg  dosing wt 77.3 kg. Total free water 2162cc (28cc/kg).

## 2018-10-25 NOTE — SPEECH LANGUAGE PATHOLOGY EVALUATION - COMMENTS
H&P continued/hospital course: Neurological examination showed right hemiparesis, right hemianopia, right dense facial droop, right sensory loss, left gaze preference with gaze palsy.   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 No evidence of Afib on tele. There is frequent PACS and Bigeminy but no Afib   10/21 Provider contact note: 4 seconds atrial tachycardia and temp 101  +Leukocytosis. ID consulted to r/o infection   10/21: ID Consult: no clues to emerging infection.  WBC minimally elevated.  No pyuria.  CXR 10/19 clear.  No other sepsis criteria at present.  By exclusion, fever likely reflects hemorrhagic component, CNS blood.    Patient on Lopressor and to possibly start PRN blood pressure medication Son interpreted during exam. Patient did not demonstrate an understanding re: use of common objects despite clinician providing models. did not test as alertness level and language skills improve will need formal assessment unable to assess based upon limited sample of voice

## 2018-10-25 NOTE — SPEECH LANGUAGE PATHOLOGY EVALUATION - SLP PERTINENT HISTORY OF CURRENT PROBLEM
***NAME HAS BEEN CORRECTED IN [facility]: STEPAN NAJERA*** H&P: Ms. Rucker is an 86y/o female with vascular risk factors of HTN and hyperlipidemia, who was last seen normal at 22:10. Then she was found by family members at the floor with inability to communicate or move her right side at 22:20. She was initially evaluated at [facility] were NIHSS=29 deemed candidate for tPA. 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 Patient was transferred for further management to [facility]. Upon arrival, NIHSS=25, was taken directly for IR intervention (s/p thrombectomy on 10/19) and extubated 10/20.

## 2018-10-26 LAB
ANION GAP SERPL CALC-SCNC: 9 MMOL/L — SIGNIFICANT CHANGE UP (ref 5–17)
BUN SERPL-MCNC: 24 MG/DL — HIGH (ref 7–23)
CALCIUM SERPL-MCNC: 8.2 MG/DL — LOW (ref 8.4–10.5)
CHLORIDE SERPL-SCNC: 114 MMOL/L — HIGH (ref 96–108)
CO2 SERPL-SCNC: 22 MMOL/L — SIGNIFICANT CHANGE UP (ref 22–31)
CREAT SERPL-MCNC: 0.73 MG/DL — SIGNIFICANT CHANGE UP (ref 0.5–1.3)
CULTURE RESULTS: SIGNIFICANT CHANGE UP
GLUCOSE SERPL-MCNC: 94 MG/DL — SIGNIFICANT CHANGE UP (ref 70–99)
HCT VFR BLD CALC: 30.8 % — LOW (ref 34.5–45)
HGB BLD-MCNC: 10.1 G/DL — LOW (ref 11.5–15.5)
MCHC RBC-ENTMCNC: 31.1 PG — SIGNIFICANT CHANGE UP (ref 27–34)
MCHC RBC-ENTMCNC: 32.9 GM/DL — SIGNIFICANT CHANGE UP (ref 32–36)
MCV RBC AUTO: 94.6 FL — SIGNIFICANT CHANGE UP (ref 80–100)
PLATELET # BLD AUTO: 248 K/UL — SIGNIFICANT CHANGE UP (ref 150–400)
POTASSIUM SERPL-MCNC: 4 MMOL/L — SIGNIFICANT CHANGE UP (ref 3.5–5.3)
POTASSIUM SERPL-SCNC: 4 MMOL/L — SIGNIFICANT CHANGE UP (ref 3.5–5.3)
RBC # BLD: 3.25 M/UL — LOW (ref 3.8–5.2)
RBC # FLD: 13.1 % — SIGNIFICANT CHANGE UP (ref 10.3–14.5)
SODIUM SERPL-SCNC: 145 MMOL/L — SIGNIFICANT CHANGE UP (ref 135–145)
SPECIMEN SOURCE: SIGNIFICANT CHANGE UP
WBC # BLD: 10.6 K/UL — HIGH (ref 3.8–10.5)
WBC # FLD AUTO: 10.6 K/UL — HIGH (ref 3.8–10.5)

## 2018-10-26 PROCEDURE — 99233 SBSQ HOSP IP/OBS HIGH 50: CPT

## 2018-10-26 RX ORDER — METOPROLOL TARTRATE 50 MG
5 TABLET ORAL EVERY 6 HOURS
Qty: 0 | Refills: 0 | Status: DISCONTINUED | OUTPATIENT
Start: 2018-10-26 | End: 2018-10-26

## 2018-10-26 RX ORDER — CEFTRIAXONE 500 MG/1
1 INJECTION, POWDER, FOR SOLUTION INTRAMUSCULAR; INTRAVENOUS EVERY 24 HOURS
Qty: 0 | Refills: 0 | Status: DISCONTINUED | OUTPATIENT
Start: 2018-10-26 | End: 2018-10-26

## 2018-10-26 RX ORDER — NYSTATIN 500MM UNIT
500000 POWDER (EA) MISCELLANEOUS
Qty: 0 | Refills: 0 | Status: DISCONTINUED | OUTPATIENT
Start: 2018-10-26 | End: 2018-11-01

## 2018-10-26 RX ORDER — METOPROLOL TARTRATE 50 MG
5 TABLET ORAL EVERY 6 HOURS
Qty: 0 | Refills: 0 | Status: DISCONTINUED | OUTPATIENT
Start: 2018-10-26 | End: 2018-11-01

## 2018-10-26 RX ADMIN — LISINOPRIL 5 MILLIGRAM(S): 2.5 TABLET ORAL at 16:32

## 2018-10-26 RX ADMIN — Medication 25 MILLIGRAM(S): at 22:44

## 2018-10-26 RX ADMIN — ATORVASTATIN CALCIUM 80 MILLIGRAM(S): 80 TABLET, FILM COATED ORAL at 22:44

## 2018-10-26 RX ADMIN — GABAPENTIN 100 MILLIGRAM(S): 400 CAPSULE ORAL at 22:44

## 2018-10-26 NOTE — PROGRESS NOTE ADULT - ASSESSMENT
88y/o female with vascular risk factors of HTN and hyperlipidemia, who was last seen normal at 22:10, on the day prior to admission. Then she was found by family members at the floor with inability to communicate or move her right side at 22:20. She was initially evaluated at [place] were NIHSS=29 deemed candidate for tPA. 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 Patient was transferred for further management to [facility]. She received IVtpa per protocol and transferred to NS after CTA revealed L ICA and L M1 occlusion. She underwent thrombectomy with resultant TICI 2B flow.     Impression: neurologically slightly  improving in language and cognitive function. 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 MRI Brain w/o - results as noted above.  Physical therapy/OT recommends acute rehab.     ANTITHROMBOTIC THERAPY: Aspirin for secondary stroke prevention    PULMONARY: CXR (10/23)shows bilateral pleural effusions- no overt sign of HF noted, lungs CTA B/L, protecting airway, saturating well     CARDIOVASCULAR: TTE shows EF: 55-60%, Agitated saline injection revealed few late bubbles in the left heart, consistent with transpulmonic shunting, LE doppler: no DVT. Previously noted with afib- now deemed to be NSR with PAC's will need ILR placement. Cardiac monitoring showed (10/24/18) 9 sec paused. Continue current cardiac meds, Dr Chand (cardiology) consult appreciated                          SBP goal: 140     GASTROINTESTINAL:  dysphagia screen failed, speech and swallow eval recommended NPO for now, re-evaluatd on 10/23-still recommended NPO. Plan for PEG placement today     Diet: NGT, was NPO overmidnight for peg/EGD today    RENAL: BUN/Cr without acute change, good urine output - continue straight cath protocol; Hypernatremia improving- continue free water bolus. UA-positive for UTI, ceftriaxone #1/7     Na Goal: Greater than 135     Mendez: No    HEMATOLOGY: H/H without acute change noted with anemia, Platelets 244, no signs or symptoms of bleeding      DVT ppx: LMWH     ID: Remains afebrile, fever on 10/21 of 38.3, no leukocytosis, BCx x 2 NGTD, UA - negative, and chest X-ray shows no evidence of PNA, Procalcitonin within normal limits, ID consulted and recommended to observe off Abx. Will continue to monitor closely.  ceftriaxone #1/7 for UTI    OTHER: Plan discussed with pts nephew (HCP) and pt's son at bedside, all questions and concerns addressed.     DISPOSITION: Rehab once stable and workup is complete.    CORE MEASURES:        Admission NIHSS: 29     TPA:  YES      LDL/HDL: 100/52      Depression Screen: unable to obtain due to aphasia     Statin Therapy: yes     Dysphagia Screen: FAIL     Smoking NO      Afib YES      Stroke Education  YES, to family 88y/o female with vascular risk factors of HTN and hyperlipidemia, who was last seen normal at 22:10, on the day prior to admission. Then she was found by family members at the floor with inability to communicate or move her right side at 22:20. She was initially evaluated at [place] were NIHSS=29 deemed candidate for tPA. 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 Patient was transferred for further management to [facility]. She received IVtpa per protocol and transferred to NS after CTA revealed L ICA and L M1 occlusion. She underwent thrombectomy with resultant TICI 2B flow.     Impression: neurologically slightly  improving in language and cognitive function. 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 MRI Brain w/o - results as noted above.  Physical therapy/OT recommends acute rehab.     ANTITHROMBOTIC THERAPY: Aspirin for secondary stroke prevention    PULMONARY: CXR (10/23)shows bilateral pleural effusions- no overt sign of HF noted, lungs CTA B/L, protecting airway, saturating well     CARDIOVASCULAR: TTE shows EF: 55-60%, Agitated saline injection revealed few late bubbles in the left heart, consistent with transpulmonic shunting, LE doppler: no DVT. Previously noted with afib- now deemed to be NSR with PAC's, ILR placed on 10/25. Cardiac monitoring showed (10/24/18) 9 sec paused. Continue current cardiac meds, Dr Chand (cardiology) consult appreciated                          SBP goal: 140     GASTROINTESTINAL:  dysphagia screen failed, speech and swallow eval recommended NPO for now, re-evaluatd on 10/23-still recommended NPO. Plan for PEG placement today     Diet: NPO overmidnight for peg/EGD today    RENAL: BUN/Cr without acute change, good urine output - continue straight cath protocol; Hypernatremia improving- continue free water bolus. UA-positive, ID consulted- will monitor off antibiotic as patient is asymptomatic and no fever     Na Goal: Greater than 135     Mendez: No    HEMATOLOGY: H/H without acute change noted with anemia, Platelets 248, no signs or symptoms of bleeding. As per family member patient has history of blood clots, PCP called and said she was on xarelto 20 and then 15mg since August 2017. We will order an LE doppler to r/o DVT and then possible order xarelto.      DVT ppx: LMWH     ID: Remains afebrile, fever on 10/21 of 38.3, no leukocytosis, BCx x 2 NGTD, UA - negative, and chest X-ray shows no evidence of PNA, Procalcitonin within normal limits, ID consulted and recommended to observe off Abx. Will continue to monitor closely.      OTHER: Plan discussed with pts nephew (HCP) and pt's son at bedside, all questions and concerns addressed.     DISPOSITION: Rehab once stable and workup is complete.    CORE MEASURES:        Admission NIHSS: 29     TPA:  YES      LDL/HDL: 100/52      Depression Screen: unable to obtain due to aphasia     Statin Therapy: yes     Dysphagia Screen: FAIL     Smoking NO      Afib YES      Stroke Education  YES, to family

## 2018-10-26 NOTE — PROGRESS NOTE ADULT - SUBJECTIVE AND OBJECTIVE BOX
THE PATIENT WAS SEEN AND EXAMINED BY ME WITH THE HOUSESTAFF AND STROKE TEAM DURING MORNING ROUNDS.   HPI:    88y/o female with vascular risk factors of HTN and hyperlipidemia, who was last seen normal at 22:10, on the day prior to admission. Then she was found by family members at the floor with inability to communicate or move her right side at 22:20. She was initially evaluated at [place] were NIHSS=29 deemed candidate for tPA. 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 Patient was transferred for further management to [facility]. She received IVtpa per protocol and transferred to NS after CTA revealed L ICA and L M1 occlusion. She underwent thrombectomy with resultant TICI 2B flow.     SUBJECTIVE: No events overnight.  No new neurologic complaints.      amLODIPine   Tablet 5 milliGRAM(s) Oral daily  artificial tears (preservative free) Ophthalmic Solution 1 Drop(s) Both EYES daily  aspirin  chewable 81 milliGRAM(s) Oral daily  atorvastatin 80 milliGRAM(s) Oral at bedtime  cefTRIAXone   IVPB 1 Gram(s) IV Intermittent every 24 hours  enoxaparin Injectable 40 milliGRAM(s) SubCutaneous daily  ferrous    sulfate Liquid 300 milliGRAM(s) Oral daily  gabapentin   Solution 100 milliGRAM(s) Oral three times a day  influenza   Vaccine 0.5 milliLiter(s) IntraMuscular once  lisinopril 5 milliGRAM(s) Oral <User Schedule>  metoprolol tartrate 25 milliGRAM(s) Oral two times a day  metoprolol tartrate Injectable 5 milliGRAM(s) IV Push every 6 hours PRN      PHYSICAL EXAM:   Vital Signs Last 24 Hrs  T(C): 36.8 (26 Oct 2018 05:18), Max: 36.8 (25 Oct 2018 08:18)  T(F): 98.3 (26 Oct 2018 05:18), Max: 98.3 (26 Oct 2018 05:18)  HR: 67 (26 Oct 2018 05:18) (65 - 80)  BP: 136/85 (26 Oct 2018 05:18) (102/50 - 136/85)  BP(mean): 68 (25 Oct 2018 10:00) (68 - 68)  RR: 18 (26 Oct 2018 05:18) (18 - 19)  SpO2: 98% (26 Oct 2018 05:18) (98% - 100%)    General: No acute distress  HEENT: unable to cooperate, no BTT on right, slight gaze deviation to the left  Abdomen: Soft, nontender, nondistended   Extremities: No edema    NEUROLOGICAL EXAM:  Mental status: eyes open, generates some unintelligible sounds, improving language and cognitive functions and more attentive today  Cranial Nerves: Moderate-Severe right nasolabial flattening, no Blink to threat on right visual field, right gaze palsy, unable to cross pass midline, no definite dysarthria but almost no verbal output  Motor exam: LUE: good effort against gravity, LLE : spontaneous movements, minimal effort against gravity, RUE: no movement, RLE: externally rotated, no volitional movement-triple flexion only, minimally increased tone in RUE/RLE.     Sensation: unable to assess    Coordination/ Gait: deferred    LABS:                        9.8    10.4  )-----------( 244      ( 25 Oct 2018 00:11 )             31.4    10-25    148<H>  |  119<H>  |  35<H>  ----------------------------<  134<H>  3.7   |  18<L>  |  0.82    Ca    8.1<L>      25 Oct 2018 00:11      Hemoglobin A1C, Whole Blood: 6.0 % (10-19 @ 07:53)      IMAGING: Reviewed by me.   MRI Head No Cont (10/20/18):  Acute hemorrhagic infarct involving the left MCA territory with mass effect on the left lateral ventricle and left to right shift.    CT Head No Cont (10/19/18) @ 23:21:   Redemonstration of large acute left MCA territory infarct with similar mass effect upon the left lateral ventricle and similar rightward midline shift of 4 mm. Ventricles stable in size. No hydrocephalus. Previously seen increased density throughout the region of infarction is less apparent on the current examination, consistent with resolving contrast staining after digital subtraction angiography. 2.1 x 1.6 cm region of persistent increased density in the left lateral frontotemporal region may represent hemorrhagic transformation of the infarct.    CT Head No Cont (10/19/18) @ 05:52:   Large acute left MCA territory infarct. Increased density seen throughout the left basal ganglia and left lateral frontal, parietal, and temporal cortices likely represents a combination of hemorrhagic transformation and cortical staining from recent digital subtraction angiography. Additional density seen within the left cerebral sulci likely representing subarachnoid hemorrhage. Mass effect upon the left lateral ventricle and rightward midline shift of 4 mm. No large hydrocephalus.

## 2018-10-26 NOTE — PROGRESS NOTE ADULT - SUBJECTIVE AND OBJECTIVE BOX
CC: f/u for fever    Patient reports  she is feeling  REVIEW OF SYSTEMS:  All other review of systems negative (Comprehensive ROS)    Antimicrobials Day #  :  cefTRIAXone   IVPB 1 Gram(s) IV Intermittent every 24 hours    Other Medications Reviewed    T(F): 98.4 (10-26-18 @ 11:59), Max: 98.4 (10-26-18 @ 11:59)  HR: 71 (10-26-18 @ 11:59)  BP: 148/81 (10-26-18 @ 11:59)  RR: 18 (10-26-18 @ 11:59)  SpO2: 96% (10-26-18 @ 11:59)  Wt(kg): --    PHYSICAL EXAM:  General: alert, no acute distress  Eyes:  anicteric, no conjunctival injection, no discharge  Oropharynx: no lesions or injection 	  Neck: supple, without adenopathy  Lungs: clear to auscultation  Heart: regular rate and rhythm; no murmur, rubs or gallops  Abdomen: soft, nondistended, nontender, without mass or organomegaly  Skin: no lesions  Extremities: no clubbing, cyanosis, or edema  Neurologic: alert, awake, follows commands, smiles    LAB RESULTS:                        10.1   10.6  )-----------( 248      ( 26 Oct 2018 07:41 )             30.8     10-    145  |  114<H>  |  24<H>  ----------------------------<  94  4.0   |  22  |  0.73    Ca    8.2<L>      26 Oct 2018 07:41        Urinalysis Basic - ( 25 Oct 2018 22:54 )    Color: Yellow / Appearance: Clear / S.020 / pH: x  Gluc: x / Ketone: Negative  / Bili: Negative / Urobili: Negative   Blood: x / Protein: Trace / Nitrite: Positive   Leuk Esterase: Large / RBC: 6 /hpf / WBC 64 /hpf   Sq Epi: x / Non Sq Epi: 0 /hpf / Bacteria: Moderate      MICROBIOLOGY:  RECENT CULTURES:  10-21 @ 18:04 .Blood Blood     No growth to date.          RADIOLOGY REVIEWED:  < from: [facility] Duplex Lower Ext Vein Scan, Bilat (10.24.18 @ 15:33) >    IMPRESSION:     No evidence of bilateral lower extremity deep venous thrombosis.      < from: MR Head No Cont (10.20.18 @ 18:38) >  Impression: Acute hemorrhagic infarct involving the left MCA territory   with mass effect on the left lateral ventricle and left to right shift.        < end of copied text >  Assessment:  Patient with large cva, tpa, thrombectomy, hemorrhagic transformation, had fever now resolved. Leukocytosis resolved. UA has mild pyuria but there are no reports of gu symptoms , no fever or leukocytosis so would not give antibiotics at present for a uti  Plan: stop ceftriaxone and monitor off antibiotics

## 2018-10-26 NOTE — PROGRESS NOTE ADULT - SUBJECTIVE AND OBJECTIVE BOX
Subjective: Patient seen and examined. No new events except as noted.   s/p ILR   daughter at bedside     REVIEW OF SYSTEMS:    CONSTITUTIONAL: + weakness, fevers or chills  EYES/ENT: No visual changes;  No vertigo or throat pain   NECK: No pain or stiffness  RESPIRATORY: No cough, wheezing, hemoptysis; No shortness of breath  CARDIOVASCULAR: No chest pain or palpitations  GASTROINTESTINAL: No abdominal or epigastric pain. No nausea, vomiting, or hematemesis; No diarrhea or constipation. No melena or hematochezia.  GENITOURINARY: No dysuria, frequency or hematuria  NEUROLOGICAL: + numbness or weakness  SKIN: No itching, burning, rashes, or lesions   All other review of systems is negative unless indicated above.    MEDICATIONS:  MEDICATIONS  (STANDING):  amLODIPine   Tablet 5 milliGRAM(s) Oral daily  artificial tears (preservative free) Ophthalmic Solution 1 Drop(s) Both EYES daily  aspirin  chewable 81 milliGRAM(s) Oral daily  atorvastatin 80 milliGRAM(s) Oral at bedtime  enoxaparin Injectable 40 milliGRAM(s) SubCutaneous daily  ferrous    sulfate Liquid 300 milliGRAM(s) Oral daily  gabapentin   Solution 100 milliGRAM(s) Oral three times a day  influenza   Vaccine 0.5 milliLiter(s) IntraMuscular once  lisinopril 5 milliGRAM(s) Oral <User Schedule>  metoprolol tartrate 25 milliGRAM(s) Oral two times a day      PHYSICAL EXAM:  T(C): 36.9 (10-26-18 @ 11:59), Max: 36.9 (10-26-18 @ 11:59)  HR: 71 (10-26-18 @ 11:59) (63 - 80)  BP: 148/81 (10-26-18 @ 11:59) (112/65 - 148/81)  RR: 18 (10-26-18 @ 11:59) (18 - 19)  SpO2: 96% (10-26-18 @ 11:59) (96% - 100%)  Wt(kg): --  I&O's Summary    25 Oct 2018 07:01  -  26 Oct 2018 07:00  --------------------------------------------------------  IN: 300 mL / OUT: 1350 mL / NET: -1050 mL          Appearance: NAD aphasic   HEENT:   Dry oral mucosa, +NGT 	  Lymphatic: No lymphadenopathy , no edema  Cardiovascular: Normal S1 S2, No JVD, No murmurs , Peripheral pulses palpable 2+ bilaterally  Respiratory: Lungs clear to auscultation, normal effort 	  Gastrointestinal:  Soft, Non-tender, + BS	  Skin: No rashes, No ecchymoses, No cyanosis, warm to touch  Musculoskeletal: Decreased range of motion and  strength  Psychiatry:  lethargic   Ext: No edema  NEUROLOGICAL EXAM:  Mental status: Awake, lethargic, not following commands, no verbal output  Cranial Nerves: Right facial droop  Motor exam:  RUE- plegic, increased tone, RLE - withdraws to noxious stimuli, LUE - minimal movement,  LLE - moving freely  Sensation: Not assessed      LABS:    CARDIAC MARKERS:                                10.1   10.6  )-----------( 248      ( 26 Oct 2018 07:41 )             30.8     10-26    145  |  114<H>  |  24<H>  ----------------------------<  94  4.0   |  22  |  0.73    Ca    8.2<L>      26 Oct 2018 07:41      proBNP:   Lipid Profile:   HgA1c:   TSH:     1          TELEMETRY: 	    ECG:  	  RADIOLOGY:   DIAGNOSTIC TESTING:  [ ] Echocardiogram:  [ ]  Catheterization:  [ ] Stress Test:    OTHER:

## 2018-10-27 LAB
CULTURE RESULTS: NO GROWTH — SIGNIFICANT CHANGE UP
SPECIMEN SOURCE: SIGNIFICANT CHANGE UP

## 2018-10-27 PROCEDURE — 99233 SBSQ HOSP IP/OBS HIGH 50: CPT

## 2018-10-27 RX ORDER — BRIMONIDINE TARTRATE 2 MG/MG
1 SOLUTION/ DROPS OPHTHALMIC
Qty: 0 | Refills: 0 | Status: DISCONTINUED | OUTPATIENT
Start: 2018-10-27 | End: 2018-11-01

## 2018-10-27 RX ORDER — DORZOLAMIDE HYDROCHLORIDE TIMOLOL MALEATE 20; 5 MG/ML; MG/ML
1 SOLUTION/ DROPS OPHTHALMIC
Qty: 0 | Refills: 0 | Status: DISCONTINUED | OUTPATIENT
Start: 2018-10-27 | End: 2018-11-01

## 2018-10-27 RX ADMIN — Medication 25 MILLIGRAM(S): at 11:56

## 2018-10-27 RX ADMIN — LISINOPRIL 5 MILLIGRAM(S): 2.5 TABLET ORAL at 17:00

## 2018-10-27 RX ADMIN — AMLODIPINE BESYLATE 5 MILLIGRAM(S): 2.5 TABLET ORAL at 05:11

## 2018-10-27 RX ADMIN — GABAPENTIN 100 MILLIGRAM(S): 400 CAPSULE ORAL at 14:59

## 2018-10-27 RX ADMIN — Medication 500000 UNIT(S): at 05:12

## 2018-10-27 RX ADMIN — DORZOLAMIDE HYDROCHLORIDE TIMOLOL MALEATE 1 DROP(S): 20; 5 SOLUTION/ DROPS OPHTHALMIC at 17:00

## 2018-10-27 RX ADMIN — BRIMONIDINE TARTRATE 1 DROP(S): 2 SOLUTION/ DROPS OPHTHALMIC at 17:00

## 2018-10-27 RX ADMIN — Medication 500000 UNIT(S): at 17:00

## 2018-10-27 RX ADMIN — GABAPENTIN 100 MILLIGRAM(S): 400 CAPSULE ORAL at 05:12

## 2018-10-27 RX ADMIN — Medication 81 MILLIGRAM(S): at 11:43

## 2018-10-27 RX ADMIN — Medication 300 MILLIGRAM(S): at 11:43

## 2018-10-27 RX ADMIN — ATORVASTATIN CALCIUM 80 MILLIGRAM(S): 80 TABLET, FILM COATED ORAL at 21:30

## 2018-10-27 RX ADMIN — GABAPENTIN 100 MILLIGRAM(S): 400 CAPSULE ORAL at 21:22

## 2018-10-27 RX ADMIN — Medication 1 DROP(S): at 12:28

## 2018-10-27 RX ADMIN — ENOXAPARIN SODIUM 40 MILLIGRAM(S): 100 INJECTION SUBCUTANEOUS at 11:43

## 2018-10-27 NOTE — PROGRESS NOTE ADULT - SUBJECTIVE AND OBJECTIVE BOX
Interval Events:   s/p peg    MEDICATIONS  (STANDING):  amLODIPine   Tablet 5 milliGRAM(s) Oral daily  aspirin  chewable 81 milliGRAM(s) Oral daily  atorvastatin 80 milliGRAM(s) Oral at bedtime  enoxaparin Injectable 40 milliGRAM(s) SubCutaneous daily  ferrous    sulfate Liquid 300 milliGRAM(s) Oral daily  gabapentin   Solution 100 milliGRAM(s) Oral three times a day  influenza   Vaccine 0.5 milliLiter(s) IntraMuscular once  lisinopril 5 milliGRAM(s) Oral <User Schedule>  metoprolol tartrate 25 milliGRAM(s) Oral two times a day    MEDICATIONS  (PRN):      Allergies    No Known Allergies    Intolerances        Review of Systems: unable to attain.       Vital Signs Last 24 Hrs  T(C): 36.7 (25 Oct 2018 12:00), Max: 36.8 (25 Oct 2018 08:18)  T(F): 98 (25 Oct 2018 12:00), Max: 98.2 (25 Oct 2018 08:18)  HR: 65 (25 Oct 2018 12:00) (65 - 88)  BP: 124/85 (25 Oct 2018 12:00) (101/62 - 124/85)  BP(mean): 68 (25 Oct 2018 10:00) (68 - 103)  RR: 18 (25 Oct 2018 12:00) (17 - 24)  SpO2: 99% (25 Oct 2018 12:00) (97% - 100%)    PHYSICAL EXAM:    GENERAL:  Appears stated age, well-groomed, well-nourished, no distress  HEENT:  NC/AT,  conjunctivae clear and pink, no thyromegaly, nodules, adenopathy, no JVD, sclera -anicteric, + NGT  CHEST:  Full & symmetric excursion, no increased effort, breath sounds clear  HEART:  Regular rhythm, S1, S2, no murmur/rub/S3/S4, no abdominal bruit, no edema  ABDOMEN:  Soft, non-tender, non-distended, normoactive bowel sounds,  no masses ,no hepato-splenomegaly, no signs of chronic liver disease  EXTEREMITIES:  no cyanosis,clubbing or edema  SKIN:  No rash/erythema/ecchymoses/petechiae/wounds/abscess/warm/dry  NEURO:  awake, not following commands, minimal verbal output      LABS:                        9.8    10.4  )-----------( 244      ( 25 Oct 2018 00:11 )             31.4     10-25    148<H>  |  119<H>  |  35<H>  ----------------------------<  134<H>  3.7   |  18<L>  |  0.82    Ca    8.1<L>      25 Oct 2018 00:11  Mg     2.1     10-24            RADIOLOGY & ADDITIONAL TESTS:

## 2018-10-27 NOTE — PROGRESS NOTE ADULT - SUBJECTIVE AND OBJECTIVE BOX
THE PATIENT WAS SEEN AND EXAMINED BY ME WITH THE HOUSESTAFF AND STROKE TEAM DURING MORNING ROUNDS.   HPI:    86y/o female with vascular risk factors of HTN and hyperlipidemia, who was last seen normal at 22:10, on the day prior to admission. Then she was found by family members at the floor with inability to communicate or move her right side at 22:20. She was initially evaluated at [place] were NIHSS=29 deemed candidate for tPA. 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 Patient was transferred for further management to [facility]. She received IVtpa per protocol and transferred to NS after CTA revealed L ICA and L M1 occlusion. She underwent thrombectomy with resultant TICI 2B flow.     SUBJECTIVE: No events overnight.  No new neurologic complaints.      amLODIPine   Tablet 5 milliGRAM(s) Oral daily  artificial tears (preservative free) Ophthalmic Solution 1 Drop(s) Both EYES daily  aspirin  chewable 81 milliGRAM(s) Oral daily  atorvastatin 80 milliGRAM(s) Oral at bedtime  enoxaparin Injectable 40 milliGRAM(s) SubCutaneous daily  ferrous    sulfate Liquid 300 milliGRAM(s) Oral daily  gabapentin   Solution 100 milliGRAM(s) Oral three times a day  influenza   Vaccine 0.5 milliLiter(s) IntraMuscular once  lisinopril 5 milliGRAM(s) Oral <User Schedule>  metoprolol tartrate 25 milliGRAM(s) Oral two times a day  metoprolol tartrate Injectable 5 milliGRAM(s) IV Push every 6 hours PRN  nystatin    Suspension 007879 Unit(s) Oral two times a day    PHYSICAL EXAM:   Vital Signs Last 24 Hrs  T(C): 37.2 (27 Oct 2018 04:48), Max: 37.2 (27 Oct 2018 04:48)  T(F): 98.9 (27 Oct 2018 04:48), Max: 98.9 (27 Oct 2018 04:48)  HR: 64 (27 Oct 2018 04:48) (64 - 80)  BP: 131/83 (27 Oct 2018 04:48) (131/83 - 153/68)  RR: 17 (27 Oct 2018 04:48) (17 - 18)  SpO2: 95% (27 Oct 2018 04:48) (95% - 96%)    General: No acute distress  HEENT: unable to cooperate, no BTT on right, slight gaze deviation to the left  Abdomen: Soft, nontender, nondistended   Extremities: No edema    NEUROLOGICAL EXAM:  Mental status: eyes open, generates some unintelligible sounds, improving language and cognitive functions and more attentive today  Cranial Nerves: Moderate-Severe right nasolabial flattening, no Blink to threat on right visual field, right gaze palsy, unable to cross pass midline, no definite dysarthria but almost no verbal output  Motor exam: LUE: good effort against gravity, LLE : spontaneous movements, minimal effort against gravity, RUE: no movement, RLE: externally rotated, no volitional movement-triple flexion only, minimally increased tone in RUE/RLE.     Sensation: unable to assess    Coordination/ Gait: deferred    LABS:                        10.1   10.6  )-----------( 248      ( 26 Oct 2018 07:41 )             30.8    10-26    145  |  114<H>  |  24<H>  ----------------------------<  94  4.0   |  22  |  0.73    Ca    8.2<L>      26 Oct 2018 07:41    Hemoglobin A1C, Whole Blood: 6.0 % (10-19 @ 07:53)    IMAGING: Reviewed by me.     MRI Head No Cont (10/20/18):  Acute hemorrhagic infarct involving the left MCA territory with mass effect on the left lateral ventricle and left to right shift.    CT Head No Cont (10/19/18) @ 23:21:   Redemonstration of large acute left MCA territory infarct with similar mass effect upon the left lateral ventricle and similar rightward midline shift of 4 mm. Ventricles stable in size. No hydrocephalus. Previously seen increased density throughout the region of infarction is less apparent on the current examination, consistent with resolving contrast staining after digital subtraction angiography. 2.1 x 1.6 cm region of persistent increased density in the left lateral frontotemporal region may represent hemorrhagic transformation of the infarct.    CT Head No Cont (10/19/18) @ 05:52:   Large acute left MCA territory infarct. Increased density seen throughout the left basal ganglia and left lateral frontal, parietal, and temporal cortices likely represents a combination of hemorrhagic transformation and cortical staining from recent digital subtraction angiography. Additional density seen within the left cerebral sulci likely representing subarachnoid hemorrhage. Mass effect upon the left lateral ventricle and rightward midline shift of 4 mm. No large hydrocephalus. THE PATIENT WAS SEEN AND EXAMINED BY ME WITH THE HOUSESTAFF AND STROKE TEAM DURING MORNING ROUNDS.   HPI:    88y/o female with vascular risk factors of HTN and hyperlipidemia, who was last seen normal at 22:10, on the day prior to admission. Then she was found by family members at the floor with inability to communicate or move her right side at 22:20. She was initially evaluated at [place] were NIHSS=29 deemed candidate for tPA. 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 Patient was transferred for further management to [facility]. She received IVtpa per protocol and transferred to NS after CTA revealed L ICA and L M1 occlusion. She underwent thrombectomy with resultant TICI 2B flow.     SUBJECTIVE: No events overnight.  No new neurologic complaints.      amLODIPine   Tablet 5 milliGRAM(s) Oral daily  artificial tears (preservative free) Ophthalmic Solution 1 Drop(s) Both EYES daily  aspirin  chewable 81 milliGRAM(s) Oral daily  atorvastatin 80 milliGRAM(s) Oral at bedtime  enoxaparin Injectable 40 milliGRAM(s) SubCutaneous daily  ferrous    sulfate Liquid 300 milliGRAM(s) Oral daily  gabapentin   Solution 100 milliGRAM(s) Oral three times a day  influenza   Vaccine 0.5 milliLiter(s) IntraMuscular once  lisinopril 5 milliGRAM(s) Oral <User Schedule>  metoprolol tartrate 25 milliGRAM(s) Oral two times a day  metoprolol tartrate Injectable 5 milliGRAM(s) IV Push every 6 hours PRN  nystatin    Suspension 503002 Unit(s) Oral two times a day    PHYSICAL EXAM:   Vital Signs Last 24 Hrs  T(C): 37.2 (27 Oct 2018 04:48), Max: 37.2 (27 Oct 2018 04:48)  T(F): 98.9 (27 Oct 2018 04:48), Max: 98.9 (27 Oct 2018 04:48)  HR: 64 (27 Oct 2018 04:48) (64 - 80)  BP: 131/83 (27 Oct 2018 04:48) (131/83 - 153/68)  RR: 17 (27 Oct 2018 04:48) (17 - 18)  SpO2: 95% (27 Oct 2018 04:48) (95% - 96%)    General: No acute distress  HEENT: unable to cooperate, no BTT on right, slight gaze deviation to the left  Abdomen: Soft, nontender, nondistended   Extremities: No edema    NEUROLOGICAL EXAM:  Mental status: eyes opened, generates some 1-2 words and unintelligible sounds, improving language and cognitive functions and more attentive, follows some commands  Cranial Nerves: Moderate-Severe right nasolabial flattening, no Blink to threat on right visual field, right gaze palsy, unable to cross pass midline, no definite dysarthria but almost no verbal output  Motor exam: LUE: good effort against gravity, LLE : spontaneous movements, minimal effort against gravity, RUE: no movement, RLE: externally rotated, no volitional movement-triple flexion only, minimally increased tone in RUE/RLE.     Sensation: unable to assess    Coordination/ Gait: deferred    LABS:                        10.1   10.6  )-----------( 248      ( 26 Oct 2018 07:41 )             30.8    10-26    145  |  114<H>  |  24<H>  ----------------------------<  94  4.0   |  22  |  0.73    Ca    8.2<L>      26 Oct 2018 07:41    Hemoglobin A1C, Whole Blood: 6.0 % (10-19 @ 07:53)    IMAGING: Reviewed by me.     MRI Head No Cont (10/20/18):  Acute hemorrhagic infarct involving the left MCA territory with mass effect on the left lateral ventricle and left to right shift.    CT Head No Cont (10/19/18) @ 23:21:   Redemonstration of large acute left MCA territory infarct with similar mass effect upon the left lateral ventricle and similar rightward midline shift of 4 mm. Ventricles stable in size. No hydrocephalus. Previously seen increased density throughout the region of infarction is less apparent on the current examination, consistent with resolving contrast staining after digital subtraction angiography. 2.1 x 1.6 cm region of persistent increased density in the left lateral frontotemporal region may represent hemorrhagic transformation of the infarct.    CT Head No Cont (10/19/18) @ 05:52:   Large acute left MCA territory infarct. Increased density seen throughout the left basal ganglia and left lateral frontal, parietal, and temporal cortices likely represents a combination of hemorrhagic transformation and cortical staining from recent digital subtraction angiography. Additional density seen within the left cerebral sulci likely representing subarachnoid hemorrhage. Mass effect upon the left lateral ventricle and rightward midline shift of 4 mm. No large hydrocephalus.

## 2018-10-27 NOTE — PROGRESS NOTE ADULT - SUBJECTIVE AND OBJECTIVE BOX
Subjective: Patient seen and examined. No new events except as noted.     REVIEW OF SYSTEMS:    CONSTITUTIONAL: + weakness, fevers or chills  EYES/ENT: No visual changes;  No vertigo or throat pain   NECK: No pain or stiffness  RESPIRATORY: No cough, wheezing, hemoptysis; No shortness of breath  CARDIOVASCULAR: No chest pain or palpitations  GASTROINTESTINAL: No abdominal or epigastric pain. No nausea, vomiting, or hematemesis; No diarrhea or constipation. No melena or hematochezia.  GENITOURINARY: No dysuria, frequency or hematuria  NEUROLOGICAL: No numbness or weakness  SKIN: No itching, burning, rashes, or lesions   All other review of systems is negative unless indicated above.    MEDICATIONS:  MEDICATIONS  (STANDING):  amLODIPine   Tablet 5 milliGRAM(s) Oral daily  artificial tears (preservative free) Ophthalmic Solution 1 Drop(s) Both EYES daily  aspirin  chewable 81 milliGRAM(s) Oral daily  atorvastatin 80 milliGRAM(s) Oral at bedtime  brimonidine 0.2% Ophthalmic Solution 1 Drop(s) Both EYES two times a day  dorzolamide 2%/timolol 0.5% Ophthalmic Solution 1 Drop(s) Both EYES two times a day  enoxaparin Injectable 40 milliGRAM(s) SubCutaneous daily  ferrous    sulfate Liquid 300 milliGRAM(s) Oral daily  gabapentin   Solution 100 milliGRAM(s) Oral three times a day  influenza   Vaccine 0.5 milliLiter(s) IntraMuscular once  lisinopril 5 milliGRAM(s) Oral <User Schedule>  metoprolol tartrate 25 milliGRAM(s) Oral two times a day  nystatin    Suspension [id number] Unit(s) Oral two times a day      PHYSICAL EXAM:  T(C): 36.6 (10-27-18 @ 20:35), Max: 37.2 (10-27-18 @ 04:48)  HR: 76 (10-27-18 @ 20:35) (62 - 97)  BP: 113/68 (10-27-18 @ 20:35) (110/72 - 142/83)  RR: 18 (10-27-18 @ 20:35) (17 - 18)  SpO2: 97% (10-27-18 @ 20:35) (95% - 97%)  Wt(kg): --  I&O's Summary    26 Oct 2018 07:01  -  27 Oct 2018 07:00  --------------------------------------------------------  IN: 0 mL / OUT: 2400 mL / NET: -2400 mL    27 Oct 2018 07:01  -  27 Oct 2018 21:04  --------------------------------------------------------  IN: 320 mL / OUT: 850 mL / NET: -530 mL          Appearance: NAD aphasic   HEENT:   Dry oral mucosa, +NGT 	  Lymphatic: No lymphadenopathy , no edema  Cardiovascular: Normal S1 S2, No JVD, No murmurs , Peripheral pulses palpable 2+ bilaterally  Respiratory: Lungs clear to auscultation, normal effort 	  Gastrointestinal:  Soft, Non-tender, + BS	  Skin: No rashes, No ecchymoses, No cyanosis, warm to touch  Musculoskeletal: Decreased range of motion and  strength  Psychiatry:  lethargic   Ext: No edema  NEUROLOGICAL EXAM:  Mental status: Awake, lethargic, not following commands, no verbal output  Cranial Nerves: Right facial droop  Motor exam:  RUE- plegic, increased tone, RLE - withdraws to noxious stimuli, LUE - minimal movement,  LLE - moving freely  Sensation: Not assessed      LABS:    CARDIAC MARKERS:                                10.1   10.6  )-----------( 248      ( 26 Oct 2018 07:41 )             30.8     10-26    145  |  114<H>  |  24<H>  ----------------------------<  94  4.0   |  22  |  0.73    Ca    8.2<L>      26 Oct 2018 07:41      proBNP:   Lipid Profile:   HgA1c:   TSH:     1          TELEMETRY: 	    ECG:  	  RADIOLOGY:   DIAGNOSTIC TESTING:  [ ] Echocardiogram:  [ ]  Catheterization:  [ ] Stress Test:    OTHER:

## 2018-10-27 NOTE — PROGRESS NOTE ADULT - ASSESSMENT
88y/o female with vascular risk factors of HTN and hyperlipidemia, who was last seen normal at 22:10, on the day prior to admission. Then she was found by family members at the floor with inability to communicate or move her right side at 22:20. She was initially evaluated at [place] were NIHSS=29 deemed candidate for tPA. 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 Patient was transferred for further management to [facility]. She received IVtpa per protocol and transferred to [facility] after CTA revealed L ICA and L M1 occlusion. She underwent thrombectomy with resultant TICI 2B flow.     Impression: neurologically slightly  improving in language and cognitive function. 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 MRI Brain w/o - results as noted above.  Physical therapy/OT recommends acute rehab.     ANTITHROMBOTIC THERAPY: Aspirin for secondary stroke prevention    PULMONARY: CXR (10/23)shows bilateral pleural effusions- no overt sign of HF noted, lungs CTA B/L, protecting airway, saturating well     CARDIOVASCULAR: TTE shows EF: 55-60%, Agitated saline injection revealed few late bubbles in the left heart, consistent with transpulmonic shunting, LE doppler: no DVT. Previously noted with afib- now deemed to be NSR with PAC's s/p ILR placement. Continue current cardiac meds, Dr Chand (cardiology) consult appreciated                          SBP goal: 140     GASTROINTESTINAL:  dysphagia screen failed, speech and swallow eval recommended NPO for now, re-evaluated on 10/23-still recommended NPO. HCP agreeable to PEG. GI consulted s/p EGD- no specimens collected-PEG tube placed- tolerating feedings.     Diet: TF via PEG    RENAL: BUN/Cr without acute change previously, good urine output - continue straight cath protocol;     Na Goal: Greater than 135     Mendez: No    HEMATOLOGY: no signs or symptoms of bleeding      DVT ppx: LMWH     ID: afebrile, slight leukocytosis previously, BCx x 2 NGTD, UA - negative, and chest X-ray shows no evidence of PNA, Procalcitonin within normal limits, ID consulted and recommended to d/c CTX and observe off Abx. Will continue to monitor closely.     OTHER: Plan discussed with family at bedside, all questions and concerns addressed.     DISPOSITION: Rehab    CORE MEASURES:        Admission NIHSS: 29     TPA:  YES      LDL/HDL: 100/52      Depression Screen: unable to obtain due to aphasia     Statin Therapy: yes     Dysphagia Screen: FAIL     Smoking NO      Afib YES      Stroke Education  YES, to family 86y/o female with vascular risk factors of HTN and hyperlipidemia, who was last seen normal at 22:10, on the day prior to admission. Then she was found by family members at the floor with inability to communicate or move her right side at 22:20. She was initially evaluated at [place] were NIHSS=29 deemed candidate for tPA. 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 Patient was transferred for further management to [facility]. She received IVtpa per protocol and transferred to [facility] after CTA revealed L ICA and L M1 occlusion. She underwent thrombectomy with resultant TICI 2B flow.     Impression: neurologically slightly  improving in language and cognitive function. 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    NEURO: Neurologically more interactive since admission, Continue monitoring for neurologic deterioration, BP goal of 140/90, continue Atorvastatin 80mg as admission . MRI Brain w/o - results as noted above. Physical therapy/OT recommends acute rehab.     ANTITHROMBOTIC THERAPY: Aspirin for secondary stroke prevention    PULMONARY: CXR (10/23)shows bilateral pleural effusions- no overt sign of HF noted, lungs CTA B/L, protecting airway, saturating well     CARDIOVASCULAR: TTE shows EF: 55-60%, Agitated saline injection revealed few late bubbles in the left heart, consistent with transpulmonic shunting, LE doppler: no DVT. Previously noted with afib- now deemed to be NSR with PAC's s/p ILR placement. Continue current cardiac meds, Dr Chand (cardiology) consult appreciated                          SBP goal: 140     GASTROINTESTINAL:  dysphagia screen failed, speech and swallow eval recommended NPO for now, re-evaluated on 10/23-still recommended NPO. HCP agreeable to PEG. GI consulted s/p EGD- no specimens collected-PEG tube placed- tolerating feedings.     Diet: TF via PEG    RENAL: BUN/Cr without acute change previously, good urine output - continue straight cath protocol;     Na Goal: Greater than 135     Mendez: No    HEMATOLOGY: no signs or symptoms of bleeding      DVT ppx: LMWH     ID: afebrile, slight leukocytosis previously, BCx x 2 NGTD, UA - negative, and chest X-ray shows no evidence of PNA, Procalcitonin within normal limits, ID consulted and recommended to d/c CTX and observe off Abx. Will continue to monitor closely.     OTHER: Plan discussed with family at bedside, all questions and concerns addressed.     DISPOSITION: Rehab    CORE MEASURES:        Admission NIHSS: 29     TPA:  YES      LDL/HDL: 100/52      Depression Screen: unable to obtain due to aphasia     Statin Therapy: yes     Dysphagia Screen: FAIL     Smoking NO      Afib YES      Stroke Education  YES, to family 88y/o female with vascular risk factors of HTN and hyperlipidemia, who was last seen normal at 22:10, on the day prior to admission. Then she was found by family members at the floor with inability to communicate or move her right side at 22:20. She was initially evaluated at [place] were NIHSS=29 deemed candidate for tPA. 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 Patient was transferred for further management to [facility]. She received IVtpa per protocol and transferred to [facility] after CTA revealed L ICA and L M1 occlusion. She underwent thrombectomy with resultant TICI 2B flow.     Impression: neurologically slightly  improving in language and cognitive function. 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    NEURO: Neurologically more interactive since admission, Continue monitoring for neurologic deterioration, BP goal of 140/90, continue Atorvastatin 80mg as admission . MRI Brain w/o - results as noted above. Physical therapy/OT recommends acute rehab.     ANTITHROMBOTIC THERAPY: Aspirin for secondary stroke prevention    PULMONARY: CXR (10/23)shows bilateral pleural effusions- no overt sign of HF noted, lungs CTA B/L, protecting airway, saturating well     CARDIOVASCULAR: TTE shows EF: 55-60%, Agitated saline injection revealed few late bubbles in the left heart, consistent with transpulmonic shunting, LE doppler: no DVT. Initially thought to have afib- now deemed to be NSR with PAC's s/p ILR placement. Continue current cardiac meds, Dr Chand (cardiology) consult appreciated                          SBP goal: 140     GASTROINTESTINAL:  dysphagia screen failed, speech and swallow eval recommended NPO for now, re-evaluated on 10/23-still recommended NPO. HCP agreeable to PEG. GI consulted s/p EGD- no specimens collected-PEG tube placed- tolerating feedings.     Diet: TF via PEG    RENAL: BUN/Cr without acute change previously, good urine output - continue straight cath protocol;     Na Goal: Greater than 135     Mendez: No    HEMATOLOGY: no signs or symptoms of bleeding      DVT ppx: LMWH     ID: afebrile, slight leukocytosis previously, BCx x 2 NGTD, UA - negative, and chest X-ray shows no evidence of PNA, Procalcitonin within normal limits, ID consulted and recommended to d/c CTX and observe off Abx. Will continue to monitor closely.     OTHER: Plan discussed with family at bedside, all questions and concerns addressed.     DISPOSITION: Rehab    CORE MEASURES:        Admission NIHSS: 29     TPA:  YES      LDL/HDL: 100/52      Depression Screen: unable to obtain due to aphasia     Statin Therapy: yes     Dysphagia Screen: FAIL     Smoking NO      Afib YES      Stroke Education  YES, to family

## 2018-10-28 LAB
ANION GAP SERPL CALC-SCNC: 9 MMOL/L — SIGNIFICANT CHANGE UP (ref 5–17)
BASOPHILS # BLD AUTO: 0.03 K/UL — SIGNIFICANT CHANGE UP (ref 0–0.2)
BASOPHILS NFR BLD AUTO: 0.2 % — SIGNIFICANT CHANGE UP (ref 0–2)
BUN SERPL-MCNC: 23 MG/DL — SIGNIFICANT CHANGE UP (ref 7–23)
CALCIUM SERPL-MCNC: 8.5 MG/DL — SIGNIFICANT CHANGE UP (ref 8.4–10.5)
CHLORIDE SERPL-SCNC: 106 MMOL/L — SIGNIFICANT CHANGE UP (ref 96–108)
CO2 SERPL-SCNC: 24 MMOL/L — SIGNIFICANT CHANGE UP (ref 22–31)
CREAT SERPL-MCNC: 0.76 MG/DL — SIGNIFICANT CHANGE UP (ref 0.5–1.3)
EOSINOPHIL # BLD AUTO: 0.21 K/UL — SIGNIFICANT CHANGE UP (ref 0–0.5)
EOSINOPHIL NFR BLD AUTO: 1.7 % — SIGNIFICANT CHANGE UP (ref 0–6)
GLUCOSE BLDC GLUCOMTR-MCNC: 133 MG/DL — HIGH (ref 70–99)
GLUCOSE BLDC GLUCOMTR-MCNC: 147 MG/DL — HIGH (ref 70–99)
GLUCOSE BLDC GLUCOMTR-MCNC: 73 MG/DL — SIGNIFICANT CHANGE UP (ref 70–99)
GLUCOSE SERPL-MCNC: 167 MG/DL — HIGH (ref 70–99)
HCT VFR BLD CALC: 33.9 % — LOW (ref 34.5–45)
HGB BLD-MCNC: 10.9 G/DL — LOW (ref 11.5–15.5)
IMM GRANULOCYTES NFR BLD AUTO: 0.4 % — SIGNIFICANT CHANGE UP (ref 0–1.5)
LYMPHOCYTES # BLD AUTO: 1.15 K/UL — SIGNIFICANT CHANGE UP (ref 1–3.3)
LYMPHOCYTES # BLD AUTO: 9.1 % — LOW (ref 13–44)
MCHC RBC-ENTMCNC: 30.1 PG — SIGNIFICANT CHANGE UP (ref 27–34)
MCHC RBC-ENTMCNC: 32.2 GM/DL — SIGNIFICANT CHANGE UP (ref 32–36)
MCV RBC AUTO: 93.6 FL — SIGNIFICANT CHANGE UP (ref 80–100)
MONOCYTES # BLD AUTO: 0.82 K/UL — SIGNIFICANT CHANGE UP (ref 0–0.9)
MONOCYTES NFR BLD AUTO: 6.5 % — SIGNIFICANT CHANGE UP (ref 2–14)
NEUTROPHILS # BLD AUTO: 10.43 K/UL — HIGH (ref 1.8–7.4)
NEUTROPHILS NFR BLD AUTO: 82.1 % — HIGH (ref 43–77)
PLATELET # BLD AUTO: 273 K/UL — SIGNIFICANT CHANGE UP (ref 150–400)
POTASSIUM SERPL-MCNC: 3.9 MMOL/L — SIGNIFICANT CHANGE UP (ref 3.5–5.3)
POTASSIUM SERPL-SCNC: 3.9 MMOL/L — SIGNIFICANT CHANGE UP (ref 3.5–5.3)
RBC # BLD: 3.62 M/UL — LOW (ref 3.8–5.2)
RBC # FLD: 14.5 % — SIGNIFICANT CHANGE UP (ref 10.3–14.5)
SODIUM SERPL-SCNC: 139 MMOL/L — SIGNIFICANT CHANGE UP (ref 135–145)
WBC # BLD: 12.69 K/UL — HIGH (ref 3.8–10.5)
WBC # FLD AUTO: 12.69 K/UL — HIGH (ref 3.8–10.5)

## 2018-10-28 PROCEDURE — 99233 SBSQ HOSP IP/OBS HIGH 50: CPT

## 2018-10-28 RX ORDER — GABAPENTIN 400 MG/1
100 CAPSULE ORAL THREE TIMES A DAY
Qty: 0 | Refills: 0 | Status: DISCONTINUED | OUTPATIENT
Start: 2018-10-28 | End: 2018-11-01

## 2018-10-28 RX ADMIN — ATORVASTATIN CALCIUM 80 MILLIGRAM(S): 80 TABLET, FILM COATED ORAL at 22:23

## 2018-10-28 RX ADMIN — Medication 500000 UNIT(S): at 18:15

## 2018-10-28 RX ADMIN — ENOXAPARIN SODIUM 40 MILLIGRAM(S): 100 INJECTION SUBCUTANEOUS at 11:47

## 2018-10-28 RX ADMIN — GABAPENTIN 100 MILLIGRAM(S): 400 CAPSULE ORAL at 05:21

## 2018-10-28 RX ADMIN — Medication 25 MILLIGRAM(S): at 11:47

## 2018-10-28 RX ADMIN — GABAPENTIN 100 MILLIGRAM(S): 400 CAPSULE ORAL at 22:23

## 2018-10-28 RX ADMIN — GABAPENTIN 100 MILLIGRAM(S): 400 CAPSULE ORAL at 16:20

## 2018-10-28 RX ADMIN — Medication 25 MILLIGRAM(S): at 00:10

## 2018-10-28 RX ADMIN — BRIMONIDINE TARTRATE 1 DROP(S): 2 SOLUTION/ DROPS OPHTHALMIC at 05:21

## 2018-10-28 RX ADMIN — DORZOLAMIDE HYDROCHLORIDE TIMOLOL MALEATE 1 DROP(S): 20; 5 SOLUTION/ DROPS OPHTHALMIC at 05:21

## 2018-10-28 RX ADMIN — AMLODIPINE BESYLATE 5 MILLIGRAM(S): 2.5 TABLET ORAL at 05:21

## 2018-10-28 RX ADMIN — Medication 1 DROP(S): at 11:47

## 2018-10-28 RX ADMIN — BRIMONIDINE TARTRATE 1 DROP(S): 2 SOLUTION/ DROPS OPHTHALMIC at 18:15

## 2018-10-28 RX ADMIN — Medication 81 MILLIGRAM(S): at 11:47

## 2018-10-28 RX ADMIN — Medication 25 MILLIGRAM(S): at 22:24

## 2018-10-28 RX ADMIN — Medication 500000 UNIT(S): at 05:21

## 2018-10-28 RX ADMIN — LISINOPRIL 5 MILLIGRAM(S): 2.5 TABLET ORAL at 18:16

## 2018-10-28 RX ADMIN — DORZOLAMIDE HYDROCHLORIDE TIMOLOL MALEATE 1 DROP(S): 20; 5 SOLUTION/ DROPS OPHTHALMIC at 18:15

## 2018-10-28 RX ADMIN — Medication 300 MILLIGRAM(S): at 11:47

## 2018-10-28 NOTE — PROGRESS NOTE ADULT - SUBJECTIVE AND OBJECTIVE BOX
THE PATIENT WAS SEEN AND EXAMINED BY ME WITH THE HOUSESTAFF AND STROKE TEAM DURING MORNING ROUNDS.   HPI:    88y/o female with vascular risk factors of HTN and hyperlipidemia, who was last seen normal at 22:10, on the day prior to admission. Then she was found by family members at the floor with inability to communicate or move her right side at 22:20. She was initially evaluated at [place] were NIHSS=29 deemed candidate for tPA. 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 Patient was transferred for further management to [facility]. She received IVtpa per protocol and transferred to NS after CTA revealed L ICA and L M1 occlusion. She underwent thrombectomy with resultant TICI 2B flow.     SUBJECTIVE: No events overnight.  No new neurologic complaints.      amLODIPine   Tablet 5 milliGRAM(s) Oral daily  artificial tears (preservative free) Ophthalmic Solution 1 Drop(s) Both EYES daily  aspirin  chewable 81 milliGRAM(s) Oral daily  atorvastatin 80 milliGRAM(s) Oral at bedtime  brimonidine 0.2% Ophthalmic Solution 1 Drop(s) Both EYES two times a day  dorzolamide 2%/timolol 0.5% Ophthalmic Solution 1 Drop(s) Both EYES two times a day  enoxaparin Injectable 40 milliGRAM(s) SubCutaneous daily  ferrous    sulfate Liquid 300 milliGRAM(s) Oral daily  gabapentin   Solution 100 milliGRAM(s) Oral three times a day  influenza   Vaccine 0.5 milliLiter(s) IntraMuscular once  lisinopril 5 milliGRAM(s) Oral <User Schedule>  metoprolol tartrate 25 milliGRAM(s) Oral two times a day  metoprolol tartrate Injectable 5 milliGRAM(s) IV Push every 6 hours PRN  nystatin    Suspension 566509 Unit(s) Oral two times a day      PHYSICAL EXAM:   Vital Signs Last 24 Hrs  T(C): 37.2 (28 Oct 2018 05:03), Max: 37.2 (28 Oct 2018 05:03)  T(F): 99 (28 Oct 2018 05:03), Max: 99 (28 Oct 2018 05:03)  HR: 102 (28 Oct 2018 05:03) (57 - 102)  BP: 142/78 (28 Oct 2018 05:03) (110/72 - 142/78)  BP(mean): --  RR: 18 (28 Oct 2018 05:03) (18 - 18)  SpO2: 96% (28 Oct 2018 05:03) (94% - 97%)    General: No acute distress  HEENT: unable to cooperate, no BTT on right, slight gaze deviation to the left  Abdomen: Soft, nontender, nondistended   Extremities: No edema    NEUROLOGICAL EXAM:  Mental status: eyes opened, generates some 1-2 words and unintelligible sounds, improving language and cognitive functions and more attentive, follows some commands  Cranial Nerves: Moderate-Severe right nasolabial flattening, no Blink to threat on right visual field, right gaze palsy, unable to cross pass midline, no definite dysarthria but almost no verbal output  Motor exam: LUE: good effort against gravity, LLE : spontaneous movements, minimal effort against gravity, RUE: no movement, RLE: externally rotated, no volitional movement-triple flexion only, minimally increased tone in RUE/RLE.     Sensation: unable to assess    Coordination/ Gait: deferred    LABS:   10-28    139  |  106  |  23  ----------------------------<  167<H>  3.9   |  24  |  0.76    Ca    8.5      28 Oct 2018 06:40    Hemoglobin A1C, Whole Blood: 6.0 % (10-19 @ 07:53)    IMAGING: Reviewed by me.     MRI Head No Cont (10/20/18):  Acute hemorrhagic infarct involving the left MCA territory with mass effect on the left lateral ventricle and left to right shift.    CT Head No Cont (10/19/18) @ 23:21:   Redemonstration of large acute left MCA territory infarct with similar mass effect upon the left lateral ventricle and similar rightward midline shift of 4 mm. Ventricles stable in size. No hydrocephalus. Previously seen increased density throughout the region of infarction is less apparent on the current examination, consistent with resolving contrast staining after digital subtraction angiography. 2.1 x 1.6 cm region of persistent increased density in the left lateral frontotemporal region may represent hemorrhagic transformation of the infarct.    CT Head No Cont (10/19/18) @ 05:52:   Large acute left MCA territory infarct. Increased density seen throughout the left basal ganglia and left lateral frontal, parietal, and temporal cortices likely represents a combination of hemorrhagic transformation and cortical staining from recent digital subtraction angiography. Additional density seen within the left cerebral sulci likely representing subarachnoid hemorrhage. Mass effect upon the left lateral ventricle and rightward midline shift of 4 mm. No large hydrocephalus. THE PATIENT WAS SEEN AND EXAMINED BY ME WITH THE HOUSESTAFF AND STROKE TEAM DURING MORNING ROUNDS.   HPI:    86y/o female with vascular risk factors of HTN and hyperlipidemia, who was last seen normal at 22:10, on the day prior to admission. Then she was found by family members at the floor with inability to communicate or move her right side at 22:20. She was initially evaluated at [place] were NIHSS=29 deemed candidate for tPA. 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 Patient was transferred for further management to [facility]. She received IVtpa per protocol and transferred to NS after CTA revealed L ICA and L M1 occlusion. She underwent thrombectomy with resultant TICI 2B flow.     SUBJECTIVE: No events overnight.  No new neurologic complaints.      amLODIPine   Tablet 5 milliGRAM(s) Oral daily  artificial tears (preservative free) Ophthalmic Solution 1 Drop(s) Both EYES daily  aspirin  chewable 81 milliGRAM(s) Oral daily  atorvastatin 80 milliGRAM(s) Oral at bedtime  brimonidine 0.2% Ophthalmic Solution 1 Drop(s) Both EYES two times a day  dorzolamide 2%/timolol 0.5% Ophthalmic Solution 1 Drop(s) Both EYES two times a day  enoxaparin Injectable 40 milliGRAM(s) SubCutaneous daily  ferrous    sulfate Liquid 300 milliGRAM(s) Oral daily  gabapentin   Solution 100 milliGRAM(s) Oral three times a day  influenza   Vaccine 0.5 milliLiter(s) IntraMuscular once  lisinopril 5 milliGRAM(s) Oral <User Schedule>  metoprolol tartrate 25 milliGRAM(s) Oral two times a day  metoprolol tartrate Injectable 5 milliGRAM(s) IV Push every 6 hours PRN  nystatin    Suspension 993670 Unit(s) Oral two times a day      PHYSICAL EXAM:   Vital Signs Last 24 Hrs  T(C): 37.2 (28 Oct 2018 05:03), Max: 37.2 (28 Oct 2018 05:03)  T(F): 99 (28 Oct 2018 05:03), Max: 99 (28 Oct 2018 05:03)  HR: 102 (28 Oct 2018 05:03) (57 - 102)  BP: 142/78 (28 Oct 2018 05:03) (110/72 - 142/78)  BP(mean): --  RR: 18 (28 Oct 2018 05:03) (18 - 18)  SpO2: 96% (28 Oct 2018 05:03) (94% - 97%)    General: No acute distress  HEENT: unable to cooperate, no BTT on right, slight gaze deviation to the left  Abdomen: Soft, nontender, nondistended   Extremities: No edema    NEUROLOGICAL EXAM:  Mental status: eyes opened, generates some 1-2 words and unintelligible sounds, improving language and cognitive functions and more attentive, follows some commands in native language  Cranial Nerves: Moderate-Severe right nasolabial flattening, no Blink to threat on right visual field, right gaze palsy, unable to cross pass midline, no definite dysarthria but almost no verbal output  Motor exam: LUE: good effort against gravity, LLE : spontaneous movements, minimal effort against gravity, normal tone, RUE: no movement, RLE: externally rotated, no volitional movement-triple flexion only, minimally increased tone in RUE/RLE.     Sensation: unable to assess    Coordination/ Gait: deferred    LABS:   10-28    139  |  106  |  23  ----------------------------<  167<H>  3.9   |  24  |  0.76    Ca    8.5      28 Oct 2018 06:40    Hemoglobin A1C, Whole Blood: 6.0 % (10-19 @ 07:53)    IMAGING: Reviewed by me.     MRI Head No Cont (10/20/18):  Acute hemorrhagic infarct involving the left MCA territory with mass effect on the left lateral ventricle and left to right shift.    CT Head No Cont (10/19/18) @ 23:21:   Redemonstration of large acute left MCA territory infarct with similar mass effect upon the left lateral ventricle and similar rightward midline shift of 4 mm. Ventricles stable in size. No hydrocephalus. Previously seen increased density throughout the region of infarction is less apparent on the current examination, consistent with resolving contrast staining after digital subtraction angiography. 2.1 x 1.6 cm region of persistent increased density in the left lateral frontotemporal region may represent hemorrhagic transformation of the infarct.    CT Head No Cont (10/19/18) @ 05:52:   Large acute left MCA territory infarct. Increased density seen throughout the left basal ganglia and left lateral frontal, parietal, and temporal cortices likely represents a combination of hemorrhagic transformation and cortical staining from recent digital subtraction angiography. Additional density seen within the left cerebral sulci likely representing subarachnoid hemorrhage. Mass effect upon the left lateral ventricle and rightward midline shift of 4 mm. No large hydrocephalus. THE PATIENT WAS SEEN AND EXAMINED BY ME WITH THE HOUSESTAFF AND STROKE TEAM DURING MORNING ROUNDS.   HPI:    86y/o female with vascular risk factors of HTN and hyperlipidemia, who was last seen normal at 22:10, on the day prior to admission. Then she was found by family members at the floor with inability to communicate or move her right side at 22:20. She was initially evaluated at [place] were NIHSS=29 deemed candidate for tPA. 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 Patient was transferred for further management to [facility]. She received IVtpa per protocol and transferred to [facility] after CTA revealed L ICA and L M1 occlusion. She underwent thrombectomy with resultant TICI 2B flow.     SUBJECTIVE: No events overnight.  No new neurologic complaints.      amLODIPine   Tablet 5 milliGRAM(s) Oral daily  artificial tears (preservative free) Ophthalmic Solution 1 Drop(s) Both EYES daily  aspirin  chewable 81 milliGRAM(s) Oral daily  atorvastatin 80 milliGRAM(s) Oral at bedtime  brimonidine 0.2% Ophthalmic Solution 1 Drop(s) Both EYES two times a day  dorzolamide 2%/timolol 0.5% Ophthalmic Solution 1 Drop(s) Both EYES two times a day  enoxaparin Injectable 40 milliGRAM(s) SubCutaneous daily  ferrous    sulfate Liquid 300 milliGRAM(s) Oral daily  gabapentin   Solution 100 milliGRAM(s) Oral three times a day  influenza   Vaccine 0.5 milliLiter(s) IntraMuscular once  lisinopril 5 milliGRAM(s) Oral <User Schedule>  metoprolol tartrate 25 milliGRAM(s) Oral two times a day  metoprolol tartrate Injectable 5 milliGRAM(s) IV Push every 6 hours PRN  nystatin    Suspension 188819 Unit(s) Oral two times a day      PHYSICAL EXAM:   Vital Signs Last 24 Hrs  T(C): 37.2 (28 Oct 2018 05:03), Max: 37.2 (28 Oct 2018 05:03)  T(F): 99 (28 Oct 2018 05:03), Max: 99 (28 Oct 2018 05:03)  HR: 102 (28 Oct 2018 05:03) (57 - 102)  BP: 142/78 (28 Oct 2018 05:03) (110/72 - 142/78)  BP(mean): --  RR: 18 (28 Oct 2018 05:03) (18 - 18)  SpO2: 96% (28 Oct 2018 05:03) (94% - 97%)    General: No acute distress  HEENT: unable to cooperate, no BTT on right, slight gaze deviation to the left  Abdomen: Soft, nontender, nondistended   Extremities: No edema    NEUROLOGICAL EXAM:  Mental status: eyes opened, generates some 1-2 words and unintelligible sounds, attentive, follows some commands in native language  Cranial Nerves: Moderate-Severe right nasolabial flattening, no Blink to threat on right visual field, right gaze palsy, unable to cross pass midline, no definite dysarthria but almost no verbal output  Motor exam: LUE: good effort against gravity, LLE : spontaneous movements, minimal effort against gravity, normal tone, RUE: no movement, RLE: externally rotated, no volitional movement-triple flexion only, minimally increased tone in RUE/RLE.     Sensation: unable to assess    Coordination/ Gait: deferred    LABS:   10-28    139  |  106  |  23  ----------------------------<  167<H>  3.9   |  24  |  0.76    Ca    8.5      28 Oct 2018 06:40    Hemoglobin A1C, Whole Blood: 6.0 % (10-19 @ 07:53)    IMAGING: Reviewed by me.     MRI Head No Cont (10/20/18):  Acute hemorrhagic infarct involving the left MCA territory with mass effect on the left lateral ventricle and left to right shift.    CT Head No Cont (10/19/18) @ 23:21:   Redemonstration of large acute left MCA territory infarct with similar mass effect upon the left lateral ventricle and similar rightward midline shift of 4 mm. Ventricles stable in size. No hydrocephalus. Previously seen increased density throughout the region of infarction is less apparent on the current examination, consistent with resolving contrast staining after digital subtraction angiography. 2.1 x 1.6 cm region of persistent increased density in the left lateral frontotemporal region may represent hemorrhagic transformation of the infarct.    CT Head No Cont (10/19/18) @ 05:52:   Large acute left MCA territory infarct. Increased density seen throughout the left basal ganglia and left lateral frontal, parietal, and temporal cortices likely represents a combination of hemorrhagic transformation and cortical staining from recent digital subtraction angiography. Additional density seen within the left cerebral sulci likely representing subarachnoid hemorrhage. Mass effect upon the left lateral ventricle and rightward midline shift of 4 mm. No large hydrocephalus.

## 2018-10-28 NOTE — PROGRESS NOTE ADULT - ATTENDING COMMENTS
agree with above; ROS otherwise negative

## 2018-10-28 NOTE — PROGRESS NOTE ADULT - SUBJECTIVE AND OBJECTIVE BOX
CC: f/u for  fever  Patient reports she is well    REVIEW OF SYSTEMS:  All other review of systems negative (Comprehensive ROS)    Antimicrobials Day #  :  nystatin    Suspension [id number] Unit(s) Oral two times a day    Other Medications Reviewed    T(F): 97.9 (10-28-18 @ 15:28), Max: 99 (10-28-18 @ 05:03)  HR: 69 (10-28-18 @ 18:13)  BP: 117/69 (10-28-18 @ 18:13)  RR: 18 (10-28-18 @ 15:28)  SpO2: 99% (10-28-18 @ 15:28)  Wt(kg): --    PHYSICAL EXAM:  General: alert, no acute distress smiling  Eyes:  anicteric, no conjunctival injection, no discharge  Oropharynx: no lesions or injection 	  Neck: supple, without adenopathy  Lungs: clear to auscultation  Heart: regular rate and rhythm; no murmur, rubs or gallops  Abdomen: soft, nondistended, nontender, without mass or organomegaly, peg slight leak  Skin: no lesions  Extremities: no clubbing, cyanosis, or edema  Neurologic: alert, moves left extremities    LAB RESULTS:                        10.9   12.69 )-----------( 273      ( 28 Oct 2018 08:28 )             33.9     10-28    139  |  106  |  23  ----------------------------<  167<H>  3.9   |  24  |  0.76    Ca    8.5      28 Oct 2018 06:40          MICROBIOLOGY:  RECENT CULTURES:  10-26 @ 08:49 .Urine Catheterized     No growth  Culture - Blood (10.20.18 @ 14:48)    Specimen Source: .Blood Blood    Culture Results:   No growth at 5 days.            RADIOLOGY REVIEWED:  < from: Xray Chest 1 View- PORTABLE-Urgent (10.23.18 @ 17:14) >  IMPRESSION:  Enteric tube, with distal tip in the stomach.    < end of copied text >    < from: VA Duplex Lower Ext Vein Scan, Bilat (10.24.18 @ 15:33) >  IMPRESSION:     No evidence of bilateral lower extremity deep venous thrombosis.    < from: MR Head No Cont (10.20.18 @ 18:38) >  Impression: Acute hemorrhagic infarct involving the left MCA territory   with mass effect on the left lateral ventricle and left to right shift.        < end of copied text >        < end of copied text >    Assessment: patient with cva, s/p thombectomy, had some hemorrhagic transformation, had fever now resolved, s/p peg, mild leukocytosis but otherwise no infection apparent.     Plan: monitor off abx  no ID objection to transfer to rehab

## 2018-10-28 NOTE — PROGRESS NOTE ADULT - SUBJECTIVE AND OBJECTIVE BOX
Subjective: Patient seen and examined. No new events except as noted.   no cp or sob    REVIEW OF SYSTEMS:    CONSTITUTIONAL: + weakness, fevers or chills  EYES/ENT: No visual changes;  No vertigo or throat pain   NECK: No pain or stiffness  RESPIRATORY: No cough, wheezing, hemoptysis; No shortness of breath  CARDIOVASCULAR: No chest pain or palpitations  GASTROINTESTINAL: No abdominal or epigastric pain. No nausea, vomiting, or hematemesis; No diarrhea or constipation. No melena or hematochezia.  GENITOURINARY: No dysuria, frequency or hematuria  NEUROLOGICAL: + numbness or weakness  SKIN: No itching, burning, rashes, or lesions   All other review of systems is negative unless indicated above.    MEDICATIONS:  MEDICATIONS  (STANDING):  amLODIPine   Tablet 5 milliGRAM(s) Oral daily  artificial tears (preservative free) Ophthalmic Solution 1 Drop(s) Both EYES daily  aspirin  chewable 81 milliGRAM(s) Oral daily  atorvastatin 80 milliGRAM(s) Oral at bedtime  brimonidine 0.2% Ophthalmic Solution 1 Drop(s) Both EYES two times a day  dorzolamide 2%/timolol 0.5% Ophthalmic Solution 1 Drop(s) Both EYES two times a day  enoxaparin Injectable 40 milliGRAM(s) SubCutaneous daily  ferrous    sulfate Liquid 300 milliGRAM(s) Oral daily  gabapentin   Solution 100 milliGRAM(s) Oral three times a day  influenza   Vaccine 0.5 milliLiter(s) IntraMuscular once  lisinopril 5 milliGRAM(s) Oral <User Schedule>  metoprolol tartrate 25 milliGRAM(s) Oral two times a day  nystatin    Suspension 108642 Unit(s) Oral two times a day      PHYSICAL EXAM:  T(C): 37.2 (10-28-18 @ 08:00), Max: 37.2 (10-28-18 @ 05:03)  HR: 68 (10-28-18 @ 11:45) (57 - 102)  BP: 118/63 (10-28-18 @ 11:45) (110/72 - 142/78)  RR: 18 (10-28-18 @ 08:00) (18 - 18)  SpO2: 96% (10-28-18 @ 08:00) (94% - 97%)  Wt(kg): --  I&O's Summary    27 Oct 2018 07:01  -  28 Oct 2018 07:00  --------------------------------------------------------  IN: 320 mL / OUT: 1775 mL / NET: -1455 mL    28 Oct 2018 07:01  -  28 Oct 2018 11:54  --------------------------------------------------------  IN: 250 mL / OUT: 0 mL / NET: 250 mL          Appearance: NAD aphasic   HEENT:   Dry oral mucosa, +NGT 	  Lymphatic: No lymphadenopathy , no edema  Cardiovascular: Normal S1 S2, No JVD, No murmurs , Peripheral pulses palpable 2+ bilaterally  Respiratory: Lungs clear to auscultation, normal effort 	  Gastrointestinal:  Soft, Non-tender, + BS	  Skin: No rashes, No ecchymoses, No cyanosis, warm to touch  Musculoskeletal: Decreased range of motion and  strength  Psychiatry:  lethargic   Ext: No edema  NEUROLOGICAL EXAM:  Mental status: Awake, lethargic, not following commands, no verbal output  Cranial Nerves: Right facial droop  Motor exam:  RUE- plegic, increased tone, RLE - withdraws to noxious stimuli, LUE - minimal movement,  LLE - moving freely  Sensation: Not assessed      LABS:    CARDIAC MARKERS:                                10.9   12.69 )-----------( 273      ( 28 Oct 2018 08:28 )             33.9     10-28    139  |  106  |  23  ----------------------------<  167<H>  3.9   |  24  |  0.76    Ca    8.5      28 Oct 2018 06:40      proBNP:   Lipid Profile:   HgA1c:   TSH:     1          TELEMETRY: 	    ECG:  	  RADIOLOGY:   DIAGNOSTIC TESTING:  [ ] Echocardiogram:  [ ]  Catheterization:  [ ] Stress Test:    OTHER:

## 2018-10-28 NOTE — PROGRESS NOTE ADULT - ASSESSMENT
86y/o female with vascular risk factors of HTN and hyperlipidemia, who was last seen normal at 22:10, on the day prior to admission. Then she was found by family members at the floor with inability to communicate or move her right side at 22:20. She was initially evaluated at [place] were NIHSS=29 deemed candidate for tPA. 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 Patient was transferred for further management to [facility]. She received IVtpa per protocol and transferred to NS after CTA revealed L ICA and L M1 occlusion. She underwent thrombectomy with resultant TICI 2B flow.     Impression: neurologically slightly  improving in language and cognitive function. 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    NEURO: Neurologically more interactive since admission, Continue monitoring for neurologic deterioration, BP goal of 140/90, continue Atorvastatin 80mg as admission . MRI Brain w/o - results as noted above. Physical therapy/OT recommends acute rehab.     ANTITHROMBOTIC THERAPY: Aspirin for secondary stroke prevention    PULMONARY: CXR (10/23)shows bilateral pleural effusions- no overt sign of HF noted, lungs CTA B/L, protecting airway, saturating well     CARDIOVASCULAR: TTE shows EF: 55-60%, Agitated saline injection revealed few late bubbles in the left heart, consistent with transpulmonic shunting, LE doppler: no DVT. Initially thought to have afib- now deemed to be NSR with PAC's s/p ILR placement. Continue current cardiac meds, Dr Chand (cardiology) consult appreciated                          SBP goal: 140     GASTROINTESTINAL:  dysphagia screen failed, speech and swallow eval recommended NPO for now, re-evaluated on 10/23-still recommended NPO. HCP agreeable to PEG. GI consulted s/p EGD- no specimens collected-PEG tube placed- tolerating feedings.     Diet: TF via PEG    RENAL: BUN/Cr without acute change, good urine output - continue straight cath protocol;     Na Goal: Greater than 135     Mendez: No    HEMATOLOGY: no signs or symptoms of bleeding ?     DVT ppx: LMWH     ID: afebrile, slight leukocytosis previously, BCx x 2 NGTD, UA - negative, and chest X-ray shows no evidence of PNA, Procalcitonin within normal limits, ID consulted and recommended to d/c CTX and observe off Abx. Will continue to monitor closely.     OTHER: Plan discussed with family at bedside, all questions and concerns addressed.     DISPOSITION: Rehab    CORE MEASURES:        Admission NIHSS: 29     TPA:  YES      LDL/HDL: 100/52      Depression Screen: unable to obtain due to aphasia     Statin Therapy: yes     Dysphagia Screen: FAIL     Smoking NO      Afib YES      Stroke Education  YES, to family 86y/o female with vascular risk factors of HTN and hyperlipidemia, who was last seen normal at 22:10, on the day prior to admission. Then she was found by family members at the floor with inability to communicate or move her right side at 22:20. She was initially evaluated at [place] were NIHSS=29 deemed candidate for tPA. 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 Patient was transferred for further management to [facility]. She received IVtpa per protocol and transferred to NS after CTA revealed L ICA and L M1 occlusion. She underwent thrombectomy with resultant TICI 2B flow.     Impression: Neurologically slightly  improving in language and cognitive function. 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    NEURO: Neurologically more interactive since admission, Continue monitoring for neurologic deterioration, BP goal of 140/90, continue Atorvastatin 80mg as admission . MRI Brain w/o - results as noted above. Physical therapy/OT recommends acute rehab.     ANTITHROMBOTIC THERAPY: Aspirin for secondary stroke prevention    PULMONARY: CXR (10/23)shows bilateral pleural effusions- no overt sign of HF noted, lungs CTA B/L, protecting airway, saturating well     CARDIOVASCULAR: TTE shows EF: 55-60%, Agitated saline injection revealed few late bubbles in the left heart, consistent with transpulmonic shunting, LE doppler: no DVT. Initially thought to have afib- now deemed to be NSR with PAC's s/p ILR placement. Continue current cardiac meds, Dr Chand (cardiology) consult appreciated                          SBP goal: 140     GASTROINTESTINAL:  dysphagia screen failed, speech and swallow eval recommended NPO for now, re-evaluated on 10/23-still recommended NPO. HCP agreeable to PEG. GI consulted s/p EGD- no specimens collected-PEG tube placed- tolerating feedings.     Diet: TF via PEG    RENAL: BUN/Cr without acute change, good urine output - continue straight cath protocol;     Na Goal: Greater than 135     Mendez: No    HEMATOLOGY: H/H without acute change- noted with anemia, no signs or symptoms of bleeding     DVT ppx: LMWH     ID: afebrile, slight leukocytosis, BCx x 2 NGTD, UA - negative, and chest X-ray shows no evidence of PNA, Procalcitonin within normal limits, ID consulted and recommended to d/c CTX and observe off Abx. Will continue to monitor closely.     OTHER: Plan discussed with family at bedside, all questions and concerns addressed.     DISPOSITION: Rehab    CORE MEASURES:        Admission NIHSS: 29     TPA:  YES      LDL/HDL: 100/52      Depression Screen: unable to obtain due to aphasia     Statin Therapy: yes     Dysphagia Screen: FAIL     Smoking NO      Afib YES      Stroke Education  YES, to family

## 2018-10-28 NOTE — PROGRESS NOTE ADULT - SUBJECTIVE AND OBJECTIVE BOX
Interval Events:   no new events     MEDICATIONS  (STANDING):  amLODIPine   Tablet 5 milliGRAM(s) Oral daily  aspirin  chewable 81 milliGRAM(s) Oral daily  atorvastatin 80 milliGRAM(s) Oral at bedtime  enoxaparin Injectable 40 milliGRAM(s) SubCutaneous daily  ferrous    sulfate Liquid 300 milliGRAM(s) Oral daily  gabapentin   Solution 100 milliGRAM(s) Oral three times a day  influenza   Vaccine 0.5 milliLiter(s) IntraMuscular once  lisinopril 5 milliGRAM(s) Oral <User Schedule>  metoprolol tartrate 25 milliGRAM(s) Oral two times a day    MEDICATIONS  (PRN):      Allergies    No Known Allergies    Intolerances        Review of Systems: unable to attain.       Vital Signs Last 24 Hrs  T(C): 36.7 (25 Oct 2018 12:00), Max: 36.8 (25 Oct 2018 08:18)  T(F): 98 (25 Oct 2018 12:00), Max: 98.2 (25 Oct 2018 08:18)  HR: 65 (25 Oct 2018 12:00) (65 - 88)  BP: 124/85 (25 Oct 2018 12:00) (101/62 - 124/85)  BP(mean): 68 (25 Oct 2018 10:00) (68 - 103)  RR: 18 (25 Oct 2018 12:00) (17 - 24)  SpO2: 99% (25 Oct 2018 12:00) (97% - 100%)    PHYSICAL EXAM:    GENERAL:  Appears stated age, well-groomed, well-nourished, no distress  HEENT:  NC/AT,  conjunctivae clear and pink, no thyromegaly, nodules, adenopathy, no JVD, sclera -anicteric, + NGT  CHEST:  Full & symmetric excursion, no increased effort, breath sounds clear  HEART:  Regular rhythm, S1, S2, no murmur/rub/S3/S4, no abdominal bruit, no edema  ABDOMEN:  Soft, non-tender, non-distended, normoactive bowel sounds,  no masses ,no hepato-splenomegaly, no signs of chronic liver disease  EXTEREMITIES:  no cyanosis,clubbing or edema  SKIN:  No rash/erythema/ecchymoses/petechiae/wounds/abscess/warm/dry  NEURO:  awake, not following commands, minimal verbal output      LABS:                        9.8    10.4  )-----------( 244      ( 25 Oct 2018 00:11 )             31.4     10-25    148<H>  |  119<H>  |  35<H>  ----------------------------<  134<H>  3.7   |  18<L>  |  0.82    Ca    8.1<L>      25 Oct 2018 00:11  Mg     2.1     10-24            RADIOLOGY & ADDITIONAL TESTS:

## 2018-10-29 LAB
ANION GAP SERPL CALC-SCNC: 10 MMOL/L — SIGNIFICANT CHANGE UP (ref 5–17)
APPEARANCE UR: ABNORMAL
BASOPHILS # BLD AUTO: 0 K/UL — SIGNIFICANT CHANGE UP (ref 0–0.2)
BASOPHILS NFR BLD AUTO: 0.2 % — SIGNIFICANT CHANGE UP (ref 0–2)
BILIRUB UR-MCNC: NEGATIVE — SIGNIFICANT CHANGE UP
BUN SERPL-MCNC: 20 MG/DL — SIGNIFICANT CHANGE UP (ref 7–23)
CALCIUM SERPL-MCNC: 8.6 MG/DL — SIGNIFICANT CHANGE UP (ref 8.4–10.5)
CHLORIDE SERPL-SCNC: 103 MMOL/L — SIGNIFICANT CHANGE UP (ref 96–108)
CO2 SERPL-SCNC: 24 MMOL/L — SIGNIFICANT CHANGE UP (ref 22–31)
COLOR SPEC: YELLOW — SIGNIFICANT CHANGE UP
CREAT SERPL-MCNC: 0.72 MG/DL — SIGNIFICANT CHANGE UP (ref 0.5–1.3)
DIFF PNL FLD: ABNORMAL
EOSINOPHIL # BLD AUTO: 0.1 K/UL — SIGNIFICANT CHANGE UP (ref 0–0.5)
EOSINOPHIL NFR BLD AUTO: 0.8 % — SIGNIFICANT CHANGE UP (ref 0–6)
GLUCOSE SERPL-MCNC: 186 MG/DL — HIGH (ref 70–99)
GLUCOSE UR QL: NEGATIVE MG/DL — SIGNIFICANT CHANGE UP
HCT VFR BLD CALC: 35.9 % — SIGNIFICANT CHANGE UP (ref 34.5–45)
HGB BLD-MCNC: 11.5 G/DL — SIGNIFICANT CHANGE UP (ref 11.5–15.5)
KETONES UR-MCNC: NEGATIVE — SIGNIFICANT CHANGE UP
LEUKOCYTE ESTERASE UR-ACNC: ABNORMAL
LYMPHOCYTES # BLD AUTO: 1.1 K/UL — SIGNIFICANT CHANGE UP (ref 1–3.3)
LYMPHOCYTES # BLD AUTO: 6.2 % — LOW (ref 13–44)
MCHC RBC-ENTMCNC: 29.9 PG — SIGNIFICANT CHANGE UP (ref 27–34)
MCHC RBC-ENTMCNC: 32.1 GM/DL — SIGNIFICANT CHANGE UP (ref 32–36)
MCV RBC AUTO: 93.2 FL — SIGNIFICANT CHANGE UP (ref 80–100)
MONOCYTES # BLD AUTO: 1.2 K/UL — HIGH (ref 0–0.9)
MONOCYTES NFR BLD AUTO: 6.5 % — SIGNIFICANT CHANGE UP (ref 2–14)
NEUTROPHILS # BLD AUTO: 15.6 K/UL — HIGH (ref 1.8–7.4)
NEUTROPHILS NFR BLD AUTO: 86.3 % — HIGH (ref 43–77)
NITRITE UR-MCNC: POSITIVE
PH UR: 7.5 — SIGNIFICANT CHANGE UP (ref 5–8)
PLATELET # BLD AUTO: 275 K/UL — SIGNIFICANT CHANGE UP (ref 150–400)
POTASSIUM SERPL-MCNC: 4.1 MMOL/L — SIGNIFICANT CHANGE UP (ref 3.5–5.3)
POTASSIUM SERPL-SCNC: 4.1 MMOL/L — SIGNIFICANT CHANGE UP (ref 3.5–5.3)
PROT UR-MCNC: 30 MG/DL
RBC # BLD: 3.85 M/UL — SIGNIFICANT CHANGE UP (ref 3.8–5.2)
RBC # FLD: 13.1 % — SIGNIFICANT CHANGE UP (ref 10.3–14.5)
SODIUM SERPL-SCNC: 137 MMOL/L — SIGNIFICANT CHANGE UP (ref 135–145)
SP GR SPEC: 1.01 — SIGNIFICANT CHANGE UP (ref 1.01–1.02)
UROBILINOGEN FLD QL: 2 MG/DL
WBC # BLD: 18.1 K/UL — HIGH (ref 3.8–10.5)
WBC # FLD AUTO: 18.1 K/UL — HIGH (ref 3.8–10.5)

## 2018-10-29 PROCEDURE — 99233 SBSQ HOSP IP/OBS HIGH 50: CPT

## 2018-10-29 PROCEDURE — 99232 SBSQ HOSP IP/OBS MODERATE 35: CPT

## 2018-10-29 RX ORDER — CEFTRIAXONE 500 MG/1
1 INJECTION, POWDER, FOR SOLUTION INTRAMUSCULAR; INTRAVENOUS EVERY 24 HOURS
Qty: 0 | Refills: 0 | Status: DISCONTINUED | OUTPATIENT
Start: 2018-10-29 | End: 2018-10-29

## 2018-10-29 RX ADMIN — GABAPENTIN 100 MILLIGRAM(S): 400 CAPSULE ORAL at 11:48

## 2018-10-29 RX ADMIN — GABAPENTIN 100 MILLIGRAM(S): 400 CAPSULE ORAL at 06:16

## 2018-10-29 RX ADMIN — Medication 81 MILLIGRAM(S): at 11:47

## 2018-10-29 RX ADMIN — ENOXAPARIN SODIUM 40 MILLIGRAM(S): 100 INJECTION SUBCUTANEOUS at 11:48

## 2018-10-29 RX ADMIN — BRIMONIDINE TARTRATE 1 DROP(S): 2 SOLUTION/ DROPS OPHTHALMIC at 18:27

## 2018-10-29 RX ADMIN — BRIMONIDINE TARTRATE 1 DROP(S): 2 SOLUTION/ DROPS OPHTHALMIC at 06:16

## 2018-10-29 RX ADMIN — ATORVASTATIN CALCIUM 80 MILLIGRAM(S): 80 TABLET, FILM COATED ORAL at 22:00

## 2018-10-29 RX ADMIN — Medication 25 MILLIGRAM(S): at 22:00

## 2018-10-29 RX ADMIN — AMLODIPINE BESYLATE 5 MILLIGRAM(S): 2.5 TABLET ORAL at 06:16

## 2018-10-29 RX ADMIN — Medication 500000 UNIT(S): at 06:18

## 2018-10-29 RX ADMIN — DORZOLAMIDE HYDROCHLORIDE TIMOLOL MALEATE 1 DROP(S): 20; 5 SOLUTION/ DROPS OPHTHALMIC at 18:27

## 2018-10-29 RX ADMIN — Medication 500000 UNIT(S): at 18:27

## 2018-10-29 RX ADMIN — GABAPENTIN 100 MILLIGRAM(S): 400 CAPSULE ORAL at 22:00

## 2018-10-29 RX ADMIN — Medication 300 MILLIGRAM(S): at 11:47

## 2018-10-29 RX ADMIN — DORZOLAMIDE HYDROCHLORIDE TIMOLOL MALEATE 1 DROP(S): 20; 5 SOLUTION/ DROPS OPHTHALMIC at 06:16

## 2018-10-29 RX ADMIN — Medication 1 DROP(S): at 11:47

## 2018-10-29 NOTE — PROGRESS NOTE ADULT - SUBJECTIVE AND OBJECTIVE BOX
CC: f/u for fever, leukocytosis    Patient smiling, no distress, mostly nonverbal, d/w granddaughter at bedside    REVIEW OF SYSTEMS:  All other review of systems negative (Comprehensive ROS)- limited    Antimicrobials Day # 4  nystatin    Suspension 274857 Unit(s) Oral two times a day    Other Medications Reviewed    T(F): 97.9 (10-28-18 @ 15:28), Max: 99 (10-28-18 @ 05:03)  HR: 69 (10-28-18 @ 18:13)  BP: 117/69 (10-28-18 @ 18:13)  RR: 18 (10-28-18 @ 15:28)  SpO2: 99% (10-28-18 @ 15:28)  Wt(kg): --    PHYSICAL EXAM:  General: alert, no acute distress   Eyes:  anicteric, no conjunctival injection, no discharge  Oropharynx: no lesions or injection 	  Neck: supple, without adenopathy  Lungs: clear to auscultation  Heart: regular rate and rhythm; no murmur, rubs or gallops  Abdomen: soft, nondistended, nontender, G tube site minimal exudate, no erythmea  Skin: no lesions  Extremities: no clubbing, cyanosis, or edema  Neurologic: alert, R weakness    LAB RESULTS:                        11.5   18.1  )-----------( 275      ( 29 Oct 2018 08:16 )             35.9   10-29    137  |  103  |  20  ----------------------------<  186<H>  4.1   |  24  |  0.72    Ca    8.6      29 Oct 2018 08:16      MICROBIOLOGY:  RECENT CULTURES:  10-26 @ 08:49 .Urine Catheterized     No growth    Culture - Blood (10.20.18 @ 14:48)    Specimen Source: .Blood Blood    Culture Results:   No growth at 5 days.      RADIOLOGY REVIEWED:  Xray Chest 1 View- PORTABLE-Urgent (10.23.18 @ 17:14) >  Enteric tube, with distal tip in the stomach.    VA Duplex Lower Ext Vein Scan, Bilat (10.24.18 @ 15:33) >  No evidence of bilateral lower extremity deep venous thrombosis.    MR Head No Cont (10.20.18 @ 18:38) >  Impression: Acute hemorrhagic infarct involving the left MCA territory   with mass effect on the left lateral ventricle and left to right shift.

## 2018-10-29 NOTE — PROGRESS NOTE ADULT - SUBJECTIVE AND OBJECTIVE BOX
HPI: Tolerating therapies.  Mod A with transfers and gait.    MEDICATIONS  (STANDING):  amLODIPine   Tablet 5 milliGRAM(s) Oral daily  artificial tears (preservative free) Ophthalmic Solution 1 Drop(s) Both EYES daily  aspirin  chewable 81 milliGRAM(s) Oral daily  atorvastatin 80 milliGRAM(s) Oral at bedtime  brimonidine 0.2% Ophthalmic Solution 1 Drop(s) Both EYES two times a day  dorzolamide 2%/timolol 0.5% Ophthalmic Solution 1 Drop(s) Both EYES two times a day  enoxaparin Injectable 40 milliGRAM(s) SubCutaneous daily  ferrous    sulfate Liquid 300 milliGRAM(s) Oral daily  gabapentin   Solution 100 milliGRAM(s) Oral three times a day  influenza   Vaccine 0.5 milliLiter(s) IntraMuscular once  lisinopril 5 milliGRAM(s) Oral <User Schedule>  metoprolol tartrate 25 milliGRAM(s) Oral two times a day  nystatin    Suspension 586286 Unit(s) Oral two times a day    MEDICATIONS  (PRN):  metoprolol tartrate Injectable 5 milliGRAM(s) IV Push every 6 hours PRN SBP > 140    Vital Signs Last 24 Hrs  T(C): 36.7 (29 Oct 2018 09:47), Max: 37.8 (29 Oct 2018 05:39)  T(F): 98.1 (29 Oct 2018 09:47), Max: 100 (29 Oct 2018 05:39)  HR: 80 (29 Oct 2018 11:45) (62 - 81)  BP: 108/68 (29 Oct 2018 11:45) (108/68 - 151/66)  BP(mean): --  RR: 18 (29 Oct 2018 09:47) (18 - 18)  SpO2: 100% (29 Oct 2018 09:47) (96% - 100%)    Constitutional - NAD, Comfortable  HEENT - NCAT  Neck - Supple  Chest - poor inspiratory effort, diminished breath sounds  Cardiovascular - irregular  Abdomen - BS+, Soft, NTND  Extremities - No C/C/E, No calf tenderness  Neurologic Exam -                 + aphasia, right hemiparesis (0/5)      Psychiatric - Mood stable, Affect WNL                          11.5   18.1  )-----------( 275      ( 29 Oct 2018 08:16 )             35.9     10-29    137  |  103  |  20  ----------------------------<  186<H>  4.1   |  24  |  0.72    Ca    8.6      29 Oct 2018 08:16    ASSESSMENT/PLAN    STEPAN RIOJAS is a 86yo Female with hx of HTN, HLD, prediabetes, who is admitted with an acute L MCA infarct with hemorragic transformation, s/p tPA and thrombectomy, with functional, gait, ADL, swallow, and speech impairments.    - PT/OT/SLP  - Prec- Cardiac, Falls  - Monitor wbc ct  - DVT px- Lovenox  - Skin- Turn q2h  - Dispo- Acute Rehab- can tolerate 3h/d PT/OT/SLP and requires daily physician visits

## 2018-10-29 NOTE — PROGRESS NOTE ADULT - SUBJECTIVE AND OBJECTIVE BOX
Interval Events: Pt s/e with family at bedside.  Tolerating PEG feeds. No N/V    MEDICATIONS  (STANDING):  amLODIPine   Tablet 5 milliGRAM(s) Oral daily  artificial tears (preservative free) Ophthalmic Solution 1 Drop(s) Both EYES daily  aspirin  chewable 81 milliGRAM(s) Oral daily  atorvastatin 80 milliGRAM(s) Oral at bedtime  brimonidine 0.2% Ophthalmic Solution 1 Drop(s) Both EYES two times a day  dorzolamide 2%/timolol 0.5% Ophthalmic Solution 1 Drop(s) Both EYES two times a day  enoxaparin Injectable 40 milliGRAM(s) SubCutaneous daily  ferrous    sulfate Liquid 300 milliGRAM(s) Oral daily  gabapentin   Solution 100 milliGRAM(s) Oral three times a day  influenza   Vaccine 0.5 milliLiter(s) IntraMuscular once  lisinopril 5 milliGRAM(s) Oral <User Schedule>  metoprolol tartrate 25 milliGRAM(s) Oral two times a day  nystatin    Suspension 881818 Unit(s) Oral two times a day    MEDICATIONS  (PRN):  metoprolol tartrate Injectable 5 milliGRAM(s) IV Push every 6 hours PRN SBP > 140      Allergies    No Known Allergies    Intolerances        Review of Systems: unable to attain.       Vital Signs Last 24 Hrs  T(C): 36.7 (29 Oct 2018 09:47), Max: 37.8 (29 Oct 2018 05:39)  T(F): 98.1 (29 Oct 2018 09:47), Max: 100 (29 Oct 2018 05:39)  HR: 80 (29 Oct 2018 11:45) (62 - 81)  BP: 108/68 (29 Oct 2018 11:45) (108/68 - 151/66)  BP(mean): --  RR: 18 (29 Oct 2018 09:47) (18 - 18)  SpO2: 100% (29 Oct 2018 09:47) (96% - 100%)    PHYSICAL EXAM:    Constitutional: NAD, well-developed  HEENT: EOMI, throat clear  Neck: No LAD, supple  Respiratory: CTA and P  Cardiovascular: S1 and S2, RRR, no M  Gastrointestinal: BS+, soft, NT/ND, neg HSM, + PEG c/d/i   Extremities: No peripheral edema, neg clubing, cyanosis  Vascular: 2+ peripheral pulses  Neurological: A/O x 3, no focal deficits  Psychiatric: Normal mood, normal affect  Skin: No rashes      LABS:                        11.5   18.1  )-----------( 275      ( 29 Oct 2018 08:16 )             35.9     10-29    137  |  103  |  20  ----------------------------<  186<H>  4.1   |  24  |  0.72    Ca    8.6      29 Oct 2018 08:16            RADIOLOGY & ADDITIONAL TESTS:

## 2018-10-29 NOTE — PROGRESS NOTE ADULT - ASSESSMENT
Patient with hemorraghic L MCA infarct, s/p thrombectomy,   Fever resolved  S/p PEG  Leukocytosis persists, now slightly higher; otherwise no clue to emerging infection  Latest urine studies negative    Plan:   Continue to monitor off abx  Check repeat CBC/diff, PCT level  D/w granddaughter

## 2018-10-29 NOTE — CHART NOTE - NSCHARTNOTEFT_GEN_A_CORE
Source: Patient [ ]    Family [ ]     other [x ]nurse, chart since 10/19/2018  seen for follow up  Reason for Admission: stroke  86y/o female with vascular risk factors of HTN and hyperlipidemia, who was last seen normal at 22:10, on the day prior to admission. Then she was found by family members at the floor with inability to communicate or move her right side at 22:20. She was initially evaluated at [place] were NIHSS=29 deemed candidate for tPA. 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 Patient was transferred for further management to [facility]. She received IVtpa per protocol and transferred to NS after CTA revealed L ICA and L M1 occlusion. She underwent thrombectomy with resultant TICI 2B flow.   DISPOSITION: Rehab    Diet : ENTERAL,NPO/ENTERAL      Patient reports [ ] nausea  [ ] vomiting [ ] diarrhea [ ] constipation  [ ]chewing problems [ ] swallowing issues  [ ] other: pt aphasic, spoke to nurse       Enteral /Parenteral Nutrition:  Recommend  Jevity 1.2 60cc/hr x 24 hrs to provide 1728 kcals, 80 gm protein, 1162cc free water  meets 22 Kcal/Kg, 1.0Gm/kg  dosing wt 77.3 kg. Total free water 2162cc (28cc/kg).  on hold at time of visit due to nurse giving pt meds    Current Weight: no current weight      Pertinent Medications: MEDICATIONS  (STANDING):  amLODIPine   Tablet 5 milliGRAM(s) Oral daily  artificial tears (preservative free) Ophthalmic Solution 1 Drop(s) Both EYES daily  aspirin  chewable 81 milliGRAM(s) Oral daily  atorvastatin 80 milliGRAM(s) Oral at bedtime  brimonidine 0.2% Ophthalmic Solution 1 Drop(s) Both EYES two times a day  dorzolamide 2%/timolol 0.5% Ophthalmic Solution 1 Drop(s) Both EYES two times a day  enoxaparin Injectable 40 milliGRAM(s) SubCutaneous daily  ferrous    sulfate Liquid 300 milliGRAM(s) Oral daily  gabapentin   Solution 100 milliGRAM(s) Oral three times a day  influenza   Vaccine 0.5 milliLiter(s) IntraMuscular once  lisinopril 5 milliGRAM(s) Oral <User Schedule>  metoprolol tartrate 25 milliGRAM(s) Oral two times a day  nystatin    Suspension [id number] Unit(s) Oral two times a day    MEDICATIONS  (PRN):  metoprolol tartrate Injectable 5 milliGRAM(s) IV Push every 6 hours PRN SBP > 140    Pertinent Labs:  10-29 Na137 mmol/L Glu 186 mg/dL<H> K+ 4.1 mmol/L Cr  0.72 mg/dL BUN 20 mg/dL 10-19 [id number] 6.0 %<H> 10-19 Chol 167 mg/dL  mg/dL HDL 52 mg/dL Trig 75 mg/dL    CAPILLARY BLOOD GLUCOSE      POCT Blood Glucose.: 147 mg/dL (28 Oct 2018 12:59)    Hemoglobin A1C, Whole Blood: 6.0 % (10-19 @ 07:53)          Skin: +2 left leg, right leg, no pressure injuries as per flow sheets    Estimated Needs:   [x ] no change since previous assessment  [ ] recalculated:       Previous Nutrition Diagnosis: Swallowing difficulty           Nutrition Diagnosis is [ x] ongoing  [ ] resolved [ ] not applicable          New Nutrition Diagnosis: [x ] not applicable          Recommend        [x ] Nutrition Support: continue: Jevity 1.2 60cc/hr x 24 hrs to provide 1728 kcals, 80 gm protein, 1162cc free water  meets 22 Kcal/Kg, 1.0Gm/kg  dosing wt 77.3 kg. Total free water 2162cc (28cc/kg)           Monitoring and Evaluation:     [ ] PO intake [ x] Tolerance to diet prescription [x ] weights [x ] follow up per protocol    [ ] other:

## 2018-10-29 NOTE — PROGRESS NOTE ADULT - ASSESSMENT
86y/o female with vascular risk factors of HTN and hyperlipidemia, who was last seen normal at 22:10, on the day prior to admission. Then she was found by family members at the floor with inability to communicate or move her right side at 22:20. She was initially evaluated at [place] were NIHSS=29 deemed candidate for tPA. 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 Patient was transferred for further management to [facility]. She received IVtpa per protocol and transferred to NS after CTA revealed L ICA and L M1 occlusion. She underwent thrombectomy with resultant TICI 2B flow.     Impression: Neurologically slightly  improving in language and cognitive function. 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    NEURO: Neurologically more interactive since admission, Continue monitoring for neurologic deterioration, BP goal of 140/90, continue Atorvastatin 80mg as admission . MRI Brain w/o - results as noted above. Physical therapy/OT recommends acute rehab.     ANTITHROMBOTIC THERAPY: Aspirin for secondary stroke prevention    PULMONARY: CXR (10/23)shows bilateral pleural effusions- no overt sign of HF noted, lungs CTA B/L, protecting airway, saturating well     CARDIOVASCULAR: TTE shows EF: 55-60%, Agitated saline injection revealed few late bubbles in the left heart, consistent with transpulmonic shunting, LE doppler: no DVT. Initially thought to have afib- now deemed to be NSR with PAC's s/p ILR placement. Continue current cardiac meds, Dr Chand (cardiology) consult appreciated                          SBP goal: 140     GASTROINTESTINAL:  dysphagia screen failed, speech and swallow eval recommended NPO for now, re-evaluated on 10/23-still recommended NPO. HCP agreeable to PEG. GI consulted s/p EGD- no specimens collected-PEG tube placed- tolerating feedings.     Diet: TF via PEG    RENAL: BUN/Cr without acute change, good urine output - continue straight cath protocol;     Na Goal: Greater than 135     Mendez: No    HEMATOLOGY: H/H without acute change- noted with anemia, no signs or symptoms of bleeding     DVT ppx: LMWH     ID: afebrile, slight leukocytosis, BCx x 2 NGTD, UA - negative, and chest X-ray shows no evidence of PNA, Procalcitonin within normal limits, ID consulted and recommended to d/c CTX and observe off Abx. Will continue to monitor closely.     OTHER: Plan discussed with family at bedside, all questions and concerns addressed.     DISPOSITION: Rehab    CORE MEASURES:        Admission NIHSS: 29     TPA:  YES      LDL/HDL: 100/52      Depression Screen: unable to obtain due to aphasia     Statin Therapy: yes     Dysphagia Screen: FAIL     Smoking NO      Afib YES      Stroke Education  YES, to family 86y/o female with vascular risk factors of HTN and hyperlipidemia, who was last seen normal at 22:10, on the day prior to admission. Then she was found by family members at the floor with inability to communicate or move her right side at 22:20. She was initially evaluated at [place] were NIHSS=29 deemed candidate for tPA. 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 Patient was transferred for further management to [facility]. She received IVtpa per protocol and transferred to NS after CTA revealed L ICA and L M1 occlusion. She underwent thrombectomy with resultant TICI 2B flow.     Impression: Neurologically slightly  improving in language and cognitive function. 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    NEURO: Neurologically more interactive since admission, Continue monitoring for neurologic deterioration, BP goal of 140/90, continue Atorvastatin 80mg as admission . MRI Brain w/o - results as noted above. Physical therapy/OT recommends acute rehab.     ANTITHROMBOTIC THERAPY: Aspirin for secondary stroke prevention    PULMONARY: CXR (10/23)shows bilateral pleural effusions- no overt sign of HF noted, lungs CTA B/L, protecting airway, saturating well     CARDIOVASCULAR: TTE shows EF: 55-60%, Agitated saline injection revealed few late bubbles in the left heart, consistent with transpulmonic shunting, LE doppler: no DVT. Initially thought to have afib- now deemed to be NSR with PAC's s/p ILR placement. Continue current cardiac meds, Dr Chand (cardiology) consult appreciated                          SBP goal: 140     GASTROINTESTINAL:  dysphagia screen failed, speech and swallow eval recommended NPO for now, re-evaluated on 10/23-still recommended NPO. HCP agreeable to PEG. GI consulted s/p EGD- no specimens collected-PEG tube placed- tolerating feedings.     Diet: TF via PEG    RENAL: BUN/Cr without acute change, good urine output - continue straight cath protocol;     Na Goal: Greater than 135     Mendez: No    HEMATOLOGY: H/H without acute change- noted with anemia, no signs or symptoms of bleeding     DVT ppx: LMWH     ID: afebrile, increase in leukocytosis on 10/29- UA ordered and ID reconsulted   previous BCx x 2 NGTD, UA - negative, and chest X-ray shows no evidence of PNA, Procalcitonin within normal limits, ID consulted and recommended to d/c CTX and observe off Abx. Will continue to monitor closely.     OTHER: Plan discussed with family at bedside, all questions and concerns addressed.     DISPOSITION: Acute rehab    CORE MEASURES:        Admission NIHSS: 29     TPA:  YES      LDL/HDL: 100/52      Depression Screen: unable to obtain due to aphasia     Statin Therapy: yes     Dysphagia Screen: FAIL     Smoking NO      Afib YES      Stroke Education  YES, to family 86y/o female with vascular risk factors of HTN and hyperlipidemia, who was last seen normal at 22:10, on the day prior to admission. Then she was found by family members at the floor with inability to communicate or move her right side at 22:20. She was initially evaluated at [place] were NIHSS=29 deemed candidate for tPA. 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 Patient was transferred for further management to [facility]. She received IVtpa per protocol and transferred to NS after CTA revealed L ICA and L M1 occlusion. She underwent thrombectomy with resultant TICI 2B flow.     Impression:   Cerebral embolism with cerebral infarction. 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 Physical therapy/OT recommends acute rehab.     ANTITHROMBOTIC THERAPY: Aspirin for secondary stroke prevention    PULMONARY: CXR (10/23)shows bilateral pleural effusions- no overt sign of HF noted, lungs CTA B/L, protecting airway, saturating well     CARDIOVASCULAR: TTE shows EF: 55-60%, Agitated saline injection revealed few late bubbles in the left heart, consistent with transpulmonic shunting, LE doppler: no DVT. Initially thought to have afib- now deemed to be NSR with PAC's s/p ICM placement for prolonged cardiac monitoring to screen for occult cardiac arrhythmias like atrial fibrillation, but a cardiac source of embolism. Continue current cardiac meds, Dr Chand (cardiology) consult appreciated                          SBP goal: gradual normotension     GASTROINTESTINAL:  dysphagia screen failed, speech and swallow eval recommended NPO for now, re-evaluated on 10/23-still recommended NPO. HCP agreeable to PEG. GI consulted s/p EGD- no specimens collected-PEG tube placed- tolerating feedings.     Diet: TF via PEG    RENAL: BUN/Cr without acute change, good urine output - continue straight cath protocol;     Na Goal: Greater than 135     Mendez: No    HEMATOLOGY: H/H without acute change- noted with anemia, no signs or symptoms of bleeding     DVT ppx: LMWH     ID: afebrile, increase in leukocytosis on 10/29- UA ordered and ID reconsulted   previous BCx x 2 NGTD, UA - negative, and chest X-ray shows no evidence of PNA, Procalcitonin within normal limits, ID consulted and recommended to d/c CTX and observe off Abx. Will continue to monitor closely.     OTHER: Plan discussed with family at bedside, all questions and concerns addressed.     DISPOSITION: Acute rehab    CORE MEASURES:        Admission NIHSS: 29     TPA:  YES      LDL/HDL: 100/52      Depression Screen: unable to obtain due to aphasia     Statin Therapy: yes     Dysphagia Screen: FAIL     Smoking NO      Afib YES      Stroke Education  YES, to family

## 2018-10-29 NOTE — PROGRESS NOTE ADULT - SUBJECTIVE AND OBJECTIVE BOX
THE PATIENT WAS SEEN AND EXAMINED BY ME WITH THE HOUSESTAFF AND STROKE TEAM DURING MORNING ROUNDS.   HPI:    88y/o female with vascular risk factors of HTN and hyperlipidemia, who was last seen normal at 22:10, on the day prior to admission. Then she was found by family members at the floor with inability to communicate or move her right side at 22:20. She was initially evaluated at [place] were NIHSS=29 deemed candidate for tPA. 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 Patient was transferred for further management to [facility]. She received IVtpa per protocol and transferred to NS after CTA revealed L ICA and L M1 occlusion. She underwent thrombectomy with resultant TICI 2B flow.     SUBJECTIVE: No events overnight.  No new neurologic complaints.      amLODIPine   Tablet 5 milliGRAM(s) Oral daily  artificial tears (preservative free) Ophthalmic Solution 1 Drop(s) Both EYES daily  aspirin  chewable 81 milliGRAM(s) Oral daily  atorvastatin 80 milliGRAM(s) Oral at bedtime  brimonidine 0.2% Ophthalmic Solution 1 Drop(s) Both EYES two times a day  dorzolamide 2%/timolol 0.5% Ophthalmic Solution 1 Drop(s) Both EYES two times a day  enoxaparin Injectable 40 milliGRAM(s) SubCutaneous daily  ferrous    sulfate Liquid 300 milliGRAM(s) Oral daily  gabapentin   Solution 100 milliGRAM(s) Oral three times a day  influenza   Vaccine 0.5 milliLiter(s) IntraMuscular once  lisinopril 5 milliGRAM(s) Oral <User Schedule>  metoprolol tartrate 25 milliGRAM(s) Oral two times a day  metoprolol tartrate Injectable 5 milliGRAM(s) IV Push every 6 hours PRN  nystatin    Suspension [id number] Unit(s) Oral two times a day      PHYSICAL EXAM:   Vital Signs Last 24 Hrs  T(C): 37.8 (29 Oct 2018 05:39), Max: 37.8 (29 Oct 2018 05:39)  T(F): 100 (29 Oct 2018 05:39), Max: 100 (29 Oct 2018 05:39)  HR: 78 (29 Oct 2018 05:39) (62 - 78)  BP: 130/80 (29 Oct 2018 05:39) (116/67 - 130/80)  RR: 18 (29 Oct 2018 05:39) (18 - 18)  SpO2: 96% (29 Oct 2018 05:39) (96% - 99%)    General: No acute distress  HEENT: unable to cooperate, no BTT on right, slight gaze deviation to the left  Abdomen: Soft, nontender, nondistended   Extremities: No edema    NEUROLOGICAL EXAM:  Mental status: eyes opened, generates some 1-2 words and unintelligible sounds, attentive, follows some commands in native language  Cranial Nerves: Moderate-Severe right nasolabial flattening, no Blink to threat on right visual field, right gaze palsy, unable to cross pass midline, no definite dysarthria but almost no verbal output  Motor exam: LUE: good effort against gravity, LLE : spontaneous movements, minimal effort against gravity, normal tone, RUE: no movement, RLE: externally rotated, no volitional movement-triple flexion only, minimally increased tone in RUE/RLE.     Sensation: unable to assess    Coordination/ Gait: deferred  LABS:                        10.9   12.69 )-----------( 273      ( 28 Oct 2018 08:28 )             33.9    10-28    139  |  106  |  23  ----------------------------<  167<H>  3.9   |  24  |  0.76    Ca    8.5      28 Oct 2018 06:40      Hemoglobin A1C, Whole Blood: 6.0 % (10-19 @ 07:53)      IMAGING: Reviewed by me.   MRI Head No Cont (10/20/18):  Acute hemorrhagic infarct involving the left MCA territory with mass effect on the left lateral ventricle and left to right shift.    CT Head No Cont (10/19/18) @ 23:21:   Redemonstration of large acute left MCA territory infarct with similar mass effect upon the left lateral ventricle and similar rightward midline shift of 4 mm. Ventricles stable in size. No hydrocephalus. Previously seen increased density throughout the region of infarction is less apparent on the current examination, consistent with resolving contrast staining after digital subtraction angiography. 2.1 x 1.6 cm region of persistent increased density in the left lateral frontotemporal region may represent hemorrhagic transformation of the infarct.    CT Head No Cont (10/19/18) @ 05:52:   Large acute left MCA territory infarct. Increased density seen throughout the left basal ganglia and left lateral frontal, parietal, and temporal cortices likely represents a combination of hemorrhagic transformation and cortical staining from recent digital subtraction angiography. Additional density seen within the left cerebral sulci likely representing subarachnoid hemorrhage. Mass effect upon the left lateral ventricle and rightward midline shift of 4 mm. No large hydrocephalus. THE PATIENT WAS SEEN AND EXAMINED BY ME WITH THE HOUSESTAFF AND STROKE TEAM DURING MORNING ROUNDS.     HPI:  88y/o female with vascular risk factors of HTN and hyperlipidemia, who was last seen normal at 22:10, on the day prior to admission. Then she was found by family members at the floor with inability to communicate or move her right side at 22:20. She was initially evaluated at [place] were NIHSS=29 deemed candidate for tPA. 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 Patient was transferred for further management to [facility]. She received IVtpa per protocol and transferred to NS after CTA revealed L ICA and L M1 occlusion. She underwent thrombectomy with resultant TICI 2B flow.     SUBJECTIVE: No events overnight.  No new neurologic complaints.      ROS: All negative except documented above.    amLODIPine   Tablet 5 milliGRAM(s) Oral daily  artificial tears (preservative free) Ophthalmic Solution 1 Drop(s) Both EYES daily  aspirin  chewable 81 milliGRAM(s) Oral daily  atorvastatin 80 milliGRAM(s) Oral at bedtime  brimonidine 0.2% Ophthalmic Solution 1 Drop(s) Both EYES two times a day  dorzolamide 2%/timolol 0.5% Ophthalmic Solution 1 Drop(s) Both EYES two times a day  enoxaparin Injectable 40 milliGRAM(s) SubCutaneous daily  ferrous    sulfate Liquid 300 milliGRAM(s) Oral daily  gabapentin   Solution 100 milliGRAM(s) Oral three times a day  influenza   Vaccine 0.5 milliLiter(s) IntraMuscular once  lisinopril 5 milliGRAM(s) Oral <User Schedule>  metoprolol tartrate 25 milliGRAM(s) Oral two times a day  metoprolol tartrate Injectable 5 milliGRAM(s) IV Push every 6 hours PRN  nystatin    Suspension [id number] Unit(s) Oral two times a day      PHYSICAL EXAM:   Vital Signs Last 24 Hrs  T(C): 37.8 (29 Oct 2018 05:39), Max: 37.8 (29 Oct 2018 05:39)  T(F): 100 (29 Oct 2018 05:39), Max: 100 (29 Oct 2018 05:39)  HR: 78 (29 Oct 2018 05:39) (62 - 78)  BP: 130/80 (29 Oct 2018 05:39) (116/67 - 130/80)  RR: 18 (29 Oct 2018 05:39) (18 - 18)  SpO2: 96% (29 Oct 2018 05:39) (96% - 99%)    General: No acute distress  HEENT: unable to cooperate, no BTT on right, slight gaze deviation to the left  Abdomen: Soft, nontender, nondistended   Extremities: No edema    NEUROLOGICAL EXAM:  Mental status: eyes opened, generates some 1-2 words and unintelligible sounds, attentive, follows some commands in native language  Cranial Nerves: Moderate-Severe right nasolabial flattening, no Blink to threat on right visual field, right gaze palsy, unable to cross pass midline, no definite dysarthria but almost no verbal output  Motor exam: LUE: good effort against gravity, LLE : spontaneous movements, minimal effort against gravity, normal tone, RUE: no movement, RLE: externally rotated, no volitional movement-triple flexion only, minimally increased tone in RUE/RLE.     Sensation: unable to assess    Coordination/ Gait: deferred  LABS:                        10.9   12.69 )-----------( 273      ( 28 Oct 2018 08:28 )             33.9    10-28    139  |  106  |  23  ----------------------------<  167<H>  3.9   |  24  |  0.76    Ca    8.5      28 Oct 2018 06:40      Hemoglobin A1C, Whole Blood: 6.0 % (10-19 @ 07:53)      IMAGING: Reviewed by me.   MRI Head No Cont (10/20/18):  Acute hemorrhagic infarct involving the left MCA territory with mass effect on the left lateral ventricle and left to right shift.    CT Head No Cont (10/19/18) @ 23:21:   Redemonstration of large acute left MCA territory infarct with similar mass effect upon the left lateral ventricle and similar rightward midline shift of 4 mm. Ventricles stable in size. No hydrocephalus. Previously seen increased density throughout the region of infarction is less apparent on the current examination, consistent with resolving contrast staining after digital subtraction angiography. 2.1 x 1.6 cm region of persistent increased density in the left lateral frontotemporal region may represent hemorrhagic transformation of the infarct.    CT Head No Cont (10/19/18) @ 05:52:   Large acute left MCA territory infarct. Increased density seen throughout the left basal ganglia and left lateral frontal, parietal, and temporal cortices likely represents a combination of hemorrhagic transformation and cortical staining from recent digital subtraction angiography. Additional density seen within the left cerebral sulci likely representing subarachnoid hemorrhage. Mass effect upon the left lateral ventricle and rightward midline shift of 4 mm. No large hydrocephalus.

## 2018-10-29 NOTE — PROGRESS NOTE ADULT - SUBJECTIVE AND OBJECTIVE BOX
Subjective: Patient seen and examined. No new events except as noted.   no cp or sob    REVIEW OF SYSTEMS:    CONSTITUTIONAL: + weakness, fevers or chills  EYES/ENT: No visual changes;  No vertigo or throat pain   NECK: No pain or stiffness  RESPIRATORY: No cough, wheezing, hemoptysis; No shortness of breath  CARDIOVASCULAR: No chest pain or palpitations  GASTROINTESTINAL: No abdominal or epigastric pain. No nausea, vomiting, or hematemesis; No diarrhea or constipation. No melena or hematochezia.  GENITOURINARY: No dysuria, frequency or hematuria  NEUROLOGICAL: + numbness or weakness  SKIN: No itching, burning, rashes, or lesions   All other review of systems is negative unless indicated above.    MEDICATIONS:  MEDICATIONS  (STANDING):  amLODIPine   Tablet 5 milliGRAM(s) Oral daily  artificial tears (preservative free) Ophthalmic Solution 1 Drop(s) Both EYES daily  aspirin  chewable 81 milliGRAM(s) Oral daily  atorvastatin 80 milliGRAM(s) Oral at bedtime  brimonidine 0.2% Ophthalmic Solution 1 Drop(s) Both EYES two times a day  dorzolamide 2%/timolol 0.5% Ophthalmic Solution 1 Drop(s) Both EYES two times a day  enoxaparin Injectable 40 milliGRAM(s) SubCutaneous daily  ferrous    sulfate Liquid 300 milliGRAM(s) Oral daily  gabapentin   Solution 100 milliGRAM(s) Oral three times a day  influenza   Vaccine 0.5 milliLiter(s) IntraMuscular once  lisinopril 5 milliGRAM(s) Oral <User Schedule>  metoprolol tartrate 25 milliGRAM(s) Oral two times a day  nystatin    Suspension [id number] Unit(s) Oral two times a day      PHYSICAL EXAM:  T(C): 37.4 (10-29-18 @ 19:19), Max: 37.8 (10-29-18 @ 05:39)  HR: 94 (10-29-18 @ 19:19) (62 - 94)  BP: 143/80 (10-29-18 @ 19:19) (105/51 - 151/66)  RR: 18 (10-29-18 @ 19:19) (18 - 18)  SpO2: 94% (10-29-18 @ 19:19) (94% - 100%)  Wt(kg): --  I&O's Summary    28 Oct 2018 07:01  -  29 Oct 2018 07:00  --------------------------------------------------------  IN: 2190 mL / OUT: 1710 mL / NET: 480 mL    29 Oct 2018 07:01  -  29 Oct 2018 20:25  --------------------------------------------------------  IN: 0 mL / OUT: 1100 mL / NET: -1100 mL          Appearance: NAD aphasic   HEENT:   Dry oral mucosa, +NGT 	  Lymphatic: No lymphadenopathy , no edema  Cardiovascular: Normal S1 S2, No JVD, No murmurs , Peripheral pulses palpable 2+ bilaterally  Respiratory: Lungs clear to auscultation, normal effort 	  Gastrointestinal:  Soft, Non-tender, + BS	  Skin: No rashes, No ecchymoses, No cyanosis, warm to touch  Musculoskeletal: Decreased range of motion and  strength  Psychiatry:  lethargic   Ext: No edema  NEUROLOGICAL EXAM:  Mental status: Awake, lethargic, not following commands, no verbal output  Cranial Nerves: Right facial droop  Motor exam:  RUE- plegic, increased tone, RLE - withdraws to noxious stimuli, LUE - minimal movement,  LLE - moving freely  Sensation: Not assessed          LABS:      Urinalysis (10.29.18 @ 17:28)    Glucose Qualitative, Urine: Negative mg/dL    Blood, Urine: Small    pH Urine: 7.5    Color: Yellow    Urine Appearance: Slightly Turbid    Bilirubin: Negative    Ketone - Urine: Negative    Specific Gravity: 1.013    Protein, Urine: 30 mg/dL    Urobilinogen: 2.0 mg/dL    Nitrite: Positive    Leukocyte Esterase Concentration: Large        CARDIAC MARKERS:                                11.5   18.1  )-----------( 275      ( 29 Oct 2018 08:16 )             35.9     10-29    137  |  103  |  20  ----------------------------<  186<H>  4.1   |  24  |  0.72    Ca    8.6      29 Oct 2018 08:16      proBNP:   Lipid Profile:   HgA1c:   TSH:     Negative          TELEMETRY: 	    ECG:  	  RADIOLOGY:   DIAGNOSTIC TESTING:  [ ] Echocardiogram:  [ ]  Catheterization:  [ ] Stress Test:    OTHER:

## 2018-10-30 PROBLEM — Z00.00 ENCOUNTER FOR PREVENTIVE HEALTH EXAMINATION: Status: ACTIVE | Noted: 2018-10-30

## 2018-10-30 LAB
BASOPHILS # BLD AUTO: 0.03 K/UL — SIGNIFICANT CHANGE UP (ref 0–0.2)
BASOPHILS NFR BLD AUTO: 0.2 % — SIGNIFICANT CHANGE UP (ref 0–2)
EOSINOPHIL # BLD AUTO: 0.13 K/UL — SIGNIFICANT CHANGE UP (ref 0–0.5)
EOSINOPHIL NFR BLD AUTO: 0.8 % — SIGNIFICANT CHANGE UP (ref 0–6)
HCT VFR BLD CALC: 30 % — LOW (ref 34.5–45)
HGB BLD-MCNC: 9.7 G/DL — LOW (ref 11.5–15.5)
IMM GRANULOCYTES NFR BLD AUTO: 0.4 % — SIGNIFICANT CHANGE UP (ref 0–1.5)
LYMPHOCYTES # BLD AUTO: 1.59 K/UL — SIGNIFICANT CHANGE UP (ref 1–3.3)
LYMPHOCYTES # BLD AUTO: 10 % — LOW (ref 13–44)
MCHC RBC-ENTMCNC: 30.2 PG — SIGNIFICANT CHANGE UP (ref 27–34)
MCHC RBC-ENTMCNC: 32.3 GM/DL — SIGNIFICANT CHANGE UP (ref 32–36)
MCV RBC AUTO: 93.5 FL — SIGNIFICANT CHANGE UP (ref 80–100)
MONOCYTES # BLD AUTO: 1.41 K/UL — HIGH (ref 0–0.9)
MONOCYTES NFR BLD AUTO: 8.9 % — SIGNIFICANT CHANGE UP (ref 2–14)
NEUTROPHILS # BLD AUTO: 12.63 K/UL — HIGH (ref 1.8–7.4)
NEUTROPHILS NFR BLD AUTO: 79.7 % — HIGH (ref 43–77)
PLATELET # BLD AUTO: 264 K/UL — SIGNIFICANT CHANGE UP (ref 150–400)
PROCALCITONIN SERPL-MCNC: 0.08 NG/ML — SIGNIFICANT CHANGE UP (ref 0.02–0.1)
RBC # BLD: 3.21 M/UL — LOW (ref 3.8–5.2)
RBC # FLD: 14.3 % — SIGNIFICANT CHANGE UP (ref 10.3–14.5)
WBC # BLD: 15.85 K/UL — HIGH (ref 3.8–10.5)
WBC # FLD AUTO: 15.85 K/UL — HIGH (ref 3.8–10.5)

## 2018-10-30 PROCEDURE — 99232 SBSQ HOSP IP/OBS MODERATE 35: CPT

## 2018-10-30 PROCEDURE — 99233 SBSQ HOSP IP/OBS HIGH 50: CPT

## 2018-10-30 RX ADMIN — ENOXAPARIN SODIUM 40 MILLIGRAM(S): 100 INJECTION SUBCUTANEOUS at 12:04

## 2018-10-30 RX ADMIN — LISINOPRIL 5 MILLIGRAM(S): 2.5 TABLET ORAL at 18:17

## 2018-10-30 RX ADMIN — Medication 81 MILLIGRAM(S): at 12:04

## 2018-10-30 RX ADMIN — BRIMONIDINE TARTRATE 1 DROP(S): 2 SOLUTION/ DROPS OPHTHALMIC at 18:17

## 2018-10-30 RX ADMIN — ATORVASTATIN CALCIUM 80 MILLIGRAM(S): 80 TABLET, FILM COATED ORAL at 23:06

## 2018-10-30 RX ADMIN — AMLODIPINE BESYLATE 5 MILLIGRAM(S): 2.5 TABLET ORAL at 05:25

## 2018-10-30 RX ADMIN — GABAPENTIN 100 MILLIGRAM(S): 400 CAPSULE ORAL at 23:06

## 2018-10-30 RX ADMIN — Medication 1 DROP(S): at 12:04

## 2018-10-30 RX ADMIN — GABAPENTIN 100 MILLIGRAM(S): 400 CAPSULE ORAL at 05:26

## 2018-10-30 RX ADMIN — DORZOLAMIDE HYDROCHLORIDE TIMOLOL MALEATE 1 DROP(S): 20; 5 SOLUTION/ DROPS OPHTHALMIC at 05:25

## 2018-10-30 RX ADMIN — DORZOLAMIDE HYDROCHLORIDE TIMOLOL MALEATE 1 DROP(S): 20; 5 SOLUTION/ DROPS OPHTHALMIC at 18:17

## 2018-10-30 RX ADMIN — BRIMONIDINE TARTRATE 1 DROP(S): 2 SOLUTION/ DROPS OPHTHALMIC at 05:25

## 2018-10-30 RX ADMIN — Medication 300 MILLIGRAM(S): at 12:05

## 2018-10-30 RX ADMIN — GABAPENTIN 100 MILLIGRAM(S): 400 CAPSULE ORAL at 12:04

## 2018-10-30 RX ADMIN — Medication 500000 UNIT(S): at 05:25

## 2018-10-30 RX ADMIN — Medication 25 MILLIGRAM(S): at 23:06

## 2018-10-30 RX ADMIN — Medication 500000 UNIT(S): at 18:17

## 2018-10-30 NOTE — PROGRESS NOTE ADULT - SUBJECTIVE AND OBJECTIVE BOX
THE PATIENT WAS SEEN AND EXAMINED BY ME WITH THE HOUSESTAFF AND STROKE TEAM DURING MORNING ROUNDS.     HPI:  86y/o female with vascular risk factors of HTN and hyperlipidemia, who was last seen normal at 22:10, on the day prior to admission. Then she was found by family members at the floor with inability to communicate or move her right side at 22:20. She was initially evaluated at [place] were NIHSS=29 deemed candidate for tPA. 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 Patient was transferred for further management to [facility]. She received IVtpa per protocol and transferred to [facility] after CTA revealed L ICA and L M1 occlusion. She underwent thrombectomy with resultant TICI 2B flow.     SUBJECTIVE: No events overnight.  No new neurologic complaints.      ROS: All negative except documented above.    amLODIPine   Tablet 5 milliGRAM(s) Oral daily  artificial tears (preservative free) Ophthalmic Solution 1 Drop(s) Both EYES daily  aspirin  chewable 81 milliGRAM(s) Oral daily  atorvastatin 80 milliGRAM(s) Oral at bedtime  brimonidine 0.2% Ophthalmic Solution 1 Drop(s) Both EYES two times a day  dorzolamide 2%/timolol 0.5% Ophthalmic Solution 1 Drop(s) Both EYES two times a day  enoxaparin Injectable 40 milliGRAM(s) SubCutaneous daily  ferrous    sulfate Liquid 300 milliGRAM(s) Oral daily  gabapentin   Solution 100 milliGRAM(s) Oral three times a day  influenza   Vaccine 0.5 milliLiter(s) IntraMuscular once  lisinopril 5 milliGRAM(s) Oral <User Schedule>  metoprolol tartrate 25 milliGRAM(s) Oral two times a day  metoprolol tartrate Injectable 5 milliGRAM(s) IV Push every 6 hours PRN  nystatin    Suspension 584839 Unit(s) Oral two times a day    PHYSICAL EXAM:   Vital Signs Last 24 Hrs  T(C): 36.8 (30 Oct 2018 04:52), Max: 37.8 (29 Oct 2018 16:41)  T(F): 98.2 (30 Oct 2018 04:52), Max: 100 (29 Oct 2018 16:41)  HR: 70 (30 Oct 2018 04:52) (67 - 94)  BP: 114/58 (30 Oct 2018 04:52) (105/51 - 151/66)  RR: 18 (30 Oct 2018 04:52) (18 - 18)  SpO2: 94% (30 Oct 2018 04:52) (94% - 100%)    General: No acute distress  HEENT: unable to cooperate, no BTT on right, slight gaze deviation to the left  Abdomen: Soft, nontender, nondistended   Extremities: No edema    NEUROLOGICAL EXAM:  Mental status: eyes opened, generates some 1-2 words and unintelligible sounds, attentive, follows some commands in native language  Cranial Nerves: Moderate-Severe right nasolabial flattening, no Blink to threat on right visual field, right gaze palsy, unable to cross pass midline, no definite dysarthria but almost no verbal output  Motor exam: LUE: good effort against gravity, LLE : spontaneous movements, minimal effort against gravity, normal tone, RUE: no movement, RLE: externally rotated, no volitional movement-triple flexion only, minimally increased tone in RUE/RLE.     Sensation: unable to assess    Coordination/ Gait: deferred    LABS:                        11.5   18.1  )-----------( 275      ( 29 Oct 2018 08:16 )             35.9    10-29    137  |  103  |  20  ----------------------------<  186<H>  4.1   |  24  |  0.72    Ca    8.6      29 Oct 2018 08:16    Hemoglobin A1C, Whole Blood: 6.0 % (10-19 @ 07:53)    IMAGING: Reviewed by me.     MRI Head No Cont (10/20/18):  Acute hemorrhagic infarct involving the left MCA territory with mass effect on the left lateral ventricle and left to right shift.    CT Head No Cont (10/19/18) @ 23:21:   Redemonstration of large acute left MCA territory infarct with similar mass effect upon the left lateral ventricle and similar rightward midline shift of 4 mm. Ventricles stable in size. No hydrocephalus. Previously seen increased density throughout the region of infarction is less apparent on the current examination, consistent with resolving contrast staining after digital subtraction angiography. 2.1 x 1.6 cm region of persistent increased density in the left lateral frontotemporal region may represent hemorrhagic transformation of the infarct.    CT Head No Cont (10/19/18) @ 05:52:   Large acute left MCA territory infarct. Increased density seen throughout the left basal ganglia and left lateral frontal, parietal, and temporal cortices likely represents a combination of hemorrhagic transformation and cortical staining from recent digital subtraction angiography. Additional density seen within the left cerebral sulci likely representing subarachnoid hemorrhage. Mass effect upon the left lateral ventricle and rightward midline shift of 4 mm. No large hydrocephalus. THE PATIENT WAS SEEN AND EXAMINED BY ME WITH THE HOUSESTAFF AND STROKE TEAM DURING MORNING ROUNDS.     HPI:  88y/o female with vascular risk factors of HTN and hyperlipidemia, who was last seen normal at 22:10, on the day prior to admission. Then she was found by family members at the floor with inability to communicate or move her right side at 22:20. She was initially evaluated at [place] were NIHSS=29 deemed candidate for tPA. 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 Patient was transferred for further management to [facility]. She received IVtpa per protocol and transferred to NS after CTA revealed L ICA and L M1 occlusion. She underwent thrombectomy with resultant TICI 2B flow.     SUBJECTIVE: No events overnight.  No new neurologic complaints.      ROS: All negative except documented above.    amLODIPine   Tablet 5 milliGRAM(s) Oral daily  artificial tears (preservative free) Ophthalmic Solution 1 Drop(s) Both EYES daily  aspirin  chewable 81 milliGRAM(s) Oral daily  atorvastatin 80 milliGRAM(s) Oral at bedtime  brimonidine 0.2% Ophthalmic Solution 1 Drop(s) Both EYES two times a day  dorzolamide 2%/timolol 0.5% Ophthalmic Solution 1 Drop(s) Both EYES two times a day  enoxaparin Injectable 40 milliGRAM(s) SubCutaneous daily  ferrous    sulfate Liquid 300 milliGRAM(s) Oral daily  gabapentin   Solution 100 milliGRAM(s) Oral three times a day  influenza   Vaccine 0.5 milliLiter(s) IntraMuscular once  lisinopril 5 milliGRAM(s) Oral <User Schedule>  metoprolol tartrate 25 milliGRAM(s) Oral two times a day  metoprolol tartrate Injectable 5 milliGRAM(s) IV Push every 6 hours PRN  nystatin    Suspension 068807 Unit(s) Oral two times a day    PHYSICAL EXAM:   Vital Signs Last 24 Hrs  T(C): 36.8 (30 Oct 2018 04:52), Max: 37.8 (29 Oct 2018 16:41)  T(F): 98.2 (30 Oct 2018 04:52), Max: 100 (29 Oct 2018 16:41)  HR: 70 (30 Oct 2018 04:52) (67 - 94)  BP: 114/58 (30 Oct 2018 04:52) (105/51 - 151/66)  RR: 18 (30 Oct 2018 04:52) (18 - 18)  SpO2: 94% (30 Oct 2018 04:52) (94% - 100%)    General: No acute distress  HEENT: unable to cooperate, no BTT on right, slight gaze deviation to the left  Abdomen: Soft, nontender, nondistended   Extremities: No edema    NEUROLOGICAL EXAM:  Mental status: eyes opened, generates some 1-2 words and unintelligible sounds, attentive, follows some commands in native language especially respecting the midline   Cranial Nerves: Moderate-Severe right nasolabial flattening, no Blink to threat on right visual field, right gaze palsy, unable to cross pass midline, no definite dysarthria but almost no verbal output  Motor exam: LUE: good effort against gravity, LLE : spontaneous movements, minimal effort against gravity, normal tone, RUE: no movement, RLE: externally rotated, no volitional movement-triple flexion only, minimally increased tone in RUE/RLE.     Sensation: unable to assess    Coordination/ Gait: deferred    LABS:                        11.5   18.1  )-----------( 275      ( 29 Oct 2018 08:16 )             35.9    10-29    137  |  103  |  20  ----------------------------<  186<H>  4.1   |  24  |  0.72    Ca    8.6      29 Oct 2018 08:16    Hemoglobin A1C, Whole Blood: 6.0 % (10-19 @ 07:53)    IMAGING: Reviewed by me.     MRI Head No Cont (10/20/18):  Acute hemorrhagic infarct involving the left MCA territory with mass effect on the left lateral ventricle and left to right shift.    CT Head No Cont (10/19/18) @ 23:21:   Redemonstration of large acute left MCA territory infarct with similar mass effect upon the left lateral ventricle and similar rightward midline shift of 4 mm. Ventricles stable in size. No hydrocephalus. Previously seen increased density throughout the region of infarction is less apparent on the current examination, consistent with resolving contrast staining after digital subtraction angiography. 2.1 x 1.6 cm region of persistent increased density in the left lateral frontotemporal region may represent hemorrhagic transformation of the infarct.    CT Head No Cont (10/19/18) @ 05:52:   Large acute left MCA territory infarct. Increased density seen throughout the left basal ganglia and left lateral frontal, parietal, and temporal cortices likely represents a combination of hemorrhagic transformation and cortical staining from recent digital subtraction angiography. Additional density seen within the left cerebral sulci likely representing subarachnoid hemorrhage. Mass effect upon the left lateral ventricle and rightward midline shift of 4 mm. No large hydrocephalus.

## 2018-10-30 NOTE — PROGRESS NOTE ADULT - SUBJECTIVE AND OBJECTIVE BOX
CC: f/u for fever, leukocytosis    Patient remains in no distress, mostly nonverbal    REVIEW OF SYSTEMS:  All other review of systems negative (Comprehensive ROS)- limited    Antimicrobials Day # 5  nystatin    Suspension [id number] Unit(s) Oral two times a day  CTX x 1 given yest  Medications Reviewed    Vital Signs Last 24 Hrs  T(F): 97.9 (30 Oct 2018 16:00), Max: 100 (29 Oct 2018 16:41)  HR: 70 (30 Oct 2018 16:00) (53 - 94)  BP: 94/60 (30 Oct 2018 16:00) (94/60 - 143/80)  BP(mean): --  RR: 18 (30 Oct 2018 16:00) (17 - 18)  SpO2: 97% (30 Oct 2018 16:00) (94% - 99%)    PHYSICAL EXAM:  General: alert, mumbling, no acute distress   Eyes:  anicteric, no conjunctival injection, no discharge  Oropharynx: no lesions or injection 	  Neck: supple, without adenopathy  Lungs: clear to auscultation  Heart: regular rate and rhythm; no murmur, rubs or gallops  Abdomen: soft, nondistended, nontender, G tube site minimal exudate, no erythema  Skin: no lesions  Extremities: no clubbing, cyanosis, or edema  Neurologic: alert, R weakness    LAB RESULTS:                        9.7    15.85 )-----------( 264      ( 30 Oct 2018 08:01 )             30.0   10-29    137  |  103  |  20  ----------------------------<  186<H>  4.1   |  24  |  0.72    Ca    8.6      29 Oct 2018 08:16    Procalcitonin, Serum (10.30.18 @ 06:33)    Procalcitonin, Serum: 0.08    Urine Microscopic-Add On (NC) (10.29.18 @ 17:28)    Red Blood Cell - Urine: 14 /HPF    White Blood Cell - Urine: >720 /HPF    Hyaline Casts: 0 /LPF    Bacteria: Moderate    Epithelial Cells: 0 /HPF    MICROBIOLOGY:  RECENT CULTURES:  10-26 @ 08:49 .Urine Catheterized     No growth    Culture - Blood (10.20.18 @ 14:48)    Specimen Source: .Blood Blood    Culture Results:   No growth at 5 days.      RADIOLOGY REVIEWED:  Xray Chest 1 View- PORTABLE-Urgent (10.23.18 @ 17:14) >  Enteric tube, with distal tip in the stomach.    VA Duplex Lower Ext Vein Scan, Bilat (10.24.18 @ 15:33) >  No evidence of bilateral lower extremity deep venous thrombosis.    MR Head No Cont (10.20.18 @ 18:38) >  Impression: Acute hemorrhagic infarct involving the left MCA territory   with mass effect on the left lateral ventricle and left to right shift.

## 2018-10-30 NOTE — PROGRESS NOTE ADULT - SUBJECTIVE AND OBJECTIVE BOX
HPI: Tolerating therapies.  Mod A with transfers and gait.  Denies pain    MEDICATIONS  (STANDING):  amLODIPine   Tablet 5 milliGRAM(s) Oral daily  artificial tears (preservative free) Ophthalmic Solution 1 Drop(s) Both EYES daily  aspirin  chewable 81 milliGRAM(s) Oral daily  atorvastatin 80 milliGRAM(s) Oral at bedtime  brimonidine 0.2% Ophthalmic Solution 1 Drop(s) Both EYES two times a day  dorzolamide 2%/timolol 0.5% Ophthalmic Solution 1 Drop(s) Both EYES two times a day  enoxaparin Injectable 40 milliGRAM(s) SubCutaneous daily  ferrous    sulfate Liquid 300 milliGRAM(s) Oral daily  gabapentin   Solution 100 milliGRAM(s) Oral three times a day  influenza   Vaccine 0.5 milliLiter(s) IntraMuscular once  lisinopril 5 milliGRAM(s) Oral <User Schedule>  metoprolol tartrate 25 milliGRAM(s) Oral two times a day  nystatin    Suspension 139103 Unit(s) Oral two times a day    MEDICATIONS  (PRN):  metoprolol tartrate Injectable 5 milliGRAM(s) IV Push every 6 hours PRN SBP > 140    Vital Signs Last 24 Hrs  T(C): 36.7 (30 Oct 2018 08:00), Max: 37.8 (29 Oct 2018 16:41)  T(F): 98.1 (30 Oct 2018 08:00), Max: 100 (29 Oct 2018 16:41)  HR: 53 (30 Oct 2018 08:00) (53 - 94)  BP: 113/68 (30 Oct 2018 08:00) (105/51 - 143/80)  BP(mean): --  RR: 18 (30 Oct 2018 08:00) (18 - 18)  SpO2: 99% (30 Oct 2018 08:00) (94% - 99%)    Constitutional - NAD, Comfortable  HEENT - NCAT  Neck - Supple  Chest - poor inspiratory effort, diminished breath sounds  Cardiovascular - irregular  Abdomen - BS+, Soft, NTND  Extremities - No C/C/E, No calf tenderness  Neurologic Exam -                 + aphasia,   RUE 0/5  RLE- trace proximal, 0/5 distal    Psychiatric - Mood stable, Affect WNL                          9.7    15.85 )-----------( 264      ( 30 Oct 2018 08:01 )             30.0     10-29    137  |  103  |  20  ----------------------------<  186<H>  4.1   |  24  |  0.72    Ca    8.6      29 Oct 2018 08:16              ASSESSMENT/PLAN    STEPAN RIOJAS is a 88yo Female with hx of HTN, HLD, prediabetes, who is admitted with an acute L MCA infarct with hemorragic transformation, s/p tPA and thrombectomy, with functional, gait, ADL, swallow, and speech impairments.    - PT/OT/SLP  - Prec- Cardiac, Falls  - Monitor wbc ct- improved from yesterday  - DVT px- Lovenox  - Skin- Turn q2h  - Dispo- Acute Rehab- can tolerate 3h/d PT/OT/SLP and requires daily physician visits

## 2018-10-30 NOTE — PROGRESS NOTE ADULT - SUBJECTIVE AND OBJECTIVE BOX
Pt awaiting assigned room to be unoccupied     Judge Callahan, CRISTAL  08/21/18  Interval Events: No acute overnight events  Tolerating PEG feeds. No N/V    MEDICATIONS  (STANDING):  amLODIPine   Tablet 5 milliGRAM(s) Oral daily  artificial tears (preservative free) Ophthalmic Solution 1 Drop(s) Both EYES daily  aspirin  chewable 81 milliGRAM(s) Oral daily  atorvastatin 80 milliGRAM(s) Oral at bedtime  brimonidine 0.2% Ophthalmic Solution 1 Drop(s) Both EYES two times a day  dorzolamide 2%/timolol 0.5% Ophthalmic Solution 1 Drop(s) Both EYES two times a day  enoxaparin Injectable 40 milliGRAM(s) SubCutaneous daily  ferrous    sulfate Liquid 300 milliGRAM(s) Oral daily  gabapentin   Solution 100 milliGRAM(s) Oral three times a day  influenza   Vaccine 0.5 milliLiter(s) IntraMuscular once  lisinopril 5 milliGRAM(s) Oral <User Schedule>  metoprolol tartrate 25 milliGRAM(s) Oral two times a day  nystatin    Suspension 119767 Unit(s) Oral two times a day    MEDICATIONS  (PRN):  bisacodyl Suppository 10 milliGRAM(s) Rectal daily PRN Constipation  metoprolol tartrate Injectable 5 milliGRAM(s) IV Push every 6 hours PRN SBP > 140      Allergies    No Known Allergies    Intolerances      Review of Systems: unable to attain.       Vital Signs Last 24 Hrs  T(C): 36.9 (30 Oct 2018 13:00), Max: 37.8 (29 Oct 2018 16:41)  T(F): 98.4 (30 Oct 2018 13:00), Max: 100 (29 Oct 2018 16:41)  HR: 71 (30 Oct 2018 13:00) (53 - 94)  BP: 100/56 (30 Oct 2018 13:00) (100/56 - 143/80)  BP(mean): --  RR: 18 (30 Oct 2018 13:00) (17 - 18)  SpO2: 98% (30 Oct 2018 13:00) (94% - 99%)    PHYSICAL EXAM:    Constitutional: NAD, well-developed  HEENT: EOMI, throat clear  Neck: No LAD, supple  Respiratory: CTA and P  Cardiovascular: S1 and S2, RRR, no M  Gastrointestinal: BS+, soft, NT/ND, neg HSM, + PEG c/d/i   Extremities: No peripheral edema, neg clubing, cyanosis  Vascular: 2+ peripheral pulses  Neurological: eyes opened, generates some 1-2 words and unintelligible sounds, attentive, follows some commands in native language  Psychiatric: Normal mood, normal affect  Skin: No rashes    LABS:                        9.7    15.85 )-----------( 264      ( 30 Oct 2018 08:01 )             30.0     10-    137  |  103  |  20  ----------------------------<  186<H>  4.1   |  24  |  0.72    Ca    8.6      29 Oct 2018 08:16        Urinalysis Basic - ( 29 Oct 2018 17:28 )    Color: Yellow / Appearance: Slightly Turbid / S.013 / pH: x  Gluc: x / Ketone: Negative  / Bili: Negative / Urobili: 2.0 mg/dL   Blood: x / Protein: 30 mg/dL / Nitrite: Positive   Leuk Esterase: Large / RBC: 14 /HPF / WBC >720 /HPF   Sq Epi: x / Non Sq Epi: 0 /HPF / Bacteria: Moderate        RADIOLOGY & ADDITIONAL TESTS:

## 2018-10-30 NOTE — PROGRESS NOTE ADULT - ASSESSMENT
Patient with hemorraghic L MCA infarct, s/p thrombectomy,   Fever resolved  S/p PEG  Leukocytosis persists, but lower today  PCT level only 0.08  No support for sepsis or systemic infection  UA with pyuria, but not clear if collected accurately    Plan:   Hold further CTX  Await repeat Urine Cx result  Follow temps and CBC/diff

## 2018-10-30 NOTE — PROGRESS NOTE ADULT - ASSESSMENT
86y/o female with vascular risk factors of HTN and hyperlipidemia, who was last seen normal at 22:10, on the day prior to admission. Then she was found by family members at the floor with inability to communicate or move her right side at 22:20. She was initially evaluated at [place] were NIHSS=29 deemed candidate for tPA. 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 Patient was transferred for further management to [facility]. She received IVtpa per protocol and transferred to NS after CTA revealed L ICA and L M1 occlusion. She underwent thrombectomy with resultant TICI 2B flow.     Impression:   Cerebral embolism with cerebral infarction. 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 Physical therapy/OT recommends acute rehab.     ANTITHROMBOTIC THERAPY: Aspirin for secondary stroke prevention    PULMONARY: CXR (10/23)shows bilateral pleural effusions- no overt sign of HF noted, lungs CTA B/L, protecting airway, saturating well     CARDIOVASCULAR: TTE shows EF: 55-60%, Agitated saline injection revealed few late bubbles in the left heart, consistent with transpulmonic shunting, LE doppler: no DVT. Initially thought to have afib- now deemed to be NSR with PAC's s/p ICM placement for prolonged cardiac monitoring to screen for occult cardiac arrhythmias like atrial fibrillation, but a cardiac source of embolism. Continue current cardiac meds, Dr Chand (cardiology) consult appreciated                          SBP goal: gradual normotension     GASTROINTESTINAL:  dysphagia screen failed, speech and swallow eval recommended NPO for now, re-evaluated on 10/23-still recommended NPO. HCP agreeable to PEG. GI consulted s/p EGD- no specimens collected-PEG tube placed- tolerating feedings.     Diet: TF via PEG    RENAL: BUN/Cr previously within normal limits, good urine output - continue straight cath protocol;     Na Goal: Greater than 135     Mendez: No    HEMATOLOGY: H/H without acute change?- noted with anemia, no signs or symptoms of bleeding     DVT ppx: LMWH     ID: afebrile, increase in leukocytosis on 10/29?- ID reconsulted- recommended to monitor off Abx, Procalcitonin within normal limits. previous BCx x 2 NGTD, UA - negative, and chest X-ray shows no evidence of PNA. Will continue to monitor closely.     OTHER: Plan discussed with family at bedside, all questions and concerns addressed.     DISPOSITION: Acute rehab    CORE MEASURES:        Admission NIHSS: 29     TPA:  YES      LDL/HDL: 100/52      Depression Screen: unable to obtain due to aphasia     Statin Therapy: yes     Dysphagia Screen: FAIL     Smoking NO      Afib YES      Stroke Education  YES, to family 86y/o female with vascular risk factors of HTN and hyperlipidemia, who was last seen normal at 22:10, on the day prior to admission. Then she was found by family members at the floor with inability to communicate or move her right side at 22:20. She was initially evaluated at [place] were NIHSS=29 deemed candidate for tPA. 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 Patient was transferred for further management to [facility]. She received IVtpa per protocol and transferred to NS after CTA revealed L ICA and L M1 occlusion. She underwent thrombectomy with resultant TICI 2B flow.     Impression:   Cerebral embolism with cerebral infarction. 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 Physical therapy/OT recommends acute rehab.     ANTITHROMBOTIC THERAPY: Aspirin for secondary stroke prevention    PULMONARY: CXR (10/23)shows bilateral pleural effusions- no overt sign of HF noted, lungs CTA B/L, protecting airway, saturating well     CARDIOVASCULAR: TTE shows EF: 55-60%, Agitated saline injection revealed few late bubbles in the left heart, consistent with transpulmonic shunting, LE doppler: no DVT. Initially thought to have afib- now deemed to be NSR with PAC's s/p ICM placement for prolonged cardiac monitoring to screen for occult cardiac arrhythmias like atrial fibrillation, but a cardiac source of embolism. Continue current cardiac meds, Dr Chand (cardiology) consult appreciated                          SBP goal: gradual normotension     GASTROINTESTINAL:  dysphagia screen failed, speech and swallow eval recommended NPO for now, re-evaluated on 10/23-still recommended NPO. HCP agreeable to PEG. GI consulted s/p EGD- no specimens collected-PEG tube placed- tolerating feedings.     Diet: TF via PEG    RENAL: BUN/Cr previously within normal limits, good urine output     Na Goal: Greater than 135     Mendez: No    HEMATOLOGY: H/H without acute change- noted with anemia, no signs or symptoms of bleeding     DVT ppx: LMWH     ID: afebrile, leukocytosis downtrending- ID reconsulted- recommended to monitor off Abx, Procalcitonin within normal limits. previous BCx x 2 NGTD, UA - negative, and chest X-ray shows no evidence of PNA. Will continue to monitor closely.     OTHER: Plan discussed with previously family at bedside, all questions and concerns addressed.     DISPOSITION: Acute rehab    CORE MEASURES:        Admission NIHSS: 29     TPA:  YES      LDL/HDL: 100/52      Depression Screen: unable to obtain due to aphasia     Statin Therapy: yes     Dysphagia Screen: FAIL     Smoking NO      Afib YES      Stroke Education  YES, to family 88y/o female with vascular risk factors of HTN and hyperlipidemia, who was last seen normal at 22:10, on the day prior to admission. Then she was found by family members at the floor with inability to communicate or move her right side at 22:20. She was initially evaluated at [place] were NIHSS=29 deemed candidate for tPA. 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 Patient was transferred for further management to [facility]. She received IV tPA per protocol and transferred to NS after CTA revealed L ICA and L M1 occlusion. She underwent thrombectomy with resultant TICI 2B flow.     Impression:   Cerebral embolism with cerebral infarction. 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 Physical therapy/OT recommends acute rehab.     ANTITHROMBOTIC THERAPY: Aspirin for secondary stroke prevention    PULMONARY: CXR (10/23)shows bilateral pleural effusions - no overt sign of HF noted, lungs CTA B/L, protecting airway, saturating well     CARDIOVASCULAR: TTE shows LVEF: 55-60%, Agitated saline injection revealed few late bubbles in the left heart, consistent with transpulmonic shunting, LE doppler: no DVT. Initially thought to have afib- now deemed to be NSR with PAC's s/p ICM placement for prolonged cardiac monitoring to screen for occult cardiac arrhythmias like atrial fibrillation, but a cardiac source of embolism. Continue current cardiac meds, Dr Chand (cardiology) consult appreciated                          SBP goal: gradual normotension     GASTROINTESTINAL:  dysphagia screen failed, speech and swallow eval recommended NPO for now, re-evaluated on 10/23-still recommended NPO. HCP agreeable to PEG. GI consulted s/p EGD- no specimens collected-PEG tube placed- tolerating feedings.     Diet: TF via PEG    RENAL: BUN/Cr previously within normal limits, good urine output     Na Goal: Greater than 135     Mendez: No    HEMATOLOGY: H/H without acute change- noted with anemia, no signs or symptoms of bleeding     DVT ppx: LMWH     ID: afebrile, leukocytosis downtrending- ID reconsulted- recommended to monitor off Abx, Procalcitonin within normal limits. previous BCx x 2 NGTD, UA - negative, and chest X-ray shows no evidence of PNA. Will continue to monitor closely.     OTHER: Plan discussed with previously family at bedside, all questions and concerns addressed.     DISPOSITION: Acute rehab    CORE MEASURES:        Admission NIHSS: 29     TPA:  YES      LDL/HDL: 100/52      Depression Screen: unable to obtain due to aphasia     Statin Therapy: yes     Dysphagia Screen: FAIL     Smoking NO      Afib YES      Stroke Education  YES, to family

## 2018-10-30 NOTE — PROGRESS NOTE ADULT - SUBJECTIVE AND OBJECTIVE BOX
Subjective: Patient seen and examined. No new events except as noted.     REVIEW OF SYSTEMS:    CONSTITUTIONAL: No weakness, fevers or chills  EYES/ENT: No visual changes;  No vertigo or throat pain   NECK: No pain or stiffness  RESPIRATORY: No cough, wheezing, hemoptysis; No shortness of breath  CARDIOVASCULAR: No chest pain or palpitations  GASTROINTESTINAL: No abdominal or epigastric pain. No nausea, vomiting, or hematemesis; No diarrhea or constipation. No melena or hematochezia.  GENITOURINARY: No dysuria, frequency or hematuria  NEUROLOGICAL: No numbness or weakness  SKIN: No itching, burning, rashes, or lesions   All other review of systems is negative unless indicated above.    MEDICATIONS:  MEDICATIONS  (STANDING):  amLODIPine   Tablet 5 milliGRAM(s) Oral daily  artificial tears (preservative free) Ophthalmic Solution 1 Drop(s) Both EYES daily  aspirin  chewable 81 milliGRAM(s) Oral daily  atorvastatin 80 milliGRAM(s) Oral at bedtime  brimonidine 0.2% Ophthalmic Solution 1 Drop(s) Both EYES two times a day  dorzolamide 2%/timolol 0.5% Ophthalmic Solution 1 Drop(s) Both EYES two times a day  enoxaparin Injectable 40 milliGRAM(s) SubCutaneous daily  ferrous    sulfate Liquid 300 milliGRAM(s) Oral daily  gabapentin   Solution 100 milliGRAM(s) Oral three times a day  influenza   Vaccine 0.5 milliLiter(s) IntraMuscular once  lisinopril 5 milliGRAM(s) Oral <User Schedule>  metoprolol tartrate 25 milliGRAM(s) Oral two times a day  nystatin    Suspension 834030 Unit(s) Oral two times a day      PHYSICAL EXAM:  T(C): 36.9 (10-30-18 @ 19:12), Max: 37.4 (10-29-18 @ 23:46)  HR: 68 (10-30-18 @ 19:12) (53 - 80)  BP: 100/68 (10-30-18 @ 19:12) (94/60 - 132/73)  RR: 18 (10-30-18 @ 19:12) (17 - 18)  SpO2: 98% (10-30-18 @ 19:12) (94% - 99%)  Wt(kg): --  I&O's Summary    29 Oct 2018 07:01  -  30 Oct 2018 07:00  --------------------------------------------------------  IN: 0 mL / OUT: 1700 mL / NET: -1700 mL          Appearance: NAD aphasic   HEENT:   Dry oral mucosa, +NGT 	  Lymphatic: No lymphadenopathy , no edema  Cardiovascular: Normal S1 S2, No JVD, No murmurs , Peripheral pulses palpable 2+ bilaterally  Respiratory: Lungs clear to auscultation, normal effort 	  Gastrointestinal:  Soft, Non-tender, + BS	  Skin: No rashes, No ecchymoses, No cyanosis, warm to touch  Musculoskeletal: Decreased range of motion and  strength  Psychiatry:  lethargic   Ext: No edema  NEUROLOGICAL EXAM:  Mental status: Awake, lethargic, not following commands, no verbal output  Cranial Nerves: Right facial droop  Motor exam:  RUE- plegic, increased tone, RLE - withdraws to noxious stimuli, LUE - minimal movement,  LLE - moving freely  Sensation: Not assessed      LABS:    CARDIAC MARKERS:                                9.7    15.85 )-----------( 264      ( 30 Oct 2018 08:01 )             30.0     10-29    137  |  103  |  20  ----------------------------<  186<H>  4.1   |  24  |  0.72    Ca    8.6      29 Oct 2018 08:16      proBNP:   Lipid Profile:   HgA1c:   TSH:     0          TELEMETRY: 	    ECG:  	  RADIOLOGY:   DIAGNOSTIC TESTING:  [ ] Echocardiogram:  [ ]  Catheterization:  [ ] Stress Test:    OTHER:

## 2018-10-31 LAB — GLUCOSE BLDC GLUCOMTR-MCNC: 218 MG/DL — HIGH (ref 70–99)

## 2018-10-31 PROCEDURE — 99233 SBSQ HOSP IP/OBS HIGH 50: CPT

## 2018-10-31 PROCEDURE — 99232 SBSQ HOSP IP/OBS MODERATE 35: CPT

## 2018-10-31 RX ORDER — ACETAMINOPHEN 500 MG
650 TABLET ORAL EVERY 6 HOURS
Qty: 0 | Refills: 0 | Status: DISCONTINUED | OUTPATIENT
Start: 2018-10-31 | End: 2018-11-01

## 2018-10-31 RX ADMIN — BRIMONIDINE TARTRATE 1 DROP(S): 2 SOLUTION/ DROPS OPHTHALMIC at 17:46

## 2018-10-31 RX ADMIN — ENOXAPARIN SODIUM 40 MILLIGRAM(S): 100 INJECTION SUBCUTANEOUS at 12:22

## 2018-10-31 RX ADMIN — Medication 500000 UNIT(S): at 05:28

## 2018-10-31 RX ADMIN — DORZOLAMIDE HYDROCHLORIDE TIMOLOL MALEATE 1 DROP(S): 20; 5 SOLUTION/ DROPS OPHTHALMIC at 05:29

## 2018-10-31 RX ADMIN — Medication 300 MILLIGRAM(S): at 12:21

## 2018-10-31 RX ADMIN — Medication 650 MILLIGRAM(S): at 19:00

## 2018-10-31 RX ADMIN — Medication 500000 UNIT(S): at 17:46

## 2018-10-31 RX ADMIN — AMLODIPINE BESYLATE 5 MILLIGRAM(S): 2.5 TABLET ORAL at 05:29

## 2018-10-31 RX ADMIN — Medication 25 MILLIGRAM(S): at 23:00

## 2018-10-31 RX ADMIN — DORZOLAMIDE HYDROCHLORIDE TIMOLOL MALEATE 1 DROP(S): 20; 5 SOLUTION/ DROPS OPHTHALMIC at 17:46

## 2018-10-31 RX ADMIN — ATORVASTATIN CALCIUM 80 MILLIGRAM(S): 80 TABLET, FILM COATED ORAL at 23:00

## 2018-10-31 RX ADMIN — GABAPENTIN 100 MILLIGRAM(S): 400 CAPSULE ORAL at 23:00

## 2018-10-31 RX ADMIN — Medication 81 MILLIGRAM(S): at 12:21

## 2018-10-31 RX ADMIN — Medication 1 DROP(S): at 12:21

## 2018-10-31 RX ADMIN — GABAPENTIN 100 MILLIGRAM(S): 400 CAPSULE ORAL at 05:30

## 2018-10-31 RX ADMIN — Medication 650 MILLIGRAM(S): at 16:09

## 2018-10-31 RX ADMIN — BRIMONIDINE TARTRATE 1 DROP(S): 2 SOLUTION/ DROPS OPHTHALMIC at 05:29

## 2018-10-31 RX ADMIN — GABAPENTIN 100 MILLIGRAM(S): 400 CAPSULE ORAL at 16:11

## 2018-10-31 NOTE — PROGRESS NOTE ADULT - ASSESSMENT
Patient with hemorraghic L MCA infarct, s/p thrombectomy   Prior fever resolved  S/p PEG  Leukocytosis persists, but lower as of yest  PCT level only 0.08  Urine Cx 10/26 negative  No support for sepsis or systemic infection    Plan:   Observe off antibiotics.   Follow temps and CBC/diff

## 2018-10-31 NOTE — PROGRESS NOTE ADULT - SUBJECTIVE AND OBJECTIVE BOX
Subjective: Patient seen and examined. No new events except as noted.   No cp or sob     REVIEW OF SYSTEMS:    CONSTITUTIONAL: + weakness, fevers or chills  EYES/ENT: No visual changes;  No vertigo or throat pain   NECK: No pain or stiffness  RESPIRATORY: No cough, wheezing, hemoptysis; No shortness of breath  CARDIOVASCULAR: No chest pain or palpitations  GASTROINTESTINAL: No abdominal or epigastric pain. No nausea, vomiting, or hematemesis; No diarrhea or constipation. No melena or hematochezia.  GENITOURINARY: No dysuria, frequency or hematuria  NEUROLOGICAL: + numbness or weakness  SKIN: No itching, burning, rashes, or lesions   All other review of systems is negative unless indicated above.    MEDICATIONS:  MEDICATIONS  (STANDING):  amLODIPine   Tablet 5 milliGRAM(s) Oral daily  artificial tears (preservative free) Ophthalmic Solution 1 Drop(s) Both EYES daily  aspirin  chewable 81 milliGRAM(s) Oral daily  atorvastatin 80 milliGRAM(s) Oral at bedtime  brimonidine 0.2% Ophthalmic Solution 1 Drop(s) Both EYES two times a day  dorzolamide 2%/timolol 0.5% Ophthalmic Solution 1 Drop(s) Both EYES two times a day  enoxaparin Injectable 40 milliGRAM(s) SubCutaneous daily  ferrous    sulfate Liquid 300 milliGRAM(s) Oral daily  gabapentin   Solution 100 milliGRAM(s) Oral three times a day  influenza   Vaccine 0.5 milliLiter(s) IntraMuscular once  lisinopril 5 milliGRAM(s) Oral <User Schedule>  metoprolol tartrate 25 milliGRAM(s) Oral two times a day  nystatin    Suspension [id number] Unit(s) Oral two times a day      PHYSICAL EXAM:  T(C): 36.8 (10-31-18 @ 16:00), Max: 37.2 (10-30-18 @ 23:04)  HR: 70 (10-31-18 @ 16:00) (65 - 73)  BP: 96/62 (10-31-18 @ 16:00) (91/55 - 130/71)  RR: 18 (10-31-18 @ 16:00) (16 - 18)  SpO2: 96% (10-31-18 @ 16:00) (94% - 97%)  Wt(kg): --  I&O's Summary    30 Oct 2018 07:01  -  31 Oct 2018 07:00  --------------------------------------------------------  IN: 0 mL / OUT: 1400 mL / NET: -1400 mL    31 Oct 2018 07:01  -  31 Oct 2018 20:21  --------------------------------------------------------  IN: 0 mL / OUT: 200 mL / NET: -200 mL          Appearance: NAD aphasic   HEENT:   Dry oral mucosa, +NGT 	  Lymphatic: No lymphadenopathy , no edema  Cardiovascular: Normal S1 S2, No JVD, No murmurs , Peripheral pulses palpable 2+ bilaterally  Respiratory: Lungs clear to auscultation, normal effort 	  Gastrointestinal:  Soft, Non-tender, + BS	  Skin: No rashes, No ecchymoses, No cyanosis, warm to touch  Musculoskeletal: Decreased range of motion and  strength  Psychiatry:  lethargic   Ext: No edema  NEUROLOGICAL EXAM:  Mental status: Awake, lethargic, not following commands, no verbal output  Cranial Nerves: Right facial droop  Motor exam:  RUE- plegic, increased tone, RLE - withdraws to noxious stimuli, LUE - minimal movement,  LLE - moving freely  Sensation: Not assessed    LABS:    CARDIAC MARKERS:                                9.7    15.85 )-----------( 264      ( 30 Oct 2018 08:01 )             30.0           proBNP:   Lipid Profile:   HgA1c:   TSH:     0          TELEMETRY: 	    ECG:  	  RADIOLOGY:   DIAGNOSTIC TESTING:  [ ] Echocardiogram:  [ ]  Catheterization:  [ ] Stress Test:    OTHER:

## 2018-10-31 NOTE — PROGRESS NOTE ADULT - SUBJECTIVE AND OBJECTIVE BOX
Interval Events: No acute overnight events  Tolerating PEG feeds. No N/V    MEDICATIONS  (STANDING):  amLODIPine   Tablet 5 milliGRAM(s) Oral daily  artificial tears (preservative free) Ophthalmic Solution 1 Drop(s) Both EYES daily  aspirin  chewable 81 milliGRAM(s) Oral daily  atorvastatin 80 milliGRAM(s) Oral at bedtime  brimonidine 0.2% Ophthalmic Solution 1 Drop(s) Both EYES two times a day  dorzolamide 2%/timolol 0.5% Ophthalmic Solution 1 Drop(s) Both EYES two times a day  enoxaparin Injectable 40 milliGRAM(s) SubCutaneous daily  ferrous    sulfate Liquid 300 milliGRAM(s) Oral daily  gabapentin   Solution 100 milliGRAM(s) Oral three times a day  influenza   Vaccine 0.5 milliLiter(s) IntraMuscular once  lisinopril 5 milliGRAM(s) Oral <User Schedule>  metoprolol tartrate 25 milliGRAM(s) Oral two times a day  nystatin    Suspension 680709 Unit(s) Oral two times a day    MEDICATIONS  (PRN):  bisacodyl Suppository 10 milliGRAM(s) Rectal daily PRN Constipation  metoprolol tartrate Injectable 5 milliGRAM(s) IV Push every 6 hours PRN SBP > 140      Allergies    No Known Allergies    Intolerances      Review of Systems: unable to attain.       Vital Signs Last 24 Hrs  T(C): 36.9 (31 Oct 2018 11:47), Max: 37.2 (30 Oct 2018 23:04)  T(F): 98.4 (31 Oct 2018 11:47), Max: 99 (30 Oct 2018 23:04)  HR: 71 (31 Oct 2018 11:47) (65 - 80)  BP: 98/58 (31 Oct 2018 11:47) (91/55 - 130/71)  BP(mean): --  RR: 16 (31 Oct 2018 11:47) (16 - 18)  SpO2: 96% (31 Oct 2018 11:47) (94% - 98%)    PHYSICAL EXAM:    Constitutional: NAD, well-developed  HEENT: EOMI, throat clear  Neck: No LAD, supple  Respiratory: CTA and P  Cardiovascular: S1 and S2, RRR, no M  Gastrointestinal: BS+, soft, NT/ND, neg HSM, + PEG c/d/i   Extremities: No peripheral edema, neg clubing, cyanosis  Vascular: 2+ peripheral pulses  Neurological: eyes opened, generates some 1-2 words and unintelligible sounds, attentive, follows some commands in native language  Psychiatric: Normal mood, normal affect  Skin: No rashes    LABS:                        9.7    15.85 )-----------( 264      ( 30 Oct 2018 08:01 )             30.0             Urinalysis Basic - ( 29 Oct 2018 17:28 )    Color: Yellow / Appearance: Slightly Turbid / S.013 / pH: x  Gluc: x / Ketone: Negative  / Bili: Negative / Urobili: 2.0 mg/dL   Blood: x / Protein: 30 mg/dL / Nitrite: Positive   Leuk Esterase: Large / RBC: 14 /HPF / WBC >720 /HPF   Sq Epi: x / Non Sq Epi: 0 /HPF / Bacteria: Moderate        RADIOLOGY & ADDITIONAL TESTS:

## 2018-10-31 NOTE — PROGRESS NOTE ADULT - ASSESSMENT
88y/o female with vascular risk factors of HTN and hyperlipidemia, who was last seen normal at 22:10, on the day prior to admission. Then she was found by family members at the floor with inability to communicate or move her right side at 22:20. She was initially evaluated at [place] were NIHSS=29 deemed candidate for tPA. 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 Patient was transferred for further management to [facility]. She received IVtpa per protocol and transferred to NS after CTA revealed L ICA and L M1 occlusion. She underwent thrombectomy with resultant TICI 2B flow.     Impression:   Cerebral embolism with cerebral infarction. 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 Physical therapy/OT recommends acute rehab.     ANTITHROMBOTIC THERAPY: Aspirin for secondary stroke prevention    PULMONARY: CXR (10/23)shows bilateral pleural effusions- no overt sign of HF noted, lungs CTA B/L, protecting airway, saturating well     CARDIOVASCULAR: TTE shows EF: 55-60%, Agitated saline injection revealed few late bubbles in the left heart, consistent with transpulmonic shunting, LE doppler: no DVT. Initially thought to have afib- now deemed to be NSR with PAC's s/p ICM placement for prolonged cardiac monitoring to screen for occult cardiac arrhythmias like atrial fibrillation, but a cardiac source of embolism. Continue current cardiac meds, Dr Chand (cardiology) consult appreciated                          SBP goal: gradual normotension     GASTROINTESTINAL:   s/p Peg placement on 10/26/18, tolerating feedings.     Diet: TF via PEG    RENAL: BUN/Cr previously within normal limits, good urine output     Na Goal: Greater than 135     Mendez: No    HEMATOLOGY: H/H 9.7/30.0 noted with anemia, no signs or symptoms of bleeding     DVT ppx: LMWH     ID: afebrile, leukocytosis downtrending- ID reconsulted- recommended to monitor off Abx, Procalcitonin within normal limits. previous BCx x 2 NGTD, UA UA with pyuria, but not clear if collected accurately, repeat culture sent, pending results.  chest X-ray shows no evidence of PNA. Will continue to monitor closely. ID consult appreciated    OTHER: Plan discussed with previously family at bedside, all questions and concerns addressed.     DISPOSITION: Acute rehab    CORE MEASURES:        Admission NIHSS: 29     TPA:  YES      LDL/HDL: 100/52      Depression Screen: unable to obtain due to aphasia     Statin Therapy: yes     Dysphagia Screen: FAIL     Smoking NO      Afib YES      Stroke Education  YES, to family 86y/o female with vascular risk factors of HTN and hyperlipidemia, who was last seen normal at 22:10, on the day prior to admission. Then she was found by family members at the floor with inability to communicate or move her right side at 22:20. She was initially evaluated at [place] were NIHSS=29 deemed candidate for tPA. 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 Patient was transferred for further management to [facility]. She received IVtpa per protocol and transferred to NS after CTA revealed L ICA and L M1 occlusion. She underwent thrombectomy with resultant TICI 2B flow.     Impression:   Cerebral embolism with cerebral infarction. 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 Physical therapy/OT recommends acute rehab.     ANTITHROMBOTIC THERAPY: Aspirin for secondary stroke prevention    PULMONARY: CXR (10/23)shows bilateral pleural effusions- no overt sign of HF noted, lungs CTA B/L, protecting airway, saturating well     CARDIOVASCULAR: TTE shows EF: 55-60%, Agitated saline injection revealed few late bubbles in the left heart, consistent with transpulmonic shunting, LE doppler: no DVT. Initially thought to have afib- now deemed to be NSR with PAC's s/p ICM placement for prolonged cardiac monitoring to screen for occult cardiac arrhythmias like atrial fibrillation, but a cardiac source of embolism. Continue current cardiac meds, Dr Chand (cardiology) consult appreciated                          SBP goal: gradual normotension     GASTROINTESTINAL:   s/p Peg placement on 10/26/18, tolerating feedings.     Diet: TF via PEG    RENAL: BUN/Cr previously within normal limits, good urine output     Na Goal: Greater than 135     Mendez: No    HEMATOLOGY: H/H 9.7/30.0 noted with anemia, no signs or symptoms of bleeding     DVT ppx: LMWH     ID: afebrile, leukocytosis downtrending- ID reconsulted- recommended to monitor off Abx, Procalcitonin within normal limits. previous BCx x 2 NGTD, UA UA with pyuria, but not clear if collected accurately, repeat culture sent, pending results.  chest X-ray shows no evidence of PNA. Will continue to monitor closely. ID consult appreciated    OTHER: Plan discussed with previously family at bedside, all questions and concerns addressed.     DISPOSITION: Acute rehab as per PMR eval    CORE MEASURES:        Admission NIHSS: 29     TPA:  YES      LDL/HDL: 100/52      Depression Screen: unable to obtain due to aphasia     Statin Therapy: yes     Dysphagia Screen: FAIL     Smoking NO      Afib YES      Stroke Education  YES, to family 88y/o female with vascular risk factors of HTN and hyperlipidemia, who was last seen normal at 22:10, on the day prior to admission. Then she was found by family members at the floor with inability to communicate or move her right side at 22:20. She was initially evaluated at [place] were NIHSS=29 deemed candidate for tPA. 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 Patient was transferred for further management to [facility]. She received IVtpa per protocol and transferred to NS after CTA revealed L ICA and L M1 occlusion. She underwent thrombectomy with resultant TICI 2B flow.     Impression:   Cerebral embolism with cerebral infarction. 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 Physical therapy/OT recommends acute rehab.     ANTITHROMBOTIC THERAPY: Aspirin for secondary stroke prevention    PULMONARY: CXR (10/23) shows bilateral pleural effusions - no overt sign of HF noted, lungs CTA B/L, protecting airway, saturating well     CARDIOVASCULAR: TTE shows EF: 55-60%, Agitated saline injection revealed few late bubbles in the left heart, consistent with transpulmonic shunting, LE doppler: no DVT. Initially thought to have afib- now deemed to be NSR with PAC's s/p ICM placement for prolonged cardiac monitoring to screen for occult cardiac arrhythmias like atrial fibrillation, but a cardiac source of embolism. Continue current cardiac meds, Dr Chand (cardiology) consult appreciated                          SBP goal: gradual normotension     GASTROINTESTINAL: s/p PEG placement on 10/26/18, tolerating feedings.     Diet: TF via PEG    RENAL: BUN/Cr previously within normal limits, good urine output     Na Goal: Greater than 135     Mendez: No    HEMATOLOGY: H/H 9.7/30.0 noted with anemia, no signs or symptoms of bleeding     DVT ppx: LMWH     ID: afebrile, leukocytosis downtrending - ID reconsulted- recommended to monitor off ABx, Procalcitonin within normal limits. Previous BCx x 2 NGTD, UA UA with pyuria, but not clear if collected accurately, repeat culture sent, pending results.  chest X-ray shows no evidence of PNA. Will continue to monitor closely. ID consult appreciated    OTHER: Plan discussed with previously family at bedside, all questions and concerns addressed.     DISPOSITION: Acute rehab as per PMR eval    CORE MEASURES:        Admission NIHSS: 29     TPA:  YES      LDL/HDL: 100/52      Depression Screen: unable to obtain due to aphasia     Statin Therapy: yes     Dysphagia Screen: FAIL     Smoking NO      Afib YES      Stroke Education  YES, to family

## 2018-10-31 NOTE — PROGRESS NOTE ADULT - SUBJECTIVE AND OBJECTIVE BOX
CC: f/u for fever, leukocytosis    Patient remains mostly nonverbal, but arouses, cooperative    REVIEW OF SYSTEMS:  All other review of systems negative (Comprehensive ROS)- limited    Antimicrobials:  nystatin    Suspension 830101 Unit(s) Oral two times a day  Medications Reviewed    Vital Signs Last 24 Hrs  T(F): 98.4 (31 Oct 2018 11:47), Max: 99 (30 Oct 2018 23:04)  HR: 71 (31 Oct 2018 11:47) (65 - 80)  BP: 98/58 (31 Oct 2018 11:47) (91/55 - 130/71)  BP(mean): --  RR: 16 (31 Oct 2018 11:47) (16 - 18)  SpO2: 96% (31 Oct 2018 11:47) (94% - 98%)    PHYSICAL EXAM:  General: alert, mumbling, no acute distress   Eyes:  anicteric, no conjunctival injection, no discharge  Oropharynx: no lesions or injection 	  Neck: supple, without adenopathy  Lungs: clear to auscultation  Heart: regular rate and rhythm; no murmur, rubs or gallops  Abdomen: soft, nondistended, nontender, G tube site minimal exudate, no erythema  Skin: no lesions  Extremities: no clubbing, cyanosis, or edema  Neurologic: alert, R weakness    LAB RESULTS:                        9.7    15.85 )-----------( 264      ( 30 Oct 2018 08:01 )             30.0     Procalcitonin, Serum (10.30.18 @ 06:33)    Procalcitonin, Serum: 0.08    Urine Microscopic-Add On (NC) (10.29.18 @ 17:28)    Red Blood Cell - Urine: 14 /HPF    White Blood Cell - Urine: >720 /HPF    Hyaline Casts: 0 /LPF    Bacteria: Moderate    Epithelial Cells: 0 /HPF    MICROBIOLOGY:  RECENT CULTURES:  10-26 @ 08:49 .Urine Catheterized     No growth    Culture - Blood (10.20.18 @ 14:48)    Specimen Source: .Blood Blood    Culture Results:   No growth at 5 days.      RADIOLOGY REVIEWED:  Xray Chest 1 View- PORTABLE-Urgent (10.23.18 @ 17:14) >  Enteric tube, with distal tip in the stomach.    VA Duplex Lower Ext Vein Scan, Bilat (10.24.18 @ 15:33) >  No evidence of bilateral lower extremity deep venous thrombosis.    MR Head No Cont (10.20.18 @ 18:38) >  Impression: Acute hemorrhagic infarct involving the left MCA territory   with mass effect on the left lateral ventricle and left to right shift.

## 2018-10-31 NOTE — PROGRESS NOTE ADULT - SUBJECTIVE AND OBJECTIVE BOX
Patient tolerating therapies. Mod A transfers    MEDICATIONS  (STANDING):  amLODIPine   Tablet 5 milliGRAM(s) Oral daily  artificial tears (preservative free) Ophthalmic Solution 1 Drop(s) Both EYES daily  aspirin  chewable 81 milliGRAM(s) Oral daily  atorvastatin 80 milliGRAM(s) Oral at bedtime  brimonidine 0.2% Ophthalmic Solution 1 Drop(s) Both EYES two times a day  dorzolamide 2%/timolol 0.5% Ophthalmic Solution 1 Drop(s) Both EYES two times a day  enoxaparin Injectable 40 milliGRAM(s) SubCutaneous daily  ferrous    sulfate Liquid 300 milliGRAM(s) Oral daily  gabapentin   Solution 100 milliGRAM(s) Oral three times a day  influenza   Vaccine 0.5 milliLiter(s) IntraMuscular once  lisinopril 5 milliGRAM(s) Oral <User Schedule>  metoprolol tartrate 25 milliGRAM(s) Oral two times a day  nystatin    Suspension 559676 Unit(s) Oral two times a day    MEDICATIONS  (PRN):  bisacodyl Suppository 10 milliGRAM(s) Rectal daily PRN Constipation  metoprolol tartrate Injectable 5 milliGRAM(s) IV Push every 6 hours PRN SBP > 140    Vital Signs Last 24 Hrs  T(C): 36.9 (31 Oct 2018 11:47), Max: 37.2 (30 Oct 2018 23:04)  T(F): 98.4 (31 Oct 2018 11:47), Max: 99 (30 Oct 2018 23:04)  HR: 71 (31 Oct 2018 11:47) (65 - 80)  BP: 98/58 (31 Oct 2018 11:47) (91/55 - 130/71)  BP(mean): --  RR: 16 (31 Oct 2018 11:47) (16 - 18)  SpO2: 96% (31 Oct 2018 11:47) (94% - 98%)      Constitutional - NAD, Comfortable  HEENT - NCAT  Extremities - No C/C/E, No calf tenderness  Neurologic Exam -                 + Aphasia      Motor - R side 0/5, L side antigravity                                          9.7    15.85 )-----------( 264      ( 30 Oct 2018 08:01 )             30.0         STEPAN RIOJAS is a 88yo Female with hx of HTN, HLD, prediabetes, who is admitted with an acute L MCA infarct with hemorragic transformation, s/p tPA and thrombectomy, with functional, gait, ADL, swallow, and speech impairments.    Cont PT/OT/SLP  Skin- Turn q2h  DVT PPx - lovenox  Dispo- Acute Rehab- can tolerate 3h/d PT/OT/SLP and requires daily physician visits

## 2018-10-31 NOTE — PROGRESS NOTE ADULT - SUBJECTIVE AND OBJECTIVE BOX
THE PATIENT WAS SEEN AND EXAMINED BY ME WITH THE HOUSESTAFF AND STROKE TEAM DURING MORNING ROUNDS.   HPI:  86y/o female with vascular risk factors of HTN and hyperlipidemia, who was last seen normal at 22:10, on the day prior to admission. Then she was found by family members at the floor with inability to communicate or move her right side at 22:20. She was initially evaluated at [place] were NIHSS=29 deemed candidate for tPA. 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 Patient was transferred for further management to [facility]. She received IVtpa per protocol and transferred to NS after CTA revealed L ICA and L M1 occlusion. She underwent thrombectomy with resultant TICI 2B flow.       SUBJECTIVE: No events overnight.  No new neurologic complaints.      amLODIPine   Tablet 5 milliGRAM(s) Oral daily  artificial tears (preservative free) Ophthalmic Solution 1 Drop(s) Both EYES daily  aspirin  chewable 81 milliGRAM(s) Oral daily  atorvastatin 80 milliGRAM(s) Oral at bedtime  bisacodyl Suppository 10 milliGRAM(s) Rectal daily PRN  brimonidine 0.2% Ophthalmic Solution 1 Drop(s) Both EYES two times a day  dorzolamide 2%/timolol 0.5% Ophthalmic Solution 1 Drop(s) Both EYES two times a day  enoxaparin Injectable 40 milliGRAM(s) SubCutaneous daily  ferrous    sulfate Liquid 300 milliGRAM(s) Oral daily  gabapentin   Solution 100 milliGRAM(s) Oral three times a day  influenza   Vaccine 0.5 milliLiter(s) IntraMuscular once  lisinopril 5 milliGRAM(s) Oral <User Schedule>  metoprolol tartrate 25 milliGRAM(s) Oral two times a day  metoprolol tartrate Injectable 5 milliGRAM(s) IV Push every 6 hours PRN  nystatin    Suspension 716505 Unit(s) Oral two times a day      PHYSICAL EXAM:   Vital Signs Last 24 Hrs  T(C): 37.1 (31 Oct 2018 04:44), Max: 37.2 (30 Oct 2018 23:04)  T(F): 98.8 (31 Oct 2018 04:44), Max: 99 (30 Oct 2018 23:04)  HR: 68 (31 Oct 2018 04:44) (68 - 80)  BP: 130/71 (31 Oct 2018 04:44) (94/60 - 130/71)  BP(mean): --  RR: 18 (31 Oct 2018 04:44) (17 - 18)  SpO2: 94% (31 Oct 2018 04:44) (94% - 98%)    General: No acute distress  HEENT: unable to cooperate, no BTT on right, slight gaze deviation to the left  Abdomen: Soft, nontender, nondistended   Extremities: No edema    NEUROLOGICAL EXAM:  Mental status: eyes opened, generates some 1-2 words and unintelligible sounds, attentive, follows some commands in native language  Cranial Nerves: Moderate-Severe right nasolabial flattening, no Blink to threat on right visual field, right gaze palsy, unable to cross pass midline, no definite dysarthria but almost no verbal output  Motor exam: LUE: good effort against gravity, LLE : spontaneous movements, minimal effort against gravity, normal tone, RUE: no movement, RLE: externally rotated, no volitional movement-triple flexion only, minimally increased tone in RUE/RLE.     Sensation: unable to assess    Coordination/ Gait: deferred    LABS:                        9.7    15.85 )-----------( 264      ( 30 Oct 2018 08:01 )             30.0    10-29    137  |  103  |  20  ----------------------------<  186<H>  4.1   |  24  |  0.72    Ca    8.6      29 Oct 2018 08:16      Hemoglobin A1C, Whole Blood: 6.0 % (10-19 @ 07:53)      IMAGING: Reviewed by me.     MRI Head No Cont (10/20/18):  Acute hemorrhagic infarct involving the left MCA territory with mass effect on the left lateral ventricle and left to right shift.    CT Head No Cont (10/19/18) @ 23:21:   Redemonstration of large acute left MCA territory infarct with similar mass effect upon the left lateral ventricle and similar rightward midline shift of 4 mm. Ventricles stable in size. No hydrocephalus. Previously seen increased density throughout the region of infarction is less apparent on the current examination, consistent with resolving contrast staining after digital subtraction angiography. 2.1 x 1.6 cm region of persistent increased density in the left lateral frontotemporal region may represent hemorrhagic transformation of the infarct.    CT Head No Cont (10/19/18) @ 05:52:   Large acute left MCA territory infarct. Increased density seen throughout the left basal ganglia and left lateral frontal, parietal, and temporal cortices likely represents a combination of hemorrhagic transformation and cortical staining from recent digital subtraction angiography. Additional density seen within the left cerebral sulci likely representing subarachnoid hemorrhage. Mass effect upon the left lateral ventricle and rightward midline shift of 4 mm. No large hydrocephalus. THE PATIENT WAS SEEN AND EXAMINED BY ME WITH THE HOUSESTAFF AND STROKE TEAM DURING MORNING ROUNDS.     HPI:  88y/o female with vascular risk factors of HTN and hyperlipidemia, who was last seen normal at 22:10, on the day prior to admission. Then she was found by family members at the floor with inability to communicate or move her right side at 22:20. She was initially evaluated at [place] were NIHSS=29 deemed candidate for tPA. 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 Patient was transferred for further management to [facility]. She received IVtpa per protocol and transferred to NS after CTA revealed L ICA and L M1 occlusion. She underwent thrombectomy with resultant TICI 2B flow.     SUBJECTIVE: No events overnight.  No new neurologic complaints.      ROS: All negative except documented above.    amLODIPine   Tablet 5 milliGRAM(s) Oral daily  artificial tears (preservative free) Ophthalmic Solution 1 Drop(s) Both EYES daily  aspirin  chewable 81 milliGRAM(s) Oral daily  atorvastatin 80 milliGRAM(s) Oral at bedtime  bisacodyl Suppository 10 milliGRAM(s) Rectal daily PRN  brimonidine 0.2% Ophthalmic Solution 1 Drop(s) Both EYES two times a day  dorzolamide 2%/timolol 0.5% Ophthalmic Solution 1 Drop(s) Both EYES two times a day  enoxaparin Injectable 40 milliGRAM(s) SubCutaneous daily  ferrous    sulfate Liquid 300 milliGRAM(s) Oral daily  gabapentin   Solution 100 milliGRAM(s) Oral three times a day  influenza   Vaccine 0.5 milliLiter(s) IntraMuscular once  lisinopril 5 milliGRAM(s) Oral <User Schedule>  metoprolol tartrate 25 milliGRAM(s) Oral two times a day  metoprolol tartrate Injectable 5 milliGRAM(s) IV Push every 6 hours PRN  nystatin    Suspension 220999 Unit(s) Oral two times a day      PHYSICAL EXAM:   Vital Signs Last 24 Hrs  T(C): 37.1 (31 Oct 2018 04:44), Max: 37.2 (30 Oct 2018 23:04)  T(F): 98.8 (31 Oct 2018 04:44), Max: 99 (30 Oct 2018 23:04)  HR: 68 (31 Oct 2018 04:44) (68 - 80)  BP: 130/71 (31 Oct 2018 04:44) (94/60 - 130/71)  BP(mean): --  RR: 18 (31 Oct 2018 04:44) (17 - 18)  SpO2: 94% (31 Oct 2018 04:44) (94% - 98%)    General: No acute distress  HEENT: unable to cooperate, no BTT on right, slight gaze deviation to the left  Abdomen: Soft, nontender, nondistended   Extremities: No edema    NEUROLOGICAL EXAM:  Mental status: eyes opened, generates some 1-2 words and unintelligible sounds, attentive, follows some commands in native language  Cranial Nerves: Moderate-Severe right nasolabial flattening, no Blink to threat on right visual field, right gaze palsy, unable to cross pass midline, no definite dysarthria but almost no verbal output  Motor exam: LUE: good effort against gravity, LLE : spontaneous movements, minimal effort against gravity, normal tone, RUE: no movement, RLE: externally rotated, no volitional movement-triple flexion only, minimally increased tone in RUE/RLE.     Sensation: unable to assess    Coordination/ Gait: deferred    LABS:                        9.7    15.85 )-----------( 264      ( 30 Oct 2018 08:01 )             30.0    10-29    137  |  103  |  20  ----------------------------<  186<H>  4.1   |  24  |  0.72    Ca    8.6      29 Oct 2018 08:16      Hemoglobin A1C, Whole Blood: 6.0 % (10-19 @ 07:53)      IMAGING: Reviewed by me.     MRI Head No Cont (10/20/18):  Acute hemorrhagic infarct involving the left MCA territory with mass effect on the left lateral ventricle and left to right shift.    CT Head No Cont (10/19/18) @ 23:21:   Redemonstration of large acute left MCA territory infarct with similar mass effect upon the left lateral ventricle and similar rightward midline shift of 4 mm. Ventricles stable in size. No hydrocephalus. Previously seen increased density throughout the region of infarction is less apparent on the current examination, consistent with resolving contrast staining after digital subtraction angiography. 2.1 x 1.6 cm region of persistent increased density in the left lateral frontotemporal region may represent hemorrhagic transformation of the infarct.    CT Head No Cont (10/19/18) @ 05:52:   Large acute left MCA territory infarct. Increased density seen throughout the left basal ganglia and left lateral frontal, parietal, and temporal cortices likely represents a combination of hemorrhagic transformation and cortical staining from recent digital subtraction angiography. Additional density seen within the left cerebral sulci likely representing subarachnoid hemorrhage. Mass effect upon the left lateral ventricle and rightward midline shift of 4 mm. No large hydrocephalus.

## 2018-11-01 ENCOUNTER — INPATIENT (INPATIENT)
Facility: HOSPITAL | Age: 83
LOS: 12 days | Discharge: NURSING, REHAB OR LONG-TERM CARE | DRG: 949 | End: 2018-11-14
Attending: PHYSICAL MEDICINE & REHABILITATION | Admitting: PHYSICAL MEDICINE & REHABILITATION
Payer: MEDICAID

## 2018-11-01 VITALS
DIASTOLIC BLOOD PRESSURE: 74 MMHG | HEART RATE: 57 BPM | TEMPERATURE: 98 F | OXYGEN SATURATION: 97 % | RESPIRATION RATE: 18 BRPM | SYSTOLIC BLOOD PRESSURE: 125 MMHG

## 2018-11-01 VITALS
DIASTOLIC BLOOD PRESSURE: 62 MMHG | OXYGEN SATURATION: 95 % | WEIGHT: 163.14 LBS | HEIGHT: 65 IN | RESPIRATION RATE: 14 BRPM | TEMPERATURE: 98 F | SYSTOLIC BLOOD PRESSURE: 118 MMHG | HEART RATE: 64 BPM

## 2018-11-01 DIAGNOSIS — I63.9 CEREBRAL INFARCTION, UNSPECIFIED: ICD-10-CM

## 2018-11-01 LAB
BASOPHILS # BLD AUTO: 0 K/UL — SIGNIFICANT CHANGE UP (ref 0–0.2)
BASOPHILS NFR BLD AUTO: 0.2 % — SIGNIFICANT CHANGE UP (ref 0–2)
EOSINOPHIL # BLD AUTO: 0.3 K/UL — SIGNIFICANT CHANGE UP (ref 0–0.5)
EOSINOPHIL NFR BLD AUTO: 2.3 % — SIGNIFICANT CHANGE UP (ref 0–6)
HCT VFR BLD CALC: 29.4 % — LOW (ref 34.5–45)
HGB BLD-MCNC: 9.7 G/DL — LOW (ref 11.5–15.5)
LYMPHOCYTES # BLD AUTO: 1.6 K/UL — SIGNIFICANT CHANGE UP (ref 1–3.3)
LYMPHOCYTES # BLD AUTO: 12.4 % — LOW (ref 13–44)
MCHC RBC-ENTMCNC: 31 PG — SIGNIFICANT CHANGE UP (ref 27–34)
MCHC RBC-ENTMCNC: 32.9 GM/DL — SIGNIFICANT CHANGE UP (ref 32–36)
MCV RBC AUTO: 94.2 FL — SIGNIFICANT CHANGE UP (ref 80–100)
MONOCYTES # BLD AUTO: 0.9 K/UL — SIGNIFICANT CHANGE UP (ref 0–0.9)
MONOCYTES NFR BLD AUTO: 7.1 % — SIGNIFICANT CHANGE UP (ref 2–14)
NEUTROPHILS # BLD AUTO: 9.7 K/UL — HIGH (ref 1.8–7.4)
NEUTROPHILS NFR BLD AUTO: 77.9 % — HIGH (ref 43–77)
PLATELET # BLD AUTO: 313 K/UL — SIGNIFICANT CHANGE UP (ref 150–400)
RBC # BLD: 3.13 M/UL — LOW (ref 3.8–5.2)
RBC # FLD: 12.9 % — SIGNIFICANT CHANGE UP (ref 10.3–14.5)
WBC # BLD: 12.5 K/UL — HIGH (ref 3.8–10.5)
WBC # FLD AUTO: 12.5 K/UL — HIGH (ref 3.8–10.5)

## 2018-11-01 PROCEDURE — 93005 ELECTROCARDIOGRAM TRACING: CPT

## 2018-11-01 PROCEDURE — C1764: CPT

## 2018-11-01 PROCEDURE — 97165 OT EVAL LOW COMPLEX 30 MIN: CPT

## 2018-11-01 PROCEDURE — 70450 CT HEAD/BRAIN W/O DYE: CPT

## 2018-11-01 PROCEDURE — 85384 FIBRINOGEN ACTIVITY: CPT

## 2018-11-01 PROCEDURE — 80048 BASIC METABOLIC PNL TOTAL CA: CPT

## 2018-11-01 PROCEDURE — 85730 THROMBOPLASTIN TIME PARTIAL: CPT

## 2018-11-01 PROCEDURE — 84436 ASSAY OF TOTAL THYROXINE: CPT

## 2018-11-01 PROCEDURE — 82947 ASSAY GLUCOSE BLOOD QUANT: CPT

## 2018-11-01 PROCEDURE — 85610 PROTHROMBIN TIME: CPT

## 2018-11-01 PROCEDURE — 85014 HEMATOCRIT: CPT

## 2018-11-01 PROCEDURE — 93970 EXTREMITY STUDY: CPT

## 2018-11-01 PROCEDURE — 84295 ASSAY OF SERUM SODIUM: CPT

## 2018-11-01 PROCEDURE — 86850 RBC ANTIBODY SCREEN: CPT

## 2018-11-01 PROCEDURE — 92523 SPEECH SOUND LANG COMPREHEN: CPT

## 2018-11-01 PROCEDURE — 80061 LIPID PANEL: CPT

## 2018-11-01 PROCEDURE — C1773: CPT

## 2018-11-01 PROCEDURE — 84100 ASSAY OF PHOSPHORUS: CPT

## 2018-11-01 PROCEDURE — C1757: CPT

## 2018-11-01 PROCEDURE — 82272 OCCULT BLD FECES 1-3 TESTS: CPT

## 2018-11-01 PROCEDURE — 83605 ASSAY OF LACTIC ACID: CPT

## 2018-11-01 PROCEDURE — 87186 SC STD MICRODIL/AGAR DIL: CPT

## 2018-11-01 PROCEDURE — L8699: CPT

## 2018-11-01 PROCEDURE — 83036 HEMOGLOBIN GLYCOSYLATED A1C: CPT

## 2018-11-01 PROCEDURE — 76380 CAT SCAN FOLLOW-UP STUDY: CPT | Mod: XU

## 2018-11-01 PROCEDURE — 97535 SELF CARE MNGMENT TRAINING: CPT

## 2018-11-01 PROCEDURE — 61645 PERQ ART M-THROMBECT &/NFS: CPT

## 2018-11-01 PROCEDURE — 71045 X-RAY EXAM CHEST 1 VIEW: CPT

## 2018-11-01 PROCEDURE — C1887: CPT

## 2018-11-01 PROCEDURE — 71045 X-RAY EXAM CHEST 1 VIEW: CPT | Mod: 26

## 2018-11-01 PROCEDURE — 99285 EMERGENCY DEPT VISIT HI MDM: CPT

## 2018-11-01 PROCEDURE — 86901 BLOOD TYPING SEROLOGIC RH(D): CPT

## 2018-11-01 PROCEDURE — C1894: CPT

## 2018-11-01 PROCEDURE — 84443 ASSAY THYROID STIM HORMONE: CPT

## 2018-11-01 PROCEDURE — 95819 EEG AWAKE AND ASLEEP: CPT

## 2018-11-01 PROCEDURE — 84484 ASSAY OF TROPONIN QUANT: CPT

## 2018-11-01 PROCEDURE — 70551 MRI BRAIN STEM W/O DYE: CPT

## 2018-11-01 PROCEDURE — 87086 URINE CULTURE/COLONY COUNT: CPT

## 2018-11-01 PROCEDURE — 97530 THERAPEUTIC ACTIVITIES: CPT

## 2018-11-01 PROCEDURE — 82962 GLUCOSE BLOOD TEST: CPT

## 2018-11-01 PROCEDURE — C1760: CPT

## 2018-11-01 PROCEDURE — 33282: CPT

## 2018-11-01 PROCEDURE — 84132 ASSAY OF SERUM POTASSIUM: CPT

## 2018-11-01 PROCEDURE — 97162 PT EVAL MOD COMPLEX 30 MIN: CPT

## 2018-11-01 PROCEDURE — 87040 BLOOD CULTURE FOR BACTERIA: CPT

## 2018-11-01 PROCEDURE — 83735 ASSAY OF MAGNESIUM: CPT

## 2018-11-01 PROCEDURE — 99233 SBSQ HOSP IP/OBS HIGH 50: CPT

## 2018-11-01 PROCEDURE — 85027 COMPLETE CBC AUTOMATED: CPT

## 2018-11-01 PROCEDURE — 94002 VENT MGMT INPAT INIT DAY: CPT

## 2018-11-01 PROCEDURE — 82330 ASSAY OF CALCIUM: CPT

## 2018-11-01 PROCEDURE — 81001 URINALYSIS AUTO W/SCOPE: CPT

## 2018-11-01 PROCEDURE — 93306 TTE W/DOPPLER COMPLETE: CPT

## 2018-11-01 PROCEDURE — 86900 BLOOD TYPING SEROLOGIC ABO: CPT

## 2018-11-01 PROCEDURE — 84145 PROCALCITONIN (PCT): CPT

## 2018-11-01 PROCEDURE — 92610 EVALUATE SWALLOWING FUNCTION: CPT

## 2018-11-01 PROCEDURE — 97116 GAIT TRAINING THERAPY: CPT

## 2018-11-01 PROCEDURE — 85362 FIBRIN DEGRADATION PRODUCTS: CPT

## 2018-11-01 PROCEDURE — 82803 BLOOD GASES ANY COMBINATION: CPT

## 2018-11-01 PROCEDURE — C1769: CPT

## 2018-11-01 PROCEDURE — 84480 ASSAY TRIIODOTHYRONINE (T3): CPT

## 2018-11-01 PROCEDURE — 82435 ASSAY OF BLOOD CHLORIDE: CPT

## 2018-11-01 RX ORDER — ENOXAPARIN SODIUM 100 MG/ML
40 INJECTION SUBCUTANEOUS DAILY
Qty: 0 | Refills: 0 | Status: DISCONTINUED | OUTPATIENT
Start: 2018-11-01 | End: 2018-11-14

## 2018-11-01 RX ORDER — DORZOLAMIDE HYDROCHLORIDE TIMOLOL MALEATE 20; 5 MG/ML; MG/ML
1 SOLUTION/ DROPS OPHTHALMIC
Qty: 0 | Refills: 0 | Status: DISCONTINUED | OUTPATIENT
Start: 2018-11-01 | End: 2018-11-01

## 2018-11-01 RX ORDER — FERROUS SULFATE 325(65) MG
300 TABLET ORAL DAILY
Qty: 0 | Refills: 0 | Status: DISCONTINUED | OUTPATIENT
Start: 2018-11-01 | End: 2018-11-14

## 2018-11-01 RX ORDER — ATORVASTATIN CALCIUM 80 MG/1
80 TABLET, FILM COATED ORAL AT BEDTIME
Qty: 0 | Refills: 0 | Status: DISCONTINUED | OUTPATIENT
Start: 2018-11-01 | End: 2018-11-14

## 2018-11-01 RX ORDER — TIMOLOL 0.5 %
1 DROPS OPHTHALMIC (EYE)
Qty: 0 | Refills: 0 | Status: DISCONTINUED | OUTPATIENT
Start: 2018-11-01 | End: 2018-11-14

## 2018-11-01 RX ORDER — BRIMONIDINE TARTRATE 2 MG/MG
1 SOLUTION/ DROPS OPHTHALMIC
Qty: 0 | Refills: 0 | Status: DISCONTINUED | OUTPATIENT
Start: 2018-11-01 | End: 2018-11-14

## 2018-11-01 RX ORDER — AMLODIPINE BESYLATE 2.5 MG/1
5 TABLET ORAL DAILY
Qty: 0 | Refills: 0 | Status: DISCONTINUED | OUTPATIENT
Start: 2018-11-01 | End: 2018-11-14

## 2018-11-01 RX ORDER — ASPIRIN/CALCIUM CARB/MAGNESIUM 324 MG
81 TABLET ORAL DAILY
Qty: 0 | Refills: 0 | Status: DISCONTINUED | OUTPATIENT
Start: 2018-11-01 | End: 2018-11-14

## 2018-11-01 RX ORDER — AMLODIPINE BESYLATE 2.5 MG/1
1 TABLET ORAL
Qty: 0 | Refills: 0 | COMMUNITY
Start: 2018-11-01

## 2018-11-01 RX ORDER — SENNA PLUS 8.6 MG/1
10 TABLET ORAL AT BEDTIME
Qty: 0 | Refills: 0 | Status: DISCONTINUED | OUTPATIENT
Start: 2018-11-01 | End: 2018-11-13

## 2018-11-01 RX ORDER — DORZOLAMIDE HYDROCHLORIDE TIMOLOL MALEATE 20; 5 MG/ML; MG/ML
1 SOLUTION/ DROPS OPHTHALMIC
Qty: 0 | Refills: 0 | COMMUNITY
Start: 2018-11-01

## 2018-11-01 RX ORDER — METOPROLOL TARTRATE 50 MG
25 TABLET ORAL EVERY 12 HOURS
Qty: 0 | Refills: 0 | Status: DISCONTINUED | OUTPATIENT
Start: 2018-11-01 | End: 2018-11-05

## 2018-11-01 RX ORDER — BRIMONIDINE TARTRATE 2 MG/MG
1 SOLUTION/ DROPS OPHTHALMIC
Qty: 0 | Refills: 0 | COMMUNITY
Start: 2018-11-01

## 2018-11-01 RX ORDER — METOPROLOL TARTRATE 50 MG
0 TABLET ORAL
Qty: 0 | Refills: 0 | COMMUNITY
Start: 2018-11-01

## 2018-11-01 RX ORDER — METFORMIN HYDROCHLORIDE 850 MG/1
500 TABLET ORAL
Qty: 0 | Refills: 0 | Status: DISCONTINUED | OUTPATIENT
Start: 2018-11-02 | End: 2018-11-02

## 2018-11-01 RX ORDER — NYSTATIN 500MM UNIT
500000 POWDER (EA) MISCELLANEOUS
Qty: 0 | Refills: 0 | Status: DISCONTINUED | OUTPATIENT
Start: 2018-11-01 | End: 2018-11-13

## 2018-11-01 RX ORDER — LISINOPRIL 2.5 MG/1
1 TABLET ORAL
Qty: 0 | Refills: 0 | COMMUNITY
Start: 2018-11-01

## 2018-11-01 RX ORDER — ACETAMINOPHEN 500 MG
650 TABLET ORAL EVERY 6 HOURS
Qty: 0 | Refills: 0 | Status: DISCONTINUED | OUTPATIENT
Start: 2018-11-01 | End: 2018-11-14

## 2018-11-01 RX ORDER — DORZOLAMIDE HYDROCHLORIDE 20 MG/ML
1 SOLUTION/ DROPS OPHTHALMIC THREE TIMES A DAY
Qty: 0 | Refills: 0 | Status: DISCONTINUED | OUTPATIENT
Start: 2018-11-01 | End: 2018-11-14

## 2018-11-01 RX ORDER — PANTOPRAZOLE SODIUM 20 MG/1
40 TABLET, DELAYED RELEASE ORAL DAILY
Qty: 0 | Refills: 0 | Status: DISCONTINUED | OUTPATIENT
Start: 2018-11-01 | End: 2018-11-14

## 2018-11-01 RX ORDER — LISINOPRIL 2.5 MG/1
5 TABLET ORAL
Qty: 0 | Refills: 0 | Status: DISCONTINUED | OUTPATIENT
Start: 2018-11-01 | End: 2018-11-11

## 2018-11-01 RX ORDER — PANTOPRAZOLE SODIUM 20 MG/1
40 TABLET, DELAYED RELEASE ORAL DAILY
Qty: 0 | Refills: 0 | Status: DISCONTINUED | OUTPATIENT
Start: 2018-11-01 | End: 2018-11-01

## 2018-11-01 RX ORDER — ATORVASTATIN CALCIUM 80 MG/1
1 TABLET, FILM COATED ORAL
Qty: 0 | Refills: 0 | COMMUNITY
Start: 2018-11-01

## 2018-11-01 RX ORDER — ASPIRIN/CALCIUM CARB/MAGNESIUM 324 MG
1 TABLET ORAL
Qty: 0 | Refills: 0 | COMMUNITY
Start: 2018-11-01

## 2018-11-01 RX ORDER — GABAPENTIN 400 MG/1
100 CAPSULE ORAL EVERY 8 HOURS
Qty: 0 | Refills: 0 | Status: DISCONTINUED | OUTPATIENT
Start: 2018-11-01 | End: 2018-11-04

## 2018-11-01 RX ORDER — NYSTATIN 500MM UNIT
5 POWDER (EA) MISCELLANEOUS
Qty: 0 | Refills: 0 | COMMUNITY
Start: 2018-11-01

## 2018-11-01 RX ORDER — SENNA PLUS 8.6 MG/1
2 TABLET ORAL AT BEDTIME
Qty: 0 | Refills: 0 | Status: DISCONTINUED | OUTPATIENT
Start: 2018-11-01 | End: 2018-11-01

## 2018-11-01 RX ORDER — ACETAMINOPHEN 500 MG
20.31 TABLET ORAL
Qty: 0 | Refills: 0 | COMMUNITY
Start: 2018-11-01

## 2018-11-01 RX ORDER — GABAPENTIN 400 MG/1
100 CAPSULE ORAL EVERY 8 HOURS
Qty: 0 | Refills: 0 | Status: DISCONTINUED | OUTPATIENT
Start: 2018-11-01 | End: 2018-11-01

## 2018-11-01 RX ORDER — GABAPENTIN 400 MG/1
2 CAPSULE ORAL
Qty: 0 | Refills: 0 | COMMUNITY
Start: 2018-11-01

## 2018-11-01 RX ORDER — POLYETHYLENE GLYCOL 3350 17 G/17G
17 POWDER, FOR SOLUTION ORAL DAILY
Qty: 0 | Refills: 0 | Status: DISCONTINUED | OUTPATIENT
Start: 2018-11-02 | End: 2018-11-14

## 2018-11-01 RX ADMIN — ATORVASTATIN CALCIUM 80 MILLIGRAM(S): 80 TABLET, FILM COATED ORAL at 23:16

## 2018-11-01 RX ADMIN — Medication 25 MILLIGRAM(S): at 12:26

## 2018-11-01 RX ADMIN — Medication 1 DROP(S): at 12:26

## 2018-11-01 RX ADMIN — DORZOLAMIDE HYDROCHLORIDE TIMOLOL MALEATE 1 DROP(S): 20; 5 SOLUTION/ DROPS OPHTHALMIC at 05:12

## 2018-11-01 RX ADMIN — ENOXAPARIN SODIUM 40 MILLIGRAM(S): 100 INJECTION SUBCUTANEOUS at 12:26

## 2018-11-01 RX ADMIN — GABAPENTIN 100 MILLIGRAM(S): 400 CAPSULE ORAL at 15:27

## 2018-11-01 RX ADMIN — GABAPENTIN 100 MILLIGRAM(S): 400 CAPSULE ORAL at 08:12

## 2018-11-01 RX ADMIN — GABAPENTIN 100 MILLIGRAM(S): 400 CAPSULE ORAL at 23:43

## 2018-11-01 RX ADMIN — AMLODIPINE BESYLATE 5 MILLIGRAM(S): 2.5 TABLET ORAL at 05:12

## 2018-11-01 RX ADMIN — BRIMONIDINE TARTRATE 1 DROP(S): 2 SOLUTION/ DROPS OPHTHALMIC at 05:12

## 2018-11-01 RX ADMIN — Medication 300 MILLIGRAM(S): at 12:26

## 2018-11-01 RX ADMIN — Medication 500000 UNIT(S): at 05:12

## 2018-11-01 RX ADMIN — DORZOLAMIDE HYDROCHLORIDE 1 DROP(S): 20 SOLUTION/ DROPS OPHTHALMIC at 23:16

## 2018-11-01 RX ADMIN — Medication 81 MILLIGRAM(S): at 12:26

## 2018-11-01 NOTE — PROGRESS NOTE ADULT - SUBJECTIVE AND OBJECTIVE BOX
Interval Events: No acute overnight events  Tolerating PEG feeds. No N/V    MEDICATIONS  (STANDING):  amLODIPine   Tablet 5 milliGRAM(s) Oral daily  artificial tears (preservative free) Ophthalmic Solution 1 Drop(s) Both EYES daily  aspirin  chewable 81 milliGRAM(s) Oral daily  atorvastatin 80 milliGRAM(s) Oral at bedtime  brimonidine 0.2% Ophthalmic Solution 1 Drop(s) Both EYES two times a day  dorzolamide 2%/timolol 0.5% Ophthalmic Solution 1 Drop(s) Both EYES two times a day  enoxaparin Injectable 40 milliGRAM(s) SubCutaneous daily  ferrous    sulfate Liquid 300 milliGRAM(s) Oral daily  gabapentin   Solution 100 milliGRAM(s) Oral three times a day  influenza   Vaccine 0.5 milliLiter(s) IntraMuscular once  lisinopril 5 milliGRAM(s) Oral <User Schedule>  metoprolol tartrate 25 milliGRAM(s) Oral two times a day  nystatin    Suspension 665054 Unit(s) Oral two times a day    MEDICATIONS  (PRN):  acetaminophen    Suspension .. 650 milliGRAM(s) Oral every 6 hours PRN Mild Pain (1 - 3), Moderate Pain (4 - 6)  bisacodyl Suppository 10 milliGRAM(s) Rectal daily PRN Constipation  metoprolol tartrate Injectable 5 milliGRAM(s) IV Push every 6 hours PRN SBP > 140      Allergies    No Known Allergies    Intolerances    Review of Systems: unable to attain.     Vital Signs Last 24 Hrs  T(C): 37.2 (01 Nov 2018 10:21), Max: 37.4 (01 Nov 2018 04:30)  T(F): 98.9 (01 Nov 2018 10:21), Max: 99.3 (01 Nov 2018 04:30)  HR: 60 (01 Nov 2018 10:21) (60 - 92)  BP: 118/64 (01 Nov 2018 10:21) (96/62 - 124/79)  BP(mean): --  RR: 18 (01 Nov 2018 10:21) (17 - 18)  SpO2: 96% (01 Nov 2018 10:21) (96% - 96%)    PHYSICAL EXAM:    Constitutional: NAD, well-developed  HEENT: EOMI, throat clear  Neck: No LAD, supple  Respiratory: CTA and P  Cardiovascular: S1 and S2, RRR, no M  Gastrointestinal: BS+, soft, NT/ND, neg HSM, + PEG c/d/i   Extremities: No peripheral edema, neg clubing, cyanosis  Vascular: 2+ peripheral pulses  Neurological: eyes opened, generates some 1-2 words and unintelligible sounds, attentive, follows some commands in native language  Psychiatric: Normal mood, normal affect  Skin: No rashes    LABS:                RADIOLOGY & ADDITIONAL TESTS:

## 2018-11-01 NOTE — H&P ADULT - NSHPPHYSICALEXAM_GEN_ALL_CORE
PHYSICAL EXAM  Constitutional - NAD, Comfortable  HEENT - NCAT, EOMI  Neck - Supple, No limited ROM  Chest - CTA bilaterally, No wheeze, No rhonchi, No crackles  Cardiovascular - RRR, S1S2, No murmurs  Abdomen - BS+, Soft, NTND  Extremities - No C/C/E, No calf tenderness   Neurologic Exam -                    Cognitive - Awake, Alert, AAO to self, place, date, year, situation     Communication - Fluent, No dysarthria     Cranial Nerves - CN 2-12 intact     Motor - No focal deficits                    LEFT    UE - ShAB 5/5, EF 5/5, EE 5/5, WE 5/5,  5/5                    RIGHT UE - ShAB 5/5, EF 5/5, EE 5/5, WE 5/5,  5/5                    LEFT    LE - HF 5/5, KE 5/5, DF 5/5, PF 5/5                    RIGHT LE - HF 5/5, KE 5/5, DF 5/5, PF 5/5        Sensory - Intact to LT     Reflexes - DTR Intact, No primitive reflexive     Coordination - FTN intact     OculoVestibular - No saccades, No nystagmus, VOR         Balance - WNL Static  Psychiatric - Mood stable, Affect WNL PHYSICAL EXAM  Constitutional - NAD, Comfortable  HEENT - NCAT  Neck - Supple, No limited ROM  Chest - CTA bilaterally, No wheeze, No rhonchi, No crackles  Cardiovascular - RRR, S1S2, No murmurs  Abdomen - BS+, Soft, NTND, +PEG c/d/i  Extremities - No C/C/E, No calf tenderness   Neurologic Exam -                    Cognitive - Awake, Alert, unable to assess orientation as patient does not understand questions     Communication - nonfluent, with comprehension deficits     Cranial Nerves - unable to test fully due to cog deficits     Motor - No focal deficits                    LEFT    UE - >3/5 throughout                    RIGHT UE - 0/5 throughout, no movement in response to pain stimulus                    LEFT    LE - >3/5 throughout                    RIGHT LE - <3/5 at knee and foot, moves to pain stimulus        Sensory -  unable to test fully due to cog deficits     Reflexes - DTRs absent right Patellar and Achilles; 2+ in BUE biceps and brachioradialis, No primitive reflexes     Coordination -  unable to test fully due to cog deficits       Balance - unable to assess on admission  Psychiatric - Mood stable, Affect WNL

## 2018-11-01 NOTE — H&P ADULT - ASSESSMENT
Precautions:                                                                                  Diet:     DVT Prophylaxis:                                                                          Medical Prognosis:     Prescreen Comparison: I have reviewed the prescreen information and I found no relevant changes between the preadmission  screening and my post admission evaluation.     Expected Therapy:   P.T.        hrs/day           O. T.      hrs/day           S.L.P.        hrs/day                    P&O                                                   Excpected Frequency: 5 days/7 day period    Rehab Potential:                                           Estimated Disposition:                          ELOS:              days      Rationale For Inpatient Rehab Admission- Patient demonstrates the following:     [X] Medically appropriate for rehabilitation admission   [X] Has attainable rehab goals with an approrpriate discharge plan  [X] Has rehabilitation potential (expected to make significant improvement within a reasonable period of time)  [X] Requires close medical management by a rehab physician, rehab nursing care and comprehensive interdisciplinary team (including         PT, OT, SLP and/or prosthetics and orthotics) 88yo F with left MCA CVA, now with right sided hemiplegia, aphasia, dysphagia, cognitive deficits, functional and gait impairments.    HTN/Afib - lisinopril, Norvasc, Lopressor  DM2 - metformin  Urinary retention - PVR q8hrs with straight cath protocol  Bowel/GI - Senna, Miralax, Dulcolax suppository prn  CVA ppx - ASA, statin  Dysphagia - NPO with TF, aspiration precautions, Oral care TID; free water via PEG  Pain - Tylenol prn, gabapentin  Anemia - iron supplement    Precautions: fall, aspiration                                                                          Diet: NPO with TF    DVT Prophylaxis: Lovenox                                                                          Medical Prognosis: Good     Prescreen Comparison: I have reviewed the prescreen information and I found no relevant changes between the preadmission  screening and my post admission evaluation.     Expected Therapy:   P.T.    1    hrs/day           O. T.   1   hrs/day           S.L.P.     1   hrs/day                    P&O     none                                              Excpected Frequency: 5 days/7 day period    Rehab Potential: Fair                                           Estimated Disposition:     Home                     ELOS:      14-21        days      Rationale For Inpatient Rehab Admission- Patient demonstrates the following:     [X] Medically appropriate for rehabilitation admission   [X] Has attainable rehab goals with an approrpriate discharge plan  [X] Has rehabilitation potential (expected to make significant improvement within a reasonable period of time)  [X] Requires close medical management by a rehab physician, rehab nursing care and comprehensive interdisciplinary team (including         PT, OT, SLP and/or prosthetics and orthotics)  Dr. Pederson aware. 86yo F with left MCA CVA, now with right sided hemiplegia, aphasia, dysphagia, cognitive deficits, functional and gait impairments.    Start comprehensive program of PT/OT/SLP.    HTN/Afib - lisinopril, Norvasc, Lopressor  DM2 - metformin  Urinary retention - PVR q8hrs with straight cath protocol  Bowel/GI - Senna, Miralax, Dulcolax suppository prn  CVA ppx - ASA, statin  Dysphagia - NPO with TF, aspiration precautions, Oral care TID; free water via PEG  Pain - Tylenol prn, gabapentin  Anemia - iron supplement    Precautions: fall, aspiration                                                                          Diet: NPO with TF    DVT Prophylaxis: Lovenox                                                                          Medical Prognosis: Good     Prescreen Comparison: I have reviewed the prescreen information and I found no relevant changes between the preadmission  screening and my post admission evaluation.     Expected Therapy:   P.T.    1    hrs/day           O. T.   1   hrs/day           S.L.P.     1   hrs/day                    P&O     none                                              Excpected Frequency: 5 days/7 day period    Rehab Potential: Fair                                           Estimated Disposition:     Home                     ELOS:      14-21        days      Rationale For Inpatient Rehab Admission- Patient demonstrates the following:     [X] Medically appropriate for rehabilitation admission   [X] Has attainable rehab goals with an approrpriate discharge plan  [X] Has rehabilitation potential (expected to make significant improvement within a reasonable period of time)  [X] Requires close medical management by a rehab physician, rehab nursing care and comprehensive interdisciplinary team (including         PT, OT, SLP and/or prosthetics and orthotics)  Dr. Pederson aware. 86yo F with left MCA CVA, now with right sided hemiplegia, aphasia, dysphagia, cognitive deficits, functional and gait impairments.    Start comprehensive program of PT/OT/SLP.    HTN/Afib - lisinopril, Norvasc, Lopressor  DM2 - metformin, blood glucose x 1 tomorrow am  Urinary retention - PVR q8hrs with straight cath protocol  Bowel/GI - Senna, Miralax, Dulcolax suppository prn  CVA ppx - ASA, statin  Dysphagia - NPO with TF, aspiration precautions, Oral care TID; free water via PEG  Pain - Tylenol prn, gabapentin  Anemia - iron supplement    Precautions: fall, aspiration                                                                          Diet: NPO with TF    DVT Prophylaxis: Lovenox                                                                          Medical Prognosis: Good     Prescreen Comparison: I have reviewed the prescreen information and I found no relevant changes between the preadmission  screening and my post admission evaluation.     Expected Therapy:   P.T.    1    hrs/day           O. T.   1   hrs/day           S.L.P.     1   hrs/day                    P&O     none                                              Excpected Frequency: 5 days/7 day period    Rehab Potential: Fair                                           Estimated Disposition:     Home                     ELOS:      14-21        days      Rationale For Inpatient Rehab Admission- Patient demonstrates the following:     [X] Medically appropriate for rehabilitation admission   [X] Has attainable rehab goals with an approrpriate discharge plan  [X] Has rehabilitation potential (expected to make significant improvement within a reasonable period of time)  [X] Requires close medical management by a rehab physician, rehab nursing care and comprehensive interdisciplinary team (including         PT, OT, SLP and/or prosthetics and orthotics)  Dr. Pederson aware. 88yo F with left MCA CVA, now with right sided hemiplegia, aphasia, dysphagia, cognitive deficits, functional and gait impairments.    Start comprehensive program of PT/OT/SLP.    HTN/Afib - lisinopril, Norvasc, Lopressor  DM2 - metformin, blood glucose monitoring  Urinary retention - PVR q8hrs with straight cath protocol  F/U urine culture  Bowel/GI - Senna, Miralax, Dulcolax suppository prn  CVA ppx - ASA, statin  Anemia- Fe  Dysphagia - NPO with TF, aspiration precautions, Oral care TID; free water via PEG  Pain - Tylenol prn, gabapentin  Anemia - iron supplement    Precautions: fall, aspiration                                                                          Diet: NPO with TF    DVT Prophylaxis: Lovenox                                                                          Medical Prognosis: Good     Prescreen Comparison: I have reviewed the prescreen information and I found no relevant changes between the preadmission  screening and my post admission evaluation.     Expected Therapy:   P.T.    1    hrs/day           O. T.   1   hrs/day           S.L.P.     1   hrs/day                    P&O     none                                              Excpected Frequency: 5 days/7 day period    Rehab Potential: Fair                                           Estimated Disposition:     Home                     ELOS:      14-21        days      Rationale For Inpatient Rehab Admission- Patient demonstrates the following:     [X] Medically appropriate for rehabilitation admission   [X] Has attainable rehab goals with an approrpriate discharge plan  [X] Has rehabilitation potential (expected to make significant improvement within a reasonable period of time)  [X] Requires close medical management by a rehab physician, rehab nursing care and comprehensive interdisciplinary team (including         PT, OT, SLP and/or prosthetics and orthotics)  Dr. Pederson aware.

## 2018-11-01 NOTE — PROGRESS NOTE ADULT - SUBJECTIVE AND OBJECTIVE BOX
THE PATIENT WAS SEEN AND EXAMINED BY ME WITH THE HOUSESTAFF AND STROKE TEAM DURING MORNING ROUNDS.     HPI:  88y/o female with vascular risk factors of HTN and hyperlipidemia, who was last seen normal at 22:10, on the day prior to admission. Then she was found by family members at the floor with inability to communicate or move her right side at 22:20. She was initially evaluated at [place] were NIHSS=29 deemed candidate for tPA. 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 Patient was transferred for further management to [facility]. She received IVtpa per protocol and transferred to [facility] after CTA revealed L ICA and L M1 occlusion. She underwent thrombectomy with resultant TICI 2B flow.     SUBJECTIVE: No events overnight.  No new neurologic complaints.      ROS: All negative except documented above.    acetaminophen    Suspension .. 650 milliGRAM(s) Oral every 6 hours PRN  amLODIPine   Tablet 5 milliGRAM(s) Oral daily  artificial tears (preservative free) Ophthalmic Solution 1 Drop(s) Both EYES daily  aspirin  chewable 81 milliGRAM(s) Oral daily  atorvastatin 80 milliGRAM(s) Oral at bedtime  bisacodyl Suppository 10 milliGRAM(s) Rectal daily PRN  brimonidine 0.2% Ophthalmic Solution 1 Drop(s) Both EYES two times a day  dorzolamide 2%/timolol 0.5% Ophthalmic Solution 1 Drop(s) Both EYES two times a day  enoxaparin Injectable 40 milliGRAM(s) SubCutaneous daily  ferrous    sulfate Liquid 300 milliGRAM(s) Oral daily  gabapentin   Solution 100 milliGRAM(s) Oral three times a day  influenza   Vaccine 0.5 milliLiter(s) IntraMuscular once  lisinopril 5 milliGRAM(s) Oral <User Schedule>  metoprolol tartrate 25 milliGRAM(s) Oral two times a day  metoprolol tartrate Injectable 5 milliGRAM(s) IV Push every 6 hours PRN  nystatin    Suspension 892111 Unit(s) Oral two times a day    PHYSICAL EXAM:   Vital Signs Last 24 Hrs  T(C): 37.2 (01 Nov 2018 10:21), Max: 37.4 (01 Nov 2018 04:30)  T(F): 98.9 (01 Nov 2018 10:21), Max: 99.3 (01 Nov 2018 04:30)  HR: 60 (01 Nov 2018 10:21) (60 - 92)  BP: 118/64 (01 Nov 2018 10:21) (96/62 - 124/79)  RR: 18 (01 Nov 2018 10:21) (16 - 18)  SpO2: 96% (01 Nov 2018 10:21) (96% - 96%)    General: No acute distress  HEENT: unable to cooperate, no BTT on right, slight gaze deviation to the left  Abdomen: Soft, nontender, nondistended   Extremities: No edema    NEUROLOGICAL EXAM:  Mental status: eyes opened, generates some 1-2 words and unintelligible sounds, attentive, follows some commands in native language  Cranial Nerves: Moderate-Severe right nasolabial flattening, no Blink to threat on right visual field, right gaze palsy, unable to cross pass midline, no definite dysarthria but almost no verbal output  Motor exam: LUE: good effort against gravity, LLE : spontaneous movements, minimal effort against gravity, normal tone, RUE: no movement, RLE: externally rotated, no volitional movement-triple flexion only, minimally increased tone in RUE/RLE.     Sensation: unable to assess    Coordination/ Gait: deferred    LABS:    Hemoglobin A1C, Whole Blood: 6.0 % (10-19 @ 07:53)    IMAGING: Reviewed by me.     MRI Head No Cont (10/20/18):  Acute hemorrhagic infarct involving the left MCA territory with mass effect on the left lateral ventricle and left to right shift.    CT Head No Cont (10/19/18) @ 23:21:   Redemonstration of large acute left MCA territory infarct with similar mass effect upon the left lateral ventricle and similar rightward midline shift of 4 mm. Ventricles stable in size. No hydrocephalus. Previously seen increased density throughout the region of infarction is less apparent on the current examination, consistent with resolving contrast staining after digital subtraction angiography. 2.1 x 1.6 cm region of persistent increased density in the left lateral frontotemporal region may represent hemorrhagic transformation of the infarct.    CT Head No Cont (10/19/18) @ 05:52:   Large acute left MCA territory infarct. Increased density seen throughout the left basal ganglia and left lateral frontal, parietal, and temporal cortices likely represents a combination of hemorrhagic transformation and cortical staining from recent digital subtraction angiography. Additional density seen within the left cerebral sulci likely representing subarachnoid hemorrhage. Mass effect upon the left lateral ventricle and rightward midline shift of 4 mm. No large hydrocephalus.

## 2018-11-01 NOTE — PROGRESS NOTE ADULT - REASON FOR ADMISSION
stroke
stroke, s/p thrombectomy
stroke

## 2018-11-01 NOTE — H&P ADULT - ATTENDING COMMENTS
Agree with above.  Pt is an 86yo F with left MCA CVA, now with right sided hemiplegia, aphasia, dysphagia, cognitive deficits, functional and gait impairments.  Pt requires comprehensive rehab with physician management of comorbidities.  Will f/u labs, PVRs

## 2018-11-01 NOTE — PROGRESS NOTE ADULT - PROBLEM SELECTOR PLAN 1
Orders per Stroke team   monitor on tele   s/p ILR as per Stroke team recommendations
-s/p peg  -cont to keep abdomen binder in place  -cont tube feeds  -daily PEG care, monitor residuals
Orders per Stroke team   monitor on tele   s/p ILR as per Stroke team recommendations
Orders per Stroke unit team   monitor on tele   TTE
Orders per Stroke unit team   monitor on tele   TTE   Thyroid panel
Orders per Stroke unit team   monitor on tele   Will plan for ILR as per Stroke team recommendations   TTE
Pt failed bedside speech and swallow evaluation x 2  - pt with oral and suspected pharyngeal dysphagia  - keep NPO with NGT feeds  - pt is at risk for aspiration  - repeat s/s eval planned for tomorrow. If she fails will plan for upper gastrointestinal endoscopy with PEG placement Friday.  - will follow
Pt failed bedside speech and swallow evaluation x 3  - pt with oral and suspected pharyngeal dysphagia  - keep NPO with NGT feeds  - pt is at risk for aspiration  - plan for upper gastrointestinal endoscopy with PEG placement in am. DW family at bedside they are agreeable. NGT feeds to be held at midnight.   - will follow
s/p peg  cont to keep abdomen binder in place  cont tube feeds, advance to goal

## 2018-11-01 NOTE — H&P ADULT - NSHPSOCIALHISTORY_GEN_ALL_CORE
SOCIAL HISTORY  Smoking - Denied  EtOH - Denied   Drugs - Denied    FUNCTIONAL HISTORY  Lives in a ____ with ___ and has __ Stairs to Enter + Hand Rails and ___ Stairs inside + Hand Rails   Prior Level of Function:               Independent prior with Ambulation and ADLs.     CURRENT FUNCTIONAL STATUS  - Bed Mobility:  - Transfers:   - Gait:  - ADLs: SOCIAL HISTORY  Smoking - Denied  EtOH - Denied   Drugs - Denied    FUNCTIONAL HISTORY  Lives in a house with daughter and has 0 Stairs to Enter + Hand Rails and 0 Stairs inside + Hand Rails   Prior Level of Function:               Independent prior with Ambulation and ADLs.     CURRENT FUNCTIONAL STATUS  - Bed Mobility: mod A  - Transfers: mod A  - Gait: 5 steps mod A  - ADLs: mod A

## 2018-11-01 NOTE — PROGRESS NOTE ADULT - ASSESSMENT
Patient with hemorraghic L MCA infarct, s/p thrombectomy   Prior fever resolved  S/p PEG  Leukocytosis persists, but lower as of yest  PCT level only 0.08  Urine Cx 10/26 negative  No support for sepsis or systemic infection  pyuria on recent UA is nonspecific  Plan:   Observe off antibiotics.   Follow temps and CBC/diff   No ID objection to rehab transfer  Family at bedside

## 2018-11-01 NOTE — PROGRESS NOTE ADULT - SUBJECTIVE AND OBJECTIVE BOX
Subjective: Patient seen and examined. No new events except as noted.   no cp or sob     REVIEW OF SYSTEMS:    CONSTITUTIONAL: + weakness, fevers or chills  EYES/ENT: No visual changes;  No vertigo or throat pain   NECK: No pain or stiffness  RESPIRATORY: No cough, wheezing, hemoptysis; No shortness of breath  CARDIOVASCULAR: No chest pain or palpitations  GASTROINTESTINAL: No abdominal or epigastric pain. No nausea, vomiting, or hematemesis; No diarrhea or constipation. No melena or hematochezia.  GENITOURINARY: No dysuria, frequency or hematuria  NEUROLOGICAL: + numbness or weakness  SKIN: No itching, burning, rashes, or lesions   All other review of systems is negative unless indicated above.    MEDICATIONS:  MEDICATIONS  (STANDING):  amLODIPine   Tablet 5 milliGRAM(s) Oral daily  artificial tears (preservative free) Ophthalmic Solution 1 Drop(s) Both EYES daily  aspirin  chewable 81 milliGRAM(s) Oral daily  atorvastatin 80 milliGRAM(s) Oral at bedtime  brimonidine 0.2% Ophthalmic Solution 1 Drop(s) Both EYES two times a day  dorzolamide 2%/timolol 0.5% Ophthalmic Solution 1 Drop(s) Both EYES two times a day  enoxaparin Injectable 40 milliGRAM(s) SubCutaneous daily  ferrous    sulfate Liquid 300 milliGRAM(s) Oral daily  gabapentin   Solution 100 milliGRAM(s) Oral three times a day  influenza   Vaccine 0.5 milliLiter(s) IntraMuscular once  lisinopril 5 milliGRAM(s) Oral <User Schedule>  metoprolol tartrate 25 milliGRAM(s) Oral two times a day  nystatin    Suspension 292746 Unit(s) Oral two times a day      PHYSICAL EXAM:  T(C): 37.2 (11-01-18 @ 10:21), Max: 37.4 (11-01-18 @ 04:30)  HR: 60 (11-01-18 @ 10:21) (60 - 92)  BP: 118/64 (11-01-18 @ 10:21) (96/62 - 124/79)  RR: 18 (11-01-18 @ 10:21) (17 - 18)  SpO2: 96% (11-01-18 @ 10:21) (96% - 96%)  Wt(kg): --  I&O's Summary    31 Oct 2018 07:01  -  01 Nov 2018 07:00  --------------------------------------------------------  IN: 0 mL / OUT: 1200 mL / NET: -1200 mL          Appearance: NAD aphasic   HEENT:   Dry oral mucosa, +NGT 	  Lymphatic: No lymphadenopathy , no edema  Cardiovascular: Normal S1 S2, No JVD, No murmurs , Peripheral pulses palpable 2+ bilaterally  Respiratory: Lungs clear to auscultation, normal effort 	  Gastrointestinal:  Soft, Non-tender, + BS	  Skin: No rashes, No ecchymoses, No cyanosis, warm to touch  Musculoskeletal: Decreased range of motion and  strength  Psychiatry:  lethargic   Ext: No edema  NEUROLOGICAL EXAM:  Mental status: Awake, lethargic, not following commands, no verbal output  Cranial Nerves: Right facial droop  Motor exam:  RUE- plegic, increased tone, RLE - withdraws to noxious stimuli, LUE - minimal movement,  LLE - moving freely  Sensation: Not assessed      LABS:    CARDIAC MARKERS:                  proBNP:   Lipid Profile:   HgA1c:   TSH:     0          TELEMETRY: 	    ECG:  	  RADIOLOGY:   DIAGNOSTIC TESTING:  [ ] Echocardiogram:  [ ]  Catheterization:  [ ] Stress Test:    OTHER:

## 2018-11-01 NOTE — PROGRESS NOTE ADULT - PROBLEM SELECTOR PROBLEM 1
Dysphagia
Acute ischemic stroke
Dysphagia
Acute ischemic stroke

## 2018-11-01 NOTE — H&P ADULT - HISTORY OF PRESENT ILLNESS
86yo female with PMH of HTN and hyperlipidemia who presented to [facility] on 10/19/18 who was last seen normal at 22:10 the night prior, who was then found down on the ground by family members with inability to communicate or move her right side. She was initially evaluated at [place], found on neurological examination to have right hemiparesis, right hemianopia, right dense facial droop, right sensory loss, left gaze preference with gaze palsy and aphasia. Deemed candidate for tPA. 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 Patient was transferred for further management to [facility]. She then underwent thrombectomy with resultant TICI 2B flow. Failed swallow eval—kept NPO, NGT replaced by PEG 10/26, and tolerating PEG feeding. Telemetry monitoring showed frequent ectopy; Cardiology consulted; no evidence of Afib, she was initially started on IV Lopressor for tachycardia, optimized to po Lopressor for better control. TTE showed PFO. Loop recorder placed. Also noted some hemorrhagic conversion without neurologic deterioration so did not undergo any neurosurgical intervention. Hospital course significant for fever Tmax 101; ID consulted, patient was initially started on IV ceftriaxone for positive UA but urine and blood cultures negative so patient monitored off antibiotics without further fevers. Patient deemed medically stable for dc to acute rehab 11/1/18. 86yo Yakut speaking female with PMH of HTN and hyperlipidemia who presented to [facility] on 10/19/18 who was last seen normal at 22:10 the night prior, who was then found down on the ground by family members with inability to communicate or move her right side. She was initially evaluated at [place], found on neurological examination to have right hemiparesis, right hemianopia, right dense facial droop, right sensory loss, left gaze preference with gaze palsy and aphasia. Deemed candidate for tPA. 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 Patient was transferred for further management to [facility]. She then underwent thrombectomy with resultant TICI 2B flow. Failed swallow eval—kept NPO, NGT replaced by PEG 10/26, and tolerating PEG feeding. Telemetry monitoring showed frequent ectopy; Cardiology consulted; no evidence of Afib, she was initially started on IV Lopressor for tachycardia, optimized to po Lopressor for better control. TTE showed PFO. Loop recorder placed. Also noted some hemorrhagic conversion without neurologic deterioration so did not undergo any neurosurgical intervention. Hospital course significant for fever Tmax 101; ID consulted, patient was initially started on IV ceftriaxone for positive UA but urine and blood cultures negative so patient monitored off antibiotics without further fevers. Patient also with new onset urinary retention requiring straight cath protocol as she has been anuric. Patient deemed medically stable for dc to acute rehab 11/1/18.

## 2018-11-01 NOTE — H&P ADULT - NSHPREVIEWOFSYSTEMS_GEN_ALL_CORE
REVIEW OF SYSTEMS  Constitutional: No fever, No Chills, No fatigue  HEENT: No eye pain, No visual disturbances, No difficulty hearing, No Dysphagia   Pulm: No cough,  No shortness of breath  Cardio: No chest pain, No palpitations  GI:  No abdominal pain, No nausea, No vomiting, No diarrhea, No constipation, No incontinence, LBM:  : No dysuria, No frequency, No hematuria, No incontinence  Neuro: No headaches, No memory loss, No loss of strength, No numbness, No tremors  Skin: No itching, No rashes, No lesions   Endo: No temperature intolerance  MSK: No joint pain, No joint swelling, No muscle pain, No Neck or back pain  Psych:  No depression, No anxiety    Any Major Surgery within past 100 days? No / Yes     Two or more Falls within past one year?  No / Yes                   One Fall with injury past one year?           No / Yes REVIEW OF SYSTEMS  Unable to obtain due to cognitive/language deficits

## 2018-11-01 NOTE — PROGRESS NOTE ADULT - ASSESSMENT
88y/o female with vascular risk factors of HTN and hyperlipidemia, who was last seen normal at 22:10, on the day prior to admission. Then she was found by family members at the floor with inability to communicate or move her right side at 22:20. She was initially evaluated at [place] were NIHSS=29 deemed candidate for tPA. 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 Patient was transferred for further management to [facility]. She received IVtpa per protocol and transferred to [facility] after CTA revealed L ICA and L M1 occlusion. She underwent thrombectomy with resultant TICI 2B flow.     Impression:   Cerebral embolism with cerebral infarction. 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 Physical therapy/OT recommends acute rehab.     ANTITHROMBOTIC THERAPY: Aspirin for secondary stroke prevention    PULMONARY: CXR (10/23) shows bilateral pleural effusions - no overt sign of HF noted, lungs CTA B/L, protecting airway, saturating well     CARDIOVASCULAR: TTE shows EF: 55-60%, Agitated saline injection revealed few late bubbles in the left heart, consistent with transpulmonic shunting, LE doppler: no DVT. Initially thought to have afib- now deemed to be NSR with PAC's s/p ICM placement for prolonged cardiac monitoring to screen for occult cardiac arrhythmias like atrial fibrillation, but a cardiac source of embolism. Continue current cardiac meds, Dr Chand (cardiology) consult appreciated                          SBP goal: gradual normotension     GASTROINTESTINAL: s/p PEG placement on 10/26/18, tolerating feedings.     Diet: TF via PEG    RENAL: BUN/Cr previously within normal limits, good urine output     Na Goal: Greater than 135     Mendez: No    HEMATOLOGY: no signs or symptoms of bleeding     DVT ppx: LMWH     ID: afebrile, ID reconsulted- recommended to monitor off ABx, Procalcitonin within normal limits. Previous BCx x 2 NGTD, UA UA with pyuria, but not clear if collected accurately, repeat culture sent, pending results.  chest X-ray shows no evidence of PNA. Will continue to monitor closely. ID consult appreciated    OTHER: Plan discussed with previously family at bedside, all questions and concerns addressed.     DISPOSITION: Acute rehab as per PMR eval    CORE MEASURES:        Admission NIHSS: 29     TPA:  YES      LDL/HDL: 100/52      Depression Screen: unable to obtain due to aphasia     Statin Therapy: yes     Dysphagia Screen: FAIL     Smoking NO      Afib YES      Stroke Education  YES, to family 86 y/o female with vascular risk factors of HTN and hyperlipidemia, who was last seen normal at 22:10, on the day prior to admission. Then she was found by family members at the floor with inability to communicate or move her right side at 22:20. She was initially evaluated at [place] were NIHSS=29 deemed candidate for tPA. 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 Patient was transferred for further management to [facility]. She received IV tPA per protocol and transferred to [facility] after CTA revealed L ICA and L M1 occlusion. She underwent thrombectomy with resultant TICI 2B flow. TTE did not show any obvious structural cardiac source of embolism, but was suggestive of extracardiac/intra-pulmonic shunting. Lower extremity duplex did not show any evidence of DVT being paradoxical source of embolism.    Impression:   Cerebral embolism with cerebral infarction. 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 Physical therapy/OT recommends acute rehab.     ANTITHROMBOTIC THERAPY: Aspirin for secondary stroke prevention    PULMONARY: CXR (10/23) shows bilateral pleural effusions - no overt sign of HF noted, lungs CTA B/L, protecting airway, saturating well     CARDIOVASCULAR: TTE shows EF: 55-60%, Agitated saline injection revealed few late bubbles in the left heart, consistent with transpulmonic shunting, LE doppler: no DVT. Initially thought to have afib- now deemed to be NSR with PAC's s/p ICM placement for prolonged cardiac monitoring to screen for occult cardiac arrhythmias like atrial fibrillation, but a cardiac source of embolism. Continue current cardiac meds, Dr Chand (cardiology) consult appreciated                          SBP goal: gradual normotension     GASTROINTESTINAL: s/p PEG placement on 10/26/18, tolerating feedings.     Diet: TF via PEG    RENAL: BUN/Cr previously within normal limits, good urine output     Na Goal: Greater than 135     Mendez: No    HEMATOLOGY: no signs or symptoms of bleeding     DVT ppx: LMWH     ID: afebrile, ID reconsulted- recommended to monitor off ABx, Procalcitonin within normal limits. Previous BCx x 2 NGTD, UA UA with pyuria, but not clear if collected accurately, repeat culture sent, pending results. Chest X-ray shows no evidence of PNA. Will continue to monitor closely. ID consult appreciated    OTHER: Plan discussed with previously family at bedside, all questions and concerns addressed.     DISPOSITION: Acute rehab as per PMR eval    CORE MEASURES:        Admission NIHSS: 29     TPA:  YES      LDL/HDL: 100/52      Depression Screen: unable to obtain due to aphasia     Statin Therapy: yes     Dysphagia Screen: FAIL     Smoking NO      Afib YES      Stroke Education  YES, to family

## 2018-11-01 NOTE — PROGRESS NOTE ADULT - SUBJECTIVE AND OBJECTIVE BOX
CC: f/u for fever    Patient reports: she is alert, follows commands, no fever today, tolerating enteral feeds    REVIEW OF SYSTEMS:  All other review of systems negative (Comprehensive ROS)    Antimicrobials Day #  :  nystatin    Suspension [id number] Unit(s) Oral two times a day    Other Medications Reviewed    T(F): 98.4 (11-01-18 @ 16:44), Max: 99.3 (11-01-18 @ 04:30)  HR: 57 (11-01-18 @ 16:44)  BP: 125/74 (11-01-18 @ 16:44)  RR: 18 (11-01-18 @ 16:44)  SpO2: 97% (11-01-18 @ 16:44)  Wt(kg): --    PHYSICAL EXAM:  General: alert, no acute distress, not verbal  Eyes:  anicteric, no conjunctival injection, no discharge  Oropharynx: no lesions or injection 	  Neck: supple, without adenopathy  Lungs: clear to auscultation  Heart: regular rate and rhythm; no murmur, rubs or gallops  Abdomen: soft, nondistended, nontender, peg  Skin: no lesions  Extremities: no clubbing, cyanosis, or edema  Neurologic: alert, rt side is weak    LAB RESULTS:                        9.7    12.5  )-----------( 313      ( 01 Nov 2018 14:42 )             29.4               MICROBIOLOGY:  RECENT CULTURES:      RADIOLOGY REVIEWED:

## 2018-11-01 NOTE — PROGRESS NOTE ADULT - PROBLEM SELECTOR PLAN 2
L MCA infarct- etiology likely embolic stroke of unknown source  - care per neurology appreciated
Cont with metoprolol 25 mg BID
- L MCA infarct- etiology likely embolic stroke of unknown source  - care per neurology appreciated
Cont with metoprolol 25 mg BID
L MCA infarct- etiology likely embolic stroke of unknown source  - care per neurology appreciated
Lopressor 5 mg IV Q 6 hours

## 2018-11-01 NOTE — PROGRESS NOTE ADULT - PROBLEM SELECTOR PROBLEM 2
Tachycardia
Acute ischemic stroke
Tachycardia
Acute ischemic stroke

## 2018-11-01 NOTE — H&P ADULT - NSHPLABSRESULTS_GEN_ALL_CORE
< from: CT Angio Head w/ IV Cont (10.18.18 @ 23:56) >    FINDINGS:   CT ANGIOGRAPHY NECK:  Thoracic aorta and branch vessels: Patent.  Atherosclerosis.  No   flow-limiting stenosis.  No evidence of dissection.    Right carotid system: Patent. Atherosclerosis of the carotid bifurcation,   resulting in mild (less than 50% by NASCET criteria) luminal stenosis.    No evidence of dissection.    Left carotid system: Diminished flow identified within the mid to distal   cervical internal carotid artery.  Mild atherosclerosis at the   bifurcation without hemodynamically significant stenosis using NASCET   criteria.  No evidence of dissection.    Vertebral arteries: Patent.   No atherosclerosis.  No flow-limiting   stenosis.  No evidenceof dissection.    Soft tissues of the neck: Unremarkable.    Visualized spine: Multilevel degenerative changes of the spine.    Visualized upper chest: Mild scattered faint groundglass airspace   opacity, nonspecific.    CT ANGIOGRAPHY BRAIN:    Internal carotid arteries: Minimal opacification of the petrous segment   of the left internal carotid artery. Nonopacification of the cavernous,   supraclinoid, carotid terminus, and left proximal MCA vessels, consistent   with occlusion. Mild atherosclerosis of the right intracranial internal   carotid artery, which is otherwise patent.    Anterior cerebral arteries: Patent bilaterally without flow limiting   stenosis. Left anterior cerebral artery, likely cross filling via an   anterior communicating artery. Proximal most portion of the left A1   segment, is unopacified.    Middle cerebral arteries: Occluded proximal left middle cerebral arteries   with marked asymmetrically decreased opacification of the distal middle 9.2    11.6  )-----------( 310      ( 02 Nov 2018 06:09 )             28.9   11-02    139  |  104  |  31<H>  ----------------------------<  187<H>  4.0   |  30  |  0.98    Ca    8.3<L>      02 Nov 2018 06:09      < from: CT Angio Head w/ IV Cont (10.18.18 @ 23:56) >    FINDINGS:   CT ANGIOGRAPHY NECK:  Thoracic aorta and branch vessels: Patent.  Atherosclerosis.  No   flow-limiting stenosis.  No evidence of dissection.    Right carotid system: Patent. Atherosclerosis of the carotid bifurcation,   resulting in mild (less than 50% by NASCET criteria) luminal stenosis.    No evidence of dissection.    Left carotid system: Diminished flow identified within the mid to distal   cervical internal carotid artery.  Mild atherosclerosis at the   bifurcation without hemodynamically significant stenosis using NASCET   criteria.  No evidence of dissection.    Vertebral arteries: Patent.   No atherosclerosis.  No flow-limiting   stenosis.  No evidenceof dissection.    Soft tissues of the neck: Unremarkable.    Visualized spine: Multilevel degenerative changes of the spine.    Visualized upper chest: Mild scattered faint groundglass airspace   opacity, nonspecific.    CT ANGIOGRAPHY BRAIN:    Internal carotid arteries: Minimal opacification of the petrous segment   of the left internal carotid artery. Nonopacification of the cavernous,   supraclinoid, carotid terminus, and left proximal MCA vessels, consistent   with occlusion. Mild atherosclerosis of the right intracranial internal   carotid artery, which is otherwise patent.    Anterior cerebral arteries: Patent bilaterally without flow limiting   stenosis. Left anterior cerebral artery, likely cross filling via an   anterior communicating artery. Proximal most portion of the left A1   segment, is unopacified.    Middle cerebral arteries: Occluded proximal left middle cerebral arteries   with marked asymmetrically decreased opacification of the distal middle

## 2018-11-02 DIAGNOSIS — I63.512 CEREBRAL INFARCTION DUE TO UNSPECIFIED OCCLUSION OR STENOSIS OF LEFT MIDDLE CEREBRAL ARTERY: ICD-10-CM

## 2018-11-02 LAB
ANION GAP SERPL CALC-SCNC: 5 MMOL/L — SIGNIFICANT CHANGE UP (ref 5–17)
BUN SERPL-MCNC: 31 MG/DL — HIGH (ref 7–23)
CALCIUM SERPL-MCNC: 8.3 MG/DL — LOW (ref 8.4–10.5)
CHLORIDE SERPL-SCNC: 104 MMOL/L — SIGNIFICANT CHANGE UP (ref 96–108)
CO2 SERPL-SCNC: 30 MMOL/L — SIGNIFICANT CHANGE UP (ref 22–31)
CREAT SERPL-MCNC: 0.98 MG/DL — SIGNIFICANT CHANGE UP (ref 0.5–1.3)
GLUCOSE SERPL-MCNC: 187 MG/DL — HIGH (ref 70–99)
HCT VFR BLD CALC: 28.9 % — LOW (ref 34.5–45)
HGB BLD-MCNC: 9.2 G/DL — LOW (ref 11.5–15.5)
MCHC RBC-ENTMCNC: 30.4 PG — SIGNIFICANT CHANGE UP (ref 27–34)
MCHC RBC-ENTMCNC: 31.9 GM/DL — LOW (ref 32–36)
MCV RBC AUTO: 95.3 FL — SIGNIFICANT CHANGE UP (ref 80–100)
PLATELET # BLD AUTO: 310 K/UL — SIGNIFICANT CHANGE UP (ref 150–400)
POTASSIUM SERPL-MCNC: 4 MMOL/L — SIGNIFICANT CHANGE UP (ref 3.5–5.3)
POTASSIUM SERPL-SCNC: 4 MMOL/L — SIGNIFICANT CHANGE UP (ref 3.5–5.3)
RBC # BLD: 3.03 M/UL — LOW (ref 3.8–5.2)
RBC # FLD: 13.2 % — SIGNIFICANT CHANGE UP (ref 10.3–14.5)
SODIUM SERPL-SCNC: 139 MMOL/L — SIGNIFICANT CHANGE UP (ref 135–145)
WBC # BLD: 11.6 K/UL — HIGH (ref 3.8–10.5)
WBC # FLD AUTO: 11.6 K/UL — HIGH (ref 3.8–10.5)

## 2018-11-02 PROCEDURE — 99222 1ST HOSP IP/OBS MODERATE 55: CPT | Mod: AI

## 2018-11-02 PROCEDURE — 93010 ELECTROCARDIOGRAM REPORT: CPT

## 2018-11-02 PROCEDURE — 99223 1ST HOSP IP/OBS HIGH 75: CPT

## 2018-11-02 RX ORDER — INSULIN LISPRO 100/ML
VIAL (ML) SUBCUTANEOUS EVERY 6 HOURS
Qty: 0 | Refills: 0 | Status: DISCONTINUED | OUTPATIENT
Start: 2018-11-02 | End: 2018-11-02

## 2018-11-02 RX ORDER — DEXTROSE 50 % IN WATER 50 %
25 SYRINGE (ML) INTRAVENOUS ONCE
Qty: 0 | Refills: 0 | Status: DISCONTINUED | OUTPATIENT
Start: 2018-11-02 | End: 2018-11-14

## 2018-11-02 RX ORDER — SIMVASTATIN 20 MG/1
1 TABLET, FILM COATED ORAL
Qty: 0 | Refills: 0 | COMMUNITY

## 2018-11-02 RX ORDER — DEXTROSE 50 % IN WATER 50 %
15 SYRINGE (ML) INTRAVENOUS ONCE
Qty: 0 | Refills: 0 | Status: DISCONTINUED | OUTPATIENT
Start: 2018-11-02 | End: 2018-11-14

## 2018-11-02 RX ORDER — ASPIRIN/CALCIUM CARB/MAGNESIUM 324 MG
1 TABLET ORAL
Qty: 0 | Refills: 0 | COMMUNITY

## 2018-11-02 RX ORDER — DEXTROSE 50 % IN WATER 50 %
12.5 SYRINGE (ML) INTRAVENOUS ONCE
Qty: 0 | Refills: 0 | Status: DISCONTINUED | OUTPATIENT
Start: 2018-11-02 | End: 2018-11-14

## 2018-11-02 RX ORDER — INSULIN LISPRO 100/ML
VIAL (ML) SUBCUTANEOUS
Qty: 0 | Refills: 0 | Status: DISCONTINUED | OUTPATIENT
Start: 2018-11-02 | End: 2018-11-14

## 2018-11-02 RX ORDER — AMLODIPINE BESYLATE 2.5 MG/1
1 TABLET ORAL
Qty: 0 | Refills: 0 | COMMUNITY

## 2018-11-02 RX ORDER — GABAPENTIN 400 MG/1
1 CAPSULE ORAL
Qty: 0 | Refills: 0 | COMMUNITY

## 2018-11-02 RX ORDER — LISINOPRIL 2.5 MG/1
1 TABLET ORAL
Qty: 0 | Refills: 0 | COMMUNITY

## 2018-11-02 RX ORDER — SODIUM CHLORIDE 9 MG/ML
1000 INJECTION, SOLUTION INTRAVENOUS
Qty: 0 | Refills: 0 | Status: DISCONTINUED | OUTPATIENT
Start: 2018-11-02 | End: 2018-11-14

## 2018-11-02 RX ORDER — GLUCAGON INJECTION, SOLUTION 0.5 MG/.1ML
1 INJECTION, SOLUTION SUBCUTANEOUS ONCE
Qty: 0 | Refills: 0 | Status: DISCONTINUED | OUTPATIENT
Start: 2018-11-02 | End: 2018-11-14

## 2018-11-02 RX ADMIN — Medication 300 MILLIGRAM(S): at 13:07

## 2018-11-02 RX ADMIN — LISINOPRIL 5 MILLIGRAM(S): 2.5 TABLET ORAL at 18:09

## 2018-11-02 RX ADMIN — Medication 81 MILLIGRAM(S): at 13:07

## 2018-11-02 RX ADMIN — BRIMONIDINE TARTRATE 1 DROP(S): 2 SOLUTION/ DROPS OPHTHALMIC at 18:09

## 2018-11-02 RX ADMIN — Medication 1 DROP(S): at 06:10

## 2018-11-02 RX ADMIN — Medication 25 MILLIGRAM(S): at 18:10

## 2018-11-02 RX ADMIN — METFORMIN HYDROCHLORIDE 500 MILLIGRAM(S): 850 TABLET ORAL at 06:09

## 2018-11-02 RX ADMIN — GABAPENTIN 100 MILLIGRAM(S): 400 CAPSULE ORAL at 22:23

## 2018-11-02 RX ADMIN — DORZOLAMIDE HYDROCHLORIDE 1 DROP(S): 20 SOLUTION/ DROPS OPHTHALMIC at 22:24

## 2018-11-02 RX ADMIN — Medication 1 DROP(S): at 13:28

## 2018-11-02 RX ADMIN — BRIMONIDINE TARTRATE 1 DROP(S): 2 SOLUTION/ DROPS OPHTHALMIC at 06:08

## 2018-11-02 RX ADMIN — PANTOPRAZOLE SODIUM 40 MILLIGRAM(S): 20 TABLET, DELAYED RELEASE ORAL at 13:08

## 2018-11-02 RX ADMIN — DORZOLAMIDE HYDROCHLORIDE 1 DROP(S): 20 SOLUTION/ DROPS OPHTHALMIC at 06:09

## 2018-11-02 RX ADMIN — Medication 1 DROP(S): at 18:31

## 2018-11-02 RX ADMIN — GABAPENTIN 100 MILLIGRAM(S): 400 CAPSULE ORAL at 14:58

## 2018-11-02 RX ADMIN — Medication 1: at 18:09

## 2018-11-02 RX ADMIN — AMLODIPINE BESYLATE 5 MILLIGRAM(S): 2.5 TABLET ORAL at 06:07

## 2018-11-02 RX ADMIN — Medication 500000 UNIT(S): at 06:10

## 2018-11-02 RX ADMIN — Medication 500000 UNIT(S): at 18:09

## 2018-11-02 RX ADMIN — Medication 1 TABLET(S): at 13:08

## 2018-11-02 RX ADMIN — ENOXAPARIN SODIUM 40 MILLIGRAM(S): 100 INJECTION SUBCUTANEOUS at 13:08

## 2018-11-02 RX ADMIN — Medication 1: at 22:32

## 2018-11-02 RX ADMIN — GABAPENTIN 100 MILLIGRAM(S): 400 CAPSULE ORAL at 06:09

## 2018-11-02 RX ADMIN — ATORVASTATIN CALCIUM 80 MILLIGRAM(S): 80 TABLET, FILM COATED ORAL at 22:24

## 2018-11-02 RX ADMIN — DORZOLAMIDE HYDROCHLORIDE 1 DROP(S): 20 SOLUTION/ DROPS OPHTHALMIC at 13:09

## 2018-11-02 RX ADMIN — Medication 25 MILLIGRAM(S): at 06:10

## 2018-11-02 NOTE — CHART NOTE - NSCHARTNOTEFT_GEN_A_CORE
Upon Nutritional Assessment by the Registered Dietitian your patient was determined to meet criteria / has evidence of the following diagnosis/diagnoses:          [ ]  Mild Protein Calorie Malnutrition        [X]  Moderate Protein Calorie Malnutrition        [ ] Severe Protein Calorie Malnutrition        [ ] Unspecified Protein Calorie Malnutrition        [ ] Underweight / BMI <19        [ ] Morbid Obesity / BMI > 40    Findings as based on:  [X] Comprehensive Nutrition Assessment   [X] Nutrition Focused Physical Exam - Temporal, Orbital & Bicep Wasting Observed  [X] Other: Recent Weight Decrease (as Per Resident)     Nutrition Plan/Recommendations:    1) Change to Bolus 2CalHN 1Can Bolus x4 Day    PROVIDER Section:   By signing this assessment you are acknowledging and agree with the diagnosis/diagnoses assigned by the Registered Dietitian    Comments:

## 2018-11-02 NOTE — DIETITIAN INITIAL EVALUATION ADULT. - PERTINENT MEDS FT
Metoformin, Norvasc, Amldopine, Atrovastatin, Ferrous Sulfate, Lisinopril, Ferrous Sulfate, Lisinopril, MVI & Pantoprazole

## 2018-11-02 NOTE — CONSULT NOTE ADULT - SUBJECTIVE AND OBJECTIVE BOX
cc: f/u of cv    hpi- 86yo [place] speaking female with PMH of HTN and hyperlipidemia who presented to [facility] on 10/19/18 who was last seen normal at 22:10 the night prior, who was then found down on the ground by family members with inability to communicate or move her right side. She was initially evaluated at [place], found on neurological examination to have right hemiparesis, right hemianopia, right dense facial droop, right sensory loss, left gaze preference with gaze palsy and aphasia. Deemed candidate for tPA. 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 Patient was transferred for further management to [facility]. She then underwent thrombectomy with resultant TICI 2B flow. Failed swallow eval—kept NPO, NGT replaced by PEG 10/26, and tolerating PEG feeding. Telemetry monitoring showed frequent ectopy; Cardiology consulted; no evidence of Afib, she was initially started on IV Lopressor for tachycardia, optimized to po Lopressor for better control. TTE showed PFO. Loop recorder placed. Also noted some hemorrhagic conversion without neurologic deterioration so did not undergo any neurosurgical intervention. Hospital course significant for fever Tmax 101; ID consulted, patient was initially started on IV ceftriaxone for positive UA but urine and blood cultures negative so patient monitored off antibiotics without further fevers. Patient also with new onset urinary retention requiring straight cath protocol as she has been anuric. Patient deemed medically stable for dc to acute rehab 11/1/18.    seen at the bedside, aphasic, does not follow all comands.    ros: unobtainable       Allergies and Intolerances:        Allergies:  	No Known Allergies:       Patient History:    Past Medical History:  HLD (hyperlipidemia)    Hypertension.     Family History:  No pertinent family history in first degree relatives.\    SH: no smoking or etoh    o/e-   Vital Signs Last 24 Hrs  T(C): 36.9 (02 Nov 2018 07:58), Max: 36.9 (02 Nov 2018 07:58)  T(F): 98.5 (02 Nov 2018 07:58), Max: 98.5 (02 Nov 2018 07:58)  HR: 64 (02 Nov 2018 07:58) (64 - 71)  BP: 95/61 (02 Nov 2018 07:58) (95/61 - 121/64)  BP(mean): --  RR: 15 (02 Nov 2018 07:58) (14 - 15)  SpO2: 97% (02 Nov 2018 07:58) (95% - 97%)    nad, adithya, mmm, ncat  supple  clear  sis2  soft nt bs present, g tube  aphasic  does not folow commands  right sided weakness                  9.2                  139  | 30   | 31           11.6  >-----------< 310     ------------------------< 187                   28.9                 4.0  | 104  | 0.98                                         Ca 8.3   Mg x     Ph x        XR CHEST viewed and interpreted:     PROCEDURE DATE: 10/18/2018   No acute cardiopulmonary disease process.      old chart reviewed           Labs and Results:  Labs, Radiology, Cardiology, and Other Results: < from: CT Angio Head w/ IV Cont (10.18.18 @ 23:56) >   FINDINGS:   CT ANGIOGRAPHY NECK:  Thoracic aorta and branch vessels: Patent.  Atherosclerosis.  No   flow-limiting stenosis.  No evidence of dissection.   Right carotid system: Patent. Atherosclerosis of the carotid bifurcation,   resulting in mild (less than 50% by NASCET criteria) luminal stenosis.    No evidence of dissection.   Left carotid system: Diminished flow identified within the mid to distal   cervical internal carotid artery.  Mild atherosclerosis at the   bifurcation without hemodynamically significant stenosis using NASCET   criteria.  No evidence of dissection.   Vertebral arteries: Patent.   No atherosclerosis.  No flow-limiting   stenosis.  No evidenceof dissection.   Soft tissues of the neck: Unremarkable.   Visualized spine: Multilevel degenerative changes of the spine.   Visualized upper chest: Mild scattered faint groundglass airspace   opacity, nonspecific.   CT ANGIOGRAPHY BRAIN:   Internal carotid arteries: Minimal opacification of the petrous segment   of the left internal carotid artery. Nonopacification of the cavernous,   supraclinoid, carotid terminus, and left proximal MCA vessels, consistent   with occlusion. Mild atherosclerosis of the right intracranial internal   carotid artery, which is otherwise patent.   Anterior cerebral arteries: Patent bilaterally without flow limiting   stenosis. Left anterior cerebral artery, likely cross filling via an   anterior communicating artery. Proximal most portion of the left A1   segment, is unopacified.   Middle cerebral arteries: Occluded proximal left middle cerebral arteries  with marked asymmetrically decreased opacification of the distal middle

## 2018-11-02 NOTE — CONSULT NOTE ADULT - ASSESSMENT
86yo F with left MCA CVA, now with right sided hemiplegia, aphasia, dysphagia, cognitive deficits, functional and gait impairments.    Start comprehensive program of PT/OT/SLP.    HTN/Afib - lisinopril, Norvasc, Lopressor  DM2 - metformin, blood glucose x 1 tomorrow am  Urinary retention - PVR q8hrs with straight cath protocol  Bowel/GI - Senna, Miralax, Dulcolax suppository prn  CVA ppx - ASA, statin  Dysphagia - NPO with TF, aspiration precautions, Oral care TID; free water via PEG  Pain - Tylenol prn, gabapentin  Anemia - iron supplement

## 2018-11-02 NOTE — PROGRESS NOTE ADULT - SUBJECTIVE AND OBJECTIVE BOX
History of Present Illness: 	  86yo Malagasy speaking female with PMH of HTN and hyperlipidemia who presented to [facility] on 10/19/18 who was last seen normal at 22:10 the night prior, who was then found down on the ground by family members with inability to communicate or move her right side. She was initially evaluated at [place], found on neurological examination to have right hemiparesis, right hemianopia, right dense facial droop, right sensory loss, left gaze preference with gaze palsy and aphasia. Deemed candidate for tPA. 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 Patient was transferred for further management to [facility]. She then underwent thrombectomy with resultant TICI 2B flow. Failed swallow eval—kept NPO, NGT replaced by PEG 10/26, and tolerating PEG feeding. Telemetry monitoring showed frequent ectopy; Cardiology consulted; no evidence of Afib, she was initially started on IV Lopressor for tachycardia, optimized to po Lopressor for better control. TTE showed PFO. Loop recorder placed. Also noted some hemorrhagic conversion without neurologic deterioration so did not undergo any neurosurgical intervention. Hospital course significant for fever Tmax 101; ID consulted, patient was initially started on IV ceftriaxone for positive UA but urine and blood cultures negative so patient monitored off antibiotics without further fevers. Patient also with new onset urinary retention requiring straight cath protocol as she has been anuric. Patient deemed medically stable for dc to acute rehab 11/1/18.    Subjective: NAEO. Patient participating in therapy this morning, appeared comfortable sitting in wheelchair. Attentive when spoken to in Malagasy, but no verbal reply. SC x 2 for 800cc and 620cc respectively     Vital Signs Last 24 Hrs  T(C): 36.9 (02 Nov 2018 07:58), Max: 36.9 (02 Nov 2018 07:58)  T(F): 98.5 (02 Nov 2018 07:58), Max: 98.5 (02 Nov 2018 07:58)  HR: 64 (02 Nov 2018 07:58) (64 - 71)  BP: 95/61 (02 Nov 2018 07:58) (95/61 - 121/64)  BP(mean): --  RR: 15 (02 Nov 2018 07:58) (14 - 15)  SpO2: 97% (02 Nov 2018 07:58) (95% - 97%)    Physical Exam: PHYSICAL EXAM  Constitutional - NAD, Comfortable  HEENT - NCAT  Neck - Supple, No limited ROM  Chest - CTA bilaterally, No wheeze, No rhonchi, No crackles  Cardiovascular - RRR, S1S2, No murmurs  Abdomen - BS+, Soft, NTND, +PEG c/d/i  Extremities - No C/C/E, No calf tenderness   Neurologic Exam -                    Cognitive - Awake, Alert, unable to assess orientation as patient does not understand questions     Communication - nonfluent, with comprehension deficits     Cranial Nerves - unable to test fully due to cog deficits     Motor - No focal deficits                    LEFT    UE - >3/5 throughout                    RIGHT UE - 0/5 throughout, no movement in response to pain stimulus                    LEFT    LE - >3/5 throughout                    RIGHT LE - <3/5 at knee and foot, moves to pain stimulus        Sensory -  unable to test fully due to cog deficits     Reflexes - DTRs absent right Patellar and Achilles; 2+ in BUE biceps and brachioradialis, No primitive reflexes     Coordination -  unable to test fully due to cog deficits       Balance - unable to assess on admission Psychiatric - Mood stable, Affect WNL	      Function:  ongoing eval today  modA on admission History of Present Illness: 	  88yo Dutch speaking female with PMH of HTN and hyperlipidemia who presented to [facility] on 10/19/18 who was last seen normal at 22:10 the night prior, who was then found down on the ground by family members with inability to communicate or move her right side. She was initially evaluated at [place], found on neurological examination to have right hemiparesis, right hemianopia, right dense facial droop, right sensory loss, left gaze preference with gaze palsy and aphasia. Deemed candidate for tPA. 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 Patient was transferred for further management to [facility]. She then underwent thrombectomy with resultant TICI 2B flow. Failed swallow eval—kept NPO, NGT replaced by PEG 10/26, and tolerating PEG feeding. Telemetry monitoring showed frequent ectopy; Cardiology consulted; no evidence of Afib, she was initially started on IV Lopressor for tachycardia, optimized to po Lopressor for better control. TTE showed PFO. Loop recorder placed. Also noted some hemorrhagic conversion without neurologic deterioration so did not undergo any neurosurgical intervention. Hospital course significant for fever Tmax 101; ID consulted, patient was initially started on IV ceftriaxone for positive UA but urine and blood cultures negative so patient monitored off antibiotics without further fevers. Patient also with new onset urinary retention requiring straight cath protocol as she has been anuric. Patient deemed medically stable for dc to acute rehab 11/1/18.    Subjective: NAEO. Patient participating in therapy this morning, appeared comfortable sitting in wheelchair. Attentive when spoken to in Dutch, but no verbal reply. SC x 2 for 800cc and 620cc respectively     Vital Signs Last 24 Hrs  T(C): 36.9 (02 Nov 2018 07:58), Max: 36.9 (02 Nov 2018 07:58)  T(F): 98.5 (02 Nov 2018 07:58), Max: 98.5 (02 Nov 2018 07:58)  HR: 64 (02 Nov 2018 07:58) (64 - 71)  BP: 95/61 (02 Nov 2018 07:58) (95/61 - 121/64)  BP(mean): --  RR: 15 (02 Nov 2018 07:58) (14 - 15)  SpO2: 97% (02 Nov 2018 07:58) (95% - 97%)    Physical Exam:   Constitutional - NAD, Comfortable  HEENT - NCAT  Neck - Supple, No limited ROM  Chest - CTA bilaterally, No wheeze, No rhonchi, No crackles  Cardiovascular - RRR, S1S2, No murmurs  Abdomen - BS+, Soft, NTND, +PEG c/d/i  Extremities - No calf tenderness   Neurologic Exam -                    Cognitive - Awake, Alert     Communication - non-verbal, receptive + expressive aphasia     Motor - No focal deficits                    LEFT    UE - >3/5 throughout                    RIGHT UE - 0/5 throughout, no movement in response to pain stimulus                    LEFT    LE - >3/5 throughout                    RIGHT LE - <3/5 at knee and foot, moves to pain stimulus        Reflexes - DTRs absent right Patellar and Achilles; 2+ in BUE biceps and brachioradialis, No primitive reflexes Psychiatric - Mood stable, Affect WNL	    Function:  ongoing eval today  modA on admission     MEDICATIONS  (STANDING):  amLODIPine   Tablet 5 milliGRAM(s) Oral daily  artificial tears (preservative free) Ophthalmic Solution 1 Drop(s) Both EYES daily  aspirin  chewable 81 milliGRAM(s) Enteral Tube daily  atorvastatin 80 milliGRAM(s) Oral at bedtime  brimonidine 0.2% Ophthalmic Solution 1 Drop(s) Both EYES two times a day  dorzolamide 2% Ophthalmic Solution 1 Drop(s) Both EYES three times a day  enoxaparin Injectable 40 milliGRAM(s) SubCutaneous daily  ferrous    sulfate Liquid 300 milliGRAM(s) Enteral Tube daily  gabapentin   Solution 100 milliGRAM(s) Oral every 8 hours  lisinopril 5 milliGRAM(s) Oral <User Schedule>  metFORMIN 500 milliGRAM(s) Oral two times a day  metoprolol tartrate 25 milliGRAM(s) Oral every 12 hours  multivitamin 1 Tablet(s) Oral daily  nystatin    Suspension 962389 Unit(s) Oral two times a day  pantoprazole   Suspension 40 milliGRAM(s) Oral daily  polyethylene glycol 3350 17 Gram(s) Oral daily  senna Syrup 10 milliLiter(s) Oral at bedtime  timolol 0.5% Solution 1 Drop(s) Both EYES two times a day    MEDICATIONS  (PRN):  acetaminophen   Tablet .. 650 milliGRAM(s) Oral every 6 hours PRN Temp greater or equal to 38C (100.4F), Mild Pain (1 - 3)  bisacodyl Suppository 10 milliGRAM(s) Rectal daily PRN Constipation    A/P:  88yo F with left MCA CVA, now with right sided hemiplegia, aphasia, dysphagia, cognitive deficits, functional and gait impairments.    Start comprehensive program of PT/OT/SLP.    #HTN/Afib  lisinopril, Norvasc, Lopressor    #DM2  metformin, blood glucose x 1 tomorrow am    #Urinary retention  PVR q8hrs with straight cath protocol    #Bowel/GI  Senna, Miralax, Dulcolax suppository prn    #CVA ppx  ASA, statin    #Dysphagia  NPO with TF- bolus feeds commenced, free water via PEG  aspiration precautions  Oral care TID    #Pain control  Tylenol prn  gabapentin    #anemia  iron supplement    Precautions: fall, aspiration                                                                            Diet: NPO with TF  DVT Prophylaxis: Lovenox                                                                              Start comprehensive program of PT/OT/SLP. History of Present Illness: 	  86yo Kazakh speaking female with PMH of HTN and hyperlipidemia who presented to [facility] on 10/19/18 who was last seen normal at 22:10 the night prior, who was then found down on the ground by family members with inability to communicate or move her right side. She was initially evaluated at [place], found on neurological examination to have right hemiparesis, right hemianopia, right dense facial droop, right sensory loss, left gaze preference with gaze palsy and aphasia. Deemed candidate for tPA. 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 Patient was transferred for further management to [facility]. She then underwent thrombectomy with resultant TICI 2B flow. Failed swallow eval—kept NPO, NGT replaced by PEG 10/26, and tolerating PEG feeding. Telemetry monitoring showed frequent ectopy; Cardiology consulted; no evidence of Afib, she was initially started on IV Lopressor for tachycardia, optimized to po Lopressor for better control. TTE showed PFO. Loop recorder placed. Also noted some hemorrhagic conversion without neurologic deterioration so did not undergo any neurosurgical intervention. Hospital course significant for fever Tmax 101; ID consulted, patient was initially started on IV ceftriaxone for positive UA but urine and blood cultures negative so patient monitored off antibiotics without further fevers. Patient also with new onset urinary retention requiring straight cath protocol as she has been anuric. Patient deemed medically stable for dc to acute rehab 11/1/18.    Subjective: NAEO. Patient participating in therapy this morning, appeared comfortable sitting in wheelchair. Attentive when spoken to in Kazakh, but no verbal reply. SC x 2 for 800cc and 620cc respectively     Vital Signs Last 24 Hrs  T(C): 36.9 (02 Nov 2018 07:58), Max: 36.9 (02 Nov 2018 07:58)  T(F): 98.5 (02 Nov 2018 07:58), Max: 98.5 (02 Nov 2018 07:58)  HR: 64 (02 Nov 2018 07:58) (64 - 71)  BP: 95/61 (02 Nov 2018 07:58) (95/61 - 121/64)  BP(mean): --  RR: 15 (02 Nov 2018 07:58) (14 - 15)  SpO2: 97% (02 Nov 2018 07:58) (95% - 97%)    Physical Exam:   Constitutional - NAD, Comfortable  HEENT - NCAT  Neck - Supple, No limited ROM  Chest - CTA bilaterally, No wheeze, No rhonchi, No crackles  Cardiovascular - RRR, S1S2, No murmurs  Abdomen - BS+, Soft, NTND, +PEG c/d/i  Extremities - No calf tenderness   Neurologic Exam -                    Cognitive - Awake, Alert     Communication - non-verbal, receptive + expressive aphasia     Motor - No focal deficits                    LEFT    UE - >3/5 throughout                    RIGHT UE - 0/5 throughout, no movement in response to pain stimulus                    LEFT    LE - >3/5 throughout                    RIGHT LE - <3/5 at knee and foot, moves to pain stimulus        Reflexes - DTRs absent right Patellar and Achilles; 2+ in BUE biceps and brachioradialis, No primitive reflexes Psychiatric - Mood stable, Affect WNL	    Function:  ongoing eval today  modA on admission     MEDICATIONS  (STANDING):  amLODIPine   Tablet 5 milliGRAM(s) Oral daily  artificial tears (preservative free) Ophthalmic Solution 1 Drop(s) Both EYES daily  aspirin  chewable 81 milliGRAM(s) Enteral Tube daily  atorvastatin 80 milliGRAM(s) Oral at bedtime  brimonidine 0.2% Ophthalmic Solution 1 Drop(s) Both EYES two times a day  dorzolamide 2% Ophthalmic Solution 1 Drop(s) Both EYES three times a day  enoxaparin Injectable 40 milliGRAM(s) SubCutaneous daily  ferrous    sulfate Liquid 300 milliGRAM(s) Enteral Tube daily  gabapentin   Solution 100 milliGRAM(s) Oral every 8 hours  lisinopril 5 milliGRAM(s) Oral <User Schedule>  metFORMIN 500 milliGRAM(s) Oral two times a day  metoprolol tartrate 25 milliGRAM(s) Oral every 12 hours  multivitamin 1 Tablet(s) Oral daily  nystatin    Suspension 925042 Unit(s) Oral two times a day  pantoprazole   Suspension 40 milliGRAM(s) Oral daily  polyethylene glycol 3350 17 Gram(s) Oral daily  senna Syrup 10 milliLiter(s) Oral at bedtime  timolol 0.5% Solution 1 Drop(s) Both EYES two times a day    MEDICATIONS  (PRN):  acetaminophen   Tablet .. 650 milliGRAM(s) Oral every 6 hours PRN Temp greater or equal to 38C (100.4F), Mild Pain (1 - 3)  bisacodyl Suppository 10 milliGRAM(s) Rectal daily PRN Constipation    A/P:  86yo F with left MCA CVA, now with right sided hemiplegia, aphasia, dysphagia, cognitive deficits, functional and gait impairments.    Start comprehensive program of PT/OT/SLP.    #HTN/Afib  lisinopril, Norvasc, Lopressor    #DM2  metformin, blood glucose x 1 tomorrow am    #Urinary retention  PVR q8hrs with straight cath protocol    #Leukocytosis  WBC trending down.  CXR 11/1 clear.  Urine culture pending.  Monitor CBC    #Bowel/GI  Senna, Miralax, Dulcolax suppository prn    #CVA ppx  ASA, statin    #Dysphagia  NPO with TF- bolus feeds commenced, free water via PEG  aspiration precautions  Oral care TID    #Pain control  Tylenol prn  gabapentin    #anemia  iron supplement    Precautions: fall, aspiration                                                                            Diet: NPO with TF  DVT Prophylaxis: Lovenox                                                                              Start comprehensive program of PT/OT/SLP.

## 2018-11-02 NOTE — DIETITIAN INITIAL EVALUATION ADULT. - NUTRITION INTERVENTION
Enteral Nutrition/Nutrition Education/Collaboration and Referral of Nutrition Care Enteral Nutrition

## 2018-11-02 NOTE — DIETITIAN INITIAL EVALUATION ADULT. - OTHER INFO
Consult for Enteral Feeding - 87yr Old Female DX: CVA Initial Assessment - NPO on Jevity 1.5Cal @60ml/hr x24, Denies Food Allergy, Tolerates Tubefeeding Well, No Pressure Ulcers, Edema +1 to Right Upper Ext, States Weight Decrease PTA

## 2018-11-03 PROCEDURE — 99232 SBSQ HOSP IP/OBS MODERATE 35: CPT

## 2018-11-03 RX ORDER — FLUOXETINE HCL 10 MG
10 CAPSULE ORAL
Qty: 0 | Refills: 0 | Status: DISCONTINUED | OUTPATIENT
Start: 2018-11-03 | End: 2018-11-11

## 2018-11-03 RX ORDER — SACCHAROMYCES BOULARDII 250 MG
250 POWDER IN PACKET (EA) ORAL
Qty: 0 | Refills: 0 | Status: DISCONTINUED | OUTPATIENT
Start: 2018-11-03 | End: 2018-11-13

## 2018-11-03 RX ADMIN — Medication 1 DROP(S): at 18:48

## 2018-11-03 RX ADMIN — Medication 10 MILLIGRAM(S): at 18:49

## 2018-11-03 RX ADMIN — BRIMONIDINE TARTRATE 1 DROP(S): 2 SOLUTION/ DROPS OPHTHALMIC at 05:15

## 2018-11-03 RX ADMIN — GABAPENTIN 100 MILLIGRAM(S): 400 CAPSULE ORAL at 21:30

## 2018-11-03 RX ADMIN — Medication 2: at 21:52

## 2018-11-03 RX ADMIN — Medication 3: at 11:59

## 2018-11-03 RX ADMIN — GABAPENTIN 100 MILLIGRAM(S): 400 CAPSULE ORAL at 14:53

## 2018-11-03 RX ADMIN — Medication 1 TABLET(S): at 18:49

## 2018-11-03 RX ADMIN — Medication 300 MILLIGRAM(S): at 12:00

## 2018-11-03 RX ADMIN — LISINOPRIL 5 MILLIGRAM(S): 2.5 TABLET ORAL at 18:49

## 2018-11-03 RX ADMIN — DORZOLAMIDE HYDROCHLORIDE 1 DROP(S): 20 SOLUTION/ DROPS OPHTHALMIC at 14:54

## 2018-11-03 RX ADMIN — ENOXAPARIN SODIUM 40 MILLIGRAM(S): 100 INJECTION SUBCUTANEOUS at 12:00

## 2018-11-03 RX ADMIN — Medication 500000 UNIT(S): at 18:49

## 2018-11-03 RX ADMIN — Medication 500000 UNIT(S): at 05:16

## 2018-11-03 RX ADMIN — BRIMONIDINE TARTRATE 1 DROP(S): 2 SOLUTION/ DROPS OPHTHALMIC at 18:48

## 2018-11-03 RX ADMIN — DORZOLAMIDE HYDROCHLORIDE 1 DROP(S): 20 SOLUTION/ DROPS OPHTHALMIC at 21:30

## 2018-11-03 RX ADMIN — Medication 81 MILLIGRAM(S): at 12:00

## 2018-11-03 RX ADMIN — Medication 1 DROP(S): at 05:16

## 2018-11-03 RX ADMIN — ATORVASTATIN CALCIUM 80 MILLIGRAM(S): 80 TABLET, FILM COATED ORAL at 21:29

## 2018-11-03 RX ADMIN — Medication 250 MILLIGRAM(S): at 18:48

## 2018-11-03 RX ADMIN — GABAPENTIN 100 MILLIGRAM(S): 400 CAPSULE ORAL at 05:16

## 2018-11-03 RX ADMIN — PANTOPRAZOLE SODIUM 40 MILLIGRAM(S): 20 TABLET, DELAYED RELEASE ORAL at 12:00

## 2018-11-03 RX ADMIN — AMLODIPINE BESYLATE 5 MILLIGRAM(S): 2.5 TABLET ORAL at 05:16

## 2018-11-03 RX ADMIN — Medication 2: at 17:50

## 2018-11-03 RX ADMIN — DORZOLAMIDE HYDROCHLORIDE 1 DROP(S): 20 SOLUTION/ DROPS OPHTHALMIC at 05:16

## 2018-11-03 RX ADMIN — SENNA PLUS 10 MILLILITER(S): 8.6 TABLET ORAL at 21:30

## 2018-11-03 RX ADMIN — Medication 25 MILLIGRAM(S): at 05:16

## 2018-11-03 RX ADMIN — Medication 1 DROP(S): at 11:59

## 2018-11-03 RX ADMIN — Medication 1 TABLET(S): at 12:00

## 2018-11-03 RX ADMIN — Medication 25 MILLIGRAM(S): at 18:48

## 2018-11-03 NOTE — PROGRESS NOTE ADULT - SUBJECTIVE AND OBJECTIVE BOX
History of Present Illness: 	  86yo Djiboutian speaking female with PMH of HTN and hyperlipidemia who presented to [facility] on 10/19/18 who was last seen normal at 22:10 the night prior, who was then found down on the ground by family members with inability to communicate or move her right side. She was initially evaluated at [place], found on neurological examination to have right hemiparesis, right hemianopia, right dense facial droop, right sensory loss, left gaze preference with gaze palsy and aphasia. Deemed candidate for tPA. 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 Patient was transferred for further management to [facility]. She then underwent thrombectomy with resultant TICI 2B flow. Failed swallow eval—kept NPO, NGT replaced by PEG 10/26, and tolerating PEG feeding. Telemetry monitoring showed frequent ectopy; Cardiology consulted; no evidence of Afib, she was initially started on IV Lopressor for tachycardia, optimized to po Lopressor for better control. TTE showed PFO. Loop recorder placed. Also noted some hemorrhagic conversion without neurologic deterioration so did not undergo any neurosurgical intervention. Hospital course significant for fever Tmax 101; ID consulted, patient was initially started on IV ceftriaxone for positive UA but urine and blood cultures negative so patient monitored off antibiotics without further fevers. Patient also with new onset urinary retention requiring straight cath protocol as she has been anuric. Patient deemed medically stable for dc to acute rehab 11/1/18.    Subjective: No complaints offered although aphasic.  Appears comfortable.    Vital Signs Last 24 Hrs  Vital Signs Last 24 Hrs  T(C): 36.3 (03 Nov 2018 08:01), Max: 36.8 (03 Nov 2018 05:40)  T(F): 97.4 (03 Nov 2018 08:01), Max: 98.3 (03 Nov 2018 05:40)  HR: 66 (03 Nov 2018 08:01) (66 - 75)  BP: 113/70 (03 Nov 2018 08:01) (111/78 - 144/83)  BP(mean): --  RR: 15 (03 Nov 2018 08:01) (14 - 15)  SpO2: 98% (03 Nov 2018 08:01) (97% - 98%)    Physical Exam:   Constitutional - NAD, Comfortable  HEENT - NCAT  Neck - Supple, No limited ROM  Chest - CTA bilaterally, No wheeze, No rhonchi, No crackles  Cardiovascular - RRR, S1S2, No murmurs  Abdomen - BS+, Soft, NTND, +PEG c/d/i  Extremities - No calf tenderness   Neurologic Exam -                    Cognitive - Awake, Alert     Communication - non-verbal, receptive + expressive aphasia     Motor - No focal deficits                    LEFT    UE - >3/5 throughout                    RIGHT UE - 0/5 throughout, no movement in response to pain stimulus                    LEFT    LE - >3/5 throughout                    RIGHT LE - <3/5 at knee and foot, moves to pain stimulus        Reflexes - DTRs absent right Patellar and Achilles; 2+ in BUE biceps and brachioradialis, No primitive reflexes Psychiatric - Mood stable, Affect WNL	    Function:  Bed mobility: Max A x 2  Transfers: Max A x 2, CG x 1  Gait: N/T Unsafe  ADLs: Total A     MEDICATIONS  (STANDING):  amLODIPine   Tablet 5 milliGRAM(s) Oral daily  artificial tears (preservative free) Ophthalmic Solution 1 Drop(s) Both EYES daily  aspirin  chewable 81 milliGRAM(s) Enteral Tube daily  atorvastatin 80 milliGRAM(s) Oral at bedtime  brimonidine 0.2% Ophthalmic Solution 1 Drop(s) Both EYES two times a day  dextrose 5%. 1000 milliLiter(s) (50 mL/Hr) IV Continuous <Continuous>  dextrose 50% Injectable 12.5 Gram(s) IV Push once  dextrose 50% Injectable 25 Gram(s) IV Push once  dextrose 50% Injectable 25 Gram(s) IV Push once  dorzolamide 2% Ophthalmic Solution 1 Drop(s) Both EYES three times a day  enoxaparin Injectable 40 milliGRAM(s) SubCutaneous daily  ferrous    sulfate Liquid 300 milliGRAM(s) Enteral Tube daily  gabapentin   Solution 100 milliGRAM(s) Oral every 8 hours  insulin lispro (HumaLOG) corrective regimen sliding scale   SubCutaneous Before meals and at bedtime  lisinopril 5 milliGRAM(s) Oral <User Schedule>  metoprolol tartrate 25 milliGRAM(s) Oral every 12 hours  multivitamin 1 Tablet(s) Oral daily  nystatin    Suspension 644749 Unit(s) Oral two times a day  pantoprazole   Suspension 40 milliGRAM(s) Oral daily  polyethylene glycol 3350 17 Gram(s) Oral daily  senna Syrup 10 milliLiter(s) Oral at bedtime  timolol 0.5% Solution 1 Drop(s) Both EYES two times a day    MEDICATIONS  (PRN):  acetaminophen   Tablet .. 650 milliGRAM(s) Oral every 6 hours PRN Temp greater or equal to 38C (100.4F), Mild Pain (1 - 3)  bisacodyl Suppository 10 milliGRAM(s) Rectal daily PRN Constipation  dextrose 40% Gel 15 Gram(s) Oral once PRN Blood Glucose LESS THAN 70 milliGRAM(s)/deciliter  glucagon  Injectable 1 milliGRAM(s) IntraMuscular once PRN Glucose LESS THAN 70 milligrams/deciliter    Labs:                        9.2    11.6  )-----------( 310      ( 02 Nov 2018 06:09 )             28.9   11-02    139  |  104  |  31<H>  ----------------------------<  187<H>  4.0   |  30  |  0.98    Ca    8.3<L>      02 Nov 2018 06:09    CAPILLARY BLOOD GLUCOSE      POCT Blood Glucose.: 279 mg/dL (03 Nov 2018 11:34)  POCT Blood Glucose.: 138 mg/dL (03 Nov 2018 08:04)  POCT Blood Glucose.: 195 mg/dL (02 Nov 2018 22:25)  POCT Blood Glucose.: 197 mg/dL (02 Nov 2018 18:04)  POCT Blood Glucose.: 220 mg/dL (02 Nov 2018 16:55)        A/P:  86yo F with left MCA CVA, now with right sided hemiplegia, aphasia, dysphagia, cognitive deficits, functional and gait impairments.    Continue comprehensive program of PT/OT/SLP.    #HTN/Afib  lisinopril, Norvasc, Lopressor    #DM2-HbA1C 6.0 on 10/19  Metformin discontinued.  Cont ISS, blood glucose     #Urinary retention  PVRs 691, 595  PVR q6hrs with straight cath protocol  F/U urine culture    #Leukocytosis  WBC trending down.  Afebrile.  CXR 11/1 clear.  Urine culture >100,000cfu/ml Ecoli.  Add Bactrim/Florastor  F/U sensitivities.  Monitor CBC    #Bowel/GI-Last BM 11/2  Senna, Miralax, Dulcolax suppository prn    #CVA   ASA, statin  Add Prozac for mood and motor recovery    #Dysphagia  Pt admitted with NPO with TF- bolus feeds commenced, free water via PEG 11/2.  Diet upgraded to pureed with no po liquids 11/2  Cont tube feeds until adequate po consumption  aspiration precautions  Oral care TID    #Pain control  Tylenol prn  gabapentin    #anemia  iron supplement    Precautions: fall, aspiration                                                                            Diet: NPO with TF  DVT Prophylaxis: Lovenox                                                                              Start comprehensive program of PT/OT/SLP.

## 2018-11-04 PROCEDURE — 93010 ELECTROCARDIOGRAM REPORT: CPT

## 2018-11-04 PROCEDURE — 99232 SBSQ HOSP IP/OBS MODERATE 35: CPT

## 2018-11-04 RX ORDER — ONDANSETRON 8 MG/1
4 TABLET, FILM COATED ORAL
Qty: 0 | Refills: 0 | Status: DISCONTINUED | OUTPATIENT
Start: 2018-11-04 | End: 2018-11-14

## 2018-11-04 RX ADMIN — Medication 1 DROP(S): at 12:58

## 2018-11-04 RX ADMIN — ENOXAPARIN SODIUM 40 MILLIGRAM(S): 100 INJECTION SUBCUTANEOUS at 12:57

## 2018-11-04 RX ADMIN — SENNA PLUS 10 MILLILITER(S): 8.6 TABLET ORAL at 22:41

## 2018-11-04 RX ADMIN — Medication 650 MILLIGRAM(S): at 23:37

## 2018-11-04 RX ADMIN — Medication 1 DROP(S): at 05:11

## 2018-11-04 RX ADMIN — Medication 1 TABLET(S): at 12:59

## 2018-11-04 RX ADMIN — Medication 1 DROP(S): at 17:47

## 2018-11-04 RX ADMIN — Medication 250 MILLIGRAM(S): at 17:47

## 2018-11-04 RX ADMIN — GABAPENTIN 100 MILLIGRAM(S): 400 CAPSULE ORAL at 05:10

## 2018-11-04 RX ADMIN — Medication 650 MILLIGRAM(S): at 17:34

## 2018-11-04 RX ADMIN — DORZOLAMIDE HYDROCHLORIDE 1 DROP(S): 20 SOLUTION/ DROPS OPHTHALMIC at 22:41

## 2018-11-04 RX ADMIN — Medication 650 MILLIGRAM(S): at 16:25

## 2018-11-04 RX ADMIN — PANTOPRAZOLE SODIUM 40 MILLIGRAM(S): 20 TABLET, DELAYED RELEASE ORAL at 12:58

## 2018-11-04 RX ADMIN — Medication 2: at 22:56

## 2018-11-04 RX ADMIN — POLYETHYLENE GLYCOL 3350 17 GRAM(S): 17 POWDER, FOR SOLUTION ORAL at 12:58

## 2018-11-04 RX ADMIN — Medication 10 MILLIGRAM(S): at 16:26

## 2018-11-04 RX ADMIN — Medication 250 MILLIGRAM(S): at 05:10

## 2018-11-04 RX ADMIN — AMLODIPINE BESYLATE 5 MILLIGRAM(S): 2.5 TABLET ORAL at 05:10

## 2018-11-04 RX ADMIN — Medication 300 MILLIGRAM(S): at 12:57

## 2018-11-04 RX ADMIN — ONDANSETRON 4 MILLIGRAM(S): 8 TABLET, FILM COATED ORAL at 14:44

## 2018-11-04 RX ADMIN — Medication 500000 UNIT(S): at 17:46

## 2018-11-04 RX ADMIN — ATORVASTATIN CALCIUM 80 MILLIGRAM(S): 80 TABLET, FILM COATED ORAL at 22:40

## 2018-11-04 RX ADMIN — DORZOLAMIDE HYDROCHLORIDE 1 DROP(S): 20 SOLUTION/ DROPS OPHTHALMIC at 13:55

## 2018-11-04 RX ADMIN — BRIMONIDINE TARTRATE 1 DROP(S): 2 SOLUTION/ DROPS OPHTHALMIC at 17:46

## 2018-11-04 RX ADMIN — Medication 1 TABLET(S): at 05:12

## 2018-11-04 RX ADMIN — Medication 650 MILLIGRAM(S): at 22:41

## 2018-11-04 RX ADMIN — DORZOLAMIDE HYDROCHLORIDE 1 DROP(S): 20 SOLUTION/ DROPS OPHTHALMIC at 05:11

## 2018-11-04 RX ADMIN — ONDANSETRON 4 MILLIGRAM(S): 8 TABLET, FILM COATED ORAL at 23:37

## 2018-11-04 RX ADMIN — Medication 81 MILLIGRAM(S): at 12:59

## 2018-11-04 RX ADMIN — ONDANSETRON 4 MILLIGRAM(S): 8 TABLET, FILM COATED ORAL at 17:46

## 2018-11-04 RX ADMIN — Medication 1 TABLET(S): at 17:47

## 2018-11-04 RX ADMIN — BRIMONIDINE TARTRATE 1 DROP(S): 2 SOLUTION/ DROPS OPHTHALMIC at 05:11

## 2018-11-04 RX ADMIN — Medication 500000 UNIT(S): at 05:10

## 2018-11-04 RX ADMIN — Medication 25 MILLIGRAM(S): at 05:10

## 2018-11-04 NOTE — PROGRESS NOTE ADULT - SUBJECTIVE AND OBJECTIVE BOX
History of Present Illness: 	  86yo Vietnamese speaking female with PMH of HTN and hyperlipidemia who presented to [facility] on 10/19/18 who was last seen normal at 22:10 the night prior, who was then found down on the ground by family members with inability to communicate or move her right side. She was initially evaluated at [place], found on neurological examination to have right hemiparesis, right hemianopia, right dense facial droop, right sensory loss, left gaze preference with gaze palsy and aphasia. Deemed candidate for tPA. 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 Patient was transferred for further management to [facility]. She then underwent thrombectomy with resultant TICI 2B flow. Failed swallow eval—kept NPO, NGT replaced by PEG 10/26, and tolerating PEG feeding. Telemetry monitoring showed frequent ectopy; Cardiology consulted; no evidence of Afib, she was initially started on IV Lopressor for tachycardia, optimized to po Lopressor for better control. TTE showed PFO. Loop recorder placed. Also noted some hemorrhagic conversion without neurologic deterioration so did not undergo any neurosurgical intervention. Hospital course significant for fever Tmax 101; ID consulted, patient was initially started on IV ceftriaxone for positive UA but urine and blood cultures negative so patient monitored off antibiotics without further fevers. Patient also with new onset urinary retention requiring straight cath protocol as she has been anuric. Patient deemed medically stable for dc to acute rehab 11/1/18.    Subjective: Pt seen and examined at bedside.  No complaints offered although aphasic.  +BM today.  Appetite fair; consuming approximately 50% of meals    Vital Signs Last 24 Hrs  T(C): 36.6 (04 Nov 2018 08:35), Max: 36.7 (04 Nov 2018 05:19)  T(F): 97.9 (04 Nov 2018 08:35), Max: 98 (04 Nov 2018 05:19)  HR: 61 (04 Nov 2018 08:35) (56 - 70)  BP: 102/56 (04 Nov 2018 08:35) (102/56 - 107/65)  BP(mean): --  RR: 14 (04 Nov 2018 08:35) (14 - 14)  SpO2: 97% (04 Nov 2018 08:35) (97% - 98%)    Physical Exam:   Constitutional - NAD, Comfortable  HEENT - NCAT  Neck - Supple, No limited ROM  Chest - CTA bilaterally, No wheeze, No rhonchi, No crackles  Cardiovascular - RRR, S1S2, No murmurs  Abdomen - BS+, Soft, NTND, +PEG c/d/i  Extremities - No calf tenderness   Neurologic Exam -                    Cognitive - Awake, Alert     Communication - non-verbal, receptive + expressive aphasia     Motor - No focal deficits                    LEFT    UE - >3/5 throughout                    RIGHT UE - 0/5 throughout, no movement in response to pain stimulus                    LEFT    LE - >3/5 throughout                    RIGHT LE - <3/5 at knee and foot, moves to pain stimulus        Reflexes - DTRs absent right Patellar and Achilles; 2+ in BUE biceps and brachioradialis, No primitive reflexes Psychiatric - Mood stable, Affect WNL	    Function:  Bed mobility: Max A x 2  Transfers: Max A x 2, CG x 1  Gait: N/T Unsafe  ADLs: Total A     MEDICATIONS  (STANDING):  amLODIPine   Tablet 5 milliGRAM(s) Oral daily  artificial tears (preservative free) Ophthalmic Solution 1 Drop(s) Both EYES daily  aspirin  chewable 81 milliGRAM(s) Enteral Tube daily  atorvastatin 80 milliGRAM(s) Oral at bedtime  brimonidine 0.2% Ophthalmic Solution 1 Drop(s) Both EYES two times a day  dextrose 5%. 1000 milliLiter(s) (50 mL/Hr) IV Continuous <Continuous>  dextrose 50% Injectable 12.5 Gram(s) IV Push once  dextrose 50% Injectable 25 Gram(s) IV Push once  dextrose 50% Injectable 25 Gram(s) IV Push once  dorzolamide 2% Ophthalmic Solution 1 Drop(s) Both EYES three times a day  enoxaparin Injectable 40 milliGRAM(s) SubCutaneous daily  ferrous    sulfate Liquid 300 milliGRAM(s) Enteral Tube daily  FLUoxetine 10 milliGRAM(s) Oral <User Schedule>  gabapentin   Solution 100 milliGRAM(s) Oral every 8 hours  insulin lispro (HumaLOG) corrective regimen sliding scale   SubCutaneous Before meals and at bedtime  lisinopril 5 milliGRAM(s) Oral <User Schedule>  metoprolol tartrate 25 milliGRAM(s) Oral every 12 hours  multivitamin 1 Tablet(s) Oral daily  nystatin    Suspension 266424 Unit(s) Oral two times a day  pantoprazole   Suspension 40 milliGRAM(s) Oral daily  polyethylene glycol 3350 17 Gram(s) Oral daily  saccharomyces boulardii 250 milliGRAM(s) Oral two times a day  senna Syrup 10 milliLiter(s) Oral at bedtime  timolol 0.5% Solution 1 Drop(s) Both EYES two times a day  trimethoprim  160 mG/sulfamethoxazole 800 mG 1 Tablet(s) Oral two times a day    MEDICATIONS  (PRN):  acetaminophen   Tablet .. 650 milliGRAM(s) Oral every 6 hours PRN Temp greater or equal to 38C (100.4F), Mild Pain (1 - 3)  bisacodyl Suppository 10 milliGRAM(s) Rectal daily PRN Constipation  dextrose 40% Gel 15 Gram(s) Oral once PRN Blood Glucose LESS THAN 70 milliGRAM(s)/deciliter  glucagon  Injectable 1 milliGRAM(s) IntraMuscular once PRN Glucose LESS THAN 70 milligrams/deciliter      Labs:                        9.2    11.6  )-----------( 310      ( 02 Nov 2018 06:09 )             28.9   11-02    139  |  104  |  31<H>  ----------------------------<  187<H>  4.0   |  30  |  0.98    Ca    8.3<L>      02 Nov 2018 06:09    CAPILLARY BLOOD GLUCOSE      POCT Blood Glucose.: 122 mg/dL (04 Nov 2018 07:41)  POCT Blood Glucose.: 232 mg/dL (03 Nov 2018 21:40)  POCT Blood Glucose.: 236 mg/dL (03 Nov 2018 17:01)  POCT Blood Glucose.: 279 mg/dL (03 Nov 2018 11:34)        A/P:  86yo F with left MCA CVA, now with right sided hemiplegia, aphasia, dysphagia, cognitive deficits, functional and gait impairments.    Continue comprehensive program of PT/OT/SLP.    #HTN/Afib  lisinopril, Norvasc, Lopressor    #DM2-HbA1C 6.0 on 10/19  Metformin discontinued.  Cont ISS, blood glucose.      #Urinary retention  PVRs 868, 700, 271  Cont PVR q6hrs with straight cath protocol  +UA  Bactrim initiated 11/3.  F/U urine culture sensitive to Bactrim    #Leukocytosis  WBC trending down.  Afebrile.  CXR 11/1 clear.  Urine culture >100,000cfu/ml Ecoli.  Added Bactrim/Florastor    Monitor CBC    #Bowel/GI-Last BM 11/2  Senna, Miralax, Dulcolax suppository prn    #CVA   ASA, statin  Added Prozac for mood and motor recovery    #Dysphagia  Pt admitted with NPO with TF- bolus feeds commenced, free water via PEG 11/2.  Diet upgraded to pureed with no po liquids 11/2  Pt consuming at least 50% of meals.  Will decrease tube feeds from three daily to 1 can twice daily between meals  Cont tube feeds until adequate po consumption  aspiration precautions  Oral care TID    #Pain control-Pt appears to be without pain  Tylenol prn  D/C gabapentin    #anemia  iron supplement    Precautions: fall, aspiration                                                                            DVT Prophylaxis: Lovenox   Diet: Upgraded to Pureed consistent carb/no liquids                                                                             Continue comprehensive program of PT/OT/SLP.

## 2018-11-04 NOTE — CHART NOTE - NSCHARTNOTEFT_GEN_A_CORE
Called to beside by resident physician Dr. Davila for bilateral hand discoloration.  Patient was easily arousable and in no acute distress.  Hands were found under blankets with no apparent discoloration.  Bilateral ulnar and radial pulses were palpated at +2.  Possible Raynaud's like response to cold that has now resolved.  Resident was encourage to call if there are any further concerns. Called to beside by resident physician Dr. Davila for bilateral hand discoloration-reported to be blue.  Patient was easily arousable and in no acute distress.  Hands were found under blankets with no apparent discoloration.  Bilateral ulnar and radial pulses were palpated at +2.  Possible Raynaud's like response to cold that has now resolved.  Resident was encourage to call if there are any further concerns.

## 2018-11-04 NOTE — PROGRESS NOTE ADULT - SUBJECTIVE AND OBJECTIVE BOX
HPI:  86yo Hungarian speaking female with PMH of HTN and hyperlipidemia who presented to [facility] on 10/19/18 who was last seen normal at 22:10 the night prior, who was then found down on the ground by family members with inability to communicate or move her right side. She was initially evaluated at [place], found on neurological examination to have right hemiparesis, right hemianopia, right dense facial droop, right sensory loss, left gaze preference with gaze palsy and aphasia. Deemed candidate for tPA. 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 Patient was transferred for further management to [facility]. She then underwent thrombectomy with resultant TICI 2B flow. Failed swallow eval—kept NPO, NGT replaced by PEG 10/26, and tolerating PEG feeding. Telemetry monitoring showed frequent ectopy; Cardiology consulted; no evidence of Afib, she was initially started on IV Lopressor for tachycardia, optimized to po Lopressor for better control. TTE showed PFO. Loop recorder placed. Also noted some hemorrhagic conversion without neurologic deterioration so did not undergo any neurosurgical intervention. Hospital course significant for fever Tmax 101; ID consulted, patient was initially started on IV ceftriaxone for positive UA but urine and blood cultures negative so patient monitored off antibiotics without further fevers. Patient also with new onset urinary retention requiring straight cath protocol. Pt transferred to Acute rehab on 11/1      Subjective   Vomited today. Denies Abd pain, SOB, cough.       PAST MEDICAL & SURGICAL HISTORY:  HLD (hyperlipidemia)  Hypertension      MedsMEDICATIONS  (STANDING):  amLODIPine   Tablet 5 milliGRAM(s) Oral daily  artificial tears (preservative free) Ophthalmic Solution 1 Drop(s) Both EYES daily  aspirin  chewable 81 milliGRAM(s) Enteral Tube daily  atorvastatin 80 milliGRAM(s) Oral at bedtime  brimonidine 0.2% Ophthalmic Solution 1 Drop(s) Both EYES two times a day  dextrose 5%. 1000 milliLiter(s) (50 mL/Hr) IV Continuous <Continuous>  dextrose 50% Injectable 12.5 Gram(s) IV Push once  dextrose 50% Injectable 25 Gram(s) IV Push once  dextrose 50% Injectable 25 Gram(s) IV Push once  dorzolamide 2% Ophthalmic Solution 1 Drop(s) Both EYES three times a day  enoxaparin Injectable 40 milliGRAM(s) SubCutaneous daily  ferrous    sulfate Liquid 300 milliGRAM(s) Enteral Tube daily  FLUoxetine 10 milliGRAM(s) Oral <User Schedule>  insulin lispro (HumaLOG) corrective regimen sliding scale   SubCutaneous Before meals and at bedtime  lisinopril 5 milliGRAM(s) Oral <User Schedule>  metoprolol tartrate 25 milliGRAM(s) Oral every 12 hours  multivitamin 1 Tablet(s) Oral daily  nystatin    Suspension 443875 Unit(s) Oral two times a day  ondansetron   Disintegrating Tablet 4 milliGRAM(s) Enteral Tube four times a day  pantoprazole   Suspension 40 milliGRAM(s) Oral daily  polyethylene glycol 3350 17 Gram(s) Oral daily  saccharomyces boulardii 250 milliGRAM(s) Oral two times a day  senna Syrup 10 milliLiter(s) Oral at bedtime  timolol 0.5% Solution 1 Drop(s) Both EYES two times a day  trimethoprim  160 mG/sulfamethoxazole 800 mG 1 Tablet(s) Oral two times a day    MEDICATIONS  (PRN):  acetaminophen   Tablet .. 650 milliGRAM(s) Oral every 6 hours PRN Temp greater or equal to 38C (100.4F), Mild Pain (1 - 3)  bisacodyl Suppository 10 milliGRAM(s) Rectal daily PRN Constipation  dextrose 40% Gel 15 Gram(s) Oral once PRN Blood Glucose LESS THAN 70 milliGRAM(s)/deciliter  glucagon  Injectable 1 milliGRAM(s) IntraMuscular once PRN Glucose LESS THAN 70 milligrams/deciliter      Vital Signs Last 24 Hrs  T(C): 36.6 (04 Nov 2018 08:35), Max: 36.7 (04 Nov 2018 05:19)  T(F): 97.9 (04 Nov 2018 08:35), Max: 98 (04 Nov 2018 05:19)  HR: 61 (04 Nov 2018 08:35) (56 - 70)  BP: 102/56 (04 Nov 2018 08:35) (102/56 - 107/65)  BP(mean): --  RR: 14 (04 Nov 2018 08:35) (14 - 14)  SpO2: 97% (04 Nov 2018 08:35) (97% - 98%)  I&O's Summary    03 Nov 2018 08:01  -  04 Nov 2018 07:00  --------------------------------------------------------  IN: 250 mL / OUT: 702 mL / NET: -452 mL        PHYSICAL EXAM:  GENERAL: NAD  NECK: Supple  NERVOUS SYSTEM:  awake and alert  HEART: S1s2 NL , RRR  CHEST/LUNG: Clear to percussion bilaterally  ABDOMEN: Soft, Nontender, Nondistended; Bowel sounds present  EXTREMITIES:  No edema      LABS:  |11-02-18 @ 06:09            9.2<L>  11.6<H>>--------------<310            28.9<L>      139  |  104  |  31<H>  --------------------------<187<H>  4.0  |  8.3<L>  |  0.98    Mg -- / Phos--    PT -- / INR --    PTT --    Lipase --  11-02-18 @ 06:09CAPILLARY BLOOD GLUCOSE      POCT Blood Glucose.: 136 mg/dL (04 Nov 2018 11:20)  POCT Blood Glucose.: 122 mg/dL (04 Nov 2018 07:41)  POCT Blood Glucose.: 232 mg/dL (03 Nov 2018 21:40)  POCT Blood Glucose.: 236 mg/dL (03 Nov 2018 17:01)    Imaging Personally Reviewed:  [ ] YES  [ ] NO      HEALTH ISSUES - PROBLEM Dx:    CVA-PT/OT per rehab    Cont ASA, statin    HTN-Cont lisinopril, Norvasc Metorpolol    DM2 - cont Humalog    Dysphagia-s/p PEG, currently on PO diet with TF, vomiting likely due to inc bolus. ?given TF at night time when pt not eating            Care Discussed with Consultants/Other Providers [ x] YES  [ ] NO

## 2018-11-05 PROCEDURE — 99232 SBSQ HOSP IP/OBS MODERATE 35: CPT | Mod: GC

## 2018-11-05 RX ORDER — METOPROLOL TARTRATE 50 MG
12.5 TABLET ORAL EVERY 12 HOURS
Qty: 0 | Refills: 0 | Status: DISCONTINUED | OUTPATIENT
Start: 2018-11-05 | End: 2018-11-07

## 2018-11-05 RX ORDER — LANOLIN ALCOHOL/MO/W.PET/CERES
6 CREAM (GRAM) TOPICAL AT BEDTIME
Qty: 0 | Refills: 0 | Status: DISCONTINUED | OUTPATIENT
Start: 2018-11-05 | End: 2018-11-14

## 2018-11-05 RX ORDER — AMANTADINE HCL 100 MG
100 CAPSULE ORAL
Qty: 0 | Refills: 0 | Status: DISCONTINUED | OUTPATIENT
Start: 2018-11-05 | End: 2018-11-14

## 2018-11-05 RX ADMIN — PANTOPRAZOLE SODIUM 40 MILLIGRAM(S): 20 TABLET, DELAYED RELEASE ORAL at 11:51

## 2018-11-05 RX ADMIN — Medication 500000 UNIT(S): at 05:06

## 2018-11-05 RX ADMIN — BRIMONIDINE TARTRATE 1 DROP(S): 2 SOLUTION/ DROPS OPHTHALMIC at 16:59

## 2018-11-05 RX ADMIN — DORZOLAMIDE HYDROCHLORIDE 1 DROP(S): 20 SOLUTION/ DROPS OPHTHALMIC at 05:08

## 2018-11-05 RX ADMIN — POLYETHYLENE GLYCOL 3350 17 GRAM(S): 17 POWDER, FOR SOLUTION ORAL at 11:51

## 2018-11-05 RX ADMIN — BRIMONIDINE TARTRATE 1 DROP(S): 2 SOLUTION/ DROPS OPHTHALMIC at 05:07

## 2018-11-05 RX ADMIN — Medication 1 TABLET(S): at 05:08

## 2018-11-05 RX ADMIN — DORZOLAMIDE HYDROCHLORIDE 1 DROP(S): 20 SOLUTION/ DROPS OPHTHALMIC at 15:22

## 2018-11-05 RX ADMIN — SENNA PLUS 10 MILLILITER(S): 8.6 TABLET ORAL at 21:20

## 2018-11-05 RX ADMIN — ONDANSETRON 4 MILLIGRAM(S): 8 TABLET, FILM COATED ORAL at 05:07

## 2018-11-05 RX ADMIN — ONDANSETRON 4 MILLIGRAM(S): 8 TABLET, FILM COATED ORAL at 17:50

## 2018-11-05 RX ADMIN — Medication 250 MILLIGRAM(S): at 05:07

## 2018-11-05 RX ADMIN — Medication 1 TABLET(S): at 11:53

## 2018-11-05 RX ADMIN — ONDANSETRON 4 MILLIGRAM(S): 8 TABLET, FILM COATED ORAL at 11:52

## 2018-11-05 RX ADMIN — Medication 2: at 16:58

## 2018-11-05 RX ADMIN — Medication 500000 UNIT(S): at 17:49

## 2018-11-05 RX ADMIN — DORZOLAMIDE HYDROCHLORIDE 1 DROP(S): 20 SOLUTION/ DROPS OPHTHALMIC at 21:19

## 2018-11-05 RX ADMIN — Medication 100 MILLIGRAM(S): at 12:30

## 2018-11-05 RX ADMIN — Medication 81 MILLIGRAM(S): at 11:49

## 2018-11-05 RX ADMIN — Medication 6 MILLIGRAM(S): at 21:18

## 2018-11-05 RX ADMIN — Medication 300 MILLIGRAM(S): at 11:50

## 2018-11-05 RX ADMIN — Medication 10 MILLIGRAM(S): at 16:59

## 2018-11-05 RX ADMIN — Medication 1 DROP(S): at 17:51

## 2018-11-05 RX ADMIN — Medication 1 DROP(S): at 11:50

## 2018-11-05 RX ADMIN — Medication 1 TABLET(S): at 17:49

## 2018-11-05 RX ADMIN — Medication 250 MILLIGRAM(S): at 17:49

## 2018-11-05 RX ADMIN — Medication 1: at 11:57

## 2018-11-05 RX ADMIN — ENOXAPARIN SODIUM 40 MILLIGRAM(S): 100 INJECTION SUBCUTANEOUS at 11:50

## 2018-11-05 RX ADMIN — Medication 1 DROP(S): at 05:07

## 2018-11-05 RX ADMIN — ATORVASTATIN CALCIUM 80 MILLIGRAM(S): 80 TABLET, FILM COATED ORAL at 21:18

## 2018-11-05 NOTE — CHART NOTE - NSCHARTNOTEFT_GEN_A_CORE
[facility] Interdisciplinary Plan of Care    REHABILITATION DIAGNOSIS:  Cerebral infarction due to occlusion or stenosis of left middle cerebral artery        COMORBIDITIES/COMPLICATING CONDITIONS IMPACTING REHABILITATION:  HEALTH ISSUES - PROBLEM Dx: Diabetes, HTN, Leukocytosis, Urinary retention, Dysphagia, Aphasia        PAST MEDICAL & SURGICAL HISTORY:  HLD (hyperlipidemia)  Hypertension      Based upon consideration of the patient's impairments, functional status, complicating conditions and any other contributing factors and after information garnered from the assessments of all therapy disciplines involved in treating the patient and other pertinent clinicians:    INTERDISCIPLINARY REHABILITATION INTERVENTIONS:    [  x ] Transfer Training  [ x  ] Bed Mobility  [ x  ] Therapeutic Exercise  [  x ] Balance/Coordination Exercises  [  x] Locomotion retraining  [  x ] Stairs  [  x ] Functional Transfer Training  [  x ] Bowel/Bladder program  [  x ] Pain Management  [  x ] Skin/Wound Care  [   ] Visual/Perceptual Training  [   ] Therapeutic Recreation Activities  [   ] Neuromuscular Re-education  [ x  ] Activities of Daily Living  [  x ] Speech Exercise  [  x ] Swallowing Exercises  [   ] Vital Stim  [ x  ] Dietary Supplements  [ x  ] Calorie Count  [  x ] Cognitive Exercises  [  x ] Cognitive/Linguistic Treatment  [   ] Behavior Program  [   ] Neuropsych Therapy  [  x ] Patient/Family Counseling  [  x ] Family Training  [  x ] Community Re-entry  [ x  ] Orthotic Evaluation  [   ] Prosthetic Eval/Training    MEDICAL PROGNOSIS:  Good    REHAB POTENTIAL:  Good  EXPECTED DAILY THERAPY:         PT:1hr       OT:1hr       ST:1hr       P&O:Eval    EXPECTED INTENSITY OF PROGRAM:  3 hrs / Day    EXPECTED FREQUENCY OF PROGRAM: 5 Days/ Week    ESTIMATED LOS:  [  ] 5-7 Days  [  ] 7-10 Days  [  ] 10- 14 Days  [  ] 14- 18 Days  [ x ] 18- 21 Days    ESTIMATED DISPOSITION:  [  ] Home   [  ] Home with Outpatient Therapies  [ x ] Home with Home Therapies  [  ] Assisted Living  [  ] Nursing Home  [  ] Long Term Acute Care    INTERDISCIPLINARY FUNCTIONAL OUTCOMES/GOALS:         Gait/Mobility:4       Transfers:4       ADLs:4       Functional Transfers:4       Medication Management:1       Communication:4       Cognitive:4       Dysphagia:4       Bladder:4       Bowel:4     Functional Independent Measures:   7 = Independent  6 = Modified Independent  5 = Supervision  4 = Minimal Assist/ Contact Guard  3 = Moderate Assistance  2 = Maximum Assistance  1 = Total Assistance  0 = Unable to assess

## 2018-11-05 NOTE — PROGRESS NOTE ADULT - SUBJECTIVE AND OBJECTIVE BOX
History of Present Illness: 	  88yo Comoran speaking female with PMH of HTN and hyperlipidemia who presented to [facility] on 10/19/18 who was last seen normal at 22:10 the night prior, who was then found down on the ground by family members with inability to communicate or move her right side. She was initially evaluated at [place], found on neurological examination to have right hemiparesis, right hemianopia, right dense facial droop, right sensory loss, left gaze preference with gaze palsy and aphasia. Deemed candidate for tPA. 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 Patient was transferred for further management to [facility]. She then underwent thrombectomy with resultant TICI 2B flow. Failed swallow eval—kept NPO, NGT replaced by PEG 10/26, and tolerating PEG feeding. Telemetry monitoring showed frequent ectopy; Cardiology consulted; no evidence of Afib, she was initially started on IV Lopressor for tachycardia, optimized to po Lopressor for better control. TTE showed PFO. Loop recorder placed. Also noted some hemorrhagic conversion without neurologic deterioration so did not undergo any neurosurgical intervention. Hospital course significant for fever Tmax 101; ID consulted, patient was initially started on IV ceftriaxone for positive UA but urine and blood cultures negative so patient monitored off antibiotics without further fevers. Patient also with new onset urinary retention requiring straight cath protocol as she has been anuric. Patient deemed medically stable for dc to acute rehab 11/1/18.    Subjective: NAEO. Patient appeared fatigued this am. Patient at 25% of breakfast, tube feed added on. BM+ yesterday. Raynaud's like features in hands yesterday afternoon, has since resolved.     Vital Signs Last 24 Hrs  T(C): 36.6 (05 Nov 2018 08:16), Max: 36.7 (04 Nov 2018 17:43)  T(F): 97.9 (05 Nov 2018 08:16), Max: 98 (04 Nov 2018 17:43)  HR: 69 (05 Nov 2018 04:40) (67 - 85)  BP: 97/61 (05 Nov 2018 08:16) (86/83 - 136/66)  BP(mean): --  RR: 14 (05 Nov 2018 08:16) (14 - 16)  SpO2: 97% (05 Nov 2018 08:16) (97% - 98%)    Physical Exam:   Constitutional - NAD, Comfortable, lethargic  HEENT - NCAT  Neck - Supple, No limited ROM  Chest - CTA bilaterally, No wheeze, No rhonchi, No crackles  Cardiovascular - RRR, S1S2, No murmurs  Abdomen - BS+, Soft, NTND, +PEG c/d/i  Extremities - No calf tenderness   Neurologic Exam -                    Cognitive - Awake, Alert     Communication - non-verbal, receptive + expressive aphasia. follows some single step commands in Com[place]an.     Motor - No focal deficits                    LEFT    UE - >3/5 throughout                    RIGHT UE - 0/5 throughout, no movement in response to pain stimulus                    LEFT    LE - >3/5 throughout                    RIGHT LE - <3/5 at knee and foot, moves to pain stimulus    Psychiatric - Mood stable, Affect WNL	    Function:  Bed mobility: Max A x 2  Transfers: Max A x 2, CG x 1  Gait: N/T Unsafe  ADLs: Total A     MEDICATIONS  (STANDING):  amLODIPine   Tablet 5 milliGRAM(s) Oral daily  artificial tears (preservative free) Ophthalmic Solution 1 Drop(s) Both EYES daily  aspirin  chewable 81 milliGRAM(s) Enteral Tube daily  atorvastatin 80 milliGRAM(s) Oral at bedtime  brimonidine 0.2% Ophthalmic Solution 1 Drop(s) Both EYES two times a day  dextrose 5%. 1000 milliLiter(s) (50 mL/Hr) IV Continuous <Continuous>  dextrose 50% Injectable 12.5 Gram(s) IV Push once  dextrose 50% Injectable 25 Gram(s) IV Push once  dextrose 50% Injectable 25 Gram(s) IV Push once  dorzolamide 2% Ophthalmic Solution 1 Drop(s) Both EYES three times a day  enoxaparin Injectable 40 milliGRAM(s) SubCutaneous daily  ferrous    sulfate Liquid 300 milliGRAM(s) Enteral Tube daily  FLUoxetine 10 milliGRAM(s) Oral <User Schedule>  insulin lispro (HumaLOG) corrective regimen sliding scale   SubCutaneous Before meals and at bedtime  lisinopril 5 milliGRAM(s) Oral <User Schedule>  metoprolol tartrate 12.5 milliGRAM(s) Oral every 12 hours  multivitamin 1 Tablet(s) Oral daily  nystatin    Suspension 655497 Unit(s) Oral two times a day  ondansetron   Disintegrating Tablet 4 milliGRAM(s) Enteral Tube four times a day  pantoprazole   Suspension 40 milliGRAM(s) Oral daily  polyethylene glycol 3350 17 Gram(s) Oral daily  saccharomyces boulardii 250 milliGRAM(s) Oral two times a day  senna Syrup 10 milliLiter(s) Oral at bedtime  timolol 0.5% Solution 1 Drop(s) Both EYES two times a day  trimethoprim  160 mG/sulfamethoxazole 800 mG 1 Tablet(s) Oral two times a day    MEDICATIONS  (PRN):  acetaminophen   Tablet .. 650 milliGRAM(s) Oral every 6 hours PRN Temp greater or equal to 38C (100.4F), Mild Pain (1 - 3)  bisacodyl Suppository 10 milliGRAM(s) Rectal daily PRN Constipation  dextrose 40% Gel 15 Gram(s) Oral once PRN Blood Glucose LESS THAN 70 milliGRAM(s)/deciliter  glucagon  Injectable 1 milliGRAM(s) IntraMuscular once PRN Glucose LESS THAN 70 milligrams/deciliter    Nil labs 11/5/18    CAPILLARY BLOOD GLUCOSE  POCT Blood Glucose.: 108 mg/dL (05 Nov 2018 07:54)  POCT Blood Glucose.: 234 mg/dL (04 Nov 2018 22:43)  POCT Blood Glucose.: 149 mg/dL (04 Nov 2018 17:26)  POCT Blood Glucose.: 136 mg/dL (04 Nov 2018 11:20)    A/P:  88yo F with left MCA CVA, now with right sided hemiplegia, aphasia, dysphagia, cognitive deficits, functional and gait impairments.    Continue comprehensive program of PT/OT/SLP.    #HTN/Afib  lisinopril, Norvasc, Lopressor    #DM2-HbA1C 6.0 on 10/19  Metformin discontinued.  Cont ISS, blood glucose.      #Urinary retention  PVRs 868, 700, 271  Cont PVR q6hrs with straight cath protocol  +UA  Bactrim initiated 11/3.  F/U urine culture sensitive to Bactrim    #Leukocytosis  WBC trending down.  Afebrile.  CXR 11/1 clear.  Urine culture >100,000cfu/ml Ecoli.  Added Bactrim/Florastor    Monitor CBC    #Bowel/GI-Last BM 11/2  Senna, Miralax, Dulcolax suppository prn    #CVA   ASA, statin  Added Prozac for mood and motor recovery  Amantadine commenced 11/5/18 to promote neuroarousal    #Dysphagia  Pt admitted with NPO with TF- bolus feeds commenced, free water via PEG 11/2.  Diet upgraded to pureed with no po liquids 11/2, supplement with 2cal HN if eats <50%  Cont tube feeds until adequate po consumption  aspiration precautions  Oral care TID    #Pain control-Pt appears to be without pain  Tylenol prn  D/C gabapentin    #anemia  iron supplement    Precautions: fall, aspiration                                                                            DVT Prophylaxis: Lovenox   Diet: Upgraded to Pureed consistent carb/no liquids                                                                             Continue comprehensive program of PT/OT/SLP. History of Present Illness: 	  88yo Wallisian speaking female with PMH of HTN and hyperlipidemia who presented to [facility] on 10/19/18 who was last seen normal at 22:10 the night prior, who was then found down on the ground by family members with inability to communicate or move her right side. She was initially evaluated at [place], found on neurological examination to have right hemiparesis, right hemianopia, right dense facial droop, right sensory loss, left gaze preference with gaze palsy and aphasia. Deemed candidate for tPA. 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 Patient was transferred for further management to [facility]. She then underwent thrombectomy with resultant TICI 2B flow. Failed swallow eval—kept NPO, NGT replaced by PEG 10/26, and tolerating PEG feeding. Telemetry monitoring showed frequent ectopy; Cardiology consulted; no evidence of Afib, she was initially started on IV Lopressor for tachycardia, optimized to po Lopressor for better control. TTE showed PFO. Loop recorder placed. Also noted some hemorrhagic conversion without neurologic deterioration so did not undergo any neurosurgical intervention. Hospital course significant for fever Tmax 101; ID consulted, patient was initially started on IV ceftriaxone for positive UA but urine and blood cultures negative so patient monitored off antibiotics without further fevers. Patient also with new onset urinary retention requiring straight cath protocol as she has been anuric. Patient deemed medically stable for dc to acute rehab 11/1/18.    Subjective: NAEO. Patient appeared fatigued this am. Patient at 25% of breakfast, tube feed added on. BM+ yesterday. Raynaud's like features in hands yesterday afternoon, has since resolved.     Vital Signs Last 24 Hrs  T(C): 36.6 (05 Nov 2018 08:16), Max: 36.7 (04 Nov 2018 17:43)  T(F): 97.9 (05 Nov 2018 08:16), Max: 98 (04 Nov 2018 17:43)  HR: 69 (05 Nov 2018 04:40) (67 - 85)  BP: 97/61 (05 Nov 2018 08:16) (86/83 - 136/66)  BP(mean): --  RR: 14 (05 Nov 2018 08:16) (14 - 16)  SpO2: 97% (05 Nov 2018 08:16) (97% - 98%)    Physical Exam:   Constitutional - NAD, Comfortable, lethargic  HEENT - NCAT  Neck - Supple, No limited ROM  Chest - CTA bilaterally, No wheeze, No rhonchi, No crackles  Cardiovascular - RRR, S1S2, No murmurs  Abdomen - BS+, Soft, NTND, +PEG c/d/i  Extremities - No calf tenderness   Neurologic Exam -                    Cognitive - Awake, Alert     Communication - non-verbal, receptive + expressive aphasia. follows some single step commands in Wallisian.     Motor - No focal deficits                    LEFT    UE - >3/5 throughout                    RIGHT UE - 0/5 throughout, no movement in response to pain stimulus                    LEFT    LE - >3/5 throughout                    RIGHT LE - <3/5 at knee and foot, moves to pain stimulus    Psychiatric - Mood stable, Affect WNL	    Function:  Bed mobility: Max A x 2  Transfers: Max A x 2, CG x 1  Gait: N/T Unsafe  ADLs: Total A     MEDICATIONS  (STANDING):  amLODIPine   Tablet 5 milliGRAM(s) Oral daily  artificial tears (preservative free) Ophthalmic Solution 1 Drop(s) Both EYES daily  aspirin  chewable 81 milliGRAM(s) Enteral Tube daily  atorvastatin 80 milliGRAM(s) Oral at bedtime  brimonidine 0.2% Ophthalmic Solution 1 Drop(s) Both EYES two times a day  dextrose 5%. 1000 milliLiter(s) (50 mL/Hr) IV Continuous <Continuous>  dextrose 50% Injectable 12.5 Gram(s) IV Push once  dextrose 50% Injectable 25 Gram(s) IV Push once  dextrose 50% Injectable 25 Gram(s) IV Push once  dorzolamide 2% Ophthalmic Solution 1 Drop(s) Both EYES three times a day  enoxaparin Injectable 40 milliGRAM(s) SubCutaneous daily  ferrous    sulfate Liquid 300 milliGRAM(s) Enteral Tube daily  FLUoxetine 10 milliGRAM(s) Oral <User Schedule>  insulin lispro (HumaLOG) corrective regimen sliding scale   SubCutaneous Before meals and at bedtime  lisinopril 5 milliGRAM(s) Oral <User Schedule>  metoprolol tartrate 12.5 milliGRAM(s) Oral every 12 hours  multivitamin 1 Tablet(s) Oral daily  nystatin    Suspension 538503 Unit(s) Oral two times a day  ondansetron   Disintegrating Tablet 4 milliGRAM(s) Enteral Tube four times a day  pantoprazole   Suspension 40 milliGRAM(s) Oral daily  polyethylene glycol 3350 17 Gram(s) Oral daily  saccharomyces boulardii 250 milliGRAM(s) Oral two times a day  senna Syrup 10 milliLiter(s) Oral at bedtime  timolol 0.5% Solution 1 Drop(s) Both EYES two times a day  trimethoprim  160 mG/sulfamethoxazole 800 mG 1 Tablet(s) Oral two times a day    MEDICATIONS  (PRN):  acetaminophen   Tablet .. 650 milliGRAM(s) Oral every 6 hours PRN Temp greater or equal to 38C (100.4F), Mild Pain (1 - 3)  bisacodyl Suppository 10 milliGRAM(s) Rectal daily PRN Constipation  dextrose 40% Gel 15 Gram(s) Oral once PRN Blood Glucose LESS THAN 70 milliGRAM(s)/deciliter  glucagon  Injectable 1 milliGRAM(s) IntraMuscular once PRN Glucose LESS THAN 70 milligrams/deciliter    Nil labs 11/5/18    CAPILLARY BLOOD GLUCOSE  POCT Blood Glucose.: 108 mg/dL (05 Nov 2018 07:54)  POCT Blood Glucose.: 234 mg/dL (04 Nov 2018 22:43)  POCT Blood Glucose.: 149 mg/dL (04 Nov 2018 17:26)  POCT Blood Glucose.: 136 mg/dL (04 Nov 2018 11:20)    A/P:  88yo F with left MCA CVA, now with right sided hemiplegia, aphasia, dysphagia, cognitive deficits, functional and gait impairments.    Continue comprehensive program of PT/OT/SLP.    #HTN/Afib  lisinopril, Norvasc, Lopressor halved 11/5/18 to 12.5 q12hrs based on hospitalist recs re: BP/HR    #DM2-HbA1C 6.0 on 10/19  Metformin discontinued.  Cont ISS, blood glucose.      #Urinary retention  PVRs 868, 700, 271  Cont PVR q6hrs with straight cath protocol  +UA  Bactrim initiated 11/3.  F/U urine culture sensitive to Bactrim    #Leukocytosis  WBC trending down.  Afebrile.  CXR 11/1 clear.  Urine culture >100,000cfu/ml Ecoli.  Added Bactrim/Florastor    Monitor CBC    #Bowel/GI-Last BM 11/2  Senna, Miralax, Dulcolax suppository prn    #CVA   ASA, statin  Added Prozac for mood and motor recovery  Amantadine commenced 11/5/18 to promote neuroarousal    #Dysphagia  Pt admitted with NPO with TF- bolus feeds commenced, free water via PEG 11/2.  Diet upgraded to pureed with no po liquids 11/2, supplement with 2cal HN if eats <50%  Cont tube feeds until adequate po consumption  aspiration precautions  Oral care TID    #Pain control-Pt appears to be without pain  Tylenol prn  D/C gabapentin    #anemia  iron supplement    Precautions: fall, aspiration                                                                            DVT Prophylaxis: Lovenox   Diet: Upgraded to Pureed consistent carb/no liquids                                                                             Continue comprehensive program of PT/OT/SLP. History of Present Illness: 	  86yo Chinese speaking female with PMH of HTN and hyperlipidemia who presented to [facility] on 10/19/18 who was last seen normal at 22:10 the night prior, who was then found down on the ground by family members with inability to communicate or move her right side. She was initially evaluated at [place], found on neurological examination to have right hemiparesis, right hemianopia, right dense facial droop, right sensory loss, left gaze preference with gaze palsy and aphasia. Deemed candidate for tPA. 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 Patient was transferred for further management to [facility]. She then underwent thrombectomy with resultant TICI 2B flow. Failed swallow eval—kept NPO, NGT replaced by PEG 10/26, and tolerating PEG feeding. Telemetry monitoring showed frequent ectopy; Cardiology consulted; no evidence of Afib, she was initially started on IV Lopressor for tachycardia, optimized to po Lopressor for better control. TTE showed PFO. Loop recorder placed. Also noted some hemorrhagic conversion without neurologic deterioration so did not undergo any neurosurgical intervention. Hospital course significant for fever Tmax 101; ID consulted, patient was initially started on IV ceftriaxone for positive UA but urine and blood cultures negative so patient monitored off antibiotics without further fevers. Patient also with new onset urinary retention requiring straight cath protocol as she has been anuric. Patient deemed medically stable for dc to acute rehab 11/1/18.    Subjective: NAEO. Patient appeared fatigued this am. Patient ate 25% of breakfast, tube feed added on. BM+ yesterday. Raynaud's like features in hands yesterday afternoon, has since resolved.     Vital Signs Last 24 Hrs  T(C): 36.6 (05 Nov 2018 08:16), Max: 36.7 (04 Nov 2018 17:43)  T(F): 97.9 (05 Nov 2018 08:16), Max: 98 (04 Nov 2018 17:43)  HR: 69 (05 Nov 2018 04:40) (67 - 85)  BP: 97/61 (05 Nov 2018 08:16) (86/83 - 136/66)  BP(mean): --  RR: 14 (05 Nov 2018 08:16) (14 - 16)  SpO2: 97% (05 Nov 2018 08:16) (97% - 98%)    Physical Exam:   Constitutional - NAD, Comfortable, lethargic  HEENT - NCAT  Neck - Supple, No limited ROM  Chest - CTA bilaterally, No wheeze, No rhonchi, No crackles  Cardiovascular - RRR, S1S2, No murmurs  Abdomen - BS+, Soft, NTND, +PEG c/d/i  Extremities - No calf tenderness   Neurologic Exam -                    Cognitive - Awake, Alert     Communication - non-verbal, receptive + expressive aphasia. follows some single step commands in Chinese.     Motor - No focal deficits                    LEFT    UE - >3/5 throughout                    RIGHT UE - 0/5 throughout, no movement in response to pain stimulus                    LEFT    LE - >3/5 throughout                    RIGHT LE - <3/5 at knee and foot, moves to pain stimulus    Psychiatric - Mood stable, Affect WNL	    Function:  Bed mobility: Max A x 2  Transfers: Max A x 2, CG x 1  Gait: N/T Unsafe  ADLs: Total A     MEDICATIONS  (STANDING):  amLODIPine   Tablet 5 milliGRAM(s) Oral daily  artificial tears (preservative free) Ophthalmic Solution 1 Drop(s) Both EYES daily  aspirin  chewable 81 milliGRAM(s) Enteral Tube daily  atorvastatin 80 milliGRAM(s) Oral at bedtime  brimonidine 0.2% Ophthalmic Solution 1 Drop(s) Both EYES two times a day  dextrose 5%. 1000 milliLiter(s) (50 mL/Hr) IV Continuous <Continuous>  dextrose 50% Injectable 12.5 Gram(s) IV Push once  dextrose 50% Injectable 25 Gram(s) IV Push once  dextrose 50% Injectable 25 Gram(s) IV Push once  dorzolamide 2% Ophthalmic Solution 1 Drop(s) Both EYES three times a day  enoxaparin Injectable 40 milliGRAM(s) SubCutaneous daily  ferrous    sulfate Liquid 300 milliGRAM(s) Enteral Tube daily  FLUoxetine 10 milliGRAM(s) Oral <User Schedule>  insulin lispro (HumaLOG) corrective regimen sliding scale   SubCutaneous Before meals and at bedtime  lisinopril 5 milliGRAM(s) Oral <User Schedule>  metoprolol tartrate 12.5 milliGRAM(s) Oral every 12 hours  multivitamin 1 Tablet(s) Oral daily  nystatin    Suspension 995989 Unit(s) Oral two times a day  ondansetron   Disintegrating Tablet 4 milliGRAM(s) Enteral Tube four times a day  pantoprazole   Suspension 40 milliGRAM(s) Oral daily  polyethylene glycol 3350 17 Gram(s) Oral daily  saccharomyces boulardii 250 milliGRAM(s) Oral two times a day  senna Syrup 10 milliLiter(s) Oral at bedtime  timolol 0.5% Solution 1 Drop(s) Both EYES two times a day  trimethoprim  160 mG/sulfamethoxazole 800 mG 1 Tablet(s) Oral two times a day    MEDICATIONS  (PRN):  acetaminophen   Tablet .. 650 milliGRAM(s) Oral every 6 hours PRN Temp greater or equal to 38C (100.4F), Mild Pain (1 - 3)  bisacodyl Suppository 10 milliGRAM(s) Rectal daily PRN Constipation  dextrose 40% Gel 15 Gram(s) Oral once PRN Blood Glucose LESS THAN 70 milliGRAM(s)/deciliter  glucagon  Injectable 1 milliGRAM(s) IntraMuscular once PRN Glucose LESS THAN 70 milligrams/deciliter    Nil labs 11/5/18    CAPILLARY BLOOD GLUCOSE  POCT Blood Glucose.: 108 mg/dL (05 Nov 2018 07:54)  POCT Blood Glucose.: 234 mg/dL (04 Nov 2018 22:43)  POCT Blood Glucose.: 149 mg/dL (04 Nov 2018 17:26)  POCT Blood Glucose.: 136 mg/dL (04 Nov 2018 11:20)    A/P:  86yo F with left MCA CVA, now with right sided hemiplegia, aphasia, dysphagia, cognitive deficits, functional and gait impairments.    Continue comprehensive program of PT/OT/SLP.    #HTN/Afib  lisinopril, Norvasc, Lopressor halved 11/5/18 to 12.5 q12hrs based on hospitalist recs re: BP/HR    #DM2-HbA1C 6.0 on 10/19  Metformin discontinued.  Cont ISS, blood glucose.      #Urinary retention  PVRs 868, 700, 271  Cont PVR q6hrs with straight cath protocol  +UA  Bactrim initiated 11/3.  F/U urine culture sensitive to Bactrim    #Leukocytosis  WBC trending down.  Afebrile.  CXR 11/1 clear.  Urine culture >100,000cfu/ml Ecoli.  Added Bactrim/Florastor    Monitor CBC    #Bowel/GI-Last BM 11/2  Senna, Miralax, Dulcolax suppository prn    #CVA   ASA, statin  Added Prozac for mood and motor recovery  Amantadine commenced 11/5/18 to promote neuroarousal    #Dysphagia  Pt admitted with NPO with TF- bolus feeds commenced, free water via PEG 11/2.  Diet upgraded to pureed with no po liquids 11/2, supplement with 2cal HN if eats <50%  Cont tube feeds until adequate po consumption  aspiration precautions  Oral care TID    #Pain control-Pt appears to be without pain  Tylenol prn  D/C gabapentin    #anemia  iron supplement    Precautions: fall, aspiration                                                                            DVT Prophylaxis: Lovenox   Diet: Upgraded to Pureed consistent carb/no liquids                                                                             Continue comprehensive program of PT/OT/SLP. History of Present Illness: 	  88yo Georgian speaking female with PMH of HTN and hyperlipidemia who presented to [facility] on 10/19/18 who was last seen normal at 22:10 the night prior, who was then found down on the ground by family members with inability to communicate or move her right side. She was initially evaluated at [place], found on neurological examination to have right hemiparesis, right hemianopia, right dense facial droop, right sensory loss, left gaze preference with gaze palsy and aphasia. Deemed candidate for tPA. 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 Patient was transferred for further management to [facility]. She then underwent thrombectomy with resultant TICI 2B flow. Failed swallow eval—kept NPO, NGT replaced by PEG 10/26, and tolerating PEG feeding. Telemetry monitoring showed frequent ectopy; Cardiology consulted; no evidence of Afib, she was initially started on IV Lopressor for tachycardia, optimized to po Lopressor for better control. TTE showed PFO. Loop recorder placed. Also noted some hemorrhagic conversion without neurologic deterioration so did not undergo any neurosurgical intervention. Hospital course significant for fever Tmax 101; ID consulted, patient was initially started on IV ceftriaxone for positive UA but urine and blood cultures negative so patient monitored off antibiotics without further fevers. Patient also with new onset urinary retention requiring straight cath protocol as she has been anuric. Patient deemed medically stable for dc to acute rehab 11/1/18.    Subjective: NAEO. Patient appeared fatigued this am. Patient ate 25% of breakfast, tube feed added on. BM+ yesterday. Raynaud's like features in hands yesterday afternoon, has since resolved.     Vital Signs Last 24 Hrs  T(C): 36.6 (05 Nov 2018 08:16), Max: 36.7 (04 Nov 2018 17:43)  T(F): 97.9 (05 Nov 2018 08:16), Max: 98 (04 Nov 2018 17:43)  HR: 69 (05 Nov 2018 04:40) (67 - 85)  BP: 97/61 (05 Nov 2018 08:16) (86/83 - 136/66)  BP(mean): --  RR: 14 (05 Nov 2018 08:16) (14 - 16)  SpO2: 97% (05 Nov 2018 08:16) (97% - 98%)    Physical Exam:   Constitutional - NAD, Comfortable, lethargic  HEENT - NCAT  Neck - Supple, No limited ROM  Chest - CTA bilaterally, No wheeze, No rhonchi, No crackles  Cardiovascular - RRR, S1S2, No murmurs  Abdomen - BS+, Soft, NTND, +PEG c/d/i  Extremities - No calf tenderness   Neurologic Exam -                    Cognitive - Awake, Alert     Communication - non-verbal, receptive + expressive aphasia. follows some single step commands in Georgian.     Motor - No focal deficits                    LEFT    UE - >3/5 throughout                    RIGHT UE - 0/5 throughout, no movement in response to pain stimulus                    LEFT    LE - >3/5 throughout                    RIGHT LE - <3/5 at knee and foot, moves to pain stimulus    Psychiatric - Mood stable, Affect WNL	    Function:  Bed mobility: Max A x 2  Transfers: Max A x 2, CG x 1  Gait: N/T Unsafe  ADLs: Total A     MEDICATIONS  (STANDING):  amLODIPine   Tablet 5 milliGRAM(s) Oral daily  artificial tears (preservative free) Ophthalmic Solution 1 Drop(s) Both EYES daily  aspirin  chewable 81 milliGRAM(s) Enteral Tube daily  atorvastatin 80 milliGRAM(s) Oral at bedtime  brimonidine 0.2% Ophthalmic Solution 1 Drop(s) Both EYES two times a day  dextrose 5%. 1000 milliLiter(s) (50 mL/Hr) IV Continuous <Continuous>  dextrose 50% Injectable 12.5 Gram(s) IV Push once  dextrose 50% Injectable 25 Gram(s) IV Push once  dextrose 50% Injectable 25 Gram(s) IV Push once  dorzolamide 2% Ophthalmic Solution 1 Drop(s) Both EYES three times a day  enoxaparin Injectable 40 milliGRAM(s) SubCutaneous daily  ferrous    sulfate Liquid 300 milliGRAM(s) Enteral Tube daily  FLUoxetine 10 milliGRAM(s) Oral <User Schedule>  insulin lispro (HumaLOG) corrective regimen sliding scale   SubCutaneous Before meals and at bedtime  lisinopril 5 milliGRAM(s) Oral <User Schedule>  metoprolol tartrate 12.5 milliGRAM(s) Oral every 12 hours  multivitamin 1 Tablet(s) Oral daily  nystatin    Suspension 283571 Unit(s) Oral two times a day  ondansetron   Disintegrating Tablet 4 milliGRAM(s) Enteral Tube four times a day  pantoprazole   Suspension 40 milliGRAM(s) Oral daily  polyethylene glycol 3350 17 Gram(s) Oral daily  saccharomyces boulardii 250 milliGRAM(s) Oral two times a day  senna Syrup 10 milliLiter(s) Oral at bedtime  timolol 0.5% Solution 1 Drop(s) Both EYES two times a day  trimethoprim  160 mG/sulfamethoxazole 800 mG 1 Tablet(s) Oral two times a day    MEDICATIONS  (PRN):  acetaminophen   Tablet .. 650 milliGRAM(s) Oral every 6 hours PRN Temp greater or equal to 38C (100.4F), Mild Pain (1 - 3)  bisacodyl Suppository 10 milliGRAM(s) Rectal daily PRN Constipation  dextrose 40% Gel 15 Gram(s) Oral once PRN Blood Glucose LESS THAN 70 milliGRAM(s)/deciliter  glucagon  Injectable 1 milliGRAM(s) IntraMuscular once PRN Glucose LESS THAN 70 milligrams/deciliter    Nil labs 11/5/18    CAPILLARY BLOOD GLUCOSE  POCT Blood Glucose.: 108 mg/dL (05 Nov 2018 07:54)  POCT Blood Glucose.: 234 mg/dL (04 Nov 2018 22:43)  POCT Blood Glucose.: 149 mg/dL (04 Nov 2018 17:26)  POCT Blood Glucose.: 136 mg/dL (04 Nov 2018 11:20)    A/P:  88yo F with left MCA CVA, now with right sided hemiplegia, aphasia, dysphagia, cognitive deficits, functional and gait impairments.    Continue comprehensive program of PT/OT/SLP    #HTN/Afib  lisinopril, Norvasc, Lopressor halved 11/5/18 to 12.5 q12hrs based on hospitalist recs re: BP/HR    #DM2-HbA1C 6.0 on 10/19  Metformin discontinued.  Cont ISS, blood glucose.      #Urinary retention  PVRs 868, 700, 271  Cont PVR q6hrs with straight cath protocol  +UA  Bactrim initiated 11/3.  F/U urine culture sensitive to Bactrim    #Leukocytosis  WBC trending down.  Afebrile.  CXR 11/1 clear.  Urine culture >100,000cfu/ml Ecoli.  Added Bactrim/Florastor    Monitor CBC    #Bowel/GI-Last BM 11/2  Senna, Miralax, Dulcolax suppository prn    #CVA   ASA, statin  Added Prozac for mood and motor recovery  Amantadine commenced 11/5/18 to promote neuroarousal    #Dysphagia  Pt admitted with NPO with TF- bolus feeds commenced, free water via PEG 11/2.  Diet upgraded to pureed with no po liquids 11/2, supplement with 2cal HN if eats <50%  Cont tube feeds until adequate po consumption  aspiration precautions  Oral care TID    #Pain control-Pt appears to be without pain  Tylenol prn  D/Cd gabapentin    #anemia  iron supplement    Precautions: fall, aspiration                                                                            DVT Prophylaxis: Lovenox   Diet: Upgraded to Pureed consistent carb/no liquids                                                                             Continue comprehensive program of PT/OT/SLP.

## 2018-11-06 LAB
ANION GAP SERPL CALC-SCNC: 6 MMOL/L — SIGNIFICANT CHANGE UP (ref 5–17)
BUN SERPL-MCNC: 29 MG/DL — HIGH (ref 7–23)
CALCIUM SERPL-MCNC: 8.7 MG/DL — SIGNIFICANT CHANGE UP (ref 8.4–10.5)
CHLORIDE SERPL-SCNC: 104 MMOL/L — SIGNIFICANT CHANGE UP (ref 96–108)
CO2 SERPL-SCNC: 28 MMOL/L — SIGNIFICANT CHANGE UP (ref 22–31)
CREAT SERPL-MCNC: 1.26 MG/DL — SIGNIFICANT CHANGE UP (ref 0.5–1.3)
GLUCOSE SERPL-MCNC: 124 MG/DL — HIGH (ref 70–99)
HCT VFR BLD CALC: 29.6 % — LOW (ref 34.5–45)
HGB BLD-MCNC: 9.4 G/DL — LOW (ref 11.5–15.5)
MCHC RBC-ENTMCNC: 29.9 PG — SIGNIFICANT CHANGE UP (ref 27–34)
MCHC RBC-ENTMCNC: 31.8 GM/DL — LOW (ref 32–36)
MCV RBC AUTO: 93.9 FL — SIGNIFICANT CHANGE UP (ref 80–100)
PLATELET # BLD AUTO: 353 K/UL — SIGNIFICANT CHANGE UP (ref 150–400)
POTASSIUM SERPL-MCNC: 5.1 MMOL/L — SIGNIFICANT CHANGE UP (ref 3.5–5.3)
POTASSIUM SERPL-SCNC: 5.1 MMOL/L — SIGNIFICANT CHANGE UP (ref 3.5–5.3)
RBC # BLD: 3.15 M/UL — LOW (ref 3.8–5.2)
RBC # FLD: 13.5 % — SIGNIFICANT CHANGE UP (ref 10.3–14.5)
SODIUM SERPL-SCNC: 138 MMOL/L — SIGNIFICANT CHANGE UP (ref 135–145)
WBC # BLD: 10.5 K/UL — SIGNIFICANT CHANGE UP (ref 3.8–10.5)
WBC # FLD AUTO: 10.5 K/UL — SIGNIFICANT CHANGE UP (ref 3.8–10.5)

## 2018-11-06 PROCEDURE — 99233 SBSQ HOSP IP/OBS HIGH 50: CPT | Mod: GC

## 2018-11-06 PROCEDURE — 99232 SBSQ HOSP IP/OBS MODERATE 35: CPT | Mod: GC

## 2018-11-06 RX ADMIN — ENOXAPARIN SODIUM 40 MILLIGRAM(S): 100 INJECTION SUBCUTANEOUS at 12:57

## 2018-11-06 RX ADMIN — Medication 6 MILLIGRAM(S): at 21:39

## 2018-11-06 RX ADMIN — Medication 1 TABLET(S): at 18:21

## 2018-11-06 RX ADMIN — ATORVASTATIN CALCIUM 80 MILLIGRAM(S): 80 TABLET, FILM COATED ORAL at 21:39

## 2018-11-06 RX ADMIN — Medication 250 MILLIGRAM(S): at 18:21

## 2018-11-06 RX ADMIN — Medication 1: at 17:02

## 2018-11-06 RX ADMIN — Medication 100 MILLIGRAM(S): at 05:36

## 2018-11-06 RX ADMIN — Medication 1 TABLET(S): at 12:59

## 2018-11-06 RX ADMIN — Medication 1: at 21:39

## 2018-11-06 RX ADMIN — PANTOPRAZOLE SODIUM 40 MILLIGRAM(S): 20 TABLET, DELAYED RELEASE ORAL at 12:58

## 2018-11-06 RX ADMIN — Medication 1 TABLET(S): at 05:36

## 2018-11-06 RX ADMIN — Medication 500000 UNIT(S): at 05:44

## 2018-11-06 RX ADMIN — ONDANSETRON 4 MILLIGRAM(S): 8 TABLET, FILM COATED ORAL at 05:37

## 2018-11-06 RX ADMIN — DORZOLAMIDE HYDROCHLORIDE 1 DROP(S): 20 SOLUTION/ DROPS OPHTHALMIC at 17:01

## 2018-11-06 RX ADMIN — SENNA PLUS 10 MILLILITER(S): 8.6 TABLET ORAL at 21:41

## 2018-11-06 RX ADMIN — Medication 12.5 MILLIGRAM(S): at 18:21

## 2018-11-06 RX ADMIN — LISINOPRIL 5 MILLIGRAM(S): 2.5 TABLET ORAL at 18:22

## 2018-11-06 RX ADMIN — Medication 1 DROP(S): at 17:01

## 2018-11-06 RX ADMIN — POLYETHYLENE GLYCOL 3350 17 GRAM(S): 17 POWDER, FOR SOLUTION ORAL at 12:56

## 2018-11-06 RX ADMIN — Medication 300 MILLIGRAM(S): at 12:57

## 2018-11-06 RX ADMIN — DORZOLAMIDE HYDROCHLORIDE 1 DROP(S): 20 SOLUTION/ DROPS OPHTHALMIC at 05:37

## 2018-11-06 RX ADMIN — ONDANSETRON 4 MILLIGRAM(S): 8 TABLET, FILM COATED ORAL at 12:58

## 2018-11-06 RX ADMIN — Medication 2: at 12:25

## 2018-11-06 RX ADMIN — Medication 250 MILLIGRAM(S): at 05:36

## 2018-11-06 RX ADMIN — BRIMONIDINE TARTRATE 1 DROP(S): 2 SOLUTION/ DROPS OPHTHALMIC at 05:37

## 2018-11-06 RX ADMIN — Medication 1 DROP(S): at 18:22

## 2018-11-06 RX ADMIN — Medication 100 MILLIGRAM(S): at 12:57

## 2018-11-06 RX ADMIN — Medication 81 MILLIGRAM(S): at 12:56

## 2018-11-06 RX ADMIN — Medication 500000 UNIT(S): at 18:23

## 2018-11-06 RX ADMIN — Medication 1 DROP(S): at 05:38

## 2018-11-06 RX ADMIN — Medication 10 MILLIGRAM(S): at 18:21

## 2018-11-06 RX ADMIN — ONDANSETRON 4 MILLIGRAM(S): 8 TABLET, FILM COATED ORAL at 18:22

## 2018-11-06 RX ADMIN — BRIMONIDINE TARTRATE 1 DROP(S): 2 SOLUTION/ DROPS OPHTHALMIC at 18:20

## 2018-11-06 RX ADMIN — DORZOLAMIDE HYDROCHLORIDE 1 DROP(S): 20 SOLUTION/ DROPS OPHTHALMIC at 21:40

## 2018-11-06 NOTE — PROGRESS NOTE ADULT - SUBJECTIVE AND OBJECTIVE BOX
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   Pt was seen and examined at bedtime. Hemodynamically stable. Non verbal     Vital Signs Last 24 Hrs  T(C): 36.7 (06 Nov 2018 07:43), Max: 36.9 (06 Nov 2018 05:51)  T(F): 98 (06 Nov 2018 07:43), Max: 98.4 (06 Nov 2018 05:51)  HR: 72 (06 Nov 2018 07:43) (71 - 84)  BP: 107/67 (06 Nov 2018 07:43) (107/67 - 113/73)  BP(mean): --  RR: 14 (06 Nov 2018 07:43) (14 - 15)  SpO2: 98% (06 Nov 2018 07:43) (97% - 98%)    I&O's Summary    05 Nov 2018 07:01  -  06 Nov 2018 07:00  --------------------------------------------------------  IN: 1720 mL / OUT: 1776 mL / NET: -56 mL    06 Nov 2018 07:01  -  06 Nov 2018 09:31  --------------------------------------------------------  IN: 490 mL / OUT: 0 mL / NET: 490 mL        CAPILLARY BLOOD GLUCOSE      POCT Blood Glucose.: 126 mg/dL (06 Nov 2018 07:34)  POCT Blood Glucose.: 250 mg/dL (05 Nov 2018 21:14)  POCT Blood Glucose.: 208 mg/dL (05 Nov 2018 16:42)  POCT Blood Glucose.: 191 mg/dL (05 Nov 2018 11:44)      PHYSICAL EXAM:    Constitutional: NAD,  HEENT: PERR, EOMI,   Neck: Soft and supple  Respiratory: Breath sounds are clear bilaterally,   Cardiovascular: S1 and S2,   Gastrointestinal: Bowel Sounds present  Extremities: No peripheral edema  Vascular: 2+ peripheral pulses  Neurological: non verbal, receptive and expressive aphasia.   Musculoskeletal:3/5 strength b/l  lower extremities, 0/5 right upper ext, 3/5 LUE  Skin: No rashes    MEDICATIONS:  MEDICATIONS  (STANDING):  amantadine 100 milliGRAM(s) Oral <User Schedule>  amLODIPine   Tablet 5 milliGRAM(s) Oral daily  artificial tears (preservative free) Ophthalmic Solution 1 Drop(s) Both EYES daily  aspirin  chewable 81 milliGRAM(s) Enteral Tube daily  atorvastatin 80 milliGRAM(s) Oral at bedtime  brimonidine 0.2% Ophthalmic Solution 1 Drop(s) Both EYES two times a day  dextrose 5%. 1000 milliLiter(s) (50 mL/Hr) IV Continuous <Continuous>  dextrose 50% Injectable 12.5 Gram(s) IV Push once  dextrose 50% Injectable 25 Gram(s) IV Push once  dextrose 50% Injectable 25 Gram(s) IV Push once  dorzolamide 2% Ophthalmic Solution 1 Drop(s) Both EYES three times a day  enoxaparin Injectable 40 milliGRAM(s) SubCutaneous daily  ferrous    sulfate Liquid 300 milliGRAM(s) Enteral Tube daily  FLUoxetine 10 milliGRAM(s) Oral <User Schedule>  insulin lispro (HumaLOG) corrective regimen sliding scale   SubCutaneous Before meals and at bedtime  lisinopril 5 milliGRAM(s) Oral <User Schedule>  melatonin 6 milliGRAM(s) Oral at bedtime  metoprolol tartrate 12.5 milliGRAM(s) Oral every 12 hours  multivitamin 1 Tablet(s) Oral daily  nystatin    Suspension 252895 Unit(s) Oral two times a day  ondansetron   Disintegrating Tablet 4 milliGRAM(s) Enteral Tube four times a day  pantoprazole   Suspension 40 milliGRAM(s) Oral daily  polyethylene glycol 3350 17 Gram(s) Oral daily  saccharomyces boulardii 250 milliGRAM(s) Oral two times a day  senna Syrup 10 milliLiter(s) Oral at bedtime  timolol 0.5% Solution 1 Drop(s) Both EYES two times a day  trimethoprim  160 mG/sulfamethoxazole 800 mG 1 Tablet(s) Oral two times a day      LABS: All Labs Reviewed:                        9.4    10.5  )-----------( 353      ( 06 Nov 2018 05:21 )             29.6     11-06    138  |  104  |  29<H>  ----------------------------<  124<H>  5.1   |  28  |  1.26    Ca    8.7      06 Nov 2018 05:21            Blood Culture: 11-01 @ 16:49  Organism Escherichia coli  Gram Stain Blood -- Gram Stain --  Specimen Source .Urine Catheterized  Culture-Blood --        RADIOLOGY/EKG:    DVT PPX:    ADVANCED DIRECTIVE:    DISPOSITION:

## 2018-11-06 NOTE — PROGRESS NOTE ADULT - ASSESSMENT
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     * Left MCV CVA  Cont acute rehab  PT/OT/SLP  Fall precaution   Cont ASA, Statin, BB, ACEi     *Dysphagia  s/p Peg placement   pureed w no PO liquid with supplement   Aspiration precaution     *A-Fib   Cont Lopressor  Rate controlled  tolerating new Lopressor dose well     *HTN  Cont Lisinopril, norvasc, Lopressor  BP controlled    *DM  ISS   Controlled    *Urinary retention   Straight cath     *UTI  Cont Bactrim   f/u UC sensitivity     Raynaud syndrome   Keep pt warm   If sever consider add Calcium channel blocker     *Anemia  Daily iron   Stable    *DVT ppx   Lovenox

## 2018-11-06 NOTE — PROGRESS NOTE ADULT - SUBJECTIVE AND OBJECTIVE BOX
History of Present Illness: 	  88yo Haitian speaking female with PMH of HTN and hyperlipidemia who presented to [facility] on 10/19/18 who was last seen normal at 22:10 the night prior, who was then found down on the ground by family members with inability to communicate or move her right side. She was initially evaluated at [place], found on neurological examination to have right hemiparesis, right hemianopia, right dense facial droop, right sensory loss, left gaze preference with gaze palsy and aphasia. Deemed candidate for tPA. 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 Patient was transferred for further management to [facility]. She then underwent thrombectomy with resultant TICI 2B flow. Failed swallow eval—kept NPO, NGT replaced by PEG 10/26, and tolerating PEG feeding. Telemetry monitoring showed frequent ectopy; Cardiology consulted; no evidence of Afib, she was initially started on IV Lopressor for tachycardia, optimized to po Lopressor for better control. TTE showed PFO. Loop recorder placed. Also noted some hemorrhagic conversion without neurologic deterioration so did not undergo any neurosurgical intervention. Hospital course significant for fever Tmax 101; ID consulted, patient was initially started on IV ceftriaxone for positive UA but urine and blood cultures negative so patient monitored off antibiotics without further fevers. Patient also with new onset urinary retention requiring straight cath protocol as she has been anuric. Patient deemed medically stable for dc to acute rehab 11/1/18.    Subjective: NAEO. Patient more awake, alert and making eye contact. As per PCA, patient better with transferring this morning and vocalized in Haitian (mumbled). Patient appears well at rest.    Vital Signs Last 24 Hrs  T(C): 36.7 (06 Nov 2018 07:43), Max: 36.9 (06 Nov 2018 05:51)  T(F): 98 (06 Nov 2018 07:43), Max: 98.4 (06 Nov 2018 05:51)  HR: 72 (06 Nov 2018 07:43) (71 - 84)  BP: 107/67 (06 Nov 2018 07:43) (107/67 - 113/73)  BP(mean): --  RR: 14 (06 Nov 2018 07:43) (14 - 15)  SpO2: 98% (06 Nov 2018 07:43) (97% - 98%)    Physical Exam:   Constitutional - NAD, Comfortable  HEENT - NCAT  Neck - Supple, No limited ROM  Chest - CTA bilaterally, No wheeze, No rhonchi, No crackles  Cardiovascular - RRR, S1S2, No murmurs  Abdomen - BS+, Soft, NTND, +PEG c/d/i  Extremities - No calf tenderness   Neurologic Exam -                    Cognitive - Awake, Alert     Communication - non-verbal, receptive + expressive aphasia. follows some single step commands in Haitian.     Motor - No focal deficits                    LEFT    UE - >3/5 throughout                    RIGHT UE - 0/5 throughout, no movement in response to pain stimulus                    LEFT    LE - >3/5 throughout                    RIGHT LE - <3/5 at knee and foot, moves to pain stimulus    Psychiatric - Mood stable, Affect WNL	    Function:  Transfers: Max A x 2  Grooming total A  severe global aphasia    MEDICATIONS  (STANDING):  amantadine 100 milliGRAM(s) Oral <User Schedule>  amLODIPine   Tablet 5 milliGRAM(s) Oral daily  artificial tears (preservative free) Ophthalmic Solution 1 Drop(s) Both EYES daily  aspirin  chewable 81 milliGRAM(s) Enteral Tube daily  atorvastatin 80 milliGRAM(s) Oral at bedtime  brimonidine 0.2% Ophthalmic Solution 1 Drop(s) Both EYES two times a day  dextrose 5%. 1000 milliLiter(s) (50 mL/Hr) IV Continuous <Continuous>  dextrose 50% Injectable 12.5 Gram(s) IV Push once  dextrose 50% Injectable 25 Gram(s) IV Push once  dextrose 50% Injectable 25 Gram(s) IV Push once  dorzolamide 2% Ophthalmic Solution 1 Drop(s) Both EYES three times a day  enoxaparin Injectable 40 milliGRAM(s) SubCutaneous daily  ferrous    sulfate Liquid 300 milliGRAM(s) Enteral Tube daily  FLUoxetine 10 milliGRAM(s) Oral <User Schedule>  insulin lispro (HumaLOG) corrective regimen sliding scale   SubCutaneous Before meals and at bedtime  lisinopril 5 milliGRAM(s) Oral <User Schedule>  melatonin 6 milliGRAM(s) Oral at bedtime  metoprolol tartrate 12.5 milliGRAM(s) Oral every 12 hours  multivitamin 1 Tablet(s) Oral daily  nystatin    Suspension 991849 Unit(s) Oral two times a day  ondansetron   Disintegrating Tablet 4 milliGRAM(s) Enteral Tube four times a day  pantoprazole   Suspension 40 milliGRAM(s) Oral daily  polyethylene glycol 3350 17 Gram(s) Oral daily  saccharomyces boulardii 250 milliGRAM(s) Oral two times a day  senna Syrup 10 milliLiter(s) Oral at bedtime  timolol 0.5% Solution 1 Drop(s) Both EYES two times a day  trimethoprim  160 mG/sulfamethoxazole 800 mG 1 Tablet(s) Oral two times a day    MEDICATIONS  (PRN):  acetaminophen   Tablet .. 650 milliGRAM(s) Oral every 6 hours PRN Temp greater or equal to 38C (100.4F), Mild Pain (1 - 3)  bisacodyl Suppository 10 milliGRAM(s) Rectal daily PRN Constipation  dextrose 40% Gel 15 Gram(s) Oral once PRN Blood Glucose LESS THAN 70 milliGRAM(s)/deciliter  glucagon  Injectable 1 milliGRAM(s) IntraMuscular once PRN Glucose LESS THAN 70 milligrams/deciliter                        9.4    10.5  )-----------( 353      ( 06 Nov 2018 05:21 )             29.6     11-06    138  |  104  |  29<H>  ----------------------------<  124<H>  5.1   |  28  |  1.26    Ca    8.7      06 Nov 2018 05:21    CAPILLARY BLOOD GLUCOSE  POCT Blood Glucose.: 126 mg/dL (06 Nov 2018 07:34)  POCT Blood Glucose.: 250 mg/dL (05 Nov 2018 21:14)  POCT Blood Glucose.: 208 mg/dL (05 Nov 2018 16:42)  POCT Blood Glucose.: 191 mg/dL (05 Nov 2018 11:44)      A/P:  88yo F with left MCA CVA, now with right sided hemiplegia, aphasia, dysphagia, cognitive deficits, functional and gait impairments.    Continue comprehensive program of PT/OT/SLP    #HTN/Afib  lisinopril, Norvasc, Lopressor halved 11/5/18 to 12.5 q12hrs based on hospitalist recs re: BP/HR    #DM2-HbA1C 6.0 on 10/19  Metformin discontinued.  Cont ISS, blood glucose.      #Urinary retention  PVRs 671, 900cc  Cont PVR q6hrs with straight cath protocol  +UA  Bactrim initiated 11/3.  F/U urine culture sensitive to Bactrim    #Leukocytosis  WBC trending down.  Afebrile.  CXR 11/1 clear.  Urine culture >100,000cfu/ml Ecoli.  Added Bactrim/Florastor    Monitor CBC    #Bowel/GI-Last BM 11/2  Senna, Miralax, Dulcolax suppository prn    #CVA   ASA, statin  Added Prozac for mood and motor recovery  Amantadine commenced 11/5/18 to promote neuroarousal    #Dysphagia  Pt admitted with NPO with TF- bolus feeds commenced, free water via PEG 11/2.  Diet upgraded to pureed with no po liquids 11/2, supplement with 2cal HN if eats <50%  Cont tube feeds until adequate po consumption  aspiration precautions  Oral care TID    #Pain control-Pt appears to be without pain  Tylenol prn  D/Cd gabapentin    #anemia  iron supplement    Precautions: fall, aspiration                                                                            DVT Prophylaxis: Lovenox   Diet: Pureed consistent carb/no liquids                                                                             Continue comprehensive program of PT/OT/SLP. History of Present Illness: 	  88yo Latvian speaking female with PMH of HTN and hyperlipidemia who presented to [facility] on 10/19/18 who was last seen normal at 22:10 the night prior, who was then found down on the ground by family members with inability to communicate or move her right side. She was initially evaluated at [place], found on neurological examination to have right hemiparesis, right hemianopia, right dense facial droop, right sensory loss, left gaze preference with gaze palsy and aphasia. Deemed candidate for tPA. 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 Patient was transferred for further management to [facility]. She then underwent thrombectomy with resultant TICI 2B flow. Failed swallow eval—kept NPO, NGT replaced by PEG 10/26, and tolerating PEG feeding. Telemetry monitoring showed frequent ectopy; Cardiology consulted; no evidence of Afib, she was initially started on IV Lopressor for tachycardia, optimized to po Lopressor for better control. TTE showed PFO. Loop recorder placed. Also noted some hemorrhagic conversion without neurologic deterioration so did not undergo any neurosurgical intervention. Hospital course significant for fever Tmax 101; ID consulted, patient was initially started on IV ceftriaxone for positive UA but urine and blood cultures negative so patient monitored off antibiotics without further fevers. Patient also with new onset urinary retention requiring straight cath protocol as she has been anuric. Patient deemed medically stable for dc to acute rehab 11/1/18.    Subjective: NAEO. Patient more awake, alert and making eye contact. As per PCA, patient better with transferring this morning and vocalized in Latvian (mumbled). Patient appears well at rest.    Vital Signs Last 24 Hrs  T(C): 36.7 (06 Nov 2018 07:43), Max: 36.9 (06 Nov 2018 05:51)  T(F): 98 (06 Nov 2018 07:43), Max: 98.4 (06 Nov 2018 05:51)  HR: 72 (06 Nov 2018 07:43) (71 - 84)  BP: 107/67 (06 Nov 2018 07:43) (107/67 - 113/73)  BP(mean): --  RR: 14 (06 Nov 2018 07:43) (14 - 15)  SpO2: 98% (06 Nov 2018 07:43) (97% - 98%)    Physical Exam:   Constitutional - NAD, Comfortable  HEENT - NCAT  Neck - Supple, No limited ROM  Chest - CTA bilaterally, No wheeze, No rhonchi, No crackles  Cardiovascular - RRR, S1S2, No murmurs  Abdomen - BS+, Soft, NTND, +PEG c/d/i  Extremities - No calf tenderness   Neurologic Exam -                    Cognitive - Awake, Alert     Communication - non-verbal, receptive + expressive aphasia. follows some single step commands in Latvian.     Motor - No focal deficits                    LEFT    UE - >3/5 throughout                    RIGHT UE - 0/5 throughout, no movement in response to pain stimulus                    LEFT    LE - >3/5 throughout                    RIGHT LE - <3/5 at knee and foot, moves to pain stimulus    Psychiatric - Mood stable, Affect WNL	    Function:  Transfers: Max A x 2  Grooming total A  severe global aphasia    MEDICATIONS  (STANDING):  amantadine 100 milliGRAM(s) Oral <User Schedule>  amLODIPine   Tablet 5 milliGRAM(s) Oral daily  artificial tears (preservative free) Ophthalmic Solution 1 Drop(s) Both EYES daily  aspirin  chewable 81 milliGRAM(s) Enteral Tube daily  atorvastatin 80 milliGRAM(s) Oral at bedtime  brimonidine 0.2% Ophthalmic Solution 1 Drop(s) Both EYES two times a day  dextrose 5%. 1000 milliLiter(s) (50 mL/Hr) IV Continuous <Continuous>  dextrose 50% Injectable 12.5 Gram(s) IV Push once  dextrose 50% Injectable 25 Gram(s) IV Push once  dextrose 50% Injectable 25 Gram(s) IV Push once  dorzolamide 2% Ophthalmic Solution 1 Drop(s) Both EYES three times a day  enoxaparin Injectable 40 milliGRAM(s) SubCutaneous daily  ferrous    sulfate Liquid 300 milliGRAM(s) Enteral Tube daily  FLUoxetine 10 milliGRAM(s) Oral <User Schedule>  insulin lispro (HumaLOG) corrective regimen sliding scale   SubCutaneous Before meals and at bedtime  lisinopril 5 milliGRAM(s) Oral <User Schedule>  melatonin 6 milliGRAM(s) Oral at bedtime  metoprolol tartrate 12.5 milliGRAM(s) Oral every 12 hours  multivitamin 1 Tablet(s) Oral daily  nystatin    Suspension 006719 Unit(s) Oral two times a day  ondansetron   Disintegrating Tablet 4 milliGRAM(s) Enteral Tube four times a day  pantoprazole   Suspension 40 milliGRAM(s) Oral daily  polyethylene glycol 3350 17 Gram(s) Oral daily  saccharomyces boulardii 250 milliGRAM(s) Oral two times a day  senna Syrup 10 milliLiter(s) Oral at bedtime  timolol 0.5% Solution 1 Drop(s) Both EYES two times a day  trimethoprim  160 mG/sulfamethoxazole 800 mG 1 Tablet(s) Oral two times a day    MEDICATIONS  (PRN):  acetaminophen   Tablet .. 650 milliGRAM(s) Oral every 6 hours PRN Temp greater or equal to 38C (100.4F), Mild Pain (1 - 3)  bisacodyl Suppository 10 milliGRAM(s) Rectal daily PRN Constipation  dextrose 40% Gel 15 Gram(s) Oral once PRN Blood Glucose LESS THAN 70 milliGRAM(s)/deciliter  glucagon  Injectable 1 milliGRAM(s) IntraMuscular once PRN Glucose LESS THAN 70 milligrams/deciliter                        9.4    10.5  )-----------( 353      ( 06 Nov 2018 05:21 )             29.6     11-06    138  |  104  |  29<H>  ----------------------------<  124<H>  5.1   |  28  |  1.26    Ca    8.7      06 Nov 2018 05:21    CAPILLARY BLOOD GLUCOSE  POCT Blood Glucose.: 126 mg/dL (06 Nov 2018 07:34)  POCT Blood Glucose.: 250 mg/dL (05 Nov 2018 21:14)  POCT Blood Glucose.: 208 mg/dL (05 Nov 2018 16:42)  POCT Blood Glucose.: 191 mg/dL (05 Nov 2018 11:44)      A/P:  88yo F with left MCA CVA, now with right sided hemiplegia, aphasia, dysphagia, cognitive deficits, functional and gait impairments.    Continue comprehensive program of PT/OT/SLP    #HTN/Afib  lisinopril, Norvasc, Lopressor halved 11/5/18 to 12.5 q12hrs based on hospitalist recs re: BP/HR    #DM2-HbA1C 6.0 on 10/19  Metformin discontinued.  Cont ISS, blood glucose.      #Urinary retention  PVRs 671, 900cc  Cont PVR q6hrs with straight cath protocol  +UA  Bactrim initiated 11/3.  F/U urine culture sensitive to Bactrim    #Leukocytosis  WBC trending down.  Afebrile.  CXR 11/1 clear.  Urine culture >100,000cfu/ml Ecoli.  Added Bactrim/Florastor    Monitor CBC    #Bowel/GI-Last BM 11/2  Senna, Miralax, Dulcolax suppository prn    #CVA   ASA, statin  Added Prozac for mood and motor recovery  Amantadine commenced 11/5/18 to promote neuroarousal    #Dysphagia  Pt admitted with NPO with TF- bolus feeds commenced, free water via PEG increased to 300cc QID as Cr creeping up.  Diet upgraded to pureed with no po liquids 11/2, supplement with 2cal HN if eats <50%  Cont tube feeds until adequate po consumption  aspiration precautions  Oral care TID    #Pain control-Pt appears to be without pain  Tylenol prn  D/Cd gabapentin    #anemia  iron supplement    Precautions: fall, aspiration                                                                            DVT Prophylaxis: Lovenox   Diet: Pureed consistent carb/no liquids                                                                             Continue comprehensive program of PT/OT/SLP. History of Present Illness: 	  88yo Micronesian speaking female with PMH of HTN and hyperlipidemia who presented to [facility] on 10/19/18 who was last seen normal at 22:10 the night prior, who was then found down on the ground by family members with inability to communicate or move her right side. She was initially evaluated at [place], found on neurological examination to have right hemiparesis, right hemianopia, right dense facial droop, right sensory loss, left gaze preference with gaze palsy and aphasia. Deemed candidate for tPA. 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 Patient was transferred for further management to [facility]. She then underwent thrombectomy with resultant TICI 2B flow. Failed swallow eval—kept NPO, NGT replaced by PEG 10/26, and tolerating PEG feeding. Telemetry monitoring showed frequent ectopy; Cardiology consulted; no evidence of Afib, she was initially started on IV Lopressor for tachycardia, optimized to po Lopressor for better control. TTE showed PFO. Loop recorder placed. Also noted some hemorrhagic conversion without neurologic deterioration so did not undergo any neurosurgical intervention. Hospital course significant for fever Tmax 101; ID consulted, patient was initially started on IV ceftriaxone for positive UA but urine and blood cultures negative so patient monitored off antibiotics without further fevers. Patient also with new onset urinary retention requiring straight cath protocol as she has been anuric. Patient deemed medically stable for dc to acute rehab 11/1/18.    Subjective: NAEO. Patient more awake, alert and making eye contact. As per PCA, patient better with transferring this morning and vocalized in Micronesian (mumbled). Patient appears well at rest.    Vital Signs Last 24 Hrs  T(C): 36.7 (06 Nov 2018 07:43), Max: 36.9 (06 Nov 2018 05:51)  T(F): 98 (06 Nov 2018 07:43), Max: 98.4 (06 Nov 2018 05:51)  HR: 72 (06 Nov 2018 07:43) (71 - 84)  BP: 107/67 (06 Nov 2018 07:43) (107/67 - 113/73)  BP(mean): --  RR: 14 (06 Nov 2018 07:43) (14 - 15)  SpO2: 98% (06 Nov 2018 07:43) (97% - 98%)    Physical Exam:   Constitutional - NAD, Comfortable  HEENT - NCAT  Neck - Supple, No limited ROM  Chest - CTA bilaterally, No wheeze, No rhonchi, No crackles  Cardiovascular - RRR, S1S2, No murmurs  Abdomen - BS+, Soft, NTND, +PEG c/d/i  Extremities - No calf tenderness   Neurologic Exam -                    Cognitive - Awake, Alert     Communication - non-verbal, receptive + expressive aphasia. follows some single step commands in Micronesian.     Motor - No focal deficits                    LEFT    UE - >3/5 throughout                    RIGHT UE - 0/5 throughout, no movement in response to pain stimulus                    LEFT    LE - >3/5 throughout                    RIGHT LE - <3/5 at knee and foot, moves to pain stimulus    Psychiatric - Mood stable, Affect WNL	    Function:  Transfers: Max A x 2  Grooming total A  severe global aphasia    MEDICATIONS  (STANDING):  amantadine 100 milliGRAM(s) Oral <User Schedule>  amLODIPine   Tablet 5 milliGRAM(s) Oral daily  artificial tears (preservative free) Ophthalmic Solution 1 Drop(s) Both EYES daily  aspirin  chewable 81 milliGRAM(s) Enteral Tube daily  atorvastatin 80 milliGRAM(s) Oral at bedtime  brimonidine 0.2% Ophthalmic Solution 1 Drop(s) Both EYES two times a day  dextrose 5%. 1000 milliLiter(s) (50 mL/Hr) IV Continuous <Continuous>  dextrose 50% Injectable 12.5 Gram(s) IV Push once  dextrose 50% Injectable 25 Gram(s) IV Push once  dextrose 50% Injectable 25 Gram(s) IV Push once  dorzolamide 2% Ophthalmic Solution 1 Drop(s) Both EYES three times a day  enoxaparin Injectable 40 milliGRAM(s) SubCutaneous daily  ferrous    sulfate Liquid 300 milliGRAM(s) Enteral Tube daily  FLUoxetine 10 milliGRAM(s) Oral <User Schedule>  insulin lispro (HumaLOG) corrective regimen sliding scale   SubCutaneous Before meals and at bedtime  lisinopril 5 milliGRAM(s) Oral <User Schedule>  melatonin 6 milliGRAM(s) Oral at bedtime  metoprolol tartrate 12.5 milliGRAM(s) Oral every 12 hours  multivitamin 1 Tablet(s) Oral daily  nystatin    Suspension 976919 Unit(s) Oral two times a day  ondansetron   Disintegrating Tablet 4 milliGRAM(s) Enteral Tube four times a day  pantoprazole   Suspension 40 milliGRAM(s) Oral daily  polyethylene glycol 3350 17 Gram(s) Oral daily  saccharomyces boulardii 250 milliGRAM(s) Oral two times a day  senna Syrup 10 milliLiter(s) Oral at bedtime  timolol 0.5% Solution 1 Drop(s) Both EYES two times a day  trimethoprim  160 mG/sulfamethoxazole 800 mG 1 Tablet(s) Oral two times a day    MEDICATIONS  (PRN):  acetaminophen   Tablet .. 650 milliGRAM(s) Oral every 6 hours PRN Temp greater or equal to 38C (100.4F), Mild Pain (1 - 3)  bisacodyl Suppository 10 milliGRAM(s) Rectal daily PRN Constipation  dextrose 40% Gel 15 Gram(s) Oral once PRN Blood Glucose LESS THAN 70 milliGRAM(s)/deciliter  glucagon  Injectable 1 milliGRAM(s) IntraMuscular once PRN Glucose LESS THAN 70 milligrams/deciliter                        9.4    10.5  )-----------( 353      ( 06 Nov 2018 05:21 )             29.6     11-06    138  |  104  |  29<H>  ----------------------------<  124<H>  5.1   |  28  |  1.26    Ca    8.7      06 Nov 2018 05:21    CAPILLARY BLOOD GLUCOSE  POCT Blood Glucose.: 126 mg/dL (06 Nov 2018 07:34)  POCT Blood Glucose.: 250 mg/dL (05 Nov 2018 21:14)  POCT Blood Glucose.: 208 mg/dL (05 Nov 2018 16:42)  POCT Blood Glucose.: 191 mg/dL (05 Nov 2018 11:44)      A/P:  88yo F with left MCA CVA, now with right sided hemiplegia, aphasia, dysphagia, cognitive deficits, functional and gait impairments.    Continue comprehensive program of PT/OT/SLP    #HTN/Afib  lisinopril, Norvasc, Lopressor halved 11/5/18 to 12.5 q12hrs based on hospitalist recs re: BP/HR    #DM2-HbA1C 6.0 on 10/19  Metformin discontinued.  Cont ISS, blood glucose.      #Urinary retention  PVRs 671, 900cc  Cont PVR q6hrs with straight cath protocol  +UA  Bactrim initiated 11/3.  F/U urine culture sensitive to Bactrim    #Leukocytosis  WBC trending down.  Afebrile.  CXR 11/1 clear.  Urine culture >100,000cfu/ml Ecoli.  Added Bactrim/Florastor    Monitor CBC    #Bowel/GI-Last BM 11/2  Senna, Miralax, Dulcolax suppository prn    #CVA   ASA, statin  Added Prozac for mood and motor recovery  Amantadine commenced 11/5/18 to promote neuroarousal  Melatonin to normalize sleep wake cycles    #Dysphagia  Pt admitted with NPO with TF- bolus feeds commenced, free water via PEG increased to 300cc QID as Cr creeping up.  Diet upgraded to pureed with no po liquids 11/2, supplement with 2cal HN if eats <50%  Cont tube feeds until adequate po consumption  aspiration precautions  Oral care TID    #Pain control-Pt appears to be without pain  Tylenol prn  D/Cd gabapentin    #anemia  iron supplement    Precautions: fall, aspiration                                                                            DVT Prophylaxis: Lovenox   Diet: Pureed consistent carb/no liquids                                                                             Continue comprehensive program of PT/OT/SLP.

## 2018-11-07 PROCEDURE — 99232 SBSQ HOSP IP/OBS MODERATE 35: CPT

## 2018-11-07 RX ADMIN — Medication 1 DROP(S): at 12:12

## 2018-11-07 RX ADMIN — ONDANSETRON 4 MILLIGRAM(S): 8 TABLET, FILM COATED ORAL at 06:11

## 2018-11-07 RX ADMIN — Medication 1: at 23:23

## 2018-11-07 RX ADMIN — Medication 500000 UNIT(S): at 06:11

## 2018-11-07 RX ADMIN — Medication 250 MILLIGRAM(S): at 18:17

## 2018-11-07 RX ADMIN — ATORVASTATIN CALCIUM 80 MILLIGRAM(S): 80 TABLET, FILM COATED ORAL at 23:22

## 2018-11-07 RX ADMIN — Medication 1 DROP(S): at 18:27

## 2018-11-07 RX ADMIN — LISINOPRIL 5 MILLIGRAM(S): 2.5 TABLET ORAL at 18:17

## 2018-11-07 RX ADMIN — Medication 100 MILLIGRAM(S): at 12:17

## 2018-11-07 RX ADMIN — ONDANSETRON 4 MILLIGRAM(S): 8 TABLET, FILM COATED ORAL at 18:17

## 2018-11-07 RX ADMIN — BRIMONIDINE TARTRATE 1 DROP(S): 2 SOLUTION/ DROPS OPHTHALMIC at 18:26

## 2018-11-07 RX ADMIN — ENOXAPARIN SODIUM 40 MILLIGRAM(S): 100 INJECTION SUBCUTANEOUS at 12:16

## 2018-11-07 RX ADMIN — BRIMONIDINE TARTRATE 1 DROP(S): 2 SOLUTION/ DROPS OPHTHALMIC at 06:09

## 2018-11-07 RX ADMIN — ONDANSETRON 4 MILLIGRAM(S): 8 TABLET, FILM COATED ORAL at 23:26

## 2018-11-07 RX ADMIN — Medication 300 MILLIGRAM(S): at 12:16

## 2018-11-07 RX ADMIN — Medication 1 DROP(S): at 06:10

## 2018-11-07 RX ADMIN — Medication 10 MILLIGRAM(S): at 18:17

## 2018-11-07 RX ADMIN — Medication 250 MILLIGRAM(S): at 06:10

## 2018-11-07 RX ADMIN — PANTOPRAZOLE SODIUM 40 MILLIGRAM(S): 20 TABLET, DELAYED RELEASE ORAL at 12:16

## 2018-11-07 RX ADMIN — Medication 1 TABLET(S): at 06:11

## 2018-11-07 RX ADMIN — Medication 81 MILLIGRAM(S): at 12:16

## 2018-11-07 RX ADMIN — Medication 1: at 12:05

## 2018-11-07 RX ADMIN — DORZOLAMIDE HYDROCHLORIDE 1 DROP(S): 20 SOLUTION/ DROPS OPHTHALMIC at 06:10

## 2018-11-07 RX ADMIN — Medication 6 MILLIGRAM(S): at 23:22

## 2018-11-07 RX ADMIN — DORZOLAMIDE HYDROCHLORIDE 1 DROP(S): 20 SOLUTION/ DROPS OPHTHALMIC at 13:18

## 2018-11-07 RX ADMIN — Medication 100 MILLIGRAM(S): at 06:09

## 2018-11-07 RX ADMIN — Medication 1 TABLET(S): at 18:17

## 2018-11-07 RX ADMIN — DORZOLAMIDE HYDROCHLORIDE 1 DROP(S): 20 SOLUTION/ DROPS OPHTHALMIC at 23:24

## 2018-11-07 RX ADMIN — Medication 1 TABLET(S): at 12:17

## 2018-11-07 RX ADMIN — Medication 500000 UNIT(S): at 18:17

## 2018-11-07 RX ADMIN — POLYETHYLENE GLYCOL 3350 17 GRAM(S): 17 POWDER, FOR SOLUTION ORAL at 12:16

## 2018-11-07 RX ADMIN — ONDANSETRON 4 MILLIGRAM(S): 8 TABLET, FILM COATED ORAL at 00:31

## 2018-11-07 RX ADMIN — ONDANSETRON 4 MILLIGRAM(S): 8 TABLET, FILM COATED ORAL at 12:16

## 2018-11-07 RX ADMIN — SENNA PLUS 10 MILLILITER(S): 8.6 TABLET ORAL at 23:23

## 2018-11-07 RX ADMIN — Medication 3: at 17:26

## 2018-11-07 NOTE — PROGRESS NOTE ADULT - SUBJECTIVE AND OBJECTIVE BOX
History of Present Illness: 	  88yo Jamaican speaking female with PMH of HTN and hyperlipidemia who presented to [facility] on 10/19/18 who was last seen normal at 22:10 the night prior, who was then found down on the ground by family members with inability to communicate or move her right side. She was initially evaluated at [place], found on neurological examination to have right hemiparesis, right hemianopia, right dense facial droop, right sensory loss, left gaze preference with gaze palsy and aphasia. Deemed candidate for tPA. 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 Patient was transferred for further management to [facility]. She then underwent thrombectomy with resultant TICI 2B flow. Failed swallow eval—kept NPO, NGT replaced by PEG 10/26, and tolerating PEG feeding. Telemetry monitoring showed frequent ectopy; Cardiology consulted; no evidence of Afib, she was initially started on IV Lopressor for tachycardia, optimized to po Lopressor for better control. TTE showed PFO. Loop recorder placed. Also noted some hemorrhagic conversion without neurologic deterioration so did not undergo any neurosurgical intervention. Hospital course significant for fever Tmax 101; ID consulted, patient was initially started on IV ceftriaxone for positive UA but urine and blood cultures negative so patient monitored off antibiotics without further fevers. Patient also with new onset urinary retention requiring straight cath protocol as she has been anuric. Patient deemed medically stable for dc to acute rehab 11/1/18.    Subjective:  Pt seen and examined.  Aphasic.  Appears well in NAD.  +BM today.  No apparent respiratory complaints.  Pt still requiring straight cath    Vital Signs Last 24 Hrs  T(C): 37.1 (07 Nov 2018 08:43), Max: 37.1 (07 Nov 2018 08:43)  T(F): 98.8 (07 Nov 2018 08:43), Max: 98.8 (07 Nov 2018 08:43)  HR: 71 (07 Nov 2018 08:43) (69 - 73)  BP: 107/72 (07 Nov 2018 08:43) (105/67 - 109/64)  BP(mean): --  RR: 14 (07 Nov 2018 08:43) (14 - 14)  SpO2: 96% (07 Nov 2018 08:43) (96% - 97%)    Physical Exam:   Constitutional - NAD, Comfortable  HEENT - NCAT  Neck - Supple, No limited ROM  Chest - CTA bilaterally, No wheeze, No rhonchi, No crackles  Cardiovascular - RRR, S1S2, No murmurs  Abdomen - BS+, Soft, NTND, +PEG c/d/i  Extremities - No calf tenderness   Neurologic Exam -                    Cognitive - Awake, Alert     Communication - non-verbal, receptive + expressive aphasia. follows some single step commands in Jamaican.     Motor - No focal deficits                    LEFT    UE - >3/5 throughout                    RIGHT UE - 0/5 throughout, no movement in response to pain stimulus                    LEFT    LE - >3/5 throughout                    RIGHT LE - <3/5 at knee and foot, moves to pain stimulus    Psychiatric - Mood stable, Affect WNL	    Function:  Transfers: Max A x 2  ADLs: total A  severe global aphasia    MEDICATIONS  (STANDING):  amantadine 100 milliGRAM(s) Oral <User Schedule>  amLODIPine   Tablet 5 milliGRAM(s) Oral daily  artificial tears (preservative free) Ophthalmic Solution 1 Drop(s) Both EYES daily  aspirin  chewable 81 milliGRAM(s) Enteral Tube daily  atorvastatin 80 milliGRAM(s) Oral at bedtime  brimonidine 0.2% Ophthalmic Solution 1 Drop(s) Both EYES two times a day  dextrose 5%. 1000 milliLiter(s) (50 mL/Hr) IV Continuous <Continuous>  dextrose 50% Injectable 12.5 Gram(s) IV Push once  dextrose 50% Injectable 25 Gram(s) IV Push once  dextrose 50% Injectable 25 Gram(s) IV Push once  dorzolamide 2% Ophthalmic Solution 1 Drop(s) Both EYES three times a day  enoxaparin Injectable 40 milliGRAM(s) SubCutaneous daily  ferrous    sulfate Liquid 300 milliGRAM(s) Enteral Tube daily  FLUoxetine 10 milliGRAM(s) Oral <User Schedule>  insulin lispro (HumaLOG) corrective regimen sliding scale   SubCutaneous Before meals and at bedtime  lisinopril 5 milliGRAM(s) Oral <User Schedule>  melatonin 6 milliGRAM(s) Oral at bedtime  metoprolol tartrate 12.5 milliGRAM(s) Oral every 12 hours  multivitamin 1 Tablet(s) Oral daily  nystatin    Suspension 329878 Unit(s) Oral two times a day  ondansetron   Disintegrating Tablet 4 milliGRAM(s) Enteral Tube four times a day  pantoprazole   Suspension 40 milliGRAM(s) Oral daily  polyethylene glycol 3350 17 Gram(s) Oral daily  saccharomyces boulardii 250 milliGRAM(s) Oral two times a day  senna Syrup 10 milliLiter(s) Oral at bedtime  timolol 0.5% Solution 1 Drop(s) Both EYES two times a day  trimethoprim  160 mG/sulfamethoxazole 800 mG 1 Tablet(s) Oral two times a day    MEDICATIONS  (PRN):  acetaminophen   Tablet .. 650 milliGRAM(s) Oral every 6 hours PRN Temp greater or equal to 38C (100.4F), Mild Pain (1 - 3)  bisacodyl Suppository 10 milliGRAM(s) Rectal daily PRN Constipation  dextrose 40% Gel 15 Gram(s) Oral once PRN Blood Glucose LESS THAN 70 milliGRAM(s)/deciliter  glucagon  Injectable 1 milliGRAM(s) IntraMuscular once PRN Glucose LESS THAN 70 milligrams/deciliter                        9.4    10.5  )-----------( 353      ( 06 Nov 2018 05:21 )             29.6     11-06    138  |  104  |  29<H>  ----------------------------<  124<H>  5.1   |  28  |  1.26    Ca    8.7      06 Nov 2018 05:21    CAPILLARY BLOOD GLUCOSE      POCT Blood Glucose.: 197 mg/dL (07 Nov 2018 12:03)  POCT Blood Glucose.: 135 mg/dL (07 Nov 2018 07:30)  POCT Blood Glucose.: 182 mg/dL (06 Nov 2018 21:26)  POCT Blood Glucose.: 193 mg/dL (06 Nov 2018 16:50)        A/P:  88yo F with left MCA CVA, now with right sided hemiplegia, aphasia, dysphagia, cognitive deficits, functional and gait impairments.    Continue comprehensive program of PT/OT/SLP    #HTN/Afib  lisinopril, Norvasc, Lopressor halved 11/5/18 to 12.5 q12hrs based on hospitalist recs re: BP/HR-D/C today as has been held due to parameters    #DM2-HbA1C 6.0 on 10/19  Metformin discontinued.  Cont ISS, blood glucose.      #Urinary retention  PVRs 340, 402, 447, 590.  Continue TOV  Cont PVR q6hrs with straight cath protocol  +UA  Bactrim initiated 11/3.  F/U urine culture sensitive to Bactrim    #Leukocytosis  WBC trending down.  Afebrile.  CXR 11/1 clear.  Urine culture >100,000cfu/ml Ecoli.  Added Bactrim/Florastor    Monitor CBC    #Bowel/GI-Last BM 11/2  Senna, Miralax, Dulcolax suppository prn    #CVA   ASA, statin  Added Prozac for mood and motor recovery  Amantadine commenced 11/5/18 to promote neuroarousal  Melatonin to normalize sleep wake cycles    #Dysphagia  Pt admitted with NPO with TF- bolus feeds commenced, free water via PEG increased to 300cc QID as Cr creeping up.  Diet upgraded to pureed with no po liquids 11/2, supplement with 2cal HN if eats <50%  Cont tube feeds until adequate po consumption  aspiration precautions  Oral care TID    #Pain control-Pt appears to be without pain  Tylenol prn  D/Cd gabapentin    #anemia  iron supplement    Precautions: fall, aspiration                                                                            DVT Prophylaxis: Lovenox   Diet: Pureed consistent carb/no liquids                                                                             Continue comprehensive program of PT/OT/SLP.

## 2018-11-08 ENCOUNTER — TRANSCRIPTION ENCOUNTER (OUTPATIENT)
Age: 83
End: 2018-11-08

## 2018-11-08 LAB
ANION GAP SERPL CALC-SCNC: 6 MMOL/L — SIGNIFICANT CHANGE UP (ref 5–17)
BUN SERPL-MCNC: 27 MG/DL — HIGH (ref 7–23)
CALCIUM SERPL-MCNC: 8.6 MG/DL — SIGNIFICANT CHANGE UP (ref 8.4–10.5)
CHLORIDE SERPL-SCNC: 100 MMOL/L — SIGNIFICANT CHANGE UP (ref 96–108)
CO2 SERPL-SCNC: 28 MMOL/L — SIGNIFICANT CHANGE UP (ref 22–31)
CREAT SERPL-MCNC: 1.19 MG/DL — SIGNIFICANT CHANGE UP (ref 0.5–1.3)
GLUCOSE SERPL-MCNC: 113 MG/DL — HIGH (ref 70–99)
HCT VFR BLD CALC: 27.4 % — LOW (ref 34.5–45)
HGB BLD-MCNC: 8.9 G/DL — LOW (ref 11.5–15.5)
MCHC RBC-ENTMCNC: 30.3 PG — SIGNIFICANT CHANGE UP (ref 27–34)
MCHC RBC-ENTMCNC: 32.5 GM/DL — SIGNIFICANT CHANGE UP (ref 32–36)
MCV RBC AUTO: 93.1 FL — SIGNIFICANT CHANGE UP (ref 80–100)
PLATELET # BLD AUTO: 348 K/UL — SIGNIFICANT CHANGE UP (ref 150–400)
POTASSIUM SERPL-MCNC: 4.9 MMOL/L — SIGNIFICANT CHANGE UP (ref 3.5–5.3)
POTASSIUM SERPL-SCNC: 4.9 MMOL/L — SIGNIFICANT CHANGE UP (ref 3.5–5.3)
RBC # BLD: 2.95 M/UL — LOW (ref 3.8–5.2)
RBC # FLD: 13 % — SIGNIFICANT CHANGE UP (ref 10.3–14.5)
SODIUM SERPL-SCNC: 134 MMOL/L — LOW (ref 135–145)
WBC # BLD: 11.6 K/UL — HIGH (ref 3.8–10.5)
WBC # FLD AUTO: 11.6 K/UL — HIGH (ref 3.8–10.5)

## 2018-11-08 PROCEDURE — 99232 SBSQ HOSP IP/OBS MODERATE 35: CPT

## 2018-11-08 RX ORDER — LANOLIN ALCOHOL/MO/W.PET/CERES
2 CREAM (GRAM) TOPICAL
Qty: 0 | Refills: 0 | COMMUNITY
Start: 2018-11-08

## 2018-11-08 RX ORDER — SACCHAROMYCES BOULARDII 250 MG
1 POWDER IN PACKET (EA) ORAL
Qty: 0 | Refills: 0 | COMMUNITY
Start: 2018-11-08

## 2018-11-08 RX ORDER — ONDANSETRON 8 MG/1
1 TABLET, FILM COATED ORAL
Qty: 0 | Refills: 0 | COMMUNITY
Start: 2018-11-08

## 2018-11-08 RX ORDER — LISINOPRIL 2.5 MG/1
1 TABLET ORAL
Qty: 0 | Refills: 0 | COMMUNITY
Start: 2018-11-08

## 2018-11-08 RX ORDER — SENNA PLUS 8.6 MG/1
10 TABLET ORAL
Qty: 0 | Refills: 0 | COMMUNITY
Start: 2018-11-08

## 2018-11-08 RX ORDER — FERROUS SULFATE 325(65) MG
5 TABLET ORAL
Qty: 0 | Refills: 0 | COMMUNITY
Start: 2018-11-08

## 2018-11-08 RX ORDER — TIMOLOL 0.5 %
1 DROPS OPHTHALMIC (EYE)
Qty: 0 | Refills: 0 | COMMUNITY
Start: 2018-11-08

## 2018-11-08 RX ORDER — AMANTADINE HCL 100 MG
1 CAPSULE ORAL
Qty: 0 | Refills: 0 | COMMUNITY
Start: 2018-11-08

## 2018-11-08 RX ORDER — ATORVASTATIN CALCIUM 80 MG/1
1 TABLET, FILM COATED ORAL
Qty: 0 | Refills: 0 | COMMUNITY
Start: 2018-11-08

## 2018-11-08 RX ORDER — METFORMIN HYDROCHLORIDE 850 MG/1
1 TABLET ORAL
Qty: 0 | Refills: 0 | COMMUNITY

## 2018-11-08 RX ORDER — PANTOPRAZOLE SODIUM 20 MG/1
40 TABLET, DELAYED RELEASE ORAL
Qty: 0 | Refills: 0 | COMMUNITY
Start: 2018-11-08

## 2018-11-08 RX ORDER — DORZOLAMIDE HYDROCHLORIDE 20 MG/ML
1 SOLUTION/ DROPS OPHTHALMIC
Qty: 0 | Refills: 0 | COMMUNITY
Start: 2018-11-08

## 2018-11-08 RX ORDER — BETHANECHOL CHLORIDE 25 MG
5 TABLET ORAL EVERY 8 HOURS
Qty: 0 | Refills: 0 | Status: DISCONTINUED | OUTPATIENT
Start: 2018-11-08 | End: 2018-11-14

## 2018-11-08 RX ORDER — AMLODIPINE BESYLATE 2.5 MG/1
1 TABLET ORAL
Qty: 0 | Refills: 0 | COMMUNITY
Start: 2018-11-08

## 2018-11-08 RX ORDER — BRIMONIDINE TARTRATE 2 MG/MG
1 SOLUTION/ DROPS OPHTHALMIC
Qty: 0 | Refills: 0 | COMMUNITY
Start: 2018-11-08

## 2018-11-08 RX ORDER — POLYETHYLENE GLYCOL 3350 17 G/17G
17 POWDER, FOR SOLUTION ORAL
Qty: 0 | Refills: 0 | COMMUNITY
Start: 2018-11-08

## 2018-11-08 RX ORDER — FERROUS SULFATE 325(65) MG
1 TABLET ORAL
Qty: 0 | Refills: 0 | COMMUNITY

## 2018-11-08 RX ORDER — ENOXAPARIN SODIUM 100 MG/ML
40 INJECTION SUBCUTANEOUS
Qty: 0 | Refills: 0 | COMMUNITY
Start: 2018-11-08

## 2018-11-08 RX ORDER — NYSTATIN 500MM UNIT
5 POWDER (EA) MISCELLANEOUS
Qty: 0 | Refills: 0 | COMMUNITY
Start: 2018-11-08

## 2018-11-08 RX ORDER — ACETAMINOPHEN 500 MG
2 TABLET ORAL
Qty: 0 | Refills: 0 | COMMUNITY
Start: 2018-11-08

## 2018-11-08 RX ORDER — ASPIRIN/CALCIUM CARB/MAGNESIUM 324 MG
1 TABLET ORAL
Qty: 0 | Refills: 0 | COMMUNITY
Start: 2018-11-08

## 2018-11-08 RX ORDER — FLUOXETINE HCL 10 MG
1 CAPSULE ORAL
Qty: 0 | Refills: 0 | COMMUNITY
Start: 2018-11-08

## 2018-11-08 RX ORDER — INSULIN LISPRO 100/ML
1 VIAL (ML) SUBCUTANEOUS
Qty: 10 | Refills: 0 | OUTPATIENT
Start: 2018-11-08

## 2018-11-08 RX ADMIN — BRIMONIDINE TARTRATE 1 DROP(S): 2 SOLUTION/ DROPS OPHTHALMIC at 17:38

## 2018-11-08 RX ADMIN — Medication 1 DROP(S): at 12:07

## 2018-11-08 RX ADMIN — Medication 1: at 16:41

## 2018-11-08 RX ADMIN — Medication 1 TABLET(S): at 17:39

## 2018-11-08 RX ADMIN — DORZOLAMIDE HYDROCHLORIDE 1 DROP(S): 20 SOLUTION/ DROPS OPHTHALMIC at 21:17

## 2018-11-08 RX ADMIN — Medication 500000 UNIT(S): at 05:31

## 2018-11-08 RX ADMIN — ENOXAPARIN SODIUM 40 MILLIGRAM(S): 100 INJECTION SUBCUTANEOUS at 12:07

## 2018-11-08 RX ADMIN — Medication 100 MILLIGRAM(S): at 12:08

## 2018-11-08 RX ADMIN — POLYETHYLENE GLYCOL 3350 17 GRAM(S): 17 POWDER, FOR SOLUTION ORAL at 12:07

## 2018-11-08 RX ADMIN — Medication 1 DROP(S): at 05:33

## 2018-11-08 RX ADMIN — ONDANSETRON 4 MILLIGRAM(S): 8 TABLET, FILM COATED ORAL at 05:33

## 2018-11-08 RX ADMIN — BRIMONIDINE TARTRATE 1 DROP(S): 2 SOLUTION/ DROPS OPHTHALMIC at 05:31

## 2018-11-08 RX ADMIN — Medication 1 TABLET(S): at 12:08

## 2018-11-08 RX ADMIN — Medication 10 MILLIGRAM(S): at 17:39

## 2018-11-08 RX ADMIN — Medication 6 MILLIGRAM(S): at 21:16

## 2018-11-08 RX ADMIN — ONDANSETRON 4 MILLIGRAM(S): 8 TABLET, FILM COATED ORAL at 17:38

## 2018-11-08 RX ADMIN — LISINOPRIL 5 MILLIGRAM(S): 2.5 TABLET ORAL at 17:38

## 2018-11-08 RX ADMIN — PANTOPRAZOLE SODIUM 40 MILLIGRAM(S): 20 TABLET, DELAYED RELEASE ORAL at 12:08

## 2018-11-08 RX ADMIN — Medication 1: at 12:07

## 2018-11-08 RX ADMIN — Medication 81 MILLIGRAM(S): at 12:08

## 2018-11-08 RX ADMIN — Medication 3: at 21:18

## 2018-11-08 RX ADMIN — Medication 250 MILLIGRAM(S): at 17:38

## 2018-11-08 RX ADMIN — ONDANSETRON 4 MILLIGRAM(S): 8 TABLET, FILM COATED ORAL at 23:48

## 2018-11-08 RX ADMIN — DORZOLAMIDE HYDROCHLORIDE 1 DROP(S): 20 SOLUTION/ DROPS OPHTHALMIC at 05:32

## 2018-11-08 RX ADMIN — Medication 250 MILLIGRAM(S): at 05:33

## 2018-11-08 RX ADMIN — Medication 300 MILLIGRAM(S): at 12:08

## 2018-11-08 RX ADMIN — Medication 100 MILLIGRAM(S): at 05:30

## 2018-11-08 RX ADMIN — Medication 500000 UNIT(S): at 17:38

## 2018-11-08 RX ADMIN — DORZOLAMIDE HYDROCHLORIDE 1 DROP(S): 20 SOLUTION/ DROPS OPHTHALMIC at 13:26

## 2018-11-08 RX ADMIN — Medication 5 MILLIGRAM(S): at 21:16

## 2018-11-08 RX ADMIN — Medication 1 TABLET(S): at 05:31

## 2018-11-08 RX ADMIN — ONDANSETRON 4 MILLIGRAM(S): 8 TABLET, FILM COATED ORAL at 12:08

## 2018-11-08 RX ADMIN — ATORVASTATIN CALCIUM 80 MILLIGRAM(S): 80 TABLET, FILM COATED ORAL at 21:16

## 2018-11-08 RX ADMIN — Medication 1 DROP(S): at 18:47

## 2018-11-08 RX ADMIN — AMLODIPINE BESYLATE 5 MILLIGRAM(S): 2.5 TABLET ORAL at 05:31

## 2018-11-08 NOTE — DISCHARGE NOTE ADULT - CARE PLAN
Principal Discharge DX:	CVA (cerebral vascular accident)  Goal:	to continue recovery: strength, function, mobility  Assessment and plan of treatment:	to continue therapy in the community  Secondary Diagnosis:	HLD (hyperlipidemia)  Secondary Diagnosis:	Hypertension

## 2018-11-08 NOTE — DISCHARGE NOTE ADULT - CARE PROVIDER_API CALL
Marion Kebede), PhysicalRehab Medicine  [address] [place] [address]  Phone: [phone]  Fax: [phone]    Thien Portillo), Neurology; Vascular Neurology  [address] [place] [address] [address]  [place] [address]  Phone: [phone]  Fax: [phone]    Kenneth Chand (), Cardiology; Internal Medicine  [address] [place] [address]  [address]  [place] [address]  Phone: [phone]  Fax: [phone]    Kerri Yun (NP; RN), NP in [facility]  [address] [place] [address]  Phone: [phone]  Fax: [phone]

## 2018-11-08 NOTE — DISCHARGE NOTE ADULT - PATIENT PORTAL LINK FT
You can access the [facility] Patient Portal, offered by [facility], by registering with the following website: [email or web address]

## 2018-11-08 NOTE — DISCHARGE NOTE ADULT - CARE PROVIDERS DIRECT ADDRESSES
[email or web address],DirectAddress_Unknown,DirectAddress_Unknown

## 2018-11-08 NOTE — DISCHARGE NOTE ADULT - OTHER SIGNIFICANT FINDINGS
CAPILLARY BLOOD GLUCOSE      POCT Blood Glucose.: 143 mg/dL (14 Nov 2018 07:58)  POCT Blood Glucose.: 105 mg/dL (13 Nov 2018 23:15)  POCT Blood Glucose.: 156 mg/dL (13 Nov 2018 16:58)  POCT Blood Glucose.: 111 mg/dL (13 Nov 2018 11:54)

## 2018-11-08 NOTE — PROVIDER CONTACT NOTE (OTHER) - BACKGROUND
pt ate approximatly 10%  of luinch 240cc 0f 2cal HN given via peg Vomited as above with Speech therapist V/S 133/77 96% 83 14

## 2018-11-08 NOTE — DISCHARGE NOTE ADULT - INSTRUCTIONS
[place] solids, no liquids.  300cc water flushes to PEG QID  Supplement with bolus feed of 1 can 2cal HN if patient eats <50% of meal

## 2018-11-08 NOTE — PROGRESS NOTE ADULT - SUBJECTIVE AND OBJECTIVE BOX
History of Present Illness: 	  86yo Tajik speaking female with PMH of HTN and hyperlipidemia who presented to [facility] on 10/19/18 who was last seen normal at 22:10 the night prior, who was then found down on the ground by family members with inability to communicate or move her right side. She was initially evaluated at [place], found on neurological examination to have right hemiparesis, right hemianopia, right dense facial droop, right sensory loss, left gaze preference with gaze palsy and aphasia. Deemed candidate for tPA. 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 Patient was transferred for further management to [facility]. She then underwent thrombectomy with resultant TICI 2B flow. Failed swallow eval—kept NPO, NGT replaced by PEG 10/26, and tolerating PEG feeding. Telemetry monitoring showed frequent ectopy; Cardiology consulted; no evidence of Afib, she was initially started on IV Lopressor for tachycardia, optimized to po Lopressor for better control. TTE showed PFO. Loop recorder placed. Also noted some hemorrhagic conversion without neurologic deterioration so did not undergo any neurosurgical intervention. Hospital course significant for fever Tmax 101; ID consulted, patient was initially started on IV ceftriaxone for positive UA but urine and blood cultures negative so patient monitored off antibiotics without further fevers. Patient also with new onset urinary retention requiring straight cath protocol as she has been anuric. Patient deemed medically stable for dc to acute rehab 11/1/18.    Subjective:  Pt seen and examined.  Aphasic.  Appears well in NAD.  +BM yesterday.  No apparent respiratory complaints.  Pt still requiring straight cath as not voiding sufficient quantities    Vital Signs Last 24 Hrs  T(C): 36.7 (08 Nov 2018 08:56), Max: 37 (08 Nov 2018 00:41)  T(F): 98.1 (08 Nov 2018 08:56), Max: 98.6 (08 Nov 2018 00:41)  HR: 67 (08 Nov 2018 08:56) (67 - 67)  BP: 107/68 (08 Nov 2018 08:56) (107/68 - 117/72)  BP(mean): --  RR: 16 (08 Nov 2018 08:56) (14 - 16)  SpO2: 96% (08 Nov 2018 08:56) (96% - 99%)    Physical Exam:   Constitutional - NAD, Comfortable  HEENT - NCAT  Neck - Supple, No limited ROM  Chest - CTA bilaterally, No wheeze, No rhonchi, No crackles  Cardiovascular - RRR, S1S2, No murmurs  Abdomen - BS+, Soft, NTND, +PEG c/d/i  Extremities - No calf tenderness   Neurologic Exam -                    Cognitive - Awake, Alert     Communication - non-verbal, receptive + expressive aphasia. follows some single step commands in Tajik.     Motor - No focal deficits                    LEFT    UE - >3/5 throughout                    RIGHT UE - 0/5 throughout, no movement in response to pain stimulus                    LEFT    LE - >3/5 throughout                    RIGHT LE - <3/5 at knee and foot, moves to pain stimulus    Psychiatric - Mood stable, Affect WNL	    Function:  Transfers: Max A x 2  ADLs: total A  severe global aphasia    MEDICATIONS  (STANDING):  amantadine 100 milliGRAM(s) Oral <User Schedule>  amLODIPine   Tablet 5 milliGRAM(s) Oral daily  artificial tears (preservative free) Ophthalmic Solution 1 Drop(s) Both EYES daily  aspirin  chewable 81 milliGRAM(s) Enteral Tube daily  atorvastatin 80 milliGRAM(s) Oral at bedtime  brimonidine 0.2% Ophthalmic Solution 1 Drop(s) Both EYES two times a day  dextrose 5%. 1000 milliLiter(s) (50 mL/Hr) IV Continuous <Continuous>  dextrose 50% Injectable 12.5 Gram(s) IV Push once  dextrose 50% Injectable 25 Gram(s) IV Push once  dextrose 50% Injectable 25 Gram(s) IV Push once  dorzolamide 2% Ophthalmic Solution 1 Drop(s) Both EYES three times a day  enoxaparin Injectable 40 milliGRAM(s) SubCutaneous daily  ferrous    sulfate Liquid 300 milliGRAM(s) Enteral Tube daily  FLUoxetine 10 milliGRAM(s) Oral <User Schedule>  insulin lispro (HumaLOG) corrective regimen sliding scale   SubCutaneous Before meals and at bedtime  lisinopril 5 milliGRAM(s) Oral <User Schedule>  melatonin 6 milliGRAM(s) Oral at bedtime  metoprolol tartrate 12.5 milliGRAM(s) Oral every 12 hours  multivitamin 1 Tablet(s) Oral daily  nystatin    Suspension 973296 Unit(s) Oral two times a day  ondansetron   Disintegrating Tablet 4 milliGRAM(s) Enteral Tube four times a day  pantoprazole   Suspension 40 milliGRAM(s) Oral daily  polyethylene glycol 3350 17 Gram(s) Oral daily  saccharomyces boulardii 250 milliGRAM(s) Oral two times a day  senna Syrup 10 milliLiter(s) Oral at bedtime  timolol 0.5% Solution 1 Drop(s) Both EYES two times a day  trimethoprim  160 mG/sulfamethoxazole 800 mG 1 Tablet(s) Oral two times a day    MEDICATIONS  (PRN):  acetaminophen   Tablet .. 650 milliGRAM(s) Oral every 6 hours PRN Temp greater or equal to 38C (100.4F), Mild Pain (1 - 3)  bisacodyl Suppository 10 milliGRAM(s) Rectal daily PRN Constipation  dextrose 40% Gel 15 Gram(s) Oral once PRN Blood Glucose LESS THAN 70 milliGRAM(s)/deciliter  glucagon  Injectable 1 milliGRAM(s) IntraMuscular once PRN Glucose LESS THAN 70 milligrams/deciliter             Labs                        8.9    11.6  )-----------( 348      ( 08 Nov 2018 05:30 )             27.4   11-08    134<L>  |  100  |  27<H>  ----------------------------<  113<H>  4.9   |  28  |  1.19    Ca    8.6      08 Nov 2018 05:30        CAPILLARY BLOOD GLUCOSE      POCT Blood Glucose.: 159 mg/dL (08 Nov 2018 11:54)  POCT Blood Glucose.: 123 mg/dL (08 Nov 2018 07:48)  POCT Blood Glucose.: 199 mg/dL (07 Nov 2018 23:18)  POCT Blood Glucose.: 255 mg/dL (07 Nov 2018 16:27)        A/P:  86yo F with left MCA CVA, now with right sided hemiplegia, aphasia, dysphagia, cognitive deficits, functional and gait impairments.    Continue comprehensive program of PT/OT/SLP    #HTN/Afib  lisinopril, Norvasc, Lopressor halved 11/5/18 to 12.5 q12hrs based on hospitalist recs re: BP/HR-D/Cd 11/7 as has been held due to parameters    #DM2-HbA1C 6.0 on 10/19  Metformin discontinued.  Cont ISS, blood glucose.      #Urinary retention  PVRs 312, 525, 500.   Add urecholine 5mg bid  Cont TOV with PVR q6hrs with straight cath protocol  +UTI: Urine culture sensitive to Bactrim; initiated 11/3    #Leukocytosis  WBC stable.  Afebrile.  CXR 11/1 clear.  Urine culture >100,000cfu/ml Ecoli.  Complete course of Bactrim/Florastor    Monitor CBC    #Bowel/GI-Last BM 11/2  Senna, Miralax, Dulcolax suppository prn    #CVA   ASA, statin  Added Prozac for mood and motor recovery  Amantadine commenced 11/5/18 to promote neuroarousal  Melatonin to normalize sleep wake cycles    #Dysphagia  Pt admitted with NPO with TF- bolus feeds commenced, free water via PEG increased to 300cc QID as Cr creeping up; improved with Cr 1.19.  Diet upgraded to pureed with no po liquids 11/2, supplement with 2cal HN if eats <50%  Cont tube feeds until adequate po consumption  aspiration precautions  Oral care TID    #Pain control-Pt appears to be without pain  Tylenol prn  D/Cd gabapentin    #anemia  iron supplement    Precautions: fall, aspiration                                                                            DVT Prophylaxis: Lovenox   Diet: Pureed consistent carb/no liquids                                                                             Continue comprehensive program of PT/OT/SLP.

## 2018-11-08 NOTE — DISCHARGE NOTE ADULT - HOSPITAL COURSE
86yo Greek speaking female with PMH of HTN and hyperlipidemia who presented to [facility] on 10/19/18 who was last seen normal at 22:10 the night prior, who was then found down on the ground by family members with inability to communicate or move her right side. She was initially evaluated at [place], found on neurological examination to have right hemiparesis, right hemianopia, right dense facial droop, right sensory loss, left gaze preference with gaze palsy and aphasia. Deemed candidate for tPA. 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 Patient was transferred for further management to [facility]. She then underwent thrombectomy with resultant TICI 2B flow. Failed swallow eval—kept NPO, NGT replaced by PEG 10/26, and tolerating PEG feeding. Telemetry monitoring showed frequent ectopy; Cardiology consulted; no evidence of Afib, she was initially started on IV Lopressor for tachycardia, optimized to po Lopressor for better control. TTE showed PFO. Loop recorder placed. Also noted some hemorrhagic conversion without neurologic deterioration so did not undergo any neurosurgical intervention. Hospital course significant for fever Tmax 101; ID consulted, patient was initially started on IV ceftriaxone for positive UA but urine and blood cultures negative so patient monitored off antibiotics without further fevers. Patient also with new onset urinary retention requiring straight cath protocol as she has been anuric. Patient deemed medically stable for dc to acute rehab 11/1/18.    Patient admitted to acute rehab at [facility], PT/OT/SLP, 11/1/18-11/9/18. Patient made some functional gains. Patient's diet was upgraded to puree solids, no liquids, but needs to be supplemented with bolus PEG feeds if eats <50% of meal. Patient had repeat UA & UCx- ecoli sensitive to Bactrim. Patient to complete a 7 day course (3 days remaining). Patient started on amantadine and prozac for neuroarousal and motor recovery post-stroke. Patient is medically stable for discharge to subacute rehab. 86yo Swedish speaking female with PMH of HTN and hyperlipidemia who presented to [facility] on 10/19/18 who was last seen normal at 22:10 the night prior, who was then found down on the ground by family members with inability to communicate or move her right side. She was initially evaluated at [place], found on neurological examination to have right hemiparesis, right hemianopia, right dense facial droop, right sensory loss, left gaze preference with gaze palsy and aphasia. Deemed candidate for tPA. 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 Patient was transferred for further management to [facility]. She then underwent thrombectomy with resultant TICI 2B flow. Failed swallow eval—kept NPO, NGT replaced by PEG 10/26, and tolerating PEG feeding. Telemetry monitoring showed frequent ectopy; Cardiology consulted; no evidence of Afib, she was initially started on IV Lopressor for tachycardia, optimized to po Lopressor for better control. TTE showed PFO. Loop recorder placed. Also noted some hemorrhagic conversion without neurologic deterioration so did not undergo any neurosurgical intervention. Hospital course significant for fever Tmax 101; ID consulted, patient was initially started on IV ceftriaxone for positive UA but urine and blood cultures negative so patient monitored off antibiotics without further fevers. Patient also with new onset urinary retention requiring straight cath protocol as she has been anuric. Urecholine added with improvement in void although still with retention.  Patient deemed medically stable for dc to acute rehab 11/1/18.    Patient admitted to acute rehab at [facility], PT/OT/SLP, 11/1/18-11/9/18. Patient made some functional gains. Patient's diet was upgraded to puree solids, no liquids, but needs to be supplemented with bolus PEG feeds if eats <50% of meal. Patient had repeat UA & UCx- ecoli sensitive to Bactrim. Patient to complete a 7 day course (3 days remaining). Patient started on amantadine and prozac for neuroarousal and motor recovery post-stroke. Patient is medically stable for discharge to subacute rehab. 86yo Romansh speaking female with PMH of HTN and hyperlipidemia who presented to [facility] on 10/19/18 who was last seen normal at 22:10 the night prior, who was then found down on the ground by family members with inability to communicate or move her right side. She was initially evaluated at [place], found on neurological examination to have right hemiparesis, right hemianopia, right dense facial droop, right sensory loss, left gaze preference with gaze palsy and aphasia. Deemed candidate for tPA. 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 Patient was transferred for further management to [facility]. She then underwent thrombectomy with resultant TICI 2B flow. Failed swallow eval—kept NPO, NGT replaced by PEG 10/26, and tolerating PEG feeding. Telemetry monitoring showed frequent ectopy; Cardiology consulted; no evidence of Afib, she was initially started on IV Lopressor for tachycardia, optimized to po Lopressor for better control. TTE showed PFO. Loop recorder placed. Also noted some hemorrhagic conversion without neurologic deterioration so did not undergo any neurosurgical intervention. Hospital course significant for fever Tmax 101; ID consulted, patient was initially started on IV ceftriaxone for positive UA but urine and blood cultures negative so patient monitored off antibiotics without further fevers. Patient also with new onset urinary retention requiring straight cath protocol as she has been anuric. Urecholine added with improvement in void although still with retention.  Patient deemed medically stable for dc to acute rehab 11/1/18.    Patient admitted to acute rehab at [facility], PT/OT/SLP, 11/1/18-11/9/18. Patient made some functional gains. Patient's diet was upgraded to puree solids, no liquids, but needs to be supplemented with bolus PEG feeds if eats <50% of meal. Patient had repeat UA & UCx- ecoli sensitive to Bactrim. Patient completed a 7 day course. Patient started on amantadine and prozac for neuroarousal and motor recovery post-stroke. Patient is medically stable for discharge to subacute rehab. 86yo Hebrew speaking female with PMH of HTN and hyperlipidemia who presented to [facility] on 10/19/18 who was last seen normal at 22:10 the night prior, who was then found down on the ground by family members with inability to communicate or move her right side. She was initially evaluated at [place], found on neurological examination to have right hemiparesis, right hemianopia, right dense facial droop, right sensory loss, left gaze preference with gaze palsy and aphasia. Deemed candidate for tPA. 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 Patient was transferred for further management to [facility]. She then underwent thrombectomy with resultant TICI 2B flow. Failed swallow eval—kept NPO, NGT replaced by PEG 10/26, and tolerating PEG feeding. Telemetry monitoring showed frequent ectopy; Cardiology consulted; no evidence of Afib, she was initially started on IV Lopressor for tachycardia, optimized to po Lopressor for better control. TTE showed PFO. Loop recorder placed. Also noted some hemorrhagic conversion without neurologic deterioration so did not undergo any neurosurgical intervention. Hospital course significant for fever Tmax 101; ID consulted, patient was initially started on IV ceftriaxone for positive UA but urine and blood cultures negative so patient monitored off antibiotics without further fevers. Patient also with new onset urinary retention requiring straight cath protocol as she has been anuric. .  Patient deemed medically stable for dc to acute rehab 11/1/18.    Patient admitted to acute rehab at [facility], PT/OT/SLP, 11/1/18. Patient made some functional gains. Patient's diet was upgraded to puree solids, no liquids, but needs to be supplemented with bolus PEG feeds if eats <50% of meal. Patient had repeat UA & UCx- ecoli sensitive to Bactrim. Patient completed a 7 day course. Urecholine added with improvement in void.  Patient started on amantadine for neuroarousal which was discontinued on d/c and prozac for motor recovery post-stroke. Patient is medically stable for discharge to subacute rehab.

## 2018-11-08 NOTE — DISCHARGE NOTE ADULT - MEDICATION SUMMARY - MEDICATIONS TO STOP TAKING
I will STOP taking the medications listed below when I get home from the hospital:    metFORMIN 500 mg oral tablet  -- 1 tab(s) by mouth 2 times a day    Prinivil 5 mg oral tablet  -- 1 tab(s) by mouth    Metoprolol Tartrate 1 mg/mL injectable solution  --  injectable I will STOP taking the medications listed below when I get home from the hospital:    metFORMIN 500 mg oral tablet  -- 1 tab(s) by mouth 2 times a day    Prinivil 5 mg oral tablet  -- 1 tab(s) by mouth    Neurontin 250 mg/5 mL oral solution  -- 2 milliliter(s) by mouth 3 times a day    Metoprolol Tartrate 1 mg/mL injectable solution  --  injectable I will STOP taking the medications listed below when I get home from the hospital:    metFORMIN 500 mg oral tablet  -- 1 tab(s) by mouth 2 times a day    Prinivil 5 mg oral tablet  -- 1 tab(s) by mouth    Neurontin 250 mg/5 mL oral solution  -- 2 milliliter(s) by mouth 3 times a day    nystatin 100,000 units/mL oral suspension  -- 5 milliliter(s) by mouth 2 times a day    Metoprolol Tartrate 1 mg/mL injectable solution  --  injectable

## 2018-11-08 NOTE — PROVIDER CONTACT NOTE (OTHER) - ACTION/TREATMENT ORDERED:
pt cleansed and made comfortable V/S monitored Dr Kebede made aware
Warm blanket given. EKG ordered. Continue to monitor.
Zofran ordered. Continue to monitor Nausea and vomit

## 2018-11-08 NOTE — DISCHARGE NOTE ADULT - MEDICATION SUMMARY - MEDICATIONS TO TAKE
I will START or STAY ON the medications listed below when I get home from the hospital:    acetaminophen 325 mg oral tablet  -- 2 tab(s) by mouth every 6 hours, As needed, Temp greater or equal to 38C (100.4F), Mild Pain (1 - 3)  -- Indication: For pain    aspirin 81 mg oral tablet, chewable  -- 1 tab(s) by mouth once a day  -- Indication: For Cardioprotective    lisinopril 5 mg oral tablet  -- 1 tab(s) by mouth   -- Indication: For BP    enoxaparin  -- 40 milligram(s) subcutaneous once a day  -- Indication: For DVT ppx    Neurontin 250 mg/5 mL oral solution  -- 2 milliliter(s) by mouth 3 times a day  -- Indication: For pain    FLUoxetine 10 mg oral capsule  -- 1 cap(s) by mouth   -- Indication: For motor recovery    insulin lispro  -- LOW DOSE SLIDING SCALE INSULIN WITH MEALS AND AT BEDTIME PLEASE   -- Indication: For sliding scale insuling for diabetes    ondansetron 4 mg oral tablet, disintegrating  -- 1 tab(s) by mouth 4 times a day  -- Indication: For nausea    nystatin 100,000 units/mL oral suspension  -- 5 milliliter(s) by mouth 2 times a day  -- Indication: For thrush    atorvastatin 80 mg oral tablet  -- 1 tab(s) by mouth once a day (at bedtime)  -- Indication: For Cholesterol    amantadine 100 mg oral capsule  -- 1 cap(s) by mouth   -- Indication: For neuroarousal    alendronate 70 mg oral tablet  -- 1 tab(s) by mouth once a week  -- Indication: For osteoporosis    amLODIPine 5 mg oral tablet  -- 1 tab(s) by mouth once a day  -- Indication: For BP    ferrous sulfate 300 mg/5 mL (60 mg elemental iron) oral liquid  -- 5 milliliter(s) by mouth once a day  -- Indication: For anemia    senna 8.8 mg/5 mL oral syrup  -- 10 milliliter(s) by mouth once a day (at bedtime)  -- Indication: For bowel regimen    bisacodyl 10 mg rectal suppository  -- 1 suppository(ies) rectally once a day, As needed, Constipation  -- Indication: For bowel regimen    polyethylene glycol 3350 oral powder for reconstitution  -- 17 gram(s) by mouth once a day  -- Indication: For bowel regimen    melatonin 3 mg oral tablet  -- 2 tab(s) by mouth once a day (at bedtime)  -- Indication: For sleep    ocular lubricant ophthalmic solution  -- 1 drop(s) to each affected eye once a day  -- Indication: For eyes    timolol maleate 0.5% ophthalmic solution  -- 1 drop(s) to each affected eye 2 times a day  -- Indication: For glaucoma    dorzolamide 2% ophthalmic solution  -- 1 drop(s) to each affected eye 3 times a day  -- Indication: For glaucoma    brimonidine 0.2% ophthalmic solution  -- 1 drop(s) to each affected eye 2 times a day  -- Indication: For glaucoma    saccharomyces boulardii lyo 250 mg oral capsule  -- 1 cap(s) by mouth 2 times a day  -- Indication: For supplement    pantoprazole 40 mg oral granule, delayed release  -- 40 milligram(s) enteral once a day PEG  -- Indication: For stomach acid    sulfamethoxazole-trimethoprim 800 mg-160 mg oral tablet  -- 1 tab(s) by mouth 2 times a day  -- Indication: For UTI- 3 more days    Multiple Vitamins oral tablet  -- 1 tab(s) by mouth once a day  -- Indication: For supplement I will START or STAY ON the medications listed below when I get home from the hospital:    acetaminophen 325 mg oral tablet  -- 2 tab(s) by mouth every 6 hours, As needed, Temp greater or equal to 38C (100.4F), Mild Pain (1 - 3)  -- Indication: For pain    aspirin 81 mg oral tablet, chewable  -- 1 tab(s) by mouth once a day  -- Indication: For Cardioprotective    lisinopril 5 mg oral tablet  -- 1 tab(s) by mouth   -- Indication: For BP    enoxaparin  -- 40 milligram(s) subcutaneous once a day  -- Indication: For DVT ppx    FLUoxetine 10 mg oral capsule  -- 1 cap(s) by mouth   -- Indication: For mood and motor recovery    insulin lispro  -- LOW DOSE SLIDING SCALE INSULIN WITH MEALS AND AT BEDTIME PLEASE   -- Indication: For sliding scale insuling for diabetes    ondansetron 4 mg oral tablet, disintegrating  -- 1 tab(s) by mouth 4 times a day  -- Indication: For nausea    nystatin 100,000 units/mL oral suspension  -- 5 milliliter(s) by mouth 2 times a day  -- Indication: For thrush    atorvastatin 80 mg oral tablet  -- 1 tab(s) by mouth once a day (at bedtime)  -- Indication: For Cholesterol    amantadine 100 mg oral capsule  -- 1 cap(s) by mouth   -- Indication: For neuroarousal    alendronate 70 mg oral tablet  -- 1 tab(s) by mouth once a week  -- Indication: For osteoporosis    amLODIPine 5 mg oral tablet  -- 1 tab(s) by mouth once a day  -- Indication: For BP    ferrous sulfate 300 mg/5 mL (60 mg elemental iron) oral liquid  -- 5 milliliter(s) by mouth once a day  -- Indication: For anemia    senna 8.8 mg/5 mL oral syrup  -- 10 milliliter(s) by mouth once a day (at bedtime)  -- Indication: For bowel regimen    bisacodyl 10 mg rectal suppository  -- 1 suppository(ies) rectally once a day, As needed, Constipation  -- Indication: For bowel regimen    polyethylene glycol 3350 oral powder for reconstitution  -- 17 gram(s) by mouth once a day  -- Indication: For bowel regimen    bethanechol 5 mg oral tablet  -- 1 tab(s) by mouth every 8 hours  -- Indication: For urinary retention    melatonin 3 mg oral tablet  -- 2 tab(s) by mouth once a day (at bedtime)  -- Indication: For sleep    ocular lubricant ophthalmic solution  -- 1 drop(s) to each affected eye once a day  -- Indication: For eyes    timolol maleate 0.5% ophthalmic solution  -- 1 drop(s) to each affected eye 2 times a day  -- Indication: For glaucoma    dorzolamide 2% ophthalmic solution  -- 1 drop(s) to each affected eye 3 times a day  -- Indication: For glaucoma    brimonidine 0.2% ophthalmic solution  -- 1 drop(s) to each affected eye 2 times a day  -- Indication: For glaucoma    saccharomyces boulardii lyo 250 mg oral capsule  -- 1 cap(s) by mouth 2 times a day  -- Indication: For supplement    pantoprazole 40 mg oral granule, delayed release  -- 40 milligram(s) enteral once a day PEG  -- Indication: For stomach acid    Multiple Vitamins oral tablet  -- 1 tab(s) by mouth once a day  -- Indication: For supplement I will START or STAY ON the medications listed below when I get home from the hospital:    acetaminophen 325 mg oral tablet  -- 2 tab(s) by mouth every 6 hours, As needed, Temp greater or equal to 38C (100.4F), Mild Pain (1 - 3)  -- Indication: For pain    aspirin 81 mg oral tablet, chewable  -- 1 tab(s) by mouth once a day  -- Indication: For Cardioprotective    lisinopril 5 mg oral tablet  -- 1 tab(s) by mouth   -- Indication: For BP    enoxaparin  -- 40 milligram(s) subcutaneous once a day  -- Indication: For DVT ppx    FLUoxetine 10 mg oral capsule  -- 1 cap(s) by mouth   -- Indication: For mood and motor recovery    insulin lispro  -- LOW DOSE SLIDING SCALE INSULIN WITH MEALS AND AT BEDTIME PLEASE   -- Indication: For sliding scale insuling for diabetes    insulin glargine  -- 5 unit(s) subcutaneous once a day (at bedtime)  -- Indication: For diabetes control    ondansetron 4 mg oral tablet, disintegrating  -- 1 tab(s) by mouth 4 times a day  -- Indication: For nausea    nystatin 100,000 units/mL oral suspension  -- 5 milliliter(s) by mouth 2 times a day  -- Indication: For thrush    atorvastatin 80 mg oral tablet  -- 1 tab(s) by mouth once a day (at bedtime)  -- Indication: For Cholesterol    amantadine 100 mg oral capsule  -- 1 cap(s) by mouth   -- Indication: For neuroarousal    amLODIPine 5 mg oral tablet  -- 1 tab(s) by mouth once a day  -- Indication: For BP    ferrous sulfate 300 mg/5 mL (60 mg elemental iron) oral liquid  -- 5 milliliter(s) by mouth once a day  -- Indication: For anemia    senna 8.8 mg/5 mL oral syrup  -- 10 milliliter(s) by mouth once a day (at bedtime)  -- Indication: For bowel regimen    bisacodyl 10 mg rectal suppository  -- 1 suppository(ies) rectally once a day, As needed, Constipation  -- Indication: For bowel regimen    polyethylene glycol 3350 oral powder for reconstitution  -- 17 gram(s) by mouth once a day  -- Indication: For bowel regimen    bethanechol 5 mg oral tablet  -- 1 tab(s) by mouth every 8 hours  -- Indication: For urinary retention    melatonin 3 mg oral tablet  -- 2 tab(s) by mouth once a day (at bedtime)  -- Indication: For sleep    ocular lubricant ophthalmic solution  -- 1 drop(s) to each affected eye once a day  -- Indication: For eyes    timolol maleate 0.5% ophthalmic solution  -- 1 drop(s) to each affected eye 2 times a day  -- Indication: For glaucoma    dorzolamide 2% ophthalmic solution  -- 1 drop(s) to each affected eye 3 times a day  -- Indication: For glaucoma    brimonidine 0.2% ophthalmic solution  -- 1 drop(s) to each affected eye 2 times a day  -- Indication: For glaucoma    saccharomyces boulardii lyo 250 mg oral capsule  -- 1 cap(s) by mouth 2 times a day  -- Indication: For supplement    pantoprazole 40 mg oral granule, delayed release  -- 40 milligram(s) enteral once a day PEG  -- Indication: For stomach acid    Multiple Vitamins oral tablet  -- 1 tab(s) by mouth once a day  -- Indication: For supplement I will START or STAY ON the medications listed below when I get home from the hospital:    acetaminophen 325 mg oral tablet  -- 2 tab(s) by mouth every 6 hours, As needed, Temp greater or equal to 38C (100.4F), Mild Pain (1 - 3)  -- Indication: For pain    aspirin 81 mg oral tablet, chewable  -- 1 tab(s) by mouth once a day  -- Indication: For Cardioprotective    enoxaparin  -- 40 milligram(s) subcutaneous once a day  -- Indication: For DVT ppx    FLUoxetine 10 mg oral capsule  -- 1 cap(s) by mouth   -- Indication: For mood and motor recovery    insulin lispro  -- LOW DOSE SLIDING SCALE INSULIN WITH MEALS AND AT BEDTIME PLEASE   -- Indication: For sliding scale insuling for diabetes    insulin glargine  -- 5 unit(s) subcutaneous once a day (at bedtime)  -- Indication: For diabetes control    ondansetron 4 mg oral tablet, disintegrating  -- 1 tab(s) by mouth 4 times a day  -- Indication: For nausea    nystatin 100,000 units/mL oral suspension  -- 5 milliliter(s) by mouth 2 times a day  -- Indication: For thrush    atorvastatin 80 mg oral tablet  -- 1 tab(s) by mouth once a day (at bedtime)  -- Indication: For Cholesterol    amantadine 100 mg oral capsule  -- 1 cap(s) by mouth   -- Indication: For neuroarousal    amLODIPine 5 mg oral tablet  -- 1 tab(s) by mouth once a day  -- Indication: For BP    ferrous sulfate 300 mg/5 mL (60 mg elemental iron) oral liquid  -- 5 milliliter(s) by mouth once a day  -- Indication: For anemia    bisacodyl 10 mg rectal suppository  -- 1 suppository(ies) rectally once a day, As needed, Constipation  -- Indication: For bowel regimen    polyethylene glycol 3350 oral powder for reconstitution  -- 17 gram(s) by mouth once a day  -- Indication: For bowel regimen    senna 8.8 mg/5 mL oral syrup  -- 10 milliliter(s) by mouth once a day (at bedtime), As Needed  -- Indication: For Constipation    bethanechol 5 mg oral tablet  -- 1 tab(s) by mouth every 8 hours  -- Indication: For urinary retention    melatonin 3 mg oral tablet  -- 2 tab(s) by mouth once a day (at bedtime)  -- Indication: For sleep    ocular lubricant ophthalmic solution  -- 1 drop(s) to each affected eye once a day  -- Indication: For eyes    timolol maleate 0.5% ophthalmic solution  -- 1 drop(s) to each affected eye 2 times a day  -- Indication: For glaucoma    dorzolamide 2% ophthalmic solution  -- 1 drop(s) to each affected eye 3 times a day  -- Indication: For glaucoma    brimonidine 0.2% ophthalmic solution  -- 1 drop(s) to each affected eye 2 times a day  -- Indication: For glaucoma    saccharomyces boulardii lyo 250 mg oral capsule  -- 1 cap(s) by mouth 2 times a day  -- Indication: For supplement    pantoprazole 40 mg oral granule, delayed release  -- 40 milligram(s) enteral once a day PEG  -- Indication: For stomach acid    Multiple Vitamins oral tablet  -- 1 tab(s) by mouth once a day  -- Indication: For supplement I will START or STAY ON the medications listed below when I get home from the hospital:    acetaminophen 325 mg oral tablet  -- 2 tab(s) by mouth every 6 hours, As needed, Temp greater or equal to 38C (100.4F), Mild Pain (1 - 3)  -- Indication: For pain    aspirin 81 mg oral tablet, chewable  -- 1 tab(s) by mouth once a day  -- Indication: For Cardioprotective    enoxaparin  -- 40 milligram(s) subcutaneous once a day  -- Indication: For DVT ppx    FLUoxetine 10 mg oral capsule  -- 1 cap(s) by mouth   -- Indication: For mood and motor recovery    insulin lispro  -- LOW DOSE SLIDING SCALE INSULIN WITH MEALS AND AT BEDTIME PLEASE   -- Indication: For sliding scale insuling for diabetes    insulin glargine  -- 5 unit(s) subcutaneous once a day (at bedtime)  -- Indication: For diabetes control    ondansetron 4 mg oral tablet, disintegrating  -- 1 tab(s) by mouth 4 times a day  -- Indication: For nausea    atorvastatin 80 mg oral tablet  -- 1 tab(s) by mouth once a day (at bedtime)  -- Indication: For Cholesterol    amLODIPine 5 mg oral tablet  -- 1 tab(s) by mouth once a day  -- Indication: For BP    nystatin 100,000 units/g topical powder  -- 1 application on skin 2 times a day  -- Indication: For rash    ferrous sulfate 300 mg/5 mL (60 mg elemental iron) oral liquid  -- 5 milliliter(s) by mouth once a day  -- Indication: For anemia    bisacodyl 10 mg rectal suppository  -- 1 suppository(ies) rectally once a day, As needed, Constipation  -- Indication: For bowel regimen    polyethylene glycol 3350 oral powder for reconstitution  -- 17 gram(s) by mouth once a day  -- Indication: For bowel regimen    senna 8.8 mg/5 mL oral syrup  -- 10 milliliter(s) by mouth once a day (at bedtime), As Needed  -- Indication: For Constipation    bethanechol 5 mg oral tablet  -- 1 tab(s) by mouth every 8 hours  -- Indication: For urinary retention    melatonin 3 mg oral tablet  -- 2 tab(s) by mouth once a day (at bedtime)  -- Indication: For sleep    ocular lubricant ophthalmic solution  -- 1 drop(s) to each affected eye once a day  -- Indication: For eyes    timolol maleate 0.5% ophthalmic solution  -- 1 drop(s) to each affected eye 2 times a day  -- Indication: For glaucoma    dorzolamide 2% ophthalmic solution  -- 1 drop(s) to each affected eye 3 times a day  -- Indication: For glaucoma    brimonidine 0.2% ophthalmic solution  -- 1 drop(s) to each affected eye 2 times a day  -- Indication: For glaucoma    saccharomyces boulardii lyo 250 mg oral capsule  -- 1 cap(s) by mouth 2 times a day  -- Indication: For supplement    pantoprazole 40 mg oral granule, delayed release  -- 40 milligram(s) enteral once a day PEG  -- Indication: For stomach acid    Multiple Vitamins oral tablet  -- 1 tab(s) by mouth once a day  -- Indication: For supplement

## 2018-11-09 PROCEDURE — 99232 SBSQ HOSP IP/OBS MODERATE 35: CPT

## 2018-11-09 RX ORDER — BETHANECHOL CHLORIDE 25 MG
1 TABLET ORAL
Qty: 0 | Refills: 0 | COMMUNITY
Start: 2018-11-09

## 2018-11-09 RX ADMIN — Medication 5 MILLIGRAM(S): at 13:01

## 2018-11-09 RX ADMIN — SENNA PLUS 10 MILLILITER(S): 8.6 TABLET ORAL at 22:36

## 2018-11-09 RX ADMIN — Medication 1 DROP(S): at 17:56

## 2018-11-09 RX ADMIN — Medication 100 MILLIGRAM(S): at 05:46

## 2018-11-09 RX ADMIN — Medication 250 MILLIGRAM(S): at 05:46

## 2018-11-09 RX ADMIN — Medication 1: at 22:47

## 2018-11-09 RX ADMIN — ONDANSETRON 4 MILLIGRAM(S): 8 TABLET, FILM COATED ORAL at 12:54

## 2018-11-09 RX ADMIN — Medication 1 TABLET(S): at 17:56

## 2018-11-09 RX ADMIN — Medication 5 MILLIGRAM(S): at 05:47

## 2018-11-09 RX ADMIN — Medication 1: at 12:08

## 2018-11-09 RX ADMIN — LISINOPRIL 5 MILLIGRAM(S): 2.5 TABLET ORAL at 17:56

## 2018-11-09 RX ADMIN — Medication 100 MILLIGRAM(S): at 12:54

## 2018-11-09 RX ADMIN — PANTOPRAZOLE SODIUM 40 MILLIGRAM(S): 20 TABLET, DELAYED RELEASE ORAL at 12:54

## 2018-11-09 RX ADMIN — ENOXAPARIN SODIUM 40 MILLIGRAM(S): 100 INJECTION SUBCUTANEOUS at 12:54

## 2018-11-09 RX ADMIN — Medication 1 DROP(S): at 12:51

## 2018-11-09 RX ADMIN — Medication 1 TABLET(S): at 05:46

## 2018-11-09 RX ADMIN — Medication 2: at 16:13

## 2018-11-09 RX ADMIN — BRIMONIDINE TARTRATE 1 DROP(S): 2 SOLUTION/ DROPS OPHTHALMIC at 05:48

## 2018-11-09 RX ADMIN — Medication 6 MILLIGRAM(S): at 22:37

## 2018-11-09 RX ADMIN — Medication 1 DROP(S): at 05:48

## 2018-11-09 RX ADMIN — POLYETHYLENE GLYCOL 3350 17 GRAM(S): 17 POWDER, FOR SOLUTION ORAL at 12:54

## 2018-11-09 RX ADMIN — Medication 5 MILLIGRAM(S): at 22:36

## 2018-11-09 RX ADMIN — ATORVASTATIN CALCIUM 80 MILLIGRAM(S): 80 TABLET, FILM COATED ORAL at 22:36

## 2018-11-09 RX ADMIN — Medication 500000 UNIT(S): at 17:55

## 2018-11-09 RX ADMIN — BRIMONIDINE TARTRATE 1 DROP(S): 2 SOLUTION/ DROPS OPHTHALMIC at 17:55

## 2018-11-09 RX ADMIN — DORZOLAMIDE HYDROCHLORIDE 1 DROP(S): 20 SOLUTION/ DROPS OPHTHALMIC at 05:47

## 2018-11-09 RX ADMIN — DORZOLAMIDE HYDROCHLORIDE 1 DROP(S): 20 SOLUTION/ DROPS OPHTHALMIC at 13:01

## 2018-11-09 RX ADMIN — ONDANSETRON 4 MILLIGRAM(S): 8 TABLET, FILM COATED ORAL at 17:56

## 2018-11-09 RX ADMIN — AMLODIPINE BESYLATE 5 MILLIGRAM(S): 2.5 TABLET ORAL at 05:46

## 2018-11-09 RX ADMIN — Medication 250 MILLIGRAM(S): at 17:56

## 2018-11-09 RX ADMIN — Medication 300 MILLIGRAM(S): at 12:54

## 2018-11-09 RX ADMIN — DORZOLAMIDE HYDROCHLORIDE 1 DROP(S): 20 SOLUTION/ DROPS OPHTHALMIC at 22:36

## 2018-11-09 RX ADMIN — Medication 1 TABLET(S): at 12:54

## 2018-11-09 RX ADMIN — Medication 81 MILLIGRAM(S): at 12:54

## 2018-11-09 RX ADMIN — Medication 10 MILLIGRAM(S): at 17:56

## 2018-11-09 RX ADMIN — Medication 500000 UNIT(S): at 05:49

## 2018-11-09 RX ADMIN — ONDANSETRON 4 MILLIGRAM(S): 8 TABLET, FILM COATED ORAL at 05:46

## 2018-11-09 NOTE — CHART NOTE - NSCHARTNOTEFT_GEN_A_CORE
Nutrition Follow Up     Hospital Course (Per Electronic Medical Record):     Source: Patient [ ]    Family [ ]     [X] Medical Record    Diet: Puree Diet w/ No Liquids  Ensure Pudding 4oz TID  Consumes 0-25% of Meals  Tolerates Bolus Well    Enteral /Parenteral Nutrition:   Continues on 2CalHN 1 Can Bolus Q8hr    Current Weight: 172.4lb on 11/6  % Weight Change: N/A  Continue Weights Weekly    Pertinent Medications: MEDICATIONS  (STANDING):  amantadine 100 milliGRAM(s) Oral <User Schedule>  amLODIPine   Tablet 5 milliGRAM(s) Oral daily  artificial tears (preservative free) Ophthalmic Solution 1 Drop(s) Both EYES daily  aspirin  chewable 81 milliGRAM(s) Enteral Tube daily  atorvastatin 80 milliGRAM(s) Oral at bedtime  bethanechol 5 milliGRAM(s) Oral every 8 hours  brimonidine 0.2% Ophthalmic Solution 1 Drop(s) Both EYES two times a day  dextrose 5%. 1000 milliLiter(s) (50 mL/Hr) IV Continuous <Continuous>  dextrose 50% Injectable 12.5 Gram(s) IV Push once  dextrose 50% Injectable 25 Gram(s) IV Push once  dextrose 50% Injectable 25 Gram(s) IV Push once  dorzolamide 2% Ophthalmic Solution 1 Drop(s) Both EYES three times a day  enoxaparin Injectable 40 milliGRAM(s) SubCutaneous daily  ferrous    sulfate Liquid 300 milliGRAM(s) Enteral Tube daily  FLUoxetine 10 milliGRAM(s) Oral <User Schedule>  insulin lispro (HumaLOG) corrective regimen sliding scale   SubCutaneous Before meals and at bedtime  lisinopril 5 milliGRAM(s) Oral <User Schedule>  melatonin 6 milliGRAM(s) Oral at bedtime  multivitamin 1 Tablet(s) Oral daily  nystatin    Suspension 840923 Unit(s) Oral two times a day  ondansetron   Disintegrating Tablet 4 milliGRAM(s) Enteral Tube four times a day  pantoprazole   Suspension 40 milliGRAM(s) Oral daily  polyethylene glycol 3350 17 Gram(s) Oral daily  saccharomyces boulardii 250 milliGRAM(s) Oral two times a day  senna Syrup 10 milliLiter(s) Oral at bedtime  timolol 0.5% Solution 1 Drop(s) Both EYES two times a day  trimethoprim  160 mG/sulfamethoxazole 800 mG 1 Tablet(s) Oral two times a day    MEDICATIONS  (PRN):  acetaminophen   Tablet .. 650 milliGRAM(s) Oral every 6 hours PRN Temp greater or equal to 38C (100.4F), Mild Pain (1 - 3)  bisacodyl Suppository 10 milliGRAM(s) Rectal daily PRN Constipation  dextrose 40% Gel 15 Gram(s) Oral once PRN Blood Glucose LESS THAN 70 milliGRAM(s)/deciliter  glucagon  Injectable 1 milliGRAM(s) IntraMuscular once PRN Glucose LESS THAN 70 milligrams/deciliter    Pertinent Labs:  11-08 Na134 mmol/L<L> Glu 113 mg/dL<H> K+ 4.9 mmol/L Cr  1.19 mg/dL BUN 27 mg/dL<H> 10-19 [id number] 6.0 %<H> 10-19 Chol 167 mg/dL  mg/dL HDL 52 mg/dL Trig 75 mg/dL    Skin: No Pressure Ulcers    Edema: None Noted    Last BM - 11/8  Estimated Needs:   [X] No Change since Previous Assessment  [ ] Recalculated:     Previous Nutrition Diagnosis:   Swallowing difficulty  Moderate Malnutrition    Nutrition Diagnosis is [X] Ongoing  [ ] Resolved [ ] Not Applicable   Continues on Enteral Feeding & Continues on Puree Diet w/ No Liquids     New Nutrition Diagnosis: [X] Not Applicable    Interventions:   1. Recommend Continue Diet & Enteral Feedings    Monitoring & Evaluation:   [X] PO intake   [X] Tolerance to Diet & TF Prescription   [X] Weights   [X] Follow Up (Per Protocol)  [X] Other: Labs    RD Remains Available.  Fantasma Persaud RD

## 2018-11-09 NOTE — PROGRESS NOTE ADULT - SUBJECTIVE AND OBJECTIVE BOX
History of Present Illness: 	  88yo [place]i speaking female with PMH of HTN and hyperlipidemia who presented to [facility] on 10/19/18 who was last seen normal at 22:10 the night prior, who was then found down on the ground by family members with inability to communicate or move her right side. She was initially evaluated at [facility], found on neurological examination to have right hemiparesis, right hemianopia, right dense facial droop, right sensory loss, left gaze preference with gaze palsy and aphasia. Deemed candidate for tPA. 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 Patient was transferred for further management to [facility]. She then underwent thrombectomy with resultant TICI 2B flow. Failed swallow eval—kept NPO, NGT replaced by PEG 10/26, and tolerating PEG feeding. Telemetry monitoring showed frequent ectopy; Cardiology consulted; no evidence of Afib, she was initially started on IV Lopressor for tachycardia, optimized to po Lopressor for better control. TTE showed PFO. Loop recorder placed. Also noted some hemorrhagic conversion without neurologic deterioration so did not undergo any neurosurgical intervention. Hospital course significant for fever Tmax 101; ID consulted, patient was initially started on IV ceftriaxone for positive UA but urine and blood cultures negative so patient monitored off antibiotics without further fevers. Patient also with new onset urinary retention requiring straight cath protocol as she has been anuric. Patient deemed medically stable for dc to acute rehab 11/1/18.    Subjective:  Pt seen and examined.  Aphasic.  Appears well in NAD.  As per nursing pt slept well.  Appetite still poor; ate 25% of breakfast.  +BM yesterday.  No apparent respiratory complaints.  Pt still requiring straight cath as not voiding sufficient quantities although is now voiding as of yesterday evening    Vital Signs Last 24 Hrs  T(C): 36.7 (09 Nov 2018 09:33), Max: 36.7 (08 Nov 2018 21:12)  T(F): 98.1 (09 Nov 2018 09:33), Max: 98.1 (08 Nov 2018 21:12)  HR: 62 (09 Nov 2018 09:33) (62 - 80)  BP: 106/64 (09 Nov 2018 09:33) (97/56 - 122/62)  BP(mean): --  RR: 12 (09 Nov 2018 09:33) (12 - 14)  SpO2: 98% (09 Nov 2018 09:33) (95% - 98%)    Physical Exam:   Constitutional - NAD, Comfortable  HEENT - NCAT  Neck - Supple, No limited ROM  Chest - CTA bilaterally, No wheeze, No rhonchi, No crackles  Cardiovascular - RRR, S1S2, No murmurs  Abdomen - BS+, Soft, NTND, +PEG c/d/i  Extremities - No calf tenderness   Neurologic Exam -                    Cognitive - Awake, Alert     Communication - non-verbal, receptive + expressive aphasia. follows some single step commands in [place]i.     Motor - No focal deficits                    LEFT    UE - >3/5 throughout                    RIGHT UE - 0/5 throughout, no movement in response to pain stimulus                    LEFT    LE - >3/5 throughout                    RIGHT LE - <3/5 at knee and foot, moves to pain stimulus    Psychiatric - Mood stable, Affect WNL	    Function:  Transfers: Max A x 2  ADLs: total A  severe global aphasia    MEDICATIONS  (STANDING):  amantadine 100 milliGRAM(s) Oral <User Schedule>  amLODIPine   Tablet 5 milliGRAM(s) Oral daily  artificial tears (preservative free) Ophthalmic Solution 1 Drop(s) Both EYES daily  aspirin  chewable 81 milliGRAM(s) Enteral Tube daily  atorvastatin 80 milliGRAM(s) Oral at bedtime  bethanechol 5 milliGRAM(s) Oral every 8 hours  brimonidine 0.2% Ophthalmic Solution 1 Drop(s) Both EYES two times a day  dextrose 5%. 1000 milliLiter(s) (50 mL/Hr) IV Continuous <Continuous>  dextrose 50% Injectable 12.5 Gram(s) IV Push once  dextrose 50% Injectable 25 Gram(s) IV Push once  dextrose 50% Injectable 25 Gram(s) IV Push once  dorzolamide 2% Ophthalmic Solution 1 Drop(s) Both EYES three times a day  enoxaparin Injectable 40 milliGRAM(s) SubCutaneous daily  ferrous    sulfate Liquid 300 milliGRAM(s) Enteral Tube daily  FLUoxetine 10 milliGRAM(s) Oral <User Schedule>  insulin lispro (HumaLOG) corrective regimen sliding scale   SubCutaneous Before meals and at bedtime  lisinopril 5 milliGRAM(s) Oral <User Schedule>  melatonin 6 milliGRAM(s) Oral at bedtime  multivitamin 1 Tablet(s) Oral daily  nystatin    Suspension 649706 Unit(s) Oral two times a day  ondansetron   Disintegrating Tablet 4 milliGRAM(s) Enteral Tube four times a day  pantoprazole   Suspension 40 milliGRAM(s) Oral daily  polyethylene glycol 3350 17 Gram(s) Oral daily  saccharomyces boulardii 250 milliGRAM(s) Oral two times a day  senna Syrup 10 milliLiter(s) Oral at bedtime  timolol 0.5% Solution 1 Drop(s) Both EYES two times a day  trimethoprim  160 mG/sulfamethoxazole 800 mG 1 Tablet(s) Oral two times a day    MEDICATIONS  (PRN):  acetaminophen   Tablet .. 650 milliGRAM(s) Oral every 6 hours PRN Temp greater or equal to 38C (100.4F), Mild Pain (1 - 3)  bisacodyl Suppository 10 milliGRAM(s) Rectal daily PRN Constipation  dextrose 40% Gel 15 Gram(s) Oral once PRN Blood Glucose LESS THAN 70 milliGRAM(s)/deciliter  glucagon  Injectable 1 milliGRAM(s) IntraMuscular once PRN Glucose LESS THAN 70 milligrams/deciliter               Labs                        8.9    11.6  )-----------( 348      ( 08 Nov 2018 05:30 )             27.4   11-08    134<L>  |  100  |  27<H>  ----------------------------<  113<H>  4.9   |  28  |  1.19    Ca    8.6      08 Nov 2018 05:30      CAPILLARY BLOOD GLUCOSE      POCT Blood Glucose.: 172 mg/dL (09 Nov 2018 12:07)  POCT Blood Glucose.: 121 mg/dL (09 Nov 2018 07:38)  POCT Blood Glucose.: 252 mg/dL (08 Nov 2018 21:15)  POCT Blood Glucose.: 151 mg/dL (08 Nov 2018 16:37)          A/P:  88yo F with left MCA CVA, now with right sided hemiplegia, aphasia, dysphagia, cognitive deficits, functional and gait impairments.    Continue comprehensive program of PT/OT/SLP    #HTN/Afib  lisinopril, Norvasc, Lopressor halved 11/5/18 to 12.5 q12hrs based on hospitalist recs re: BP/HR-D/Cd 11/7 as has been held due to parameters    #DM2-HbA1C 6.0 on 10/19  Metformin discontinued.  Cont ISS, blood glucose.      #Urinary retention  PVRs 309, 844, 467, 312.   Added urecholine 5mg bid 11/8  Cont TOV with PVR q6hrs with straight cath protocol  +UTI: Urine culture sensitive to Bactrim; initiated 11/3    #Leukocytosis  WBC stable.  Afebrile.  CXR 11/1 clear.  Urine culture >100,000cfu/ml Ecoli.  Complete course of Bactrim/Florastor    Monitor CBC    #Bowel/GI-Last BM 11/2  Senna, Miralax, Dulcolax suppository prn    #CVA   ASA, statin  Added Prozac for mood and motor recovery  Amantadine commenced 11/5/18 to promote neuroarousal  Melatonin to normalize sleep wake cycles    #Dysphagia  Pt admitted with NPO with TF- bolus feeds commenced, free water via PEG increased to 300cc QID as Cr creeping up; improved with Cr 1.19.  Diet upgraded to pureed with no po liquids 11/2, supplement with 2cal HN if eats <50%  Cont tube feeds until adequate po consumption  aspiration precautions  Oral care TID    #Pain control-Pt appears to be without pain  Tylenol prn  D/Cd gabapentin    #anemia  iron supplement    Precautions: fall, aspiration                                                                            DVT Prophylaxis: Lovenox   Diet: Pureed consistent carb/no liquids with PEG feeds                                                                      Continue comprehensive program of PT/OT/SLP.

## 2018-11-10 PROCEDURE — 99231 SBSQ HOSP IP/OBS SF/LOW 25: CPT

## 2018-11-10 PROCEDURE — 99232 SBSQ HOSP IP/OBS MODERATE 35: CPT

## 2018-11-10 RX ADMIN — Medication 81 MILLIGRAM(S): at 14:35

## 2018-11-10 RX ADMIN — ONDANSETRON 4 MILLIGRAM(S): 8 TABLET, FILM COATED ORAL at 23:34

## 2018-11-10 RX ADMIN — ONDANSETRON 4 MILLIGRAM(S): 8 TABLET, FILM COATED ORAL at 18:19

## 2018-11-10 RX ADMIN — Medication 1: at 12:12

## 2018-11-10 RX ADMIN — ATORVASTATIN CALCIUM 80 MILLIGRAM(S): 80 TABLET, FILM COATED ORAL at 22:35

## 2018-11-10 RX ADMIN — Medication 6 MILLIGRAM(S): at 22:35

## 2018-11-10 RX ADMIN — Medication 300 MILLIGRAM(S): at 14:35

## 2018-11-10 RX ADMIN — ONDANSETRON 4 MILLIGRAM(S): 8 TABLET, FILM COATED ORAL at 05:44

## 2018-11-10 RX ADMIN — Medication 1 TABLET(S): at 14:35

## 2018-11-10 RX ADMIN — Medication 500000 UNIT(S): at 18:19

## 2018-11-10 RX ADMIN — ENOXAPARIN SODIUM 40 MILLIGRAM(S): 100 INJECTION SUBCUTANEOUS at 14:38

## 2018-11-10 RX ADMIN — Medication 500000 UNIT(S): at 05:44

## 2018-11-10 RX ADMIN — PANTOPRAZOLE SODIUM 40 MILLIGRAM(S): 20 TABLET, DELAYED RELEASE ORAL at 14:34

## 2018-11-10 RX ADMIN — Medication 250 MILLIGRAM(S): at 18:19

## 2018-11-10 RX ADMIN — DORZOLAMIDE HYDROCHLORIDE 1 DROP(S): 20 SOLUTION/ DROPS OPHTHALMIC at 22:34

## 2018-11-10 RX ADMIN — Medication 100 MILLIGRAM(S): at 05:43

## 2018-11-10 RX ADMIN — Medication 5 MILLIGRAM(S): at 14:35

## 2018-11-10 RX ADMIN — Medication 100 MILLIGRAM(S): at 14:37

## 2018-11-10 RX ADMIN — Medication 5 MILLIGRAM(S): at 05:44

## 2018-11-10 RX ADMIN — AMLODIPINE BESYLATE 5 MILLIGRAM(S): 2.5 TABLET ORAL at 05:43

## 2018-11-10 RX ADMIN — DORZOLAMIDE HYDROCHLORIDE 1 DROP(S): 20 SOLUTION/ DROPS OPHTHALMIC at 05:44

## 2018-11-10 RX ADMIN — DORZOLAMIDE HYDROCHLORIDE 1 DROP(S): 20 SOLUTION/ DROPS OPHTHALMIC at 14:39

## 2018-11-10 RX ADMIN — Medication 2: at 18:18

## 2018-11-10 RX ADMIN — Medication 250 MILLIGRAM(S): at 05:44

## 2018-11-10 RX ADMIN — Medication 1 TABLET(S): at 05:43

## 2018-11-10 RX ADMIN — ONDANSETRON 4 MILLIGRAM(S): 8 TABLET, FILM COATED ORAL at 14:40

## 2018-11-10 RX ADMIN — BRIMONIDINE TARTRATE 1 DROP(S): 2 SOLUTION/ DROPS OPHTHALMIC at 05:44

## 2018-11-10 RX ADMIN — Medication 2: at 22:35

## 2018-11-10 RX ADMIN — BRIMONIDINE TARTRATE 1 DROP(S): 2 SOLUTION/ DROPS OPHTHALMIC at 18:18

## 2018-11-10 RX ADMIN — Medication 1 DROP(S): at 14:40

## 2018-11-10 RX ADMIN — Medication 10 MILLIGRAM(S): at 18:19

## 2018-11-10 RX ADMIN — Medication 1 DROP(S): at 05:44

## 2018-11-10 RX ADMIN — Medication 1 DROP(S): at 18:18

## 2018-11-10 RX ADMIN — Medication 5 MILLIGRAM(S): at 22:35

## 2018-11-10 RX ADMIN — LISINOPRIL 5 MILLIGRAM(S): 2.5 TABLET ORAL at 18:19

## 2018-11-10 RX ADMIN — ONDANSETRON 4 MILLIGRAM(S): 8 TABLET, FILM COATED ORAL at 00:00

## 2018-11-10 NOTE — PROGRESS NOTE ADULT - SUBJECTIVE AND OBJECTIVE BOX
86yo Welsh speaking female with PMH of HTN and hyperlipidemia who presented to [facility] on 10/19/18 who was last seen normal at 22:10 the night prior, who was then found down on the ground by family members with inability to communicate or move her right side. She was initially evaluated at [place], found on neurological examination to have right hemiparesis, right hemianopia, right dense facial droop, right sensory loss, left gaze preference with gaze palsy and aphasia. Deemed candidate for tPA. 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 Patient was transferred for further management to [facility]. She then underwent thrombectomy with resultant TICI 2B flow. Failed swallow eval—kept NPO, NGT replaced by PEG 10/26, and tolerating PEG feeding. Telemetry monitoring showed frequent ectopy; Cardiology consulted; no evidence of Afib, she was initially started on IV Lopressor for tachycardia, optimized to po Lopressor for better control. TTE showed PFO. Loop recorder placed. Also noted some hemorrhagic conversion without neurologic deterioration so did not undergo any neurosurgical intervention. Hospital course significant for fever Tmax 101; ID consulted, patient was initially started on IV ceftriaxone for positive UA but urine and blood cultures negative so patient monitored off antibiotics without further fevers. Patient also with new onset urinary retention requiring straight cath protocol as she has been anuric. Patient deemed medically stable for dc to acute rehab 11/1/18. (01 Nov 2018 16:38)      Subjective    No events overnight.       PAST MEDICAL & SURGICAL HISTORY:  HLD (hyperlipidemia)  Hypertension      MedsMEDICATIONS  (STANDING):  amantadine 100 milliGRAM(s) Oral <User Schedule>  amLODIPine   Tablet 5 milliGRAM(s) Oral daily  artificial tears (preservative free) Ophthalmic Solution 1 Drop(s) Both EYES daily  aspirin  chewable 81 milliGRAM(s) Enteral Tube daily  atorvastatin 80 milliGRAM(s) Oral at bedtime  bethanechol 5 milliGRAM(s) Oral every 8 hours  brimonidine 0.2% Ophthalmic Solution 1 Drop(s) Both EYES two times a day  dextrose 5%. 1000 milliLiter(s) (50 mL/Hr) IV Continuous <Continuous>  dextrose 50% Injectable 12.5 Gram(s) IV Push once  dextrose 50% Injectable 25 Gram(s) IV Push once  dextrose 50% Injectable 25 Gram(s) IV Push once  dorzolamide 2% Ophthalmic Solution 1 Drop(s) Both EYES three times a day  enoxaparin Injectable 40 milliGRAM(s) SubCutaneous daily  ferrous    sulfate Liquid 300 milliGRAM(s) Enteral Tube daily  FLUoxetine 10 milliGRAM(s) Oral <User Schedule>  insulin lispro (HumaLOG) corrective regimen sliding scale   SubCutaneous Before meals and at bedtime  lisinopril 5 milliGRAM(s) Oral <User Schedule>  melatonin 6 milliGRAM(s) Oral at bedtime  multivitamin 1 Tablet(s) Oral daily  nystatin    Suspension 585828 Unit(s) Oral two times a day  ondansetron   Disintegrating Tablet 4 milliGRAM(s) Enteral Tube four times a day  pantoprazole   Suspension 40 milliGRAM(s) Oral daily  polyethylene glycol 3350 17 Gram(s) Oral daily  saccharomyces boulardii 250 milliGRAM(s) Oral two times a day  senna Syrup 10 milliLiter(s) Oral at bedtime  timolol 0.5% Solution 1 Drop(s) Both EYES two times a day    MEDICATIONS  (PRN):  acetaminophen   Tablet .. 650 milliGRAM(s) Oral every 6 hours PRN Temp greater or equal to 38C (100.4F), Mild Pain (1 - 3)  bisacodyl Suppository 10 milliGRAM(s) Rectal daily PRN Constipation  dextrose 40% Gel 15 Gram(s) Oral once PRN Blood Glucose LESS THAN 70 milliGRAM(s)/deciliter  glucagon  Injectable 1 milliGRAM(s) IntraMuscular once PRN Glucose LESS THAN 70 milligrams/deciliter      Vital Signs Last 24 Hrs  T(C): 36.6 (10 Nov 2018 08:06), Max: 36.7 (09 Nov 2018 22:53)  T(F): 97.9 (10 Nov 2018 08:06), Max: 98 (09 Nov 2018 22:53)  HR: 68 (10 Nov 2018 08:06) (62 - 68)  BP: 112/72 (10 Nov 2018 08:06) (111/62 - 113/-)  BP(mean): 62 (10 Nov 2018 06:09) (62 - 62)  RR: 14 (10 Nov 2018 08:06) (14 - 14)  SpO2: 96% (10 Nov 2018 08:06) (96% - 98%)  I&O's Summary    09 Nov 2018 07:01  -  10 Nov 2018 07:00  --------------------------------------------------------  IN: 1570 mL / OUT: 1300 mL / NET: 270 mL        PHYSICAL EXAM:  GENERAL: NAD  NECK: Supple  NERVOUS SYSTEM:  awake and alert  HEART: S1s2 NL , RRR  CHEST/LUNG: Clear to percussion bilaterally  ABDOMEN: Soft, Nontender, Nondistended; Bowel sounds present  EXTREMITIES:  No edema      LABS:  |11-08-18 @ 05:30            8.9<L>  11.6<H>>--------------<348            27.4<L>      134<L>  |  100  |  27<H>  --------------------------<113<H>  4.9  |  8.6  |  1.19    Mg -- / Phos--    PT -- / INR --    PTT --    Lipase --  11-08-18 @ 05:30  CAPILLARY BLOOD GLUCOSE      POCT Blood Glucose.: 118 mg/dL (10 Nov 2018 08:11)  POCT Blood Glucose.: 181 mg/dL (09 Nov 2018 22:38)  POCT Blood Glucose.: 219 mg/dL (09 Nov 2018 16:11)  POCT Blood Glucose.: 172 mg/dL (09 Nov 2018 12:07)    Imaging Personally Reviewed:  [ ] YES  [ ] NO      HEALTH ISSUES - PROBLEM Dx:    CVA-PT/OT per rehab    Cont ASA, statin    HTN-Cont lisinopril, Norvasc     DM2 - cont Humalog    Dysphagia-s/p PEG, currently on PO diet with TF with Water bolus(inc on 11/6 due to inc Cr)-Cr improved but Na dec. Monitor labs.     Leukocytosis-Afebrile. Monitor for now        Care Discussed with Consultants/Other Providers [ x] YES  [ ] NO

## 2018-11-10 NOTE — PROGRESS NOTE ADULT - SUBJECTIVE AND OBJECTIVE BOX
INTERVAL SUBJECTIVE & REVIEW OF SYMPTOMS:87 year old female with left MCA stroke  Chart reviewed. Patient seen at bedside . Seated in chair. Per nursing staff appetite poor         MEDICATIONS:  acetaminophen   Tablet .. 650 milliGRAM(s) Oral every 6 hours PRN  amantadine 100 milliGRAM(s) Oral <User Schedule>  amLODIPine   Tablet 5 milliGRAM(s) Oral daily  artificial tears (preservative free) Ophthalmic Solution 1 Drop(s) Both EYES daily  aspirin  chewable 81 milliGRAM(s) Enteral Tube daily  atorvastatin 80 milliGRAM(s) Oral at bedtime  bethanechol 5 milliGRAM(s) Oral every 8 hours  bisacodyl Suppository 10 milliGRAM(s) Rectal daily PRN  brimonidine 0.2% Ophthalmic Solution 1 Drop(s) Both EYES two times a day  dextrose 40% Gel 15 Gram(s) Oral once PRN  dextrose 5%. 1000 milliLiter(s) IV Continuous <Continuous>  dextrose 50% Injectable 12.5 Gram(s) IV Push once  dextrose 50% Injectable 25 Gram(s) IV Push once  dextrose 50% Injectable 25 Gram(s) IV Push once  dorzolamide 2% Ophthalmic Solution 1 Drop(s) Both EYES three times a day  enoxaparin Injectable 40 milliGRAM(s) SubCutaneous daily  ferrous    sulfate Liquid 300 milliGRAM(s) Enteral Tube daily  FLUoxetine 10 milliGRAM(s) Oral <User Schedule>  glucagon  Injectable 1 milliGRAM(s) IntraMuscular once PRN  insulin lispro (HumaLOG) corrective regimen sliding scale   SubCutaneous Before meals and at bedtime  lisinopril 5 milliGRAM(s) Oral <User Schedule>  melatonin 6 milliGRAM(s) Oral at bedtime  multivitamin 1 Tablet(s) Oral daily  nystatin    Suspension 454790 Unit(s) Oral two times a day  ondansetron   Disintegrating Tablet 4 milliGRAM(s) Enteral Tube four times a day  pantoprazole   Suspension 40 milliGRAM(s) Oral daily  polyethylene glycol 3350 17 Gram(s) Oral daily  saccharomyces boulardii 250 milliGRAM(s) Oral two times a day  senna Syrup 10 milliLiter(s) Oral at bedtime  timolol 0.5% Solution 1 Drop(s) Both EYES two times a day      REVIEW OF SYSTEMS  Limited due to aphasia    VITAL SIGNS  Vital Signs Last 24 Hrs  T(C): 36.6 (10 Nov 2018 08:06), Max: 36.7 (09 Nov 2018 22:53)  T(F): 97.9 (10 Nov 2018 08:06), Max: 98 (09 Nov 2018 22:53)  HR: 68 (10 Nov 2018 08:06) (62 - 68)  BP: 112/72 (10 Nov 2018 08:06) (111/62 - 113/-)  BP(mean): 62 (10 Nov 2018 06:09) (62 - 62)  RR: 14 (10 Nov 2018 08:06) (14 - 14)  SpO2: 96% (10 Nov 2018 08:06) (96% - 98%)    PHYSICAL EXAM  General: NAD  Cardio: s1s2+  Resp: clear  Abdomen: soft  Neuro: alert, awake. expressive and receptive aphasia, followed a few simple 1 step commands  Extrem: no edema      CAPILLARY BLOOD GLUCOSE      POCT Blood Glucose.: 237 mg/dL (10 Nov 2018 18:02)  POCT Blood Glucose.: 184 mg/dL (10 Nov 2018 12:04)  POCT Blood Glucose.: 118 mg/dL (10 Nov 2018 08:11)  POCT Blood Glucose.: 181 mg/dL (09 Nov 2018 22:38)      A/P:    87 year old female with left MCA stroke  Patient with right hemiplegia, aphasia, dysphagia    Continue current rehab program:PT/OT/ST    Secondary stroke prophylaxis:on ASA, statins, risk factor management    DVT prophylaxis:on Lovenox    On amantadine to improve arousal, on Fluoxetine for motor recovery/mood    Pain: on tylenol

## 2018-11-11 LAB
ALBUMIN SERPL ELPH-MCNC: 2.3 G/DL — LOW (ref 3.3–5)
ALP SERPL-CCNC: 96 U/L — SIGNIFICANT CHANGE UP (ref 40–120)
ALT FLD-CCNC: 24 U/L DA — SIGNIFICANT CHANGE UP (ref 10–45)
ANION GAP SERPL CALC-SCNC: 7 MMOL/L — SIGNIFICANT CHANGE UP (ref 5–17)
AST SERPL-CCNC: 25 U/L — SIGNIFICANT CHANGE UP (ref 10–40)
BILIRUB SERPL-MCNC: 0.2 MG/DL — SIGNIFICANT CHANGE UP (ref 0.2–1.2)
BUN SERPL-MCNC: 32 MG/DL — HIGH (ref 7–23)
CALCIUM SERPL-MCNC: 8.9 MG/DL — SIGNIFICANT CHANGE UP (ref 8.4–10.5)
CHLORIDE SERPL-SCNC: 100 MMOL/L — SIGNIFICANT CHANGE UP (ref 96–108)
CO2 SERPL-SCNC: 25 MMOL/L — SIGNIFICANT CHANGE UP (ref 22–31)
CREAT SERPL-MCNC: 1.33 MG/DL — HIGH (ref 0.5–1.3)
GLUCOSE SERPL-MCNC: 118 MG/DL — HIGH (ref 70–99)
HCT VFR BLD CALC: 29.6 % — LOW (ref 34.5–45)
HGB BLD-MCNC: 9.8 G/DL — LOW (ref 11.5–15.5)
MAGNESIUM SERPL-MCNC: 2 MG/DL — SIGNIFICANT CHANGE UP (ref 1.6–2.6)
MCHC RBC-ENTMCNC: 30.9 PG — SIGNIFICANT CHANGE UP (ref 27–34)
MCHC RBC-ENTMCNC: 33.1 GM/DL — SIGNIFICANT CHANGE UP (ref 32–36)
MCV RBC AUTO: 93.4 FL — SIGNIFICANT CHANGE UP (ref 80–100)
PLATELET # BLD AUTO: 392 K/UL — SIGNIFICANT CHANGE UP (ref 150–400)
POTASSIUM SERPL-MCNC: 5.2 MMOL/L — SIGNIFICANT CHANGE UP (ref 3.5–5.3)
POTASSIUM SERPL-SCNC: 5.2 MMOL/L — SIGNIFICANT CHANGE UP (ref 3.5–5.3)
PROT SERPL-MCNC: 6.8 G/DL — SIGNIFICANT CHANGE UP (ref 6–8.3)
RBC # BLD: 3.17 M/UL — LOW (ref 3.8–5.2)
RBC # FLD: 13.1 % — SIGNIFICANT CHANGE UP (ref 10.3–14.5)
SODIUM SERPL-SCNC: 132 MMOL/L — LOW (ref 135–145)
WBC # BLD: 13 K/UL — HIGH (ref 3.8–10.5)
WBC # FLD AUTO: 13 K/UL — HIGH (ref 3.8–10.5)

## 2018-11-11 PROCEDURE — 99231 SBSQ HOSP IP/OBS SF/LOW 25: CPT

## 2018-11-11 PROCEDURE — 99232 SBSQ HOSP IP/OBS MODERATE 35: CPT

## 2018-11-11 RX ADMIN — DORZOLAMIDE HYDROCHLORIDE 1 DROP(S): 20 SOLUTION/ DROPS OPHTHALMIC at 05:19

## 2018-11-11 RX ADMIN — BRIMONIDINE TARTRATE 1 DROP(S): 2 SOLUTION/ DROPS OPHTHALMIC at 18:25

## 2018-11-11 RX ADMIN — Medication 5 MILLIGRAM(S): at 05:18

## 2018-11-11 RX ADMIN — Medication 1 DROP(S): at 05:18

## 2018-11-11 RX ADMIN — ONDANSETRON 4 MILLIGRAM(S): 8 TABLET, FILM COATED ORAL at 23:53

## 2018-11-11 RX ADMIN — Medication 100 MILLIGRAM(S): at 05:21

## 2018-11-11 RX ADMIN — ENOXAPARIN SODIUM 40 MILLIGRAM(S): 100 INJECTION SUBCUTANEOUS at 13:04

## 2018-11-11 RX ADMIN — Medication 1 DROP(S): at 18:26

## 2018-11-11 RX ADMIN — Medication 1: at 12:21

## 2018-11-11 RX ADMIN — Medication 250 MILLIGRAM(S): at 18:25

## 2018-11-11 RX ADMIN — Medication 250 MILLIGRAM(S): at 05:18

## 2018-11-11 RX ADMIN — Medication 5 MILLIGRAM(S): at 21:46

## 2018-11-11 RX ADMIN — AMLODIPINE BESYLATE 5 MILLIGRAM(S): 2.5 TABLET ORAL at 05:18

## 2018-11-11 RX ADMIN — Medication 6 MILLIGRAM(S): at 21:45

## 2018-11-11 RX ADMIN — Medication 100 MILLIGRAM(S): at 13:01

## 2018-11-11 RX ADMIN — Medication 1 TABLET(S): at 13:01

## 2018-11-11 RX ADMIN — Medication 500000 UNIT(S): at 18:25

## 2018-11-11 RX ADMIN — BRIMONIDINE TARTRATE 1 DROP(S): 2 SOLUTION/ DROPS OPHTHALMIC at 05:19

## 2018-11-11 RX ADMIN — Medication 2: at 21:53

## 2018-11-11 RX ADMIN — DORZOLAMIDE HYDROCHLORIDE 1 DROP(S): 20 SOLUTION/ DROPS OPHTHALMIC at 13:05

## 2018-11-11 RX ADMIN — Medication 500000 UNIT(S): at 05:19

## 2018-11-11 RX ADMIN — ONDANSETRON 4 MILLIGRAM(S): 8 TABLET, FILM COATED ORAL at 13:01

## 2018-11-11 RX ADMIN — DORZOLAMIDE HYDROCHLORIDE 1 DROP(S): 20 SOLUTION/ DROPS OPHTHALMIC at 21:45

## 2018-11-11 RX ADMIN — POLYETHYLENE GLYCOL 3350 17 GRAM(S): 17 POWDER, FOR SOLUTION ORAL at 13:04

## 2018-11-11 RX ADMIN — ONDANSETRON 4 MILLIGRAM(S): 8 TABLET, FILM COATED ORAL at 18:25

## 2018-11-11 RX ADMIN — ATORVASTATIN CALCIUM 80 MILLIGRAM(S): 80 TABLET, FILM COATED ORAL at 21:45

## 2018-11-11 RX ADMIN — Medication 300 MILLIGRAM(S): at 13:04

## 2018-11-11 RX ADMIN — Medication 81 MILLIGRAM(S): at 13:01

## 2018-11-11 RX ADMIN — Medication 5 MILLIGRAM(S): at 13:01

## 2018-11-11 RX ADMIN — ONDANSETRON 4 MILLIGRAM(S): 8 TABLET, FILM COATED ORAL at 05:19

## 2018-11-11 RX ADMIN — PANTOPRAZOLE SODIUM 40 MILLIGRAM(S): 20 TABLET, DELAYED RELEASE ORAL at 13:01

## 2018-11-11 NOTE — PROGRESS NOTE ADULT - SUBJECTIVE AND OBJECTIVE BOX
STEPAN NAJERA  87y  Female    Patient is a 87y old  Female who presents with a chief complaint of CVA (10 Nov 2018 19:30)      HPI:  88yo Irish speaking female with PMH of HTN and hyperlipidemia who presented to [facility] on 10/19/18 who was last seen normal at 22:10 the night prior, who was then found down on the ground by family members with inability to communicate or move her right side. She was initially evaluated at [place], found on neurological examination to have right hemiparesis, right hemianopia, right dense facial droop, right sensory loss, left gaze preference with gaze palsy and aphasia. Deemed candidate for tPA. 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 Patient was transferred for further management to [facility]. She then underwent thrombectomy with resultant TICI 2B flow. Failed swallow eval—kept NPO, NGT replaced by PEG 10/26, and tolerating PEG feeding. Telemetry monitoring showed frequent ectopy; Cardiology consulted; no evidence of Afib, she was initially started on IV Lopressor for tachycardia, optimized to po Lopressor for better control. TTE showed PFO. Loop recorder placed. Also noted some hemorrhagic conversion without neurologic deterioration so did not undergo any neurosurgical intervention. Hospital course significant for fever Tmax 101; ID consulted, patient was initially started on IV ceftriaxone for positive UA but urine and blood cultures negative so patient monitored off antibiotics without further fevers. Patient also with new onset urinary retention requiring straight cath protocol as she has been anuric. Patient deemed medically stable for dc to acute rehab 11/1/18. (01 Nov 2018 16:38)      Subjective  no new complaints/events        PAST MEDICAL & SURGICAL HISTORY:  HLD (hyperlipidemia)  Hypertension      MedsMEDICATIONS  (STANDING):  amantadine 100 milliGRAM(s) Oral <User Schedule>  amLODIPine   Tablet 5 milliGRAM(s) Oral daily  artificial tears (preservative free) Ophthalmic Solution 1 Drop(s) Both EYES daily  aspirin  chewable 81 milliGRAM(s) Enteral Tube daily  atorvastatin 80 milliGRAM(s) Oral at bedtime  bethanechol 5 milliGRAM(s) Oral every 8 hours  brimonidine 0.2% Ophthalmic Solution 1 Drop(s) Both EYES two times a day  dextrose 5%. 1000 milliLiter(s) (50 mL/Hr) IV Continuous <Continuous>  dextrose 50% Injectable 12.5 Gram(s) IV Push once  dextrose 50% Injectable 25 Gram(s) IV Push once  dextrose 50% Injectable 25 Gram(s) IV Push once  dorzolamide 2% Ophthalmic Solution 1 Drop(s) Both EYES three times a day  enoxaparin Injectable 40 milliGRAM(s) SubCutaneous daily  ferrous    sulfate Liquid 300 milliGRAM(s) Enteral Tube daily  FLUoxetine 10 milliGRAM(s) Oral <User Schedule>  insulin lispro (HumaLOG) corrective regimen sliding scale   SubCutaneous Before meals and at bedtime  lisinopril 5 milliGRAM(s) Oral <User Schedule>  melatonin 6 milliGRAM(s) Oral at bedtime  multivitamin 1 Tablet(s) Oral daily  nystatin    Suspension 965558 Unit(s) Oral two times a day  ondansetron   Disintegrating Tablet 4 milliGRAM(s) Enteral Tube four times a day  pantoprazole   Suspension 40 milliGRAM(s) Oral daily  polyethylene glycol 3350 17 Gram(s) Oral daily  saccharomyces boulardii 250 milliGRAM(s) Oral two times a day  senna Syrup 10 milliLiter(s) Oral at bedtime  timolol 0.5% Solution 1 Drop(s) Both EYES two times a day    MEDICATIONS  (PRN):  acetaminophen   Tablet .. 650 milliGRAM(s) Oral every 6 hours PRN Temp greater or equal to 38C (100.4F), Mild Pain (1 - 3)  bisacodyl Suppository 10 milliGRAM(s) Rectal daily PRN Constipation  dextrose 40% Gel 15 Gram(s) Oral once PRN Blood Glucose LESS THAN 70 milliGRAM(s)/deciliter  glucagon  Injectable 1 milliGRAM(s) IntraMuscular once PRN Glucose LESS THAN 70 milligrams/deciliter      Vital Signs Last 24 Hrs  T(C): 36.7 (11 Nov 2018 08:17), Max: 36.8 (10 Nov 2018 22:45)  T(F): 98 (11 Nov 2018 08:17), Max: 98.3 (11 Nov 2018 05:26)  HR: 60 (11 Nov 2018 08:17) (60 - 65)  BP: 120/76 (11 Nov 2018 08:17) (115/72 - 120/76)  BP(mean): --  RR: 14 (11 Nov 2018 08:17) (14 - 14)  SpO2: 98% (11 Nov 2018 08:17) (97% - 98%)  I&O's Summary    10 Nov 2018 07:01  -  11 Nov 2018 07:00  --------------------------------------------------------  IN: 500 mL / OUT: 0 mL / NET: 500 mL        PHYSICAL EXAM:  GENERAL: NAD  NECK: Supple  NERVOUS SYSTEM:  awake and alert  HEART: S1s2 NL , RRR  CHEST/LUNG: Clear to percussion bilaterally  ABDOMEN: Soft, Nontender, Nondistended; Bowel sounds present  EXTREMITIES:  No edema      LABS:  |11-11-18 @ 06:05            9.8<L>  13.0<H>>--------------<392            29.6<L>      132<L>  |  100  |  32<H>  --------------------------<118<H>  5.2  |  8.9  |  1.33<H>    Mg 2.0 / Phos--    PT -- / INR --    PTT --    Lipase --  11-11-18 @ 06:05  CAPILLARY BLOOD GLUCOSE      POCT Blood Glucose.: 169 mg/dL (11 Nov 2018 11:44)  POCT Blood Glucose.: 118 mg/dL (11 Nov 2018 07:47)  POCT Blood Glucose.: 220 mg/dL (10 Nov 2018 22:29)  POCT Blood Glucose.: 237 mg/dL (10 Nov 2018 18:02)    Imaging Personally Reviewed:  [ ] YES  [ ] NO      HEALTH ISSUES - PROBLEM Dx:  CVA-PT/OT per rehab    Cont ASA, statin    HTN-Cont  Norvasc   DC lisinopril as K/Cr increasing    Hyponatremia  DC fluoxetine    DM2 - cont Humalog    Dysphagia-s/p PEG, currently on PO diet with TF with Water bolus    Leukocytosis-Afebrile.  increasing. check UA      Care Discussed with Consultants/Other Providers [ x] YES  [ ] NO

## 2018-11-11 NOTE — PROGRESS NOTE ADULT - SUBJECTIVE AND OBJECTIVE BOX
INTERVAL SUBJECTIVE & REVIEW OF SYMPTOMS:87 year old female with left MCA stroke  Chart reviewed. Patient seen at bedside . Seated in chair. Per nursing staff appetite poor, being supplemented with TF  Appears comfortable in chair    REVIEW OF SYSTEMS  Limited due to aphasia    Vital Signs Last 24 Hrs  T(C): 36.7 (11 Nov 2018 08:17), Max: 36.8 (10 Nov 2018 22:45)  T(F): 98 (11 Nov 2018 08:17), Max: 98.3 (11 Nov 2018 05:26)  HR: 60 (11 Nov 2018 08:17) (60 - 65)  BP: 120/76 (11 Nov 2018 08:17) (115/72 - 120/76)  BP(mean): --  RR: 14 (11 Nov 2018 08:17) (14 - 14)  SpO2: 98% (11 Nov 2018 08:17) (97% - 98%)    PHYSICAL EXAM  General: NAD  Cardio: s1s2+  Resp: clear  Abdomen: soft, PEG site clean  Neuro: alert, awake. expressive and receptive aphasia, followed a few simple 1 step commands  Extrem: no edema      CAPILLARY BLOOD GLUCOSE      POCT Blood Glucose.: 169 mg/dL (11 Nov 2018 11:44)  POCT Blood Glucose.: 118 mg/dL (11 Nov 2018 07:47)  POCT Blood Glucose.: 220 mg/dL (10 Nov 2018 22:29)  POCT Blood Glucose.: 237 mg/dL (10 Nov 2018 18:02)    MEDICATIONS  (STANDING):  amantadine 100 milliGRAM(s) Oral <User Schedule>  amLODIPine   Tablet 5 milliGRAM(s) Oral daily  artificial tears (preservative free) Ophthalmic Solution 1 Drop(s) Both EYES daily  aspirin  chewable 81 milliGRAM(s) Enteral Tube daily  atorvastatin 80 milliGRAM(s) Oral at bedtime  bethanechol 5 milliGRAM(s) Oral every 8 hours  brimonidine 0.2% Ophthalmic Solution 1 Drop(s) Both EYES two times a day  dextrose 5%. 1000 milliLiter(s) (50 mL/Hr) IV Continuous <Continuous>  dextrose 50% Injectable 12.5 Gram(s) IV Push once  dextrose 50% Injectable 25 Gram(s) IV Push once  dextrose 50% Injectable 25 Gram(s) IV Push once  dorzolamide 2% Ophthalmic Solution 1 Drop(s) Both EYES three times a day  enoxaparin Injectable 40 milliGRAM(s) SubCutaneous daily  ferrous    sulfate Liquid 300 milliGRAM(s) Enteral Tube daily  insulin lispro (HumaLOG) corrective regimen sliding scale   SubCutaneous Before meals and at bedtime  melatonin 6 milliGRAM(s) Oral at bedtime  multivitamin 1 Tablet(s) Oral daily  nystatin    Suspension 509445 Unit(s) Oral two times a day  ondansetron   Disintegrating Tablet 4 milliGRAM(s) Enteral Tube four times a day  pantoprazole   Suspension 40 milliGRAM(s) Oral daily  polyethylene glycol 3350 17 Gram(s) Oral daily  saccharomyces boulardii 250 milliGRAM(s) Oral two times a day  senna Syrup 10 milliLiter(s) Oral at bedtime  timolol 0.5% Solution 1 Drop(s) Both EYES two times a day    MEDICATIONS  (PRN):  acetaminophen   Tablet .. 650 milliGRAM(s) Oral every 6 hours PRN Temp greater or equal to 38C (100.4F), Mild Pain (1 - 3)  bisacodyl Suppository 10 milliGRAM(s) Rectal daily PRN Constipation  dextrose 40% Gel 15 Gram(s) Oral once PRN Blood Glucose LESS THAN 70 milliGRAM(s)/deciliter  glucagon  Injectable 1 milliGRAM(s) IntraMuscular once PRN Glucose LESS THAN 70 milligrams/deciliter      A/P:    87 year old female with left MCA stroke  Patient with right hemiplegia, aphasia, dysphagia    Continue current rehab program:PT/OT/ST    Secondary stroke prophylaxis:on ASA, statins, risk factor management    DM-II: Diet, SSI, Humalog    DVT prophylaxis:on Lovenox    On amantadine to improve arousal, on Fluoxetine for motor recovery/mood    Pain: on tylenol INTERVAL SUBJECTIVE & REVIEW OF SYMPTOMS:87 year old female with left MCA stroke  Chart reviewed. Patient seen at bedside . Seated in chair. Per nursing staff appetite poor, being supplemented with TF  Appears comfortable in chair    REVIEW OF SYSTEMS  Limited due to aphasia    Vital Signs Last 24 Hrs  T(C): 36.7 (11 Nov 2018 08:17), Max: 36.8 (10 Nov 2018 22:45)  T(F): 98 (11 Nov 2018 08:17), Max: 98.3 (11 Nov 2018 05:26)  HR: 60 (11 Nov 2018 08:17) (60 - 65)  BP: 120/76 (11 Nov 2018 08:17) (115/72 - 120/76)  BP(mean): --  RR: 14 (11 Nov 2018 08:17) (14 - 14)  SpO2: 98% (11 Nov 2018 08:17) (97% - 98%)    PHYSICAL EXAM  General: NAD  Cardio: s1s2+  Resp: clear  Abdomen: soft, PEG site clean  Neuro: alert, awake. expressive and receptive aphasia, followed a few simple 1 step commands  Extrem: no edema                              9.8    13.0  )-----------( 392      ( 11 Nov 2018 06:05 )             29.6     11-11    132<L>  |  100  |  32<H>  ----------------------------<  118<H>  5.2   |  25  |  1.33<H>    Ca    8.9      11 Nov 2018 06:05  Mg     2.0     11-11    TPro  6.8  /  Alb  2.3<L>  /  TBili  0.2  /  DBili  x   /  AST  25  /  ALT  24  /  AlkPhos  96  11-11              CAPILLARY BLOOD GLUCOSE      POCT Blood Glucose.: 169 mg/dL (11 Nov 2018 11:44)  POCT Blood Glucose.: 118 mg/dL (11 Nov 2018 07:47)  POCT Blood Glucose.: 220 mg/dL (10 Nov 2018 22:29)  POCT Blood Glucose.: 237 mg/dL (10 Nov 2018 18:02)    MEDICATIONS  (STANDING):  amantadine 100 milliGRAM(s) Oral <User Schedule>  amLODIPine   Tablet 5 milliGRAM(s) Oral daily  artificial tears (preservative free) Ophthalmic Solution 1 Drop(s) Both EYES daily  aspirin  chewable 81 milliGRAM(s) Enteral Tube daily  atorvastatin 80 milliGRAM(s) Oral at bedtime  bethanechol 5 milliGRAM(s) Oral every 8 hours  brimonidine 0.2% Ophthalmic Solution 1 Drop(s) Both EYES two times a day  dextrose 5%. 1000 milliLiter(s) (50 mL/Hr) IV Continuous <Continuous>  dextrose 50% Injectable 12.5 Gram(s) IV Push once  dextrose 50% Injectable 25 Gram(s) IV Push once  dextrose 50% Injectable 25 Gram(s) IV Push once  dorzolamide 2% Ophthalmic Solution 1 Drop(s) Both EYES three times a day  enoxaparin Injectable 40 milliGRAM(s) SubCutaneous daily  ferrous    sulfate Liquid 300 milliGRAM(s) Enteral Tube daily  insulin lispro (HumaLOG) corrective regimen sliding scale   SubCutaneous Before meals and at bedtime  melatonin 6 milliGRAM(s) Oral at bedtime  multivitamin 1 Tablet(s) Oral daily  nystatin    Suspension 934476 Unit(s) Oral two times a day  ondansetron   Disintegrating Tablet 4 milliGRAM(s) Enteral Tube four times a day  pantoprazole   Suspension 40 milliGRAM(s) Oral daily  polyethylene glycol 3350 17 Gram(s) Oral daily  saccharomyces boulardii 250 milliGRAM(s) Oral two times a day  senna Syrup 10 milliLiter(s) Oral at bedtime  timolol 0.5% Solution 1 Drop(s) Both EYES two times a day    MEDICATIONS  (PRN):  acetaminophen   Tablet .. 650 milliGRAM(s) Oral every 6 hours PRN Temp greater or equal to 38C (100.4F), Mild Pain (1 - 3)  bisacodyl Suppository 10 milliGRAM(s) Rectal daily PRN Constipation  dextrose 40% Gel 15 Gram(s) Oral once PRN Blood Glucose LESS THAN 70 milliGRAM(s)/deciliter  glucagon  Injectable 1 milliGRAM(s) IntraMuscular once PRN Glucose LESS THAN 70 milligrams/deciliter      A/P:    87 year old female with left MCA stroke  Patient with right hemiplegia, aphasia, dysphagia    Continue current rehab program:PT/OT/ST    Secondary stroke prophylaxis:on ASA, statins, risk factor management    DM-II: Diet, SSI, Humalog    DVT prophylaxis:on Lovenox    On amantadine to improve arousal, on Fluoxetine for motor recovery/mood- d/c due to hyponatremia    Pain: on tylenol    Leucocytosis: fu in am, patient afebrile

## 2018-11-12 LAB
ANION GAP SERPL CALC-SCNC: 6 MMOL/L — SIGNIFICANT CHANGE UP (ref 5–17)
BUN SERPL-MCNC: 28 MG/DL — HIGH (ref 7–23)
CALCIUM SERPL-MCNC: 8.9 MG/DL — SIGNIFICANT CHANGE UP (ref 8.4–10.5)
CHLORIDE SERPL-SCNC: 100 MMOL/L — SIGNIFICANT CHANGE UP (ref 96–108)
CO2 SERPL-SCNC: 27 MMOL/L — SIGNIFICANT CHANGE UP (ref 22–31)
CREAT SERPL-MCNC: 1.27 MG/DL — SIGNIFICANT CHANGE UP (ref 0.5–1.3)
GLUCOSE SERPL-MCNC: 111 MG/DL — HIGH (ref 70–99)
HCT VFR BLD CALC: 30.7 % — LOW (ref 34.5–45)
HGB BLD-MCNC: 9.9 G/DL — LOW (ref 11.5–15.5)
MCHC RBC-ENTMCNC: 30 PG — SIGNIFICANT CHANGE UP (ref 27–34)
MCHC RBC-ENTMCNC: 32.2 GM/DL — SIGNIFICANT CHANGE UP (ref 32–36)
MCV RBC AUTO: 93.4 FL — SIGNIFICANT CHANGE UP (ref 80–100)
PLATELET # BLD AUTO: 388 K/UL — SIGNIFICANT CHANGE UP (ref 150–400)
POTASSIUM SERPL-MCNC: 5 MMOL/L — SIGNIFICANT CHANGE UP (ref 3.5–5.3)
POTASSIUM SERPL-SCNC: 5 MMOL/L — SIGNIFICANT CHANGE UP (ref 3.5–5.3)
RBC # BLD: 3.29 M/UL — LOW (ref 3.8–5.2)
RBC # FLD: 13.3 % — SIGNIFICANT CHANGE UP (ref 10.3–14.5)
SODIUM SERPL-SCNC: 133 MMOL/L — LOW (ref 135–145)
WBC # BLD: 11.8 K/UL — HIGH (ref 3.8–10.5)
WBC # FLD AUTO: 11.8 K/UL — HIGH (ref 3.8–10.5)

## 2018-11-12 PROCEDURE — 99232 SBSQ HOSP IP/OBS MODERATE 35: CPT | Mod: GC

## 2018-11-12 PROCEDURE — 99233 SBSQ HOSP IP/OBS HIGH 50: CPT

## 2018-11-12 RX ORDER — ALENDRONATE SODIUM 70 MG/1
1 TABLET ORAL
Qty: 0 | Refills: 0 | COMMUNITY

## 2018-11-12 RX ORDER — INSULIN GLARGINE 100 [IU]/ML
5 INJECTION, SOLUTION SUBCUTANEOUS
Qty: 0 | Refills: 0 | COMMUNITY
Start: 2018-11-12

## 2018-11-12 RX ORDER — INSULIN GLARGINE 100 [IU]/ML
5 INJECTION, SOLUTION SUBCUTANEOUS AT BEDTIME
Qty: 0 | Refills: 0 | Status: DISCONTINUED | OUTPATIENT
Start: 2018-11-12 | End: 2018-11-14

## 2018-11-12 RX ADMIN — Medication 1 DROP(S): at 17:47

## 2018-11-12 RX ADMIN — DORZOLAMIDE HYDROCHLORIDE 1 DROP(S): 20 SOLUTION/ DROPS OPHTHALMIC at 13:05

## 2018-11-12 RX ADMIN — Medication 100 MILLIGRAM(S): at 13:05

## 2018-11-12 RX ADMIN — Medication 5 MILLIGRAM(S): at 13:05

## 2018-11-12 RX ADMIN — Medication 5 MILLIGRAM(S): at 05:20

## 2018-11-12 RX ADMIN — SENNA PLUS 10 MILLILITER(S): 8.6 TABLET ORAL at 21:23

## 2018-11-12 RX ADMIN — Medication 5 MILLIGRAM(S): at 21:22

## 2018-11-12 RX ADMIN — PANTOPRAZOLE SODIUM 40 MILLIGRAM(S): 20 TABLET, DELAYED RELEASE ORAL at 13:05

## 2018-11-12 RX ADMIN — Medication 1 DROP(S): at 05:20

## 2018-11-12 RX ADMIN — ONDANSETRON 4 MILLIGRAM(S): 8 TABLET, FILM COATED ORAL at 17:46

## 2018-11-12 RX ADMIN — Medication 250 MILLIGRAM(S): at 17:46

## 2018-11-12 RX ADMIN — Medication 1 DROP(S): at 13:06

## 2018-11-12 RX ADMIN — Medication 100 MILLIGRAM(S): at 05:21

## 2018-11-12 RX ADMIN — Medication 250 MILLIGRAM(S): at 05:20

## 2018-11-12 RX ADMIN — Medication 1 TABLET(S): at 13:05

## 2018-11-12 RX ADMIN — Medication 6 MILLIGRAM(S): at 21:22

## 2018-11-12 RX ADMIN — AMLODIPINE BESYLATE 5 MILLIGRAM(S): 2.5 TABLET ORAL at 05:20

## 2018-11-12 RX ADMIN — BRIMONIDINE TARTRATE 1 DROP(S): 2 SOLUTION/ DROPS OPHTHALMIC at 17:46

## 2018-11-12 RX ADMIN — ATORVASTATIN CALCIUM 80 MILLIGRAM(S): 80 TABLET, FILM COATED ORAL at 21:22

## 2018-11-12 RX ADMIN — ONDANSETRON 4 MILLIGRAM(S): 8 TABLET, FILM COATED ORAL at 05:20

## 2018-11-12 RX ADMIN — Medication 500000 UNIT(S): at 05:20

## 2018-11-12 RX ADMIN — Medication 500000 UNIT(S): at 17:46

## 2018-11-12 RX ADMIN — Medication 1: at 17:20

## 2018-11-12 RX ADMIN — DORZOLAMIDE HYDROCHLORIDE 1 DROP(S): 20 SOLUTION/ DROPS OPHTHALMIC at 21:22

## 2018-11-12 RX ADMIN — Medication 300 MILLIGRAM(S): at 13:05

## 2018-11-12 RX ADMIN — Medication 2: at 21:22

## 2018-11-12 RX ADMIN — ONDANSETRON 4 MILLIGRAM(S): 8 TABLET, FILM COATED ORAL at 13:05

## 2018-11-12 RX ADMIN — BRIMONIDINE TARTRATE 1 DROP(S): 2 SOLUTION/ DROPS OPHTHALMIC at 05:20

## 2018-11-12 RX ADMIN — DORZOLAMIDE HYDROCHLORIDE 1 DROP(S): 20 SOLUTION/ DROPS OPHTHALMIC at 05:20

## 2018-11-12 RX ADMIN — ENOXAPARIN SODIUM 40 MILLIGRAM(S): 100 INJECTION SUBCUTANEOUS at 13:05

## 2018-11-12 RX ADMIN — Medication 81 MILLIGRAM(S): at 13:05

## 2018-11-12 RX ADMIN — INSULIN GLARGINE 5 UNIT(S): 100 INJECTION, SOLUTION SUBCUTANEOUS at 21:22

## 2018-11-12 NOTE — PROGRESS NOTE ADULT - ASSESSMENT
87 year old female with left MCA stroke  Patient with right hemiplegia, aphasia, dysphagia    Continue current rehab program:PT/OT/ST    Secondary stroke prophylaxis:on ASA, statins, risk factor management    DM-II: Diet, SSI, Humalog    DVT prophylaxis:on Lovenox    On amantadine to improve arousal, on Fluoxetine for motor recovery/mood- d/c due to hyponatremia    Pain: on tylenol    Leucocytosis: fu in am, patient afebrile 87 year old female with left MCA stroke  Patient with right hemiplegia, aphasia, dysphagia    Continue current rehab program:PT/OT/ST    Secondary stroke prophylaxis:  on ASA, statins, risk factor management    DM-II:  [id number]  Diet,Humalog    DVT prophylaxis:on Lovenox    On amantadine to improve arousal, on Fluoxetine for motor recovery/mood- d/c due to hyponatremia    Pain: on tylenol    Leucocytosis: fu in am, patient afebrile

## 2018-11-12 NOTE — PROGRESS NOTE ADULT - SUBJECTIVE AND OBJECTIVE BOX
Patient is a 87y old  Female who presents with a chief complaint of CVA (11 Nov 2018 13:06)      Patient seen and examined at bedside.  Patient unable to communicate due to aphasia.     ALLERGIES:  No Known Allergies    MEDICATIONS:  amantadine 100 milliGRAM(s) Oral <User Schedule>  bethanechol 5 milliGRAM(s) Oral every 8 hours  dextrose 40% Gel 15 Gram(s) Oral once PRN  dextrose 5%. 1000 milliLiter(s) IV Continuous <Continuous>  dextrose 50% Injectable 12.5 Gram(s) IV Push once  dextrose 50% Injectable 25 Gram(s) IV Push once  dextrose 50% Injectable 25 Gram(s) IV Push once  dorzolamide 2% Ophthalmic Solution 1 Drop(s) Both EYES three times a day  glucagon  Injectable 1 milliGRAM(s) IntraMuscular once PRN  insulin lispro (HumaLOG) corrective regimen sliding scale   SubCutaneous Before meals and at bedtime  melatonin 6 milliGRAM(s) Oral at bedtime  ondansetron   Disintegrating Tablet 4 milliGRAM(s) Enteral Tube four times a day  pantoprazole   Suspension 40 milliGRAM(s) Oral daily  polyethylene glycol 3350 17 Gram(s) Oral daily  saccharomyces boulardii 250 milliGRAM(s) Oral two times a day  senna Syrup 10 milliLiter(s) Oral at bedtime  timolol 0.5% Solution 1 Drop(s) Both EYES two times a day    Vital Signs Last 24 Hrs  T(F): 98.2 (12 Nov 2018 07:37), Max: 98.2 (12 Nov 2018 07:37)  HR: 65 (12 Nov 2018 07:37) (63 - 65)  BP: 116/76 (12 Nov 2018 07:37) (114/72 - 116/76)  RR: 14 (12 Nov 2018 07:37) (14 - 14)  SpO2: 100% (12 Nov 2018 07:37) (98% - 100%)  I&O's Summary    11 Nov 2018 07:01  -  12 Nov 2018 07:00  --------------------------------------------------------  IN: 1474 mL / OUT: 0 mL / NET: 1474 mL        PHYSICAL EXAM:  General: NAD, A/O x 3  ENT: MMM  Neck: Supple, No JVD  Lungs: Clear to auscultation bilaterally  Cardio: RRR, S1/S2, No murmurs  Abdomen: Soft, Nontender, Nondistended; Bowel sounds present  Extremities: No cyanosis, No edema    LABS:                        9.9    11.8  )-----------( 388      ( 12 Nov 2018 05:20 )             30.7     11-12    133  |  100  |  28  ----------------------------<  111  5.0   |  27  |  1.27    Ca    8.9      12 Nov 2018 05:20  Mg     2.0     11-11    TPro  6.8  /  Alb  2.3  /  TBili  0.2  /  DBili  x   /  AST  25  /  ALT  24  /  AlkPhos  96  11-11    eGFR if Non African American: 38 mL/min/1.73M2 (11-12-18 @ 05:20)  eGFR if African American: 44 mL/min/1.73M2 (11-12-18 @ 05:20)            10-19 Chol 167 mg/dL  mg/dL HDL 52 mg/dL Trig 75 mg/dL        CAPILLARY BLOOD GLUCOSE      POCT Blood Glucose.: 92 mg/dL (12 Nov 2018 07:55)  POCT Blood Glucose.: 227 mg/dL (11 Nov 2018 21:51)  POCT Blood Glucose.: 120 mg/dL (11 Nov 2018 16:56)  POCT Blood Glucose.: 169 mg/dL (11 Nov 2018 11:44)    10-19 [id number] 6.0          RADIOLOGY & ADDITIONAL TESTS:    Care Discussed with Consultants/Other Providers: Patient is a 87y old  Female who presents with a chief complaint of CVA (11 Nov 2018 13:06)      Patient seen and examined at bedside.  Patient unable to communicate due to aphasia.     ALLERGIES:  No Known Allergies    MEDICATIONS:  amantadine 100 milliGRAM(s) Oral <User Schedule>  bethanechol 5 milliGRAM(s) Oral every 8 hours  dextrose 40% Gel 15 Gram(s) Oral once PRN  dextrose 5%. 1000 milliLiter(s) IV Continuous <Continuous>  dextrose 50% Injectable 12.5 Gram(s) IV Push once  dextrose 50% Injectable 25 Gram(s) IV Push once  dextrose 50% Injectable 25 Gram(s) IV Push once  dorzolamide 2% Ophthalmic Solution 1 Drop(s) Both EYES three times a day  glucagon  Injectable 1 milliGRAM(s) IntraMuscular once PRN  insulin lispro (HumaLOG) corrective regimen sliding scale   SubCutaneous Before meals and at bedtime  melatonin 6 milliGRAM(s) Oral at bedtime  ondansetron   Disintegrating Tablet 4 milliGRAM(s) Enteral Tube four times a day  pantoprazole   Suspension 40 milliGRAM(s) Oral daily  polyethylene glycol 3350 17 Gram(s) Oral daily  saccharomyces boulardii 250 milliGRAM(s) Oral two times a day  senna Syrup 10 milliLiter(s) Oral at bedtime  timolol 0.5% Solution 1 Drop(s) Both EYES two times a day    Vital Signs Last 24 Hrs  T(F): 98.2 (12 Nov 2018 07:37), Max: 98.2 (12 Nov 2018 07:37)  HR: 65 (12 Nov 2018 07:37) (63 - 65)  BP: 116/76 (12 Nov 2018 07:37) (114/72 - 116/76)  RR: 14 (12 Nov 2018 07:37) (14 - 14)  SpO2: 100% (12 Nov 2018 07:37) (98% - 100%)  I&O's Summary    11 Nov 2018 07:01  -  12 Nov 2018 07:00  --------------------------------------------------------  IN: 1474 mL / OUT: 0 mL / NET: 1474 mL        PHYSICAL EXAM:  General: NAD, A/O x 3  ENT: MMM  Neck: Supple, No JVD  Lungs: Clear to auscultation bilaterally  Cardio: RRR, S1/S2, No murmurs  Abdomen: Soft, Nontender, Nondistended; Bowel sounds present  Extremities: No cyanosis, No edema    LABS:                        9.9    11.8  )-----------( 388      ( 12 Nov 2018 05:20 )             30.7     11-12    133  |  100  |  28  ----------------------------<  111  5.0   |  27  |  1.27    Ca    8.9      12 Nov 2018 05:20  Mg     2.0     11-11    TPro  6.8  /  Alb  2.3  /  TBili  0.2  /  DBili  x   /  AST  25  /  ALT  24  /  AlkPhos  96  11-11    eGFR if Non African American: 38 mL/min/1.73M2 (11-12-18 @ 05:20)  eGFR if African American: 44 mL/min/1.73M2 (11-12-18 @ 05:20)            10-19 Chol 167 mg/dL  mg/dL HDL 52 mg/dL Trig 75 mg/dL        CAPILLARY BLOOD GLUCOSE      POCT Blood Glucose.: 92 mg/dL (12 Nov 2018 07:55)  POCT Blood Glucose.: 227 mg/dL (11 Nov 2018 21:51)  POCT Blood Glucose.: 120 mg/dL (11 Nov 2018 16:56)  POCT Blood Glucose.: 169 mg/dL (11 Nov 2018 11:44)    10-19 [id number] 6.0

## 2018-11-12 NOTE — PROGRESS NOTE ADULT - ATTENDING COMMENTS
Agree with above.  Pt noted attempts to verbalize this am.  Voiding now with acceptable bladder scans; continue to monitor on urecholine.  Medically stable.  Continue comprehensive rehab, dvt ppx.  Appetite mildly improved.  Cont supplemental tube feeds as pt not consuming sufficient pureed food PO.  D/C planning to [facility]
Agree with above.  Pt undergoing comprehensive eval.  Medically stable.
Agree with above.  Add extra water via PEG due to elevated BUN/Cr above baseline.  Cont to encourage po intake of pureed food.  Cont comprehensive rehab, dvt ppx, positioning for proper skin care to prevent breakdown and contractures.
Agree with above.  Pt with poor arousal; added Amantadine for day and Melatonin for night.  Pt with poor po intake; continue PEG feeds for good nutrition.  Discussed case with pts son; Nick.  All questions answered.  Pt receiving Bactrim for UTI.  Cont comprehensive rehab, dvt ppx, nutrition.

## 2018-11-12 NOTE — PROGRESS NOTE ADULT - SUBJECTIVE AND OBJECTIVE BOX
History of Present Illness: 	  86yo [place] speaking female with PMH of HTN and hyperlipidemia who presented to [facility] on 10/19/18 who was last seen normal at 22:10 the night prior, who was then found down on the ground by family members with inability to communicate or move her right side. She was initially evaluated at [place], found on neurological examination to have right hemiparesis, right hemianopia, right dense facial droop, right sensory loss, left gaze preference with gaze palsy and aphasia. Deemed candidate for tPA. 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 Patient was transferred for further management to [facility]. She then underwent thrombectomy with resultant TICI 2B flow. Failed swallow eval—kept NPO, NGT replaced by PEG 10/26, and tolerating PEG feeding. Telemetry monitoring showed frequent ectopy; Cardiology consulted; no evidence of Afib, she was initially started on IV Lopressor for tachycardia, optimized to po Lopressor for better control. TTE showed PFO. Loop recorder placed. Also noted some hemorrhagic conversion without neurologic deterioration so did not undergo any neurosurgical intervention. Hospital course significant for fever Tmax 101; ID consulted, patient was initially started on IV ceftriaxone for positive UA but urine and blood cultures negative so patient monitored off antibiotics without further fevers. Patient also with new onset urinary retention requiring straight cath protocol as she has been anuric. Patient deemed medically stable for dc to acute rehab 11/1/18.    Subjective:  NAEO. As per PCA, patient passed urine this morning ("soaking wet"). BM + this AM. Ate more than other days, prefers puree food in a cup. Patient attempting to verbalize in Uz[place].    Vital Signs Last 24 Hrs  T(C): 36.8 (12 Nov 2018 07:37), Max: 36.8 (12 Nov 2018 07:37)  T(F): 98.2 (12 Nov 2018 07:37), Max: 98.2 (12 Nov 2018 07:37)  HR: 65 (12 Nov 2018 07:37) (63 - 65)  BP: 116/76 (12 Nov 2018 07:37) (114/72 - 116/76)  BP(mean): --  RR: 14 (12 Nov 2018 07:37) (14 - 14)  SpO2: 100% (12 Nov 2018 07:37) (98% - 100%)    Physical Exam:   Constitutional - NAD, Comfortable  HEENT - NCAT  Neck - Supple, No limited ROM  Chest - CTA bilaterally, No wheeze, No rhonchi, No crackles  Cardiovascular - RRR, S1S2, No murmurs  Abdomen - BS+, Soft, NTND, +PEG c/d/i  Extremities - No calf tenderness   Neurologic Exam -                    Cognitive - Awake, Alert     Communication - non-verbal, receptive + expressive aphasia. follows some single step commands in U[place].     Motor - No focal deficits                    LEFT    UE - >3/5 throughout                    RIGHT UE - 0/5 throughout, no movement in response to pain stimulus                    LEFT    LE - >3/5 throughout                    RIGHT LE - <3/5 at knee and foot, moves to pain stimulus    Psychiatric - Mood stable, Affect WNL	    Function:  stand pivot maxA  sit/supine maxA  eating [place]  severe global aphasia    MEDICATIONS  (STANDING):  amantadine 100 milliGRAM(s) Oral <User Schedule>  amLODIPine   Tablet 5 milliGRAM(s) Oral daily  artificial tears (preservative free) Ophthalmic Solution 1 Drop(s) Both EYES daily  aspirin  chewable 81 milliGRAM(s) Enteral Tube daily  atorvastatin 80 milliGRAM(s) Oral at bedtime  bethanechol 5 milliGRAM(s) Oral every 8 hours  brimonidine 0.2% Ophthalmic Solution 1 Drop(s) Both EYES two times a day  dextrose 5%. 1000 milliLiter(s) (50 mL/Hr) IV Continuous <Continuous>  dextrose 50% Injectable 12.5 Gram(s) IV Push once  dextrose 50% Injectable 25 Gram(s) IV Push once  dextrose 50% Injectable 25 Gram(s) IV Push once  dorzolamide 2% Ophthalmic Solution 1 Drop(s) Both EYES three times a day  enoxaparin Injectable 40 milliGRAM(s) SubCutaneous daily  ferrous    sulfate Liquid 300 milliGRAM(s) Enteral Tube daily  insulin lispro (HumaLOG) corrective regimen sliding scale   SubCutaneous Before meals and at bedtime  melatonin 6 milliGRAM(s) Oral at bedtime  multivitamin 1 Tablet(s) Oral daily  nystatin    Suspension 823612 Unit(s) Oral two times a day  ondansetron   Disintegrating Tablet 4 milliGRAM(s) Enteral Tube four times a day  pantoprazole   Suspension 40 milliGRAM(s) Oral daily  polyethylene glycol 3350 17 Gram(s) Oral daily  saccharomyces boulardii 250 milliGRAM(s) Oral two times a day  senna Syrup 10 milliLiter(s) Oral at bedtime  timolol 0.5% Solution 1 Drop(s) Both EYES two times a day    MEDICATIONS  (PRN):  acetaminophen   Tablet .. 650 milliGRAM(s) Oral every 6 hours PRN Temp greater or equal to 38C (100.4F), Mild Pain (1 - 3)  bisacodyl Suppository 10 milliGRAM(s) Rectal daily PRN Constipation  dextrose 40% Gel 15 Gram(s) Oral once PRN Blood Glucose LESS THAN 70 milliGRAM(s)/deciliter  glucagon  Injectable 1 milliGRAM(s) IntraMuscular once PRN Glucose LESS THAN 70 milligrams/deciliter                        9.9    11.8  )-----------( 388      ( 12 Nov 2018 05:20 )             30.7     11-12    133<L>  |  100  |  28<H>  ----------------------------<  111<H>  5.0   |  27  |  1.27    Ca    8.9      12 Nov 2018 05:20  Mg     2.0     11-11    TPro  6.8  /  Alb  2.3<L>  /  TBili  0.2  /  DBili  x   /  AST  25  /  ALT  24  /  AlkPhos  96  11-11    CAPILLARY BLOOD GLUCOSE  POCT Blood Glucose.: 92 mg/dL (12 Nov 2018 07:55)  POCT Blood Glucose.: 227 mg/dL (11 Nov 2018 21:51)  POCT Blood Glucose.: 120 mg/dL (11 Nov 2018 16:56)  POCT Blood Glucose.: 169 mg/dL (11 Nov 2018 11:44)    A/P:  86yo F with left MCA CVA, now with right sided hemiplegia, aphasia, dysphagia, cognitive deficits, functional and gait impairments.    Continue comprehensive program of PT/OT/SLP    #HTN/Afib  lisinopril, Norvasc, Lopressor halved 11/5/18 to 12.5 q12hrs based on hospitalist recs re: BP/HR-D/Cd 11/7 as has been held due to parameters    #DM2-HbA1C 6.0 on 10/19  Metformin discontinued.  Cont ISS, blood glucose.      #Urinary retention  PVRs 132, 232, 319.   Continue urecholine 5mg bid 11/8. Reduce bladder scans to q8hrs 11/12/18  +UTI: Urine culture sensitive to Bactrim; initiated 11/3- completed 11/10/18    #Leukocytosis  WBC stable.  Afebrile.  CXR 11/1 clear.  Urine culture >100,000cfu/ml Ecoli.  Complete course of Bactrim/Florastor    Monitor CBC    #Bowel/GI-Last BM 11/2  Senna, Miralax, Dulcolax suppository prn    #CVA   ASA, statin  Added Prozac for mood and motor recovery  Amantadine commenced 11/5/18 to promote neuroarousal- improving  Melatonin to normalize sleep wake cycles    #Dysphagia  Pt admitted with NPO with TF- bolus feeds commenced, free water via PEG increased to 300cc QID as Cr creeping up; improved with Cr 1.27.  Diet upgraded to pureed with no po liquids 11/2, supplement with 2cal HN if eats <50%  Cont tube feeds until adequate po consumption  aspiration precautions  Oral care TID    #Pain control-Pt appears to be without pain  Tylenol prn  D/Cd gabapentin    #anemia  iron supplement    Precautions: fall, aspiration                                                                            DVT Prophylaxis: Lovenox   Diet: Pureed consistent carb/no liquids with PEG feeds                                                                      Continue comprehensive program of PT/OT/SLP.

## 2018-11-13 PROCEDURE — 99233 SBSQ HOSP IP/OBS HIGH 50: CPT

## 2018-11-13 PROCEDURE — 99232 SBSQ HOSP IP/OBS MODERATE 35: CPT | Mod: GC

## 2018-11-13 RX ORDER — FLUOXETINE HCL 10 MG
10 CAPSULE ORAL
Qty: 0 | Refills: 0 | Status: DISCONTINUED | OUTPATIENT
Start: 2018-11-13 | End: 2018-11-14

## 2018-11-13 RX ORDER — MUPIROCIN 20 MG/G
1 OINTMENT TOPICAL
Qty: 0 | Refills: 0 | Status: DISCONTINUED | OUTPATIENT
Start: 2018-11-13 | End: 2018-11-14

## 2018-11-13 RX ORDER — SENNA PLUS 8.6 MG/1
10 TABLET ORAL AT BEDTIME
Qty: 0 | Refills: 0 | Status: DISCONTINUED | OUTPATIENT
Start: 2018-11-13 | End: 2018-11-14

## 2018-11-13 RX ADMIN — Medication 81 MILLIGRAM(S): at 12:42

## 2018-11-13 RX ADMIN — BRIMONIDINE TARTRATE 1 DROP(S): 2 SOLUTION/ DROPS OPHTHALMIC at 17:12

## 2018-11-13 RX ADMIN — Medication 5 MILLIGRAM(S): at 13:09

## 2018-11-13 RX ADMIN — Medication 10 MILLIGRAM(S): at 17:12

## 2018-11-13 RX ADMIN — DORZOLAMIDE HYDROCHLORIDE 1 DROP(S): 20 SOLUTION/ DROPS OPHTHALMIC at 06:32

## 2018-11-13 RX ADMIN — Medication 250 MILLIGRAM(S): at 06:31

## 2018-11-13 RX ADMIN — INSULIN GLARGINE 5 UNIT(S): 100 INJECTION, SOLUTION SUBCUTANEOUS at 23:38

## 2018-11-13 RX ADMIN — Medication 1 DROP(S): at 06:32

## 2018-11-13 RX ADMIN — ONDANSETRON 4 MILLIGRAM(S): 8 TABLET, FILM COATED ORAL at 12:42

## 2018-11-13 RX ADMIN — Medication 500000 UNIT(S): at 06:32

## 2018-11-13 RX ADMIN — Medication 6 MILLIGRAM(S): at 23:43

## 2018-11-13 RX ADMIN — ONDANSETRON 4 MILLIGRAM(S): 8 TABLET, FILM COATED ORAL at 17:12

## 2018-11-13 RX ADMIN — Medication 1: at 16:59

## 2018-11-13 RX ADMIN — AMLODIPINE BESYLATE 5 MILLIGRAM(S): 2.5 TABLET ORAL at 06:31

## 2018-11-13 RX ADMIN — ENOXAPARIN SODIUM 40 MILLIGRAM(S): 100 INJECTION SUBCUTANEOUS at 12:42

## 2018-11-13 RX ADMIN — Medication 1 TABLET(S): at 12:42

## 2018-11-13 RX ADMIN — DORZOLAMIDE HYDROCHLORIDE 1 DROP(S): 20 SOLUTION/ DROPS OPHTHALMIC at 13:10

## 2018-11-13 RX ADMIN — Medication 5 MILLIGRAM(S): at 06:31

## 2018-11-13 RX ADMIN — Medication 300 MILLIGRAM(S): at 12:42

## 2018-11-13 RX ADMIN — Medication 100 MILLIGRAM(S): at 06:31

## 2018-11-13 RX ADMIN — ONDANSETRON 4 MILLIGRAM(S): 8 TABLET, FILM COATED ORAL at 00:20

## 2018-11-13 RX ADMIN — DORZOLAMIDE HYDROCHLORIDE 1 DROP(S): 20 SOLUTION/ DROPS OPHTHALMIC at 23:38

## 2018-11-13 RX ADMIN — ONDANSETRON 4 MILLIGRAM(S): 8 TABLET, FILM COATED ORAL at 06:31

## 2018-11-13 RX ADMIN — Medication 5 MILLIGRAM(S): at 23:38

## 2018-11-13 RX ADMIN — ATORVASTATIN CALCIUM 80 MILLIGRAM(S): 80 TABLET, FILM COATED ORAL at 23:38

## 2018-11-13 RX ADMIN — Medication 1 DROP(S): at 17:13

## 2018-11-13 RX ADMIN — MUPIROCIN 1 APPLICATION(S): 20 OINTMENT TOPICAL at 23:43

## 2018-11-13 RX ADMIN — PANTOPRAZOLE SODIUM 40 MILLIGRAM(S): 20 TABLET, DELAYED RELEASE ORAL at 12:42

## 2018-11-13 RX ADMIN — BRIMONIDINE TARTRATE 1 DROP(S): 2 SOLUTION/ DROPS OPHTHALMIC at 06:32

## 2018-11-13 RX ADMIN — Medication 100 MILLIGRAM(S): at 12:42

## 2018-11-13 NOTE — CHART NOTE - NSCHARTNOTEFT_GEN_A_CORE
Nutrition Follow Up     Hospital Course (Per Electronic Medical Record):     Source: Patient [ ]    Family [ ]     [X] Medical Record/[facility] Staff    Diet: Puree Diet w/ No Liquids  [place] Diet  Consumes 0-25% of Meals    Enteral /Parenteral Nutrition: 2CalHN 1can Bolus x3Day  Recommend Increase to 2CalHN 1can Bolus x4Day    Current Weight: 1724.4lb 11/6  % Weight Change: N/A  Obtain New Weight  Continue on Weekly Weights    Pertinent Medications: MEDICATIONS  (STANDING):  amantadine 100 milliGRAM(s) Oral <User Schedule>  amLODIPine   Tablet 5 milliGRAM(s) Oral daily  artificial tears (preservative free) Ophthalmic Solution 1 Drop(s) Both EYES daily  aspirin  chewable 81 milliGRAM(s) Enteral Tube daily  atorvastatin 80 milliGRAM(s) Oral at bedtime  bethanechol 5 milliGRAM(s) Oral every 8 hours  brimonidine 0.2% Ophthalmic Solution 1 Drop(s) Both EYES two times a day  dextrose 5%. 1000 milliLiter(s) (50 mL/Hr) IV Continuous <Continuous>  dextrose 50% Injectable 12.5 Gram(s) IV Push once  dextrose 50% Injectable 25 Gram(s) IV Push once  dextrose 50% Injectable 25 Gram(s) IV Push once  dorzolamide 2% Ophthalmic Solution 1 Drop(s) Both EYES three times a day  enoxaparin Injectable 40 milliGRAM(s) SubCutaneous daily  ferrous    sulfate Liquid 300 milliGRAM(s) Enteral Tube daily  FLUoxetine 10 milliGRAM(s) Oral <User Schedule>  insulin glargine Injectable (LANTUS) 5 Unit(s) SubCutaneous at bedtime  insulin lispro (HumaLOG) corrective regimen sliding scale   SubCutaneous Before meals and at bedtime  melatonin 6 milliGRAM(s) Oral at bedtime  multivitamin 1 Tablet(s) Oral daily  ondansetron   Disintegrating Tablet 4 milliGRAM(s) Enteral Tube four times a day  pantoprazole   Suspension 40 milliGRAM(s) Oral daily  polyethylene glycol 3350 17 Gram(s) Oral daily  timolol 0.5% Solution 1 Drop(s) Both EYES two times a day    MEDICATIONS  (PRN):  acetaminophen   Tablet .. 650 milliGRAM(s) Oral every 6 hours PRN Temp greater or equal to 38C (100.4F), Mild Pain (1 - 3)  bisacodyl Suppository 10 milliGRAM(s) Rectal daily PRN Constipation  dextrose 40% Gel 15 Gram(s) Oral once PRN Blood Glucose LESS THAN 70 milliGRAM(s)/deciliter  glucagon  Injectable 1 milliGRAM(s) IntraMuscular once PRN Glucose LESS THAN 70 milligrams/deciliter  senna Syrup 10 milliLiter(s) Oral at bedtime PRN Constipation    Pertinent Labs:  11-12 Na133 mmol/L<L> Glu 111 mg/dL<H> K+ 5.0 mmol/L Cr  1.27 mg/dL BUN 28 mg/dL<H> 11-11 Alb 2.3 g/dL<L> 10-19 [id number] 6.0 %<H> 10-19 Chol 167 mg/dL  mg/dL HDL 52 mg/dL Trig 75 mg/dL    Skin: No Pressure Ulcers     Edema: None Noted    Last BM: 11/11    Estimated Needs:   [X] No Change since Previous Assessment  [ ] Recalculated:     Previous Nutrition Diagnosis:   Swallowing difficulty  Moderate Malnutrition    Nutrition Diagnosis is [X] Ongoing  [ ] Resolved [ ] Not Applicable   Continues on TF to Help Meet Nutrition Needs     New Nutrition Diagnosis: [X] Not Applicable    Interventions:   1. Recommend Change TF to 2CalHN 1can Bolus 4xDay    Monitoring & Evaluation:   [X] PO intake   [X] Tolerance to Diet Prescription   [X] Weights   [X] Follow Up (Per Protocol)  [X] Other: Labs & PCOT    RD Remains Available.  Fantasma Persaud RD

## 2018-11-13 NOTE — PROGRESS NOTE ADULT - ASSESSMENT
87 year old female with left MCA stroke  Patient with right hemiplegia, aphasia, dysphagia    Continue current rehab program:PT/OT/ST    Secondary stroke prophylaxis:  on ASA, statins, risk factor management    DM-II:  [id number]  Diet,Humalog, Lantus was started 11/12- will monitor for basal requirements    DVT prophylaxis: on Lovenox    On amantadine to improve arousal, on Fluoxetine for motor recovery/mood- d/c due to hyponatremia    Pain: on tylenol    Leucocytosis: follow intermittently,  patient afebrile

## 2018-11-13 NOTE — PROGRESS NOTE ADULT - SUBJECTIVE AND OBJECTIVE BOX
Patient is a 87y old  Female who presents with a chief complaint of CVA (12 Nov 2018 10:00)      Patient seen and examined at bedside.  Unable to communicate due to aphasia    ALLERGIES:  No Known Allergies    MEDICATIONS:  amantadine 100 milliGRAM(s) Oral <User Schedule>  bethanechol 5 milliGRAM(s) Oral every 8 hours  dextrose 40% Gel 15 Gram(s) Oral once PRN  dextrose 5%. 1000 milliLiter(s) IV Continuous <Continuous>  dextrose 50% Injectable 12.5 Gram(s) IV Push once  dextrose 50% Injectable 25 Gram(s) IV Push once  dextrose 50% Injectable 25 Gram(s) IV Push once  dorzolamide 2% Ophthalmic Solution 1 Drop(s) Both EYES three times a day  glucagon  Injectable 1 milliGRAM(s) IntraMuscular once PRN  insulin glargine Injectable (LANTUS) 5 Unit(s) SubCutaneous at bedtime  insulin lispro (HumaLOG) corrective regimen sliding scale   SubCutaneous Before meals and at bedtime  melatonin 6 milliGRAM(s) Oral at bedtime  ondansetron   Disintegrating Tablet 4 milliGRAM(s) Enteral Tube four times a day  pantoprazole   Suspension 40 milliGRAM(s) Oral daily  polyethylene glycol 3350 17 Gram(s) Oral daily  saccharomyces boulardii 250 milliGRAM(s) Oral two times a day  senna Syrup 10 milliLiter(s) Oral at bedtime  timolol 0.5% Solution 1 Drop(s) Both EYES two times a day    Vital Signs Last 24 Hrs  T(F): 98.4 (13 Nov 2018 07:23), Max: 98.4 (12 Nov 2018 21:12)  HR: 62 (13 Nov 2018 07:23) (62 - 70)  BP: 122/78 (13 Nov 2018 07:23) (122/78 - 134/83)  RR: 15 (13 Nov 2018 07:23) (15 - 15)  SpO2: 97% (13 Nov 2018 07:23) (97% - 98%)  I&O's Summary    12 Nov 2018 07:01  -  13 Nov 2018 07:00  --------------------------------------------------------  IN: 974 mL / OUT: 0 mL / NET: 974 mL        PHYSICAL EXAM:  General: NAD,  ENT: MMM  Neck: Supple, No JVD  Lungs: Clear to auscultation bilaterally  Cardio: RRR, S1/S2, No murmurs  Abdomen: Soft, Nontender, Nondistended; Bowel sounds present  Extremities: No cyanosis, No edema    LABS:                        9.9    11.8  )-----------( 388      ( 12 Nov 2018 05:20 )             30.7     11-12    133  |  100  |  28  ----------------------------<  111  5.0   |  27  |  1.27    Ca    8.9      12 Nov 2018 05:20  Mg     2.0     11-11    TPro  6.8  /  Alb  2.3  /  TBili  0.2  /  DBili  x   /  AST  25  /  ALT  24  /  AlkPhos  96  11-11    eGFR if Non African American: 38 mL/min/1.73M2 (11-12-18 @ 05:20)  eGFR if African American: 44 mL/min/1.73M2 (11-12-18 @ 05:20)            10-19 Chol 167 mg/dL  mg/dL HDL 52 mg/dL Trig 75 mg/dL        CAPILLARY BLOOD GLUCOSE      POCT Blood Glucose.: 124 mg/dL (13 Nov 2018 08:01)  POCT Blood Glucose.: 201 mg/dL (12 Nov 2018 21:14)  POCT Blood Glucose.: 178 mg/dL (12 Nov 2018 17:19)  POCT Blood Glucose.: 96 mg/dL (12 Nov 2018 12:06)    10-19 [id number] 6.0

## 2018-11-13 NOTE — PROGRESS NOTE ADULT - SUBJECTIVE AND OBJECTIVE BOX
History of Present Illness: 	  88yo Fijian speaking female with PMH of HTN and hyperlipidemia who presented to [facility] on 10/19/18 who was last seen normal at 22:10 the night prior, who was then found down on the ground by family members with inability to communicate or move her right side. She was initially evaluated at [place], found on neurological examination to have right hemiparesis, right hemianopia, right dense facial droop, right sensory loss, left gaze preference with gaze palsy and aphasia. Deemed candidate for tPA. 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 Patient was transferred for further management to [facility]. She then underwent thrombectomy with resultant TICI 2B flow. Failed swallow eval—kept NPO, NGT replaced by PEG 10/26, and tolerating PEG feeding. Telemetry monitoring showed frequent ectopy; Cardiology consulted; no evidence of Afib, she was initially started on IV Lopressor for tachycardia, optimized to po Lopressor for better control. TTE showed PFO. Loop recorder placed. Also noted some hemorrhagic conversion without neurologic deterioration so did not undergo any neurosurgical intervention. Hospital course significant for fever Tmax 101; ID consulted, patient was initially started on IV ceftriaxone for positive UA but urine and blood cultures negative so patient monitored off antibiotics without further fevers. Patient also with new onset urinary retention requiring straight cath protocol as she has been anuric. Patient deemed medically stable for dc to acute rehab 11/1/18.    Subjective:  Pt seen and examined in OT gym.  Alert, NAD. Voiding although still requires straight cath.  +BM incontinent  Appetite poor.  Patient attempting to verbalize in Fijian.    Vital Signs Last 24 Hrs  T(C): 36.9 (13 Nov 2018 07:23), Max: 36.9 (12 Nov 2018 21:12)  T(F): 98.4 (13 Nov 2018 07:23), Max: 98.4 (12 Nov 2018 21:12)  HR: 62 (13 Nov 2018 07:23) (62 - 70)  BP: 122/78 (13 Nov 2018 07:23) (122/78 - 134/83)  BP(mean): --  RR: 15 (13 Nov 2018 07:23) (15 - 15)  SpO2: 97% (13 Nov 2018 07:23) (97% - 98%)    Physical Exam:   Constitutional - NAD, Comfortable  HEENT - NCAT  Neck - Supple, No limited ROM  Chest - CTA bilaterally, No wheeze, No rhonchi, No crackles  Cardiovascular - RRR, S1S2, No murmurs  Abdomen - BS+, Soft, NTND, +PEG c/d/i  Extremities - No calf tenderness   Neurologic Exam -                    Cognitive - Awake, Alert     Communication - non-verbal, receptive + expressive aphasia. follows some single step commands in Fijian.     Motor - No focal deficits                    LEFT    UE - >3/5 throughout                    RIGHT UE - 0/5 throughout, no movement in response to pain stimulus                    LEFT    LE - >3/5 throughout                    RIGHT LE - <3/5 at knee and foot, moves to pain stimulus    Psychiatric - Mood stable, Affect WNL	    Function:  stand pivot maxA  sit/supine maxA  eating [place]  severe global aphasia    Labs:                       9.9    11.8  )-----------( 388      ( 12 Nov 2018 05:20 )             30.7     11-12    133<L>  |  100  |  28<H>  ----------------------------<  111<H>  5.0   |  27  |  1.27    Ca    8.9      12 Nov 2018 05:20  Mg     2.0     11-11    TPro  6.8  /  Alb  2.3<L>  /  TBili  0.2  /  DBili  x   /  AST  25  /  ALT  24  /  AlkPhos  96  11-11    CAPILLARY BLOOD GLUCOSE      POCT Blood Glucose.: 124 mg/dL (13 Nov 2018 08:01)  POCT Blood Glucose.: 201 mg/dL (12 Nov 2018 21:14)  POCT Blood Glucose.: 178 mg/dL (12 Nov 2018 17:19)  POCT Blood Glucose.: 96 mg/dL (12 Nov 2018 12:06)    MEDICATIONS  (STANDING):  amantadine 100 milliGRAM(s) Oral <User Schedule>  amLODIPine   Tablet 5 milliGRAM(s) Oral daily  artificial tears (preservative free) Ophthalmic Solution 1 Drop(s) Both EYES daily  aspirin  chewable 81 milliGRAM(s) Enteral Tube daily  atorvastatin 80 milliGRAM(s) Oral at bedtime  bethanechol 5 milliGRAM(s) Oral every 8 hours  brimonidine 0.2% Ophthalmic Solution 1 Drop(s) Both EYES two times a day  dextrose 5%. 1000 milliLiter(s) (50 mL/Hr) IV Continuous <Continuous>  dextrose 50% Injectable 12.5 Gram(s) IV Push once  dextrose 50% Injectable 25 Gram(s) IV Push once  dextrose 50% Injectable 25 Gram(s) IV Push once  dorzolamide 2% Ophthalmic Solution 1 Drop(s) Both EYES three times a day  enoxaparin Injectable 40 milliGRAM(s) SubCutaneous daily  ferrous    sulfate Liquid 300 milliGRAM(s) Enteral Tube daily  insulin glargine Injectable (LANTUS) 5 Unit(s) SubCutaneous at bedtime  insulin lispro (HumaLOG) corrective regimen sliding scale   SubCutaneous Before meals and at bedtime  melatonin 6 milliGRAM(s) Oral at bedtime  multivitamin 1 Tablet(s) Oral daily  ondansetron   Disintegrating Tablet 4 milliGRAM(s) Enteral Tube four times a day  pantoprazole   Suspension 40 milliGRAM(s) Oral daily  polyethylene glycol 3350 17 Gram(s) Oral daily  timolol 0.5% Solution 1 Drop(s) Both EYES two times a day    MEDICATIONS  (PRN):  acetaminophen   Tablet .. 650 milliGRAM(s) Oral every 6 hours PRN Temp greater or equal to 38C (100.4F), Mild Pain (1 - 3)  bisacodyl Suppository 10 milliGRAM(s) Rectal daily PRN Constipation  dextrose 40% Gel 15 Gram(s) Oral once PRN Blood Glucose LESS THAN 70 milliGRAM(s)/deciliter  glucagon  Injectable 1 milliGRAM(s) IntraMuscular once PRN Glucose LESS THAN 70 milligrams/deciliter  senna Syrup 10 milliLiter(s) Oral at bedtime PRN Constipation        A/P:  88yo F with left MCA CVA, now with right sided hemiplegia, aphasia, dysphagia, cognitive deficits, functional and gait impairments.    Continue comprehensive program of PT/OT/SLP    #HTN/Afib  Norvasc, Lopressor halved 11/5/18 to 12.5 q12hrs based on hospitalist recs re: BP/HR-D/Cd 11/7 as has been held due to parameters.  Lisinopril discontinued by hospitalist 11/11    #DM2-HbA1C 6.0 on 10/19  Metformin discontinued.  Lantus 5U SC at bedtime initiated 11/12 with improved accucheck this am.  Cont ISS, FS    #Urinary retention  PVRs 569, 319.   Continue urecholine 5mg bid 11/8. Cont bladder scans q6hrs and straight cath prn  +UTI: Urine culture sensitive to Bactrim; initiated 11/3- completed 11/10/18    #Leukocytosis  WBC stable.  Afebrile.  CXR 11/1 clear.  Urine culture >100,000cfu/ml Ecoli.  Completed course of Bactrim/Florastor    Monitor CBC    #Bowel/GI-Bowel incontinence  Change Senna to qhs prn.  Cont Miralax daily, Dulcolax suppository prn    #CVA   ASA, statin  Added Prozac for mood and motor recovery  Amantadine commenced 11/5/18 to promote neuroarousal- improving  Melatonin to normalize sleep wake cycles    #Dysphagia  Pt admitted with NPO with TF- bolus feeds commenced, free water via PEG increased to 300cc QID as Cr creeping up; improved with Cr 1.27.  Diet upgraded to pureed with no po liquids 11/2, supplement with 2cal HN if eats <50%  Cont tube feeds until adequate po consumption  aspiration precautions  Oral care TID    #Pain control-Pt appears to be without pain  Tylenol prn  D/Cd gabapentin    #anemia  iron supplement    Precautions: fall, aspiration                                                                            DVT Prophylaxis: Lovenox   Diet: Pureed consistent carb/no liquids with PEG feeds                                                                      Continue comprehensive program of PT/OT/SLP.

## 2018-11-14 VITALS
HEART RATE: 70 BPM | OXYGEN SATURATION: 96 % | DIASTOLIC BLOOD PRESSURE: 77 MMHG | RESPIRATION RATE: 12 BRPM | TEMPERATURE: 98 F | SYSTOLIC BLOOD PRESSURE: 127 MMHG

## 2018-11-14 PROCEDURE — 99238 HOSP IP/OBS DSCHRG MGMT 30/<: CPT

## 2018-11-14 PROCEDURE — 99233 SBSQ HOSP IP/OBS HIGH 50: CPT | Mod: GC

## 2018-11-14 RX ORDER — NYSTATIN CREAM 100000 [USP'U]/G
1 CREAM TOPICAL
Qty: 0 | Refills: 0 | COMMUNITY
Start: 2018-11-14

## 2018-11-14 RX ORDER — NYSTATIN CREAM 100000 [USP'U]/G
1 CREAM TOPICAL
Qty: 0 | Refills: 0 | Status: DISCONTINUED | OUTPATIENT
Start: 2018-11-14 | End: 2018-11-14

## 2018-11-14 RX ADMIN — Medication 5 MILLIGRAM(S): at 07:02

## 2018-11-14 RX ADMIN — Medication 81 MILLIGRAM(S): at 11:17

## 2018-11-14 RX ADMIN — PANTOPRAZOLE SODIUM 40 MILLIGRAM(S): 20 TABLET, DELAYED RELEASE ORAL at 11:17

## 2018-11-14 RX ADMIN — AMLODIPINE BESYLATE 5 MILLIGRAM(S): 2.5 TABLET ORAL at 06:57

## 2018-11-14 RX ADMIN — Medication 1 TABLET(S): at 11:17

## 2018-11-14 RX ADMIN — DORZOLAMIDE HYDROCHLORIDE 1 DROP(S): 20 SOLUTION/ DROPS OPHTHALMIC at 06:59

## 2018-11-14 RX ADMIN — DORZOLAMIDE HYDROCHLORIDE 1 DROP(S): 20 SOLUTION/ DROPS OPHTHALMIC at 13:01

## 2018-11-14 RX ADMIN — ONDANSETRON 4 MILLIGRAM(S): 8 TABLET, FILM COATED ORAL at 00:22

## 2018-11-14 RX ADMIN — Medication 100 MILLIGRAM(S): at 06:57

## 2018-11-14 RX ADMIN — Medication 5 MILLIGRAM(S): at 13:01

## 2018-11-14 RX ADMIN — Medication 1 DROP(S): at 11:56

## 2018-11-14 RX ADMIN — BRIMONIDINE TARTRATE 1 DROP(S): 2 SOLUTION/ DROPS OPHTHALMIC at 06:58

## 2018-11-14 RX ADMIN — ONDANSETRON 4 MILLIGRAM(S): 8 TABLET, FILM COATED ORAL at 07:00

## 2018-11-14 RX ADMIN — Medication 300 MILLIGRAM(S): at 11:16

## 2018-11-14 RX ADMIN — POLYETHYLENE GLYCOL 3350 17 GRAM(S): 17 POWDER, FOR SOLUTION ORAL at 11:16

## 2018-11-14 RX ADMIN — ENOXAPARIN SODIUM 40 MILLIGRAM(S): 100 INJECTION SUBCUTANEOUS at 11:16

## 2018-11-14 RX ADMIN — MUPIROCIN 1 APPLICATION(S): 20 OINTMENT TOPICAL at 06:59

## 2018-11-14 RX ADMIN — ONDANSETRON 4 MILLIGRAM(S): 8 TABLET, FILM COATED ORAL at 11:17

## 2018-11-14 RX ADMIN — Medication 1 DROP(S): at 07:00

## 2018-11-14 RX ADMIN — Medication 1: at 11:02

## 2018-11-14 NOTE — PROGRESS NOTE ADULT - PROVIDER SPECIALTY LIST ADULT
Hospitalist
Rehab Medicine
Hospitalist

## 2018-11-14 NOTE — PROGRESS NOTE ADULT - SUBJECTIVE AND OBJECTIVE BOX
History of Present Illness: 	  86yo Norwegian speaking female with PMH of HTN and hyperlipidemia who presented to [facility] on 10/19/18 who was last seen normal at 22:10 the night prior, who was then found down on the ground by family members with inability to communicate or move her right side. She was initially evaluated at [place], found on neurological examination to have right hemiparesis, right hemianopia, right dense facial droop, right sensory loss, left gaze preference with gaze palsy and aphasia. Deemed candidate for tPA. 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 Patient was transferred for further management to [facility]. She then underwent thrombectomy with resultant TICI 2B flow. Failed swallow eval—kept NPO, NGT replaced by PEG 10/26, and tolerating PEG feeding. Telemetry monitoring showed frequent ectopy; Cardiology consulted; no evidence of Afib, she was initially started on IV Lopressor for tachycardia, optimized to po Lopressor for better control. TTE showed PFO. Loop recorder placed. Also noted some hemorrhagic conversion without neurologic deterioration so did not undergo any neurosurgical intervention. Hospital course significant for fever Tmax 101; ID consulted, patient was initially started on IV ceftriaxone for positive UA but urine and blood cultures negative so patient monitored off antibiotics without further fevers. Patient also with new onset urinary retention requiring straight cath protocol as she has been anuric. Patient deemed medically stable for dc to acute rehab 11/1/18.    Subjective:  Pt seen and examined.  Alert, NAD. Voiding with incontinence; has not required straight cath following most recent scans.  +BM incontinent-last one yesterday.  Appetite poor-eating approximately 20% of meals.  Patient attempting to verbalize in Norwegian.    Vital Signs Last 24 Hrs  T(C): 36.4 (14 Nov 2018 00:32), Max: 36.4 (14 Nov 2018 00:32)  T(F): 97.6 (14 Nov 2018 00:32), Max: 97.6 (14 Nov 2018 00:32)  HR: 66 (14 Nov 2018 00:32) (66 - 66)  BP: 120/74 (14 Nov 2018 00:32) (120/74 - 120/74)  BP(mean): --  RR: 18 (14 Nov 2018 00:32) (18 - 18)  SpO2: 96% (14 Nov 2018 00:32) (96% - 96%)    Physical Exam:   Constitutional - NAD, Comfortable  HEENT - NCAT  Neck - Supple, No limited ROM  Chest - CTA bilaterally, No wheeze, No rhonchi, No crackles  Cardiovascular - RRR, S1S2, No murmurs  Abdomen - BS+, Soft, NTND, +PEG c/d/i  Extremities - No calf tenderness   Neurologic Exam -                    Cognitive - Awake, Alert     Communication - non-verbal, receptive + expressive aphasia. Follows some single step commands in Norwegian.     Motor - No focal deficits.                      LEFT    UE - >3/5 throughout                    RIGHT UE - 0/5 throughout, no movement in response to pain stimulus                    LEFT    LE - >3/5 throughout                    RIGHT LE - <3/5 at knee and foot, moves to pain stimulus    Psychiatric - Mood stable, Affect WNL Skin - small 0.5cm pustules bilateral buttocks.  Mild erythema inframammary region	    Function:  stand pivot maxA  sit/supine maxA  ADLs total A  severe global aphasia    Labs:                       9.9    11.8  )-----------( 388      ( 12 Nov 2018 05:20 )             30.7     11-12    133<L>  |  100  |  28<H>  ----------------------------<  111<H>  5.0   |  27  |  1.27    Ca    8.9      12 Nov 2018 05:20  Mg     2.0     11-11    TPro  6.8  /  Alb  2.3<L>  /  TBili  0.2  /  DBili  x   /  AST  25  /  ALT  24  /  AlkPhos  96  11-11    CAPILLARY BLOOD GLUCOSE      POCT Blood Glucose.: 143 mg/dL (14 Nov 2018 07:58)  POCT Blood Glucose.: 105 mg/dL (13 Nov 2018 23:15)  POCT Blood Glucose.: 156 mg/dL (13 Nov 2018 16:58)  POCT Blood Glucose.: 111 mg/dL (13 Nov 2018 11:54)          MEDICATIONS  (STANDING):  amLODIPine   Tablet 5 milliGRAM(s) Oral daily  artificial tears (preservative free) Ophthalmic Solution 1 Drop(s) Both EYES daily  aspirin  chewable 81 milliGRAM(s) Enteral Tube daily  atorvastatin 80 milliGRAM(s) Oral at bedtime  bethanechol 5 milliGRAM(s) Oral every 8 hours  brimonidine 0.2% Ophthalmic Solution 1 Drop(s) Both EYES two times a day  dextrose 5%. 1000 milliLiter(s) (50 mL/Hr) IV Continuous <Continuous>  dextrose 50% Injectable 12.5 Gram(s) IV Push once  dextrose 50% Injectable 25 Gram(s) IV Push once  dextrose 50% Injectable 25 Gram(s) IV Push once  dorzolamide 2% Ophthalmic Solution 1 Drop(s) Both EYES three times a day  enoxaparin Injectable 40 milliGRAM(s) SubCutaneous daily  ferrous    sulfate Liquid 300 milliGRAM(s) Enteral Tube daily  FLUoxetine 10 milliGRAM(s) Oral <User Schedule>  insulin glargine Injectable (LANTUS) 5 Unit(s) SubCutaneous at bedtime  insulin lispro (HumaLOG) corrective regimen sliding scale   SubCutaneous Before meals and at bedtime  melatonin 6 milliGRAM(s) Oral at bedtime  multivitamin 1 Tablet(s) Oral daily  mupirocin 2% Ointment 1 Application(s) Topical two times a day  nystatin Powder 1 Application(s) Topical two times a day  ondansetron   Disintegrating Tablet 4 milliGRAM(s) Enteral Tube four times a day  pantoprazole   Suspension 40 milliGRAM(s) Oral daily  polyethylene glycol 3350 17 Gram(s) Oral daily  timolol 0.5% Solution 1 Drop(s) Both EYES two times a day    MEDICATIONS  (PRN):  acetaminophen   Tablet .. 650 milliGRAM(s) Oral every 6 hours PRN Temp greater or equal to 38C (100.4F), Mild Pain (1 - 3)  bisacodyl Suppository 10 milliGRAM(s) Rectal daily PRN Constipation  dextrose 40% Gel 15 Gram(s) Oral once PRN Blood Glucose LESS THAN 70 milliGRAM(s)/deciliter  glucagon  Injectable 1 milliGRAM(s) IntraMuscular once PRN Glucose LESS THAN 70 milligrams/deciliter  senna Syrup 10 milliLiter(s) Oral at bedtime PRN Constipation          A/P:  86yo F with left MCA CVA, now with right sided hemiplegia, aphasia, dysphagia, cognitive deficits, functional and gait impairments.    Continue comprehensive program of PT/OT/SLP    #HTN/Afib  Norvasc, Lopressor halved 11/5/18 to 12.5 q12hrs based on hospitalist recs re: BP/HR-D/Cd 11/7 as has been held due to parameters.  Lisinopril discontinued by hospitalist 11/11    #DM2-HbA1C 6.0 on 10/19  Metformin discontinued.  Lantus 5U SC at bedtime initiated 11/12 with improved accuchecks.  Cont ISS, FS    #Urinary retention  PVRs 303, 290, 235, 136.   Continue urecholine 5mg bid 11/8. Decrease frequency of bladder scans q6hrs to q12hrs and straight cath prn; may d/c scans following 2 consecutive scans <250cc  +UTI: Urine culture sensitive to Bactrim; initiated 11/3- completed 11/10/18    #Leukocytosis  WBC stable.  Afebrile.  CXR 11/1 clear.  Urine culture >100,000cfu/ml Ecoli.  Completed course of Bactrim/Florastor    Monitor CBC    #Bowel/GI-Bowel incontinence  Change Senna to qhs prn.  Cont Miralax daily, Dulcolax suppository prn    #CVA   ASA, statin  Added Prozac for mood and motor recovery  Amantadine commenced 11/5/18 to promote neuroarousal- improved.  D/C Amantadine  Melatonin to normalize sleep wake cycles    #Dysphagia  Pt admitted with NPO with TF- bolus feeds commenced, free water via PEG increased to 300cc QID as Cr creeping up; improved with Cr 1.27.  Diet upgraded to pureed with no po liquids 11/2, supplement with 2cal HN if eats <50%  Cont tube feeds until adequate po consumption  aspiration precautions  Oral care TID    #Pain control-Pt appears to be without pain  Tylenol prn  D/Cd gabapentin    #anemia  iron supplement    #Skin  Bactroban to pustules buttocks  Nystatin powder to inframmary region    Precautions: fall, aspiration                                                                            DVT Prophylaxis: Lovenox   Diet: Pureed consistent carb/no liquids with PEG feeds                                                                      Continue comprehensive program of PT/OT/SLP.  Medically stable.  D/C to [facility]

## 2018-11-14 NOTE — PROGRESS NOTE ADULT - SUBJECTIVE AND OBJECTIVE BOX
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   Pt was seen and examined at bedside. Very pleasant, non verbal. Hymodynamically stable     Vital Signs Last 24 Hrs  T(C): 36.4 (14 Nov 2018 09:56), Max: 36.4 (14 Nov 2018 00:32)  T(F): 97.5 (14 Nov 2018 09:56), Max: 97.6 (14 Nov 2018 00:32)  HR: 70 (14 Nov 2018 09:56) (66 - 70)  BP: 127/77 (14 Nov 2018 09:56) (120/74 - 127/77)  BP(mean): --  RR: 12 (14 Nov 2018 09:56) (12 - 18)  SpO2: 96% (14 Nov 2018 09:56) (96% - 96%)    I&O's Summary    13 Nov 2018 07:01  -  14 Nov 2018 07:00  --------------------------------------------------------  IN: 1954 mL / OUT: 0 mL / NET: 1954 mL        CAPILLARY BLOOD GLUCOSE      POCT Blood Glucose.: 143 mg/dL (14 Nov 2018 07:58)  POCT Blood Glucose.: 105 mg/dL (13 Nov 2018 23:15)  POCT Blood Glucose.: 156 mg/dL (13 Nov 2018 16:58)  POCT Blood Glucose.: 111 mg/dL (13 Nov 2018 11:54)    PHYSICAL EXAM:    Constitutional: NAD,  HEENT: PERR, EOMI,   Neck: Soft and supple  Respiratory: Breath sounds are clear bilaterally,   Cardiovascular: S1 and S2,   Gastrointestinal: Bowel Sounds present  Extremities: No peripheral edema  Vascular: 2+ peripheral pulses  Neurological: non verbal, receptive and expressive aphasia.   Musculoskeletal:3/5 strength b/l  lower extremities, 0/5 right upper ext, 3/5 LUE  Skin: No rashes    MEDICATIONS:  MEDICATIONS  (STANDING):  amLODIPine   Tablet 5 milliGRAM(s) Oral daily  artificial tears (preservative free) Ophthalmic Solution 1 Drop(s) Both EYES daily  aspirin  chewable 81 milliGRAM(s) Enteral Tube daily  atorvastatin 80 milliGRAM(s) Oral at bedtime  bethanechol 5 milliGRAM(s) Oral every 8 hours  brimonidine 0.2% Ophthalmic Solution 1 Drop(s) Both EYES two times a day  dextrose 5%. 1000 milliLiter(s) (50 mL/Hr) IV Continuous <Continuous>  dextrose 50% Injectable 12.5 Gram(s) IV Push once  dextrose 50% Injectable 25 Gram(s) IV Push once  dextrose 50% Injectable 25 Gram(s) IV Push once  dorzolamide 2% Ophthalmic Solution 1 Drop(s) Both EYES three times a day  enoxaparin Injectable 40 milliGRAM(s) SubCutaneous daily  ferrous    sulfate Liquid 300 milliGRAM(s) Enteral Tube daily  FLUoxetine 10 milliGRAM(s) Oral <User Schedule>  insulin glargine Injectable (LANTUS) 5 Unit(s) SubCutaneous at bedtime  insulin lispro (HumaLOG) corrective regimen sliding scale   SubCutaneous Before meals and at bedtime  melatonin 6 milliGRAM(s) Oral at bedtime  multivitamin 1 Tablet(s) Oral daily  mupirocin 2% Ointment 1 Application(s) Topical two times a day  nystatin Powder 1 Application(s) Topical two times a day  ondansetron   Disintegrating Tablet 4 milliGRAM(s) Enteral Tube four times a day  pantoprazole   Suspension 40 milliGRAM(s) Oral daily  polyethylene glycol 3350 17 Gram(s) Oral daily  timolol 0.5% Solution 1 Drop(s) Both EYES two times a day      LABS: All Labs Reviewed:                Blood Culture:     RADIOLOGY/EKG:    DVT PPX:    ADVANCED DIRECTIVE:    DISPOSITION:

## 2018-11-14 NOTE — PROGRESS NOTE ADULT - ASSESSMENT
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     * Left MCV CVA  Cont acute rehab  PT/OT/SLP  Fall precaution   Cont ASA, Statin  Re-consider restarting ACE after next Crt check     *Dysphagia  s/p Peg placement   pureed w no PO liquid with supplement   Aspiration precaution     *A-Fib   Rate controlled    *HTN  Cont norvasc,  BP controlled    *DM  ISS   Controlled    *Urinary retention   Straight cath  prn     *UTI  s/p Bactrim competed 11/10     Raynaud syndrome   Keep pt warm   If sever consider add Calcium channel blocker     *CLARENCE  Resolving  Re-consider restarting ACE after next Crt check     *Anemia  Daily iron   Stable    *DVT ppx   Lovenox

## 2018-11-27 DIAGNOSIS — R33.9 RETENTION OF URINE, UNSPECIFIED: ICD-10-CM

## 2018-11-27 DIAGNOSIS — Q21.1 ATRIAL SEPTAL DEFECT: ICD-10-CM

## 2018-11-27 DIAGNOSIS — Z51.89 ENCOUNTER FOR OTHER SPECIFIED AFTERCARE: ICD-10-CM

## 2018-11-27 DIAGNOSIS — I69.819 UNSPECIFIED SYMPTOMS AND SIGNS INVOLVING COGNITIVE FUNCTIONS FOLLOWING OTHER CEREBROVASCULAR DISEASE: ICD-10-CM

## 2018-11-27 DIAGNOSIS — Z79.01 LONG TERM (CURRENT) USE OF ANTICOAGULANTS: ICD-10-CM

## 2018-11-27 DIAGNOSIS — I10 ESSENTIAL (PRIMARY) HYPERTENSION: ICD-10-CM

## 2018-11-27 DIAGNOSIS — D64.9 ANEMIA, UNSPECIFIED: ICD-10-CM

## 2018-11-27 DIAGNOSIS — I69.820 APHASIA FOLLOWING OTHER CEREBROVASCULAR DISEASE: ICD-10-CM

## 2018-11-27 DIAGNOSIS — R15.9 FULL INCONTINENCE OF FECES: ICD-10-CM

## 2018-11-27 DIAGNOSIS — I48.91 UNSPECIFIED ATRIAL FIBRILLATION: ICD-10-CM

## 2018-11-27 DIAGNOSIS — E78.5 HYPERLIPIDEMIA, UNSPECIFIED: ICD-10-CM

## 2018-11-27 DIAGNOSIS — Z79.4 LONG TERM (CURRENT) USE OF INSULIN: ICD-10-CM

## 2018-11-27 DIAGNOSIS — Z48.812 ENCOUNTER FOR SURGICAL AFTERCARE FOLLOWING SURGERY ON THE CIRCULATORY SYSTEM: ICD-10-CM

## 2018-11-27 DIAGNOSIS — E11.9 TYPE 2 DIABETES MELLITUS WITHOUT COMPLICATIONS: ICD-10-CM

## 2018-11-27 DIAGNOSIS — Z43.1 ENCOUNTER FOR ATTENTION TO GASTROSTOMY: ICD-10-CM

## 2018-11-27 DIAGNOSIS — I69.851 HEMIPLEGIA AND HEMIPARESIS FOLLOWING OTHER CEREBROVASCULAR DISEASE AFFECTING RIGHT DOMINANT SIDE: ICD-10-CM

## 2018-11-27 DIAGNOSIS — D72.829 ELEVATED WHITE BLOOD CELL COUNT, UNSPECIFIED: ICD-10-CM

## 2018-11-27 DIAGNOSIS — I69.891 DYSPHAGIA FOLLOWING OTHER CEREBROVASCULAR DISEASE: ICD-10-CM

## 2018-11-27 DIAGNOSIS — E87.1 HYPO-OSMOLALITY AND HYPONATREMIA: ICD-10-CM

## 2018-11-27 DIAGNOSIS — R26.9 UNSPECIFIED ABNORMALITIES OF GAIT AND MOBILITY: ICD-10-CM

## 2018-11-27 DIAGNOSIS — I73.00 RAYNAUD'S SYNDROME WITHOUT GANGRENE: ICD-10-CM

## 2018-11-27 DIAGNOSIS — N39.0 URINARY TRACT INFECTION, SITE NOT SPECIFIED: ICD-10-CM

## 2019-01-09 PROCEDURE — 97530 THERAPEUTIC ACTIVITIES: CPT

## 2019-01-09 PROCEDURE — 92610 EVALUATE SWALLOWING FUNCTION: CPT

## 2019-01-09 PROCEDURE — 85027 COMPLETE CBC AUTOMATED: CPT

## 2019-01-09 PROCEDURE — 97163 PT EVAL HIGH COMPLEX 45 MIN: CPT

## 2019-01-09 PROCEDURE — 97112 NEUROMUSCULAR REEDUCATION: CPT

## 2019-01-09 PROCEDURE — 92526 ORAL FUNCTION THERAPY: CPT

## 2019-01-09 PROCEDURE — 82962 GLUCOSE BLOOD TEST: CPT

## 2019-01-09 PROCEDURE — 93005 ELECTROCARDIOGRAM TRACING: CPT

## 2019-01-09 PROCEDURE — 92507 TX SP LANG VOICE COMM INDIV: CPT

## 2019-01-09 PROCEDURE — 92523 SPEECH SOUND LANG COMPREHEN: CPT

## 2019-01-09 PROCEDURE — 97110 THERAPEUTIC EXERCISES: CPT

## 2019-01-09 PROCEDURE — 97116 GAIT TRAINING THERAPY: CPT

## 2019-01-09 PROCEDURE — 97535 SELF CARE MNGMENT TRAINING: CPT

## 2019-01-09 PROCEDURE — 80053 COMPREHEN METABOLIC PANEL: CPT

## 2019-01-09 PROCEDURE — 97167 OT EVAL HIGH COMPLEX 60 MIN: CPT

## 2019-01-09 PROCEDURE — 83735 ASSAY OF MAGNESIUM: CPT

## 2019-01-09 PROCEDURE — 80048 BASIC METABOLIC PNL TOTAL CA: CPT

## 2019-01-23 NOTE — ED ADULT TRIAGE NOTE - NS ED NOTE AC HIGH RISK COUNTRIES
Assessment/Plan:         Diagnoses and all orders for this visit:    Right chronic serous otitis media  -     amoxicillin (AMOXIL) 400 MG/5ML suspension; Take 14 4 mL (1,152 mg total) by mouth 2 (two) times a day for 7 days  -     Ambulatory Referral to Otolaryngology; Future    Perforation of right tympanic membrane  -     amoxicillin (AMOXIL) 400 MG/5ML suspension; Take 14 4 mL (1,152 mg total) by mouth 2 (two) times a day for 7 days  -     ofloxacin (FLOXIN) 0 3 % otic solution; Administer 5 drops to the right ear 2 (two) times a day for 7 days  -     Ambulatory Referral to Otolaryngology; Future          Subjective:      Patient ID: Gracie Proctor is a 5 y o  male  Here with mom  Had a bad R ear infection about 1 year ago and his eardrum popped  No issues until last week, began with cough/cold symptoms, runny nose and then mom noticed drainage only from R ear  Now mom says it stinks and won't stop draining  No fevers  Sometimes moist NP cough  Eating and drinking well  Sleeping well, but mom notes d/c outside of R ear but not on pillow  Ear Drainage    There is pain in the right ear  This is a recurrent problem  The current episode started in the past 7 days  The problem occurs constantly  The problem has been unchanged  There has been no fever  The patient is experiencing no pain  Associated symptoms include coughing, diarrhea, ear discharge and rhinorrhea  Pertinent negatives include no rash or sore throat  He has tried nothing for the symptoms  The treatment provided no relief  There is no history of a chronic ear infection or a tympanostomy tube  The following portions of the patient's history were reviewed and updated as appropriate: allergies, current medications, past medical history, past social history, past surgical history and problem list     Review of Systems   Constitutional: Negative for activity change, appetite change and fever     HENT: Positive for congestion, ear discharge, postnasal drip and rhinorrhea  Negative for ear pain and sore throat  Eyes: Negative  Respiratory: Positive for cough  Cardiovascular: Negative  Gastrointestinal: Positive for diarrhea  Skin: Negative for rash  All other systems reviewed and are negative  Objective:      BP (!) 110/56   Temp 97 9 °F (36 6 °C) (Tympanic)   Ht 4' 0 9" (1 242 m)   Wt 51 1 kg (112 lb 10 5 oz)   BMI 33 13 kg/m²          Physical Exam   Constitutional: He appears well-developed and well-nourished  He is active  Obese hisp boy with  Down Syndrome here for eval of R ear drainage  HENT:   Nose: No nasal discharge  Mouth/Throat: Mucous membranes are moist  Dentition is normal  No tonsillar exudate  Oropharynx is clear  Pharynx is normal    R TM not visualized since pt has copious clear/whitish d/c noted from R canal  Malodorous  L TM bulging, gray with good cone of light but sl CLEMENTE noted  Has runny nose with same clear/whitish rhinorrhea   Eyes: Pupils are equal, round, and reactive to light  Conjunctivae are normal  Right eye exhibits no discharge  Left eye exhibits no discharge  Neck: Normal range of motion  Neck supple  No neck adenopathy  Cardiovascular: Normal rate, regular rhythm, S1 normal and S2 normal     No murmur heard  Pulmonary/Chest: Effort normal and breath sounds normal  There is normal air entry  No respiratory distress  He has no wheezes  He has no rhonchi  He has no rales  Neurological: He is alert  Skin: Skin is warm and dry  Nursing note and vitals reviewed  No
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
## 2020-08-06 NOTE — PATIENT PROFILE ADULT - VISION (WITH CORRECTIVE LENSES IF THE PATIENT USUALLY WEARS THEM):
Partially impaired: cannot see medication labels or newsprint, but can see obstacles in path, and the surrounding layout; can count fingers at arm's length POST-OP DIAGNOSIS:  Renal mass 06-Aug-2020 11:49:05  Naheed Moreno A

## 2020-11-02 NOTE — STROKE CODE NOTE - NIH STROKE SCALE: 1C. LOC COMMANDS, QM
Patient stated that she would like to get call from Esme and she did not give me more information.Please call.   (2) Performs neither task correctly

## 2022-02-09 NOTE — STROKE CODE NOTE - NIH STROKE SCALE: 2. BEST GAZE, QM
(1) Partial gaze palsy; gaze is abnormal in one or both eyes, but forced deviation or total gaze paresis is not present (2) Forced deviation, or total gaze paresis not overcome by the oculocephalic maneuver 3 = A little assistance

## 2022-06-06 NOTE — DIETITIAN INITIAL EVALUATION ADULT. - 20 CAL FROM
1018 Rituxan Pregnancy And Lactation Text: This medication is Pregnancy Category C and it isn't know if it is safe during pregnancy. It is unknown if this medication is excreted in breast milk but similar antibodies are known to be excreted.

## 2022-09-28 NOTE — PROGRESS NOTE ADULT - ASSESSMENT
Subjective:      Facundo Lord Jr. is a 65 y.o. male who was self-referred for hearing loss.    Mr. Lord presents to clinic for the evaluation of his hearing.  He reports that recently his wife has noted his increased difficultly hearing.  She notes that the TV is on a higher volume and he speaks loudly.  He agreed to have his hearing checked.  He also reports some bilateral ear fullness, but denies any otalgia, otorrhea, tinnitus or vertigo. Overall, he feels that he hears pretty well.  Admits to instrumentation in the ear.     There is not a family history of hearing loss at a young age.  There is not a prior history of ear surgery.  There is not a prior history of ear infections.  There is not a history of ear trauma.  He denies a history of significant noise exposure.  He does not wear hearing aids currently.  He has not had a hearing test recently.      Past Medical History  He has a past medical history of AICD (automatic cardioverter/defibrillator) present, Anticoagulant long-term use, Cancer, Cataract, CHF (congestive heart failure), COPD (chronic obstructive pulmonary disease), Coronary artery disease, Emphysema of lung, Hypertension, and Rheumatoid arthritis.    Past Surgical History  He has a past surgical history that includes bladder cancer surgeruy (Bilateral); Cardiac defibrillator placement; TURBT; pace maker; excision mass buttocks (05/04/2018); anal fistula excision (08/2018); Hip Arthroplasty (Right, 11/26/2018); Release of tendon of hip (Right, 11/26/2018); Joint replacement; Cataract extraction w/  intraocular lens implant (Left, 11/24/2020); and Cataract extraction w/  intraocular lens implant (Right, 1/26/2021).    Family History  His family history is not on file.    Social History  He reports that he quit smoking about 2 years ago. He started smoking about 50 years ago. He smoked an average of .5 packs per day. He has never used smokeless tobacco. He reports current alcohol use. He  Ms. Rucker is an 88y/o female with vascular risk factors of HTN and hyperlipidemia, who was last seen normal at 22:10, on the day prior to admission. Then she was found by family members at the floor with inability to communicate or move her right side at 22:20. She was initially evaluated at [place] were NIHSS=29 deemed candidate for tPA. 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 Patient was transferred for further management to [facility]. Neurological examination showed right hemiparesis, right hemianopia, right dense facial droop, right sensory loss, left gaze preference with gaze palsy. She received IVtpa per protocol and transferred to NS after CTA revealed L ICA and L M1 occlusion.     Impression:    ASSESSMENT:     NEURO: Continue close monitoring for neurologic deterioration, permissive HTN, titrate statin to LDL goal less than 70, MRI Brain w/o, MRA Head w/o and Neck w/contrast. Physical therapy/OT/Speech eval/treatment.     ANTITHROMBOTIC THERAPY:     PULMONARY: CXR clear, protecting airway, saturating well     CARDIOVASCULAR: check TTE, cardiac monitoring                              SBP goal:     GASTROINTESTINAL:  dysphagia screen       Diet:     RENAL: BUN/Cr stable, good urine output      Na Goal: Greater than 135     Mendez:    HEMATOLOGY: H/H stable, Platelets normal      DVT ppx: Heparin s.c [] LMWH []     ID: afebrile, no leukocytosis     OTHER:     DISPOSITION: Rehab or home depending on PT eval once stable and workup is complete      CORE MEASURES:        Admission NIHSS:      TPA: [] YES [] NO      LDL/HDL:     Depression Screen:      Statin Therapy:     Dysphagia Screen: [] PASS [] FAIL     Smoking [] YES [] NO      Afib [] YES [] NO     Stroke Education [] YES [] NO reports that he does not use drugs.    Allergies  He is allergic to lisinopril.    Medications  He has a current medication list which includes the following prescription(s): albuterol-ipratropium, amiodarone, aspirin, atorvastatin, clopidogrel, folic acid, hydroxychloroquine, losartan, methotrexate, metoprolol succinate, ofloxacin, pentoxifylline, potassium chloride, spironolactone, prednisolone acetate, and spiriva with handihaler, and the following Facility-Administered Medications: cyclopentolate 1%, ofloxacin, and sodium chloride 0.9%.    Review of Systems     Constitutional: Negative for appetite change, chills, fatigue, fever and unexpected weight loss.      HENT: Negative for ear discharge, ear infection, ear pain, hearing loss and ringing in the ears.      Respiratory:  Negative for cough, sleep apnea, snoring and wheezing.      Cardiovascular:  Negative for chest pain, foot swelling, irregular heartbeat and swollen veins.     Gastrointestinal:  Negative for abdominal pain, acid reflux, constipation, diarrhea, heartburn and vomiting.       Objective:   /84   Pulse 84      Constitutional:   He is oriented to person, place, and time. He appears well-developed and well-nourished. He appears alert. He is cooperative.  Non-toxic appearance. He does not have a sickly appearance. He does not appear ill. Normal speech.      Head:  Normocephalic and atraumatic. Not macrocephalic and not microcephalic. Head is without raccoon's eyes, without De Jesus's sign, without abrasion, without laceration, without right periorbital erythema, without left periorbital erythema and without TMJ tenderness.     Ears:    Right Ear: No lacerations. No drainage, swelling or tenderness. No foreign bodies. No mastoid tenderness. Tympanic membrane is not injected, not scarred, not perforated, not erythematous, not retracted and not bulging. No middle ear effusion. No hemotympanum.   Left Ear: No lacerations. No drainage, swelling or  tenderness. No foreign bodies. No mastoid tenderness. Tympanic membrane is not injected, not scarred, not perforated, not erythematous, not retracted and not bulging.  No middle ear effusion. No hemotympanum.   Significant ceruminous debris partially obstructing bilateral TMs removed under microscopy     Pulmonary/Chest:   Effort normal.     Psychiatric:   He has a normal mood and affect. His speech is normal and behavior is normal.     Neurological:   He is alert and oriented to person, place, and time. Coordination and gait normal.   Procedure  Cerumen removal performed.  See procedure note.    Data Reviewed  WBC (K/uL)   Date Value   04/23/2019 11.19     Eosinophil % (%)   Date Value   04/23/2019 1.4     Eos # (K/uL)   Date Value   04/23/2019 0.2     Platelets (K/uL)   Date Value   04/23/2019 335     Glucose (mg/dL)   Date Value   04/23/2019 109     No results found for: IGE    Imaging  No pertinent imaging available.    Audiogram    I independently reviewed the tracings of the complete audiometric evaluation performed today.  I reviewed the audiogram with the patient as well.  Pertinent findings include mild sloping to moderate mixed hearing loss bilaterally.  Tympanometry revealed Type B with normal ECV in the right ear and Type As in the left ear.   Assessment:     1. Bilateral impacted cerumen      Plan:     Bilateral impacted cerumen  -     Ear Cerumen Removal  Bilateral cerumen impaction removed under microscopy.  Patient tolerated procedure well and noted immediate improvement in hearing and ear fullness upon impaction removal.  Otoscopic exam benign.  Education about normal ear hygiene and the avoidance of Q-tips was reviewed.      Mixed conductive and sensorineural hearing loss of both ears  The audiometric testing should be interpreted with caution given the presence of cerumen impactions during the testing. I believe the results were effected by the presence of cerumen on the tympanic membrane.  He  reports that once the cerumen was removed his hearing improved.     Follow-up in 6-12 months for regular ear cleaning, repeat audio at that visit.        Ms. Rucker is an 86y/o female with vascular risk factors of HTN and hyperlipidemia, who was last seen normal at 22:10, on the day prior to admission. Then she was found by family members at the floor with inability to communicate or move her right side at 22:20. She was initially evaluated at [place] were NIHSS=29 deemed candidate for tPA. 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 Patient was transferred for further management to [facility]. Neurological examination showed right hemiparesis, right hemianopia, right dense facial droop, right sensory loss, left gaze preference with gaze palsy. She received IVtpa per protocol and transferred to NS after CTA revealed L ICA and L M1 occlusion.     Impression: L MCA infarct secondary to new onset of a-fib.     ASSESSMENT:     NEURO: Continue close monitoring for neurologic deterioration, SBP goal of 110-160, titrate statin to LDL goal less than 70, MRI Brain w/o pending. Physical therapy/OT/Speech eval/treatment- evals pending.     ANTITHROMBOTIC THERAPY: Aspirin only due to hemorraghic transformation     PULMONARY: CXR clear on 10/19, protecting airway, saturating well     CARDIOVASCULAR: TTE shows EF: 55-60%, Normal left ventricular internal dimensions and wall  thicknesses. Normal left ventricular systolic function. No segmental wall motion abnormalities. Agitated saline injection revealed few late bubbles in the left heart, consistent with transpulmonic shunting, cardiac monitoring found to be in a-fib.                              SBP goal: 110-160     GASTROINTESTINAL:  dysphagia screen failed, speech and swallow eval pending now that patient is extubated.     Diet: NPO    RENAL: BUN/Cr without acute change, good urine output      Na Goal: Greater than 135     Mendez:     HEMATOLOGY: H/H without acute change, Platelets 231, no signs or symptoms of bleeding      DVT ppx: Heparin s.c [] LMWH [x]     ID: afebrile, slight leukocytosis , will continue to monitor, will recheck CBC w/ diff in the AM.     OTHER: Plan discussed with pts daughter at bedside, all questions and concerns addressed.     DISPOSITION: Rehab or home depending on PT eval once stable and workup is complete    CORE MEASURES:        Admission NIHSS: 29     TPA: [x] YES [] NO      LDL/HDL: 100/52      Depression Screen: p     Statin Therapy: yes     Dysphagia Screen: [] PASS [x] FAIL     Smoking [] YES [x] NO      Afib [x] YES [] NO     Stroke Education [] YES [] NO - p Ms. Rucker is an 86y/o female with vascular risk factors of HTN and hyperlipidemia, who was last seen normal at 22:10, on the day prior to admission. Then she was found by family members at the floor with inability to communicate or move her right side at 22:20. She was initially evaluated at [place] were NIHSS=29 deemed candidate for tPA. 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 Patient was transferred for further management to [facility]. Neurological examination showed right hemiparesis, right hemianopia, right dense facial droop, right sensory loss, left gaze preference with gaze palsy. She received IVtpa per protocol and transferred to NS after CTA revealed L ICA and L M1 occlusion.     Impression: L MCA infarct secondary to new onset of a-fib.     ASSESSMENT:     NEURO: Continue close monitoring for neurologic deterioration, SBP goal of 110-160, titrate statin to LDL goal less than 70, MRI Brain w/o pending. Physical therapy/OT/Speech eval/treatment- evals pending.     ANTITHROMBOTIC THERAPY: Aspirin only due to hemorraghic transformation     PULMONARY: CXR clear on 10/19, protecting airway, saturating well     CARDIOVASCULAR: TTE shows EF: 55-60%, Normal left ventricular internal dimensions and wall  thicknesses. Normal left ventricular systolic function. No segmental wall motion abnormalities. Agitated saline injection revealed few late bubbles in the left heart, consistent with transpulmonic shunting, cardiac monitoring found to be in a-fib.                              SBP goal: 110-160     GASTROINTESTINAL:  dysphagia screen failed, speech and swallow eval pending now that patient is extubated.     Diet: NPO    RENAL: BUN/Cr without acute change, good urine output      Na Goal: Greater than 135     Mendez:     HEMATOLOGY: H/H without acute change, Platelets 231, no signs or symptoms of bleeding      DVT ppx: Heparin s.c [] LMWH [x]     ID: afebrile, slight leukocytosis , will continue to monitor, will recheck CBC w/ diff in the AM. Ordered 2 sets of blood cultures, UA, and chest X-ray to rule out infection.     OTHER: Plan discussed with pts daughter at bedside, all questions and concerns addressed.     DISPOSITION: Rehab or home depending on PT eval once stable and workup is complete    CORE MEASURES:        Admission NIHSS: 29     TPA: [x] YES [] NO      LDL/HDL: 100/52      Depression Screen: p     Statin Therapy: yes     Dysphagia Screen: [] PASS [x] FAIL     Smoking [] YES [x] NO      Afib [x] YES [] NO     Stroke Education [] YES [] NO - p Ms. Rucker is an 88y/o female with vascular risk factors of HTN and hyperlipidemia, who was last seen normal at 22:10, on the day prior to admission. Then she was found by family members at the floor with inability to communicate or move her right side at 22:20. She was initially evaluated at [place] were NIHSS=29 deemed candidate for tPA. 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 Patient was transferred for further management to [facility]. Neurological examination showed right hemiparesis, right hemianopia, right dense facial droop, right sensory loss, left gaze preference with gaze palsy. She received IVtpa per protocol and transferred to NS after CTA revealed L ICA and L M1 occlusion.     Impression: L MCA infarct likely secondary to cardioembolic etiology (new onset of a-fib)     ASSESSMENT:     NEURO: Continue close monitoring for neurologic deterioration, SBP goal of 110-160, titrate statin to LDL goal less than 70, MRI Brain w/o pending. Physical therapy/OT/Speech eval/treatment- evals pending.     ANTITHROMBOTIC THERAPY: Aspirin only due to hemorraghic transformation (PH-2) on Head CT    PULMONARY: CXR clear on 10/19, protecting airway, saturating well     CARDIOVASCULAR: TTE shows EF: 55-60%, Normal left ventricular internal dimensions and wall  thicknesses. Normal left ventricular systolic function. No segmental wall motion abnormalities. Agitated saline injection revealed few late bubbles in the left heart, consistent with transpulmonic shunting, cardiac monitoring found to be in a-fib.                              SBP goal: 110-160     GASTROINTESTINAL:  dysphagia screen failed, speech and swallow eval pending now that patient is extubated.     Diet: NPO    RENAL: BUN/Cr without acute change, good urine output      Na Goal: Greater than 135     Mendez:     HEMATOLOGY: H/H without acute change, Platelets 231, no signs or symptoms of bleeding      DVT ppx: Heparin s.c [] LMWH [x]     ID: afebrile, slight leukocytosis , will continue to monitor, will recheck CBC w/ diff in the AM. Ordered 2 sets of blood cultures, UA, and chest X-ray to rule out infection.     OTHER: Plan discussed with pts daughter at bedside, all questions and concerns addressed.     DISPOSITION: Rehab or home depending on PT eval once stable and workup is complete    CORE MEASURES:        Admission NIHSS: 29     TPA: [x] YES [] NO      LDL/HDL: 100/52      Depression Screen: p     Statin Therapy: yes     Dysphagia Screen: [] PASS [x] FAIL     Smoking [] YES [x] NO      Afib [x] YES [] NO     Stroke Education [] YES [] NO - p

## 2022-12-07 NOTE — PROVIDER CONTACT NOTE (OTHER) - ASSESSMENT
Noted pt vomitted. Not in acute distress.
Pt aphasic. No acute distress noted. Noted trembling on the left hand with purplish color
as above
Noted history

## 2023-04-30 NOTE — DIETITIAN INITIAL EVALUATION ADULT. - NUTRITIONGOAL OUTCOME3
61 y.o. female with PMH of HTN, DM2, hyperlipidemia, tobacco use (quit in 2020), stroke (2020, R MCA, no deficits), PE (December 2021, on xarelto),  CAD, DCM, HFpEF (15%) s/p AICD, Pulmonary hypertension, who is admited to  for the treatment of a right leg dvt on heparin gtt. Today around 8:15 patient was noted not to be able to talk or move her right side and a stroke code was called. Patient noted to be van+ , NIHSS 2,. L MCA syndrome. CTA showed a L M2 occlusion. Not a candidate for TNK because she was on a heaprin gtt and with therapeutic APTT. Patient will be taken to IR for thrombectomy.     Antithrombotics for secondary stroke prevention: Anticoagulants: Heparin protocol without bolus after imaging completed after IR without evidence of hemorragic conversion.    Statins for secondary stroke prevention and hyperlipidemia, if present:   Statins: Atorvastatin- 40 mg daily    Aggressive risk factor modification: HTN, DM, HLD, HF, DVT     Rehab efforts: The patient has been evaluated by a stroke team provider and the therapy needs have been fully considered based off the presenting complaints and exam findings. The following therapy evaluations are needed: PT evaluate and treat, OT evaluate and treat, SLP evaluate and treat, PM&R evaluate for appropriate placement    Diagnostics ordered/pending: MRI head without contrast to assess brain parenchyma, TTE to assess cardiac function/status     VTE prophylaxis: None: Reason for No Pharmacological VTE Prophylaxis: Currently on anticoagulation    BP parameters: Infarct: Post sucessful thrombectomy, SBP <140  Post unsucessful thrombectomy, SBP <220       Patient to Meet>75% of Nutrition Needs During Current Hospitalization

## 2023-06-02 NOTE — CHART NOTE - NSCHARTNOTEFT_GEN_A_CORE
Asking for refill on Colestipol BID #120. ayo   Nutrition Follow Up     Hospital Course (Per Electronic Medical Record):     Source: Patient [ ]    Family [X]     [X] Medical Record    Diet: Puree Diet w/ No Liquids  Start Ensure Pudding 4oz TID  Intake - Consumes 0-50% of Meals  Discussed Preferences w/ Family    Enteral /Parenteral Nutrition:   2CalHN 1Can-237ml Bolus 3xDay    Current Weight: 172.4lb  % Weight Change: N/A  Obtain New Weight    Pertinent Medications: MEDICATIONS  (STANDING):  amantadine 100 milliGRAM(s) Oral <User Schedule>  amLODIPine   Tablet 5 milliGRAM(s) Oral daily  artificial tears (preservative free) Ophthalmic Solution 1 Drop(s) Both EYES daily  aspirin  chewable 81 milliGRAM(s) Enteral Tube daily  atorvastatin 80 milliGRAM(s) Oral at bedtime  brimonidine 0.2% Ophthalmic Solution 1 Drop(s) Both EYES two times a day  dextrose 5%. 1000 milliLiter(s) (50 mL/Hr) IV Continuous <Continuous>  dextrose 50% Injectable 12.5 Gram(s) IV Push once  dextrose 50% Injectable 25 Gram(s) IV Push once  dextrose 50% Injectable 25 Gram(s) IV Push once  dorzolamide 2% Ophthalmic Solution 1 Drop(s) Both EYES three times a day  enoxaparin Injectable 40 milliGRAM(s) SubCutaneous daily  ferrous    sulfate Liquid 300 milliGRAM(s) Enteral Tube daily  FLUoxetine 10 milliGRAM(s) Oral <User Schedule>  insulin lispro (HumaLOG) corrective regimen sliding scale   SubCutaneous Before meals and at bedtime  lisinopril 5 milliGRAM(s) Oral <User Schedule>  melatonin 6 milliGRAM(s) Oral at bedtime  metoprolol tartrate 12.5 milliGRAM(s) Oral every 12 hours  multivitamin 1 Tablet(s) Oral daily  nystatin    Suspension 643064 Unit(s) Oral two times a day  ondansetron   Disintegrating Tablet 4 milliGRAM(s) Enteral Tube four times a day  pantoprazole   Suspension 40 milliGRAM(s) Oral daily  polyethylene glycol 3350 17 Gram(s) Oral daily  saccharomyces boulardii 250 milliGRAM(s) Oral two times a day  senna Syrup 10 milliLiter(s) Oral at bedtime  timolol 0.5% Solution 1 Drop(s) Both EYES two times a day  trimethoprim  160 mG/sulfamethoxazole 800 mG 1 Tablet(s) Oral two times a day    MEDICATIONS  (PRN):  acetaminophen   Tablet .. 650 milliGRAM(s) Oral every 6 hours PRN Temp greater or equal to 38C (100.4F), Mild Pain (1 - 3)  bisacodyl Suppository 10 milliGRAM(s) Rectal daily PRN Constipation  dextrose 40% Gel 15 Gram(s) Oral once PRN Blood Glucose LESS THAN 70 milliGRAM(s)/deciliter  glucagon  Injectable 1 milliGRAM(s) IntraMuscular once PRN Glucose LESS THAN 70 milligrams/deciliter      Pertinent Labs:  11-06 Na138 mmol/L Glu 124 mg/dL<H> K+ 5.1 mmol/L Cr  1.26 mg/dL BUN 29 mg/dL<H> 10-19 [id number] 6.0 %<H> 10-19 Chol 167 mg/dL  mg/dL HDL 52 mg/dL Trig 75 mg/dL    Skin: No Pressure Ulcers    Edema: None Noted    Estimated Needs:   [X] No Change since Previous Assessment  [ ] Recalculated:     Previous Nutrition Diagnosis:   Swallowing difficulty  Moderate Malnutrition    Nutrition Diagnosis is [X] Ongoing  [ ] Resolved [ ] Not Applicable   Continues     New Nutrition Diagnosis: [ ] Not Applicable    [ ] Inadequate Protein Energy Intake [ ]Inadequate Oral Intake [ ] Excessive Energy Intake     [ ] Underweight [ ] Increased Nutrient Needs [ ] Overweight/Obesity     [ ] Altered GI Function [ ] Unintended Weight Loss [ ] Food & Nutrition Related Knowledge Deficit[ ] Limited Adherence to nutrition related recommendations [ ] Malnutrition  [ ] other: Free text    Interventions:     1. Recommend    Monitoring & Evaluation:   [X] PO intake   [X] Tolerance to Diet Prescription   [X] Weights   [X] Follow Up (Per Protocol)  [X] Other: Labs & PCOT    RD Remains Available.  Fantasma Persaud RD Nutrition Follow Up     Hospital Course (Per Electronic Medical Record):     Source: Patient [ ]    Family [X]     [X] Medical Record    Diet: Puree Diet w/ No Liquids  Start Ensure Pudding 4oz TID  Intake - Consumes 0-50% of Meals  Discussed Preferences w/ Family    Enteral /Parenteral Nutrition:   2CalHN 1Can-237ml Bolus 3xDay    Current Weight: 172.4lb  % Weight Change: N/A  Obtain New Weight    Pertinent Medications: MEDICATIONS  (STANDING):  amantadine 100 milliGRAM(s) Oral <User Schedule>  amLODIPine   Tablet 5 milliGRAM(s) Oral daily  artificial tears (preservative free) Ophthalmic Solution 1 Drop(s) Both EYES daily  aspirin  chewable 81 milliGRAM(s) Enteral Tube daily  atorvastatin 80 milliGRAM(s) Oral at bedtime  brimonidine 0.2% Ophthalmic Solution 1 Drop(s) Both EYES two times a day  dextrose 5%. 1000 milliLiter(s) (50 mL/Hr) IV Continuous <Continuous>  dextrose 50% Injectable 12.5 Gram(s) IV Push once  dextrose 50% Injectable 25 Gram(s) IV Push once  dextrose 50% Injectable 25 Gram(s) IV Push once  dorzolamide 2% Ophthalmic Solution 1 Drop(s) Both EYES three times a day  enoxaparin Injectable 40 milliGRAM(s) SubCutaneous daily  ferrous    sulfate Liquid 300 milliGRAM(s) Enteral Tube daily  FLUoxetine 10 milliGRAM(s) Oral <User Schedule>  insulin lispro (HumaLOG) corrective regimen sliding scale   SubCutaneous Before meals and at bedtime  lisinopril 5 milliGRAM(s) Oral <User Schedule>  melatonin 6 milliGRAM(s) Oral at bedtime  metoprolol tartrate 12.5 milliGRAM(s) Oral every 12 hours  multivitamin 1 Tablet(s) Oral daily  nystatin    Suspension 124524 Unit(s) Oral two times a day  ondansetron   Disintegrating Tablet 4 milliGRAM(s) Enteral Tube four times a day  pantoprazole   Suspension 40 milliGRAM(s) Oral daily  polyethylene glycol 3350 17 Gram(s) Oral daily  saccharomyces boulardii 250 milliGRAM(s) Oral two times a day  senna Syrup 10 milliLiter(s) Oral at bedtime  timolol 0.5% Solution 1 Drop(s) Both EYES two times a day  trimethoprim  160 mG/sulfamethoxazole 800 mG 1 Tablet(s) Oral two times a day    MEDICATIONS  (PRN):  acetaminophen   Tablet .. 650 milliGRAM(s) Oral every 6 hours PRN Temp greater or equal to 38C (100.4F), Mild Pain (1 - 3)  bisacodyl Suppository 10 milliGRAM(s) Rectal daily PRN Constipation  dextrose 40% Gel 15 Gram(s) Oral once PRN Blood Glucose LESS THAN 70 milliGRAM(s)/deciliter  glucagon  Injectable 1 milliGRAM(s) IntraMuscular once PRN Glucose LESS THAN 70 milligrams/deciliter      Pertinent Labs:  11-06 Na138 mmol/L Glu 124 mg/dL<H> K+ 5.1 mmol/L Cr  1.26 mg/dL BUN 29 mg/dL<H> 10-19 [id number] 6.0 %<H> 10-19 Chol 167 mg/dL  mg/dL HDL 52 mg/dL Trig 75 mg/dL    Skin: No Pressure Ulcers    Edema: None Noted    Estimated Needs:   [X] No Change since Previous Assessment  [ ] Recalculated:     Previous Nutrition Diagnosis:   Swallowing difficulty  Moderate Malnutrition    Nutrition Diagnosis is [X] Ongoing  [ ] Resolved [ ] Not Applicable   Continues on TF Started PO Diet w/ No Liquids  Will Continue to Monitor Acceptance     New Nutrition Diagnosis: [X] Not Applicable    Interventions:   1. Recommend Start Ensure Pudding 4oz PO TID  2. Continue Diet & TF as Ordered    Monitoring & Evaluation:   [X] PO intake   [X] Tolerance to Diet Prescription   [X] Weights   [X] Follow Up (Per Protocol)  [X] Other: Labs     RD Remains Available.  Fantasma Persaud RD

## 2023-06-20 NOTE — SWALLOW BEDSIDE ASSESSMENT ADULT - ASR SWALLOW LABIAL MOBILITY
Physical Therapy: No show/Cancellation of Visit  Date: 06/20/2023    Patient was a no show to today's PT appointment. Mr. Mueller was removed from the schedule secondary to non-compliance per no show/cancellation policy.    Initial Evaluation: 5/30/2023  Plan of Care Explanation: 8/4/2023  Cancel: 0  No show: 3    Therapist: Johanne Stark PTA         Right sided asymmetry/impaired pursing/impaired retraction/impaired seal

## 2024-03-28 NOTE — PROGRESS NOTE ADULT - PROVIDER SPECIALTY LIST ADULT
CCU
Cardiology
Gastroenterology
Infectious Disease
NSICU
Neurology
Physiatry
Infectious Disease
Gastroenterology
Neurology
Neurology
Cardiology
Gastroenterology
declines

## 2025-06-24 NOTE — PROGRESS NOTE ADULT - NSHPATTENDINGPLANDISCUSS_GEN_ALL_CORE
[FreeTextEntry1] : I discussed with the patient the pathophysiology of anterior epistaxis. I showed on a drawing the location of the anterior vascular area. I explained the risk factors including but not limited to nasal dryness, and recommended nasal moisturizing and avoiding any intranasal trauma. I also demonstrated how to stop a bleeding if it happens again. 
Neurology resident, PA, NP and medical students on stroke team
Neurology residents, PA and medical students on stroke service
neurology residents and PA
neurology residents, PA and NP
Neurology resident, NP, PA and medical students on stroke service
Neurology residents, NPs and medical students on stroke service.
neurology residents and PA
stroke fellow and PA
stroke fellow and PA
neurology residents and PAs
neurology residents, PA and NP

## 2025-06-30 NOTE — PHYSICAL THERAPY INITIAL EVALUATION ADULT - ORIENTATION, REHAB EVAL
Problem: Occupational Therapy  Goal: Occupational Therapy Goal  Description: LTG: Pt will perform basic ADLs and ADL transfers with Modified independence using LRAD by discharge.    STG: to be met by 7/30/25    Pt will complete grooming standing at sink with LRAD with SBA.  Pt will complete UB dressing with SBA.  Pt will complete LB dressing with SBA using LRAD.  Pt will complete toileting with SBA using LRAD.  Pt will complete functional mobility to/from toilet and toilet transfer with SBA using LRAD.   Outcome: Progressing      unable to assess